# Patient Record
Sex: FEMALE | Race: WHITE | NOT HISPANIC OR LATINO | Employment: FULL TIME | URBAN - METROPOLITAN AREA
[De-identification: names, ages, dates, MRNs, and addresses within clinical notes are randomized per-mention and may not be internally consistent; named-entity substitution may affect disease eponyms.]

---

## 2017-05-22 ENCOUNTER — OFFICE VISIT (OUTPATIENT)
Dept: FAMILY MEDICINE | Facility: CLINIC | Age: 52
End: 2017-05-22
Payer: COMMERCIAL

## 2017-05-22 VITALS
SYSTOLIC BLOOD PRESSURE: 128 MMHG | WEIGHT: 246.5 LBS | HEIGHT: 66 IN | DIASTOLIC BLOOD PRESSURE: 80 MMHG | HEART RATE: 60 BPM | BODY MASS INDEX: 39.62 KG/M2

## 2017-05-22 DIAGNOSIS — R00.2 PALPITATIONS: ICD-10-CM

## 2017-05-22 DIAGNOSIS — Z00.00 ROUTINE ADULT HEALTH MAINTENANCE: Primary | ICD-10-CM

## 2017-05-22 DIAGNOSIS — F31.70 BIPOLAR DISORDER IN FULL REMISSION, MOST RECENT EPISODE UNSPECIFIED TYPE: ICD-10-CM

## 2017-05-22 PROCEDURE — 99999 PR PBB SHADOW E&M-EST. PATIENT-LVL III: CPT | Mod: PBBFAC,,, | Performed by: FAMILY MEDICINE

## 2017-05-22 PROCEDURE — 99396 PREV VISIT EST AGE 40-64: CPT | Mod: S$GLB,,, | Performed by: FAMILY MEDICINE

## 2017-05-22 RX ORDER — OMEPRAZOLE 20 MG/1
20 CAPSULE, DELAYED RELEASE ORAL DAILY
Qty: 30 CAPSULE | Refills: 11 | Status: SHIPPED | OUTPATIENT
Start: 2017-05-22 | End: 2018-08-25 | Stop reason: SDUPTHER

## 2017-05-22 RX ORDER — DIPHENHYDRAMINE HCL 50 MG
50 CAPSULE ORAL NIGHTLY PRN
COMMUNITY
Start: 2017-04-18 | End: 2019-05-02

## 2017-05-22 NOTE — PROGRESS NOTES
HPI  Cristina Tan is a 52 y.o. female with multiple medical diagnoses as listed in the medical history and problem list that presents for Hyperlipidemia (Dr. Garcia)  .      HPI  Here today for routine health maintenance.     PAST MEDICAL HISTORY:  Past Medical History:   Diagnosis Date    Bipolar 1 disorder     GERD (gastroesophageal reflux disease)     History of psychiatric hospitalization     Mental disorder        PAST SURGICAL HISTORY:  Past Surgical History:   Procedure Laterality Date    breast reduction       SECTION      gastric sleeve      HYSTERECTOMY      OOPHORECTOMY      TONSILLECTOMY      tummy tuck         SOCIAL HISTORY:  Social History     Social History    Marital status:      Spouse name: N/A    Number of children: N/A    Years of education: N/A     Occupational History    Not on file.     Social History Main Topics    Smoking status: Never Smoker    Smokeless tobacco: Never Used    Alcohol use No    Drug use: No    Sexual activity: Yes     Partners: Male     Other Topics Concern    Not on file     Social History Narrative    No narrative on file       FAMILY HISTORY:  Family History   Problem Relation Age of Onset    Cancer Father     Breast cancer Neg Hx     Ovarian cancer Neg Hx     Diabetes Neg Hx     Hypertension Neg Hx     Thyroid disease Neg Hx        ALLERGIES AND MEDICATIONS: updated and reviewed.  Review of patient's allergies indicates:   Allergen Reactions    Sulfa (sulfonamide antibiotics) Other (See Comments)     Unknown/childhood    Lamictal [lamotrigine] Rash     Current Outpatient Prescriptions   Medication Sig Dispense Refill    diphenhydrAMINE (BENADRYL) 50 MG capsule Take 50 mg by mouth nightly as needed.      lithium (ESKALITH) 300 MG capsule Take 600 mg by mouth every evening. 2 cap. In the evening.      omeprazole (PRILOSEC) 20 MG capsule Take 1 capsule (20 mg total) by mouth once daily. 30 capsule 11     No current  "facility-administered medications for this visit.        ROS  Review of Systems   Constitutional: Negative for fatigue, fever and unexpected weight change.   HENT: Negative for congestion, hearing loss, rhinorrhea and sore throat.    Eyes: Negative for visual disturbance.   Respiratory: Negative for cough, chest tightness, shortness of breath and wheezing.    Cardiovascular: Positive for palpitations (began 6 months ago.  no triggering events.  no treatment.  lasts several seconds and resolves spontaneously). Negative for chest pain and leg swelling.   Gastrointestinal: Negative for abdominal distention, abdominal pain, blood in stool, constipation, diarrhea, nausea and vomiting.   Genitourinary: Negative for difficulty urinating, dysuria, frequency, hematuria, menstrual problem, pelvic pain, urgency and vaginal bleeding.        Rare urgency   Musculoskeletal: Negative for back pain, joint swelling and neck pain.   Skin: Negative for rash.   Neurological: Negative for dizziness, tremors, weakness, light-headedness, numbness and headaches.   Psychiatric/Behavioral: Negative for confusion, dysphoric mood and sleep disturbance. The patient is not nervous/anxious.        Physical Exam  Vitals:    05/22/17 1627   BP: 128/80   Pulse: 60    Body mass index is 39.78 kg/m².  Weight: 111.8 kg (246 lb 7.6 oz)   Height: 5' 6" (167.6 cm)     Physical Exam   Constitutional: She is oriented to person, place, and time. She appears well-developed and well-nourished.   HENT:   Head: Normocephalic and atraumatic.   Right Ear: External ear normal.   Left Ear: External ear normal.   Nose: Nose normal.   Mouth/Throat: Oropharynx is clear and moist.   Eyes: Conjunctivae and EOM are normal. Pupils are equal, round, and reactive to light. No scleral icterus.   Neck: Normal range of motion. Neck supple. No JVD present. No thyromegaly present.   Cardiovascular: Normal rate, regular rhythm and normal heart sounds.  Exam reveals no gallop and " no friction rub.    No murmur heard.  Pulmonary/Chest: Effort normal and breath sounds normal. She has no wheezes. She has no rales.   Abdominal: Soft. Bowel sounds are normal. She exhibits no distension and no mass. There is no tenderness. There is no rebound and no guarding.   Musculoskeletal: Normal range of motion. She exhibits no edema.   Lymphadenopathy:     She has no cervical adenopathy.   Neurological: She is alert and oriented to person, place, and time. She has normal strength. No cranial nerve deficit or sensory deficit.   Skin: Skin is warm and dry. No rash noted.   Vitals reviewed.      Health Maintenance       Date Due Completion Date    Fecal Occult Blood Test (FOBT) 1965 ---    TETANUS VACCINE 04/05/1982 ---    Mammogram 10/28/2016 10/28/2015    Influenza Vaccine 08/01/2017 ---    Pap Smear 10/26/2018 10/26/2015    Lipid Panel 02/17/2021 2/17/2016          Assessment & Plan    Routine adult health maintenance  -     Mammo Digital Screening Bilat with CAD; Future; Expected date: 05/22/2017  -     Occult blood x 1, stool; Future; Expected date: 05/22/2017  - Health maintenance reviewed  - Diet and exercise education.    Palpitations  -     Holter monitor - 48 hour; Future    Bipolar disorder in full remission, most recent episode unspecified type  - Continue current therapy  - Continue Psychiatry    Other orders  -     omeprazole (PRILOSEC) 20 MG capsule; Take 1 capsule (20 mg total) by mouth once daily.  Dispense: 30 capsule; Refill: 11        No Follow-up on file.

## 2017-05-30 ENCOUNTER — HOSPITAL ENCOUNTER (OUTPATIENT)
Dept: RADIOLOGY | Facility: HOSPITAL | Age: 52
Discharge: HOME | End: 2017-05-30
Attending: FAMILY MEDICINE
Payer: COMMERCIAL

## 2017-05-30 DIAGNOSIS — Z00.00 ROUTINE ADULT HEALTH MAINTENANCE: ICD-10-CM

## 2017-05-30 DIAGNOSIS — Z12.31 VISIT FOR SCREENING MAMMOGRAM: ICD-10-CM

## 2017-05-30 PROCEDURE — 77067 SCR MAMMO BI INCL CAD: CPT | Mod: 26,,, | Performed by: RADIOLOGY

## 2017-05-30 PROCEDURE — 77067 SCR MAMMO BI INCL CAD: CPT | Mod: TC

## 2017-06-21 ENCOUNTER — LAB VISIT (OUTPATIENT)
Dept: LAB | Facility: HOSPITAL | Age: 52
End: 2017-06-21
Attending: FAMILY MEDICINE
Payer: COMMERCIAL

## 2017-06-21 DIAGNOSIS — Z00.00 ROUTINE ADULT HEALTH MAINTENANCE: ICD-10-CM

## 2017-06-21 PROCEDURE — 82272 OCCULT BLD FECES 1-3 TESTS: CPT

## 2017-06-22 LAB — OB PNL STL: NEGATIVE

## 2017-07-07 ENCOUNTER — TELEPHONE (OUTPATIENT)
Dept: FAMILY MEDICINE | Facility: CLINIC | Age: 52
End: 2017-07-07

## 2017-07-07 NOTE — TELEPHONE ENCOUNTER
----- Message from Ratna Daugherty sent at 7/7/2017  9:02 AM CDT -----  Contact: Rere Engle [phone]  Patient needs hospital follow up with the doctor.Thanks!

## 2017-07-10 ENCOUNTER — TELEPHONE (OUTPATIENT)
Dept: FAMILY MEDICINE | Facility: CLINIC | Age: 52
End: 2017-07-10

## 2017-07-10 NOTE — TELEPHONE ENCOUNTER
----- Message from Megan Reaves sent at 7/10/2017 10:49 AM CDT -----  Dr. Garcia's office is calling to let office know that patient is on her way.  said she was supposed to call about 15 minutes ago but that was still when the appointment was supposed to be completed. I do not have the patient's cell. When she gets there office will have to decide whether to reschedule.

## 2018-01-30 ENCOUNTER — TELEPHONE (OUTPATIENT)
Dept: FAMILY MEDICINE | Facility: CLINIC | Age: 53
End: 2018-01-30

## 2018-01-30 NOTE — TELEPHONE ENCOUNTER
----- Message from Tessy Davison sent at 1/29/2018  4:05 PM CST -----  Contact: self  Patient called regarding heart monitor was to be given at last appt, stating having issues with heart again. Please contact [phone] (home)

## 2018-02-02 ENCOUNTER — CLINICAL SUPPORT (OUTPATIENT)
Dept: CARDIOLOGY | Facility: CLINIC | Age: 53
End: 2018-02-02
Attending: FAMILY MEDICINE
Payer: COMMERCIAL

## 2018-02-02 DIAGNOSIS — R00.2 PALPITATIONS: ICD-10-CM

## 2018-02-02 PROCEDURE — 93224 XTRNL ECG REC UP TO 48 HRS: CPT | Mod: S$GLB,,, | Performed by: INTERNAL MEDICINE

## 2018-07-31 ENCOUNTER — TELEPHONE (OUTPATIENT)
Dept: FAMILY MEDICINE | Facility: CLINIC | Age: 53
End: 2018-07-31

## 2018-07-31 DIAGNOSIS — Z12.39 SCREENING FOR BREAST CANCER: Primary | ICD-10-CM

## 2018-07-31 NOTE — TELEPHONE ENCOUNTER
----- Message from Tabatha Coppola sent at 7/31/2018  7:49 AM CDT -----  Contact: self  Patient [phone] is calling to schedule her mammogram/last mammogram was 05 30 17 but an order is needed/please enter order/

## 2018-08-11 ENCOUNTER — HOSPITAL ENCOUNTER (OUTPATIENT)
Dept: RADIOLOGY | Facility: HOSPITAL | Age: 53
Discharge: HOME | End: 2018-08-11
Attending: FAMILY MEDICINE
Payer: COMMERCIAL

## 2018-08-11 DIAGNOSIS — Z12.39 SCREENING FOR BREAST CANCER: ICD-10-CM

## 2018-08-11 PROCEDURE — 77067 SCR MAMMO BI INCL CAD: CPT | Mod: 26,,, | Performed by: RADIOLOGY

## 2018-08-11 PROCEDURE — 77063 BREAST TOMOSYNTHESIS BI: CPT | Mod: TC,PO

## 2018-08-11 PROCEDURE — 77063 BREAST TOMOSYNTHESIS BI: CPT | Mod: 26,,, | Performed by: RADIOLOGY

## 2018-08-27 RX ORDER — OMEPRAZOLE 20 MG/1
CAPSULE, DELAYED RELEASE ORAL
Qty: 30 CAPSULE | Refills: 3 | Status: SHIPPED | OUTPATIENT
Start: 2018-08-27 | End: 2020-08-20 | Stop reason: SDUPTHER

## 2018-08-28 ENCOUNTER — PATIENT MESSAGE (OUTPATIENT)
Dept: FAMILY MEDICINE | Facility: CLINIC | Age: 53
End: 2018-08-28

## 2018-09-19 ENCOUNTER — PATIENT MESSAGE (OUTPATIENT)
Dept: FAMILY MEDICINE | Facility: CLINIC | Age: 53
End: 2018-09-19

## 2018-09-22 ENCOUNTER — PATIENT MESSAGE (OUTPATIENT)
Dept: FAMILY MEDICINE | Facility: CLINIC | Age: 53
End: 2018-09-22

## 2018-09-22 DIAGNOSIS — Z00.00 ROUTINE HEALTH MAINTENANCE: Primary | ICD-10-CM

## 2018-11-07 ENCOUNTER — TELEPHONE (OUTPATIENT)
Dept: GASTROENTEROLOGY | Facility: CLINIC | Age: 53
End: 2018-11-07

## 2018-11-07 NOTE — TELEPHONE ENCOUNTER
Spoke with pt. Attempted to schedule ordered colonoscopy. Pt undecided if she wants to have procedure. Pt will call back to schedule if she decides to schedule.

## 2019-04-12 ENCOUNTER — OFFICE VISIT (OUTPATIENT)
Dept: FAMILY MEDICINE | Facility: CLINIC | Age: 54
End: 2019-04-12
Payer: COMMERCIAL

## 2019-04-12 VITALS
HEART RATE: 68 BPM | DIASTOLIC BLOOD PRESSURE: 70 MMHG | BODY MASS INDEX: 42.13 KG/M2 | WEIGHT: 261 LBS | SYSTOLIC BLOOD PRESSURE: 114 MMHG | OXYGEN SATURATION: 94 %

## 2019-04-12 DIAGNOSIS — R07.9 CHEST PAIN, UNSPECIFIED TYPE: ICD-10-CM

## 2019-04-12 DIAGNOSIS — S62.91XD CLOSED FRACTURE OF RIGHT HAND WITH ROUTINE HEALING, SUBSEQUENT ENCOUNTER: ICD-10-CM

## 2019-04-12 DIAGNOSIS — F31.9 BIPOLAR AFFECTIVE DISORDER, REMISSION STATUS UNSPECIFIED: ICD-10-CM

## 2019-04-12 DIAGNOSIS — Z00.00 ROUTINE HEALTH MAINTENANCE: Primary | ICD-10-CM

## 2019-04-12 PROCEDURE — 99999 PR PBB SHADOW E&M-EST. PATIENT-LVL II: CPT | Mod: PBBFAC,,, | Performed by: FAMILY MEDICINE

## 2019-04-12 PROCEDURE — 99396 PR PREVENTIVE VISIT,EST,40-64: ICD-10-PCS | Mod: S$GLB,,, | Performed by: FAMILY MEDICINE

## 2019-04-12 PROCEDURE — 99396 PREV VISIT EST AGE 40-64: CPT | Mod: S$GLB,,, | Performed by: FAMILY MEDICINE

## 2019-04-12 PROCEDURE — 99999 PR PBB SHADOW E&M-EST. PATIENT-LVL II: ICD-10-PCS | Mod: PBBFAC,,, | Performed by: FAMILY MEDICINE

## 2019-04-12 NOTE — PROGRESS NOTES
JESSICA Tan is a 54 y.o. female with multiple medical diagnoses as listed in the medical history and problem list that presents for No chief complaint on file.  .      HPI  Here today for routine health maintenance.  Still seeing Psychiatry for recurrent bipolar disorder with manic episodes.  She recently injured her right hand with persistent pain and numbness in the right thumb.  Recent xrays by her report show loose bone fragments and a hand specialist was recommended.    PAST MEDICAL HISTORY:  Past Medical History:   Diagnosis Date    Bipolar 1 disorder     GERD (gastroesophageal reflux disease)     History of psychiatric hospitalization     Mental disorder        PAST SURGICAL HISTORY:  Past Surgical History:   Procedure Laterality Date    breast reduction       SECTION      gastric sleeve      HYSTERECTOMY      OOPHORECTOMY      TONSILLECTOMY      TOTAL REDUCTION MAMMOPLASTY Bilateral         tummy tuck         SOCIAL HISTORY:  Social History     Socioeconomic History    Marital status:      Spouse name: Not on file    Number of children: Not on file    Years of education: Not on file    Highest education level: Not on file   Occupational History    Not on file   Social Needs    Financial resource strain: Not on file    Food insecurity:     Worry: Not on file     Inability: Not on file    Transportation needs:     Medical: Not on file     Non-medical: Not on file   Tobacco Use    Smoking status: Never Smoker    Smokeless tobacco: Never Used   Substance and Sexual Activity    Alcohol use: Yes    Drug use: No    Sexual activity: Yes     Partners: Male   Lifestyle    Physical activity:     Days per week: Not on file     Minutes per session: Not on file    Stress: Not on file   Relationships    Social connections:     Talks on phone: Not on file     Gets together: Not on file     Attends Muslim service: Not on file     Active member of club or  organization: Not on file     Attends meetings of clubs or organizations: Not on file     Relationship status: Not on file   Other Topics Concern    Not on file   Social History Narrative    Not on file       FAMILY HISTORY:  Family History   Problem Relation Age of Onset    Cancer Father     Breast cancer Neg Hx     Ovarian cancer Neg Hx     Diabetes Neg Hx     Hypertension Neg Hx     Thyroid disease Neg Hx        ALLERGIES AND MEDICATIONS: updated and reviewed.  Review of patient's allergies indicates:   Allergen Reactions    Sulfa (sulfonamide antibiotics) Other (See Comments)     Unknown/childhood    Lamictal [lamotrigine] Rash     Current Outpatient Medications   Medication Sig Dispense Refill    diphenhydrAMINE (BENADRYL) 50 MG capsule Take 50 mg by mouth nightly as needed.      lithium (ESKALITH) 300 MG capsule Take 600 mg by mouth every evening. 2 cap. In the evening.      LORazepam (ATIVAN) 1 MG tablet Take 1 tablet (1 mg total) by mouth every 8 (eight) hours as needed for Anxiety. 15 tablet 0    omeprazole (PRILOSEC) 20 MG capsule TAKE 1 CAPSULE(20 MG) BY MOUTH EVERY DAY 30 capsule 3    risperidone (RISPERDAL) 1 MG tablet Take 1 tablet (1 mg total) by mouth once daily. 30 tablet 1     No current facility-administered medications for this visit.        ROS  Review of Systems   Constitutional: Negative for fatigue, fever and unexpected weight change.   HENT: Negative for congestion, hearing loss, rhinorrhea and sore throat.    Eyes: Negative for visual disturbance.   Respiratory: Negative for cough, chest tightness, shortness of breath and wheezing.    Cardiovascular: Positive for chest pain (one episode sharp and shooting last week). Negative for palpitations and leg swelling.   Gastrointestinal: Negative for abdominal distention, abdominal pain, blood in stool, constipation, diarrhea, nausea and vomiting.   Genitourinary: Negative for difficulty urinating, dysuria, frequency, hematuria,  menstrual problem, pelvic pain, urgency and vaginal bleeding.   Musculoskeletal: Positive for arthralgias. Negative for back pain, joint swelling and neck pain.   Skin: Negative for rash.   Neurological: Negative for dizziness, tremors, weakness, light-headedness, numbness and headaches.   Psychiatric/Behavioral: Negative for confusion, dysphoric mood and sleep disturbance. The patient is not nervous/anxious.        Physical Exam  There were no vitals filed for this visit. Body mass index is 42.13 kg/m².  Weight: 118.4 kg (261 lb 0.4 oz)         Physical Exam   Constitutional: She is oriented to person, place, and time. She appears well-developed and well-nourished.   HENT:   Head: Normocephalic and atraumatic.   Right Ear: External ear normal.   Left Ear: External ear normal.   Nose: Nose normal.   Mouth/Throat: Oropharynx is clear and moist.   Eyes: Pupils are equal, round, and reactive to light. Conjunctivae and EOM are normal. No scleral icterus.   Neck: Normal range of motion. Neck supple. No JVD present. No thyromegaly present.   Cardiovascular: Normal rate, regular rhythm and normal heart sounds. Exam reveals no gallop and no friction rub.   No murmur heard.  Pulmonary/Chest: Effort normal and breath sounds normal. She has no wheezes. She has no rales.   Abdominal: Soft. Bowel sounds are normal. She exhibits no distension and no mass. There is no tenderness. There is no rebound and no guarding.   Musculoskeletal: Normal range of motion. She exhibits no edema.   Lymphadenopathy:     She has no cervical adenopathy.   Neurological: She is alert and oriented to person, place, and time. She has normal strength. No cranial nerve deficit or sensory deficit.   Skin: Skin is warm and dry. No rash noted.   Vitals reviewed.      Health Maintenance       Date Due Completion Date    TETANUS VACCINE 04/05/1982 ---    Fecal Occult Blood Test (FOBT)/[employer] 06/21/2018 6/21/2017    Influenza Vaccine 08/01/2018 ---    Mammogram  08/11/2019 8/11/2018    Lipid Panel 02/17/2021 2/17/2016          Assessment & Plan    Routine health maintenance  -     Comprehensive metabolic panel; Future; Expected date: 04/12/2019  -     Lipid panel; Future; Expected date: 04/12/2019  -     Fecal Immunochemical Test (iFOBT); Future; Expected date: 04/12/2019  -     Case request GI: COLONOSCOPY  - Health maintenance reviewed  - Diet and exercise education.    Bipolar affective disorder, remission status unspecified  -     Lithium level; Future; Expected date: 04/12/2019  -     TSH; Future; Expected date: 04/12/2019  - Continue current therapy  - Continue Psychiatry    Closed fracture of right hand with routine healing, subsequent encounter  -     Ambulatory consult to Orthopedics    Chest pain, unspecified type  -     Echocardiogram stress test (Cupid Only); Future          Follow up in about 1 year (around 4/12/2020).

## 2019-04-15 ENCOUNTER — TELEPHONE (OUTPATIENT)
Dept: GASTROENTEROLOGY | Facility: CLINIC | Age: 54
End: 2019-04-15

## 2019-04-15 NOTE — TELEPHONE ENCOUNTER
Pt not interested in scheduling colonoscopy at this time. Needs more time to think about it. Will call back when she is ready to schedule

## 2019-04-24 ENCOUNTER — LAB VISIT (OUTPATIENT)
Dept: LAB | Facility: HOSPITAL | Age: 54
End: 2019-04-24
Attending: FAMILY MEDICINE
Payer: COMMERCIAL

## 2019-04-24 DIAGNOSIS — F31.9 BIPOLAR AFFECTIVE DISORDER, REMISSION STATUS UNSPECIFIED: ICD-10-CM

## 2019-04-24 DIAGNOSIS — Z00.00 ROUTINE HEALTH MAINTENANCE: ICD-10-CM

## 2019-04-24 LAB
ALBUMIN SERPL BCP-MCNC: 3.6 G/DL (ref 3.5–5.2)
ALP SERPL-CCNC: 52 U/L (ref 55–135)
ALT SERPL W/O P-5'-P-CCNC: 16 U/L (ref 10–44)
ANION GAP SERPL CALC-SCNC: 8 MMOL/L (ref 8–16)
AST SERPL-CCNC: 18 U/L (ref 10–40)
BILIRUB SERPL-MCNC: 1.2 MG/DL (ref 0.1–1)
BUN SERPL-MCNC: 11 MG/DL (ref 6–20)
CALCIUM SERPL-MCNC: 9.6 MG/DL (ref 8.7–10.5)
CHLORIDE SERPL-SCNC: 109 MMOL/L (ref 95–110)
CHOLEST SERPL-MCNC: 255 MG/DL (ref 120–199)
CHOLEST/HDLC SERPL: 4.7 {RATIO} (ref 2–5)
CO2 SERPL-SCNC: 26 MMOL/L (ref 23–29)
CREAT SERPL-MCNC: 0.8 MG/DL (ref 0.5–1.4)
EST. GFR  (AFRICAN AMERICAN): >60 ML/MIN/1.73 M^2
EST. GFR  (NON AFRICAN AMERICAN): >60 ML/MIN/1.73 M^2
GLUCOSE SERPL-MCNC: 109 MG/DL (ref 70–110)
HDLC SERPL-MCNC: 54 MG/DL (ref 40–75)
HDLC SERPL: 21.2 % (ref 20–50)
LDLC SERPL CALC-MCNC: 161.2 MG/DL (ref 63–159)
LITHIUM SERPL-SCNC: 0.7 MMOL/L (ref 0.6–1.2)
NONHDLC SERPL-MCNC: 201 MG/DL
POTASSIUM SERPL-SCNC: 4.2 MMOL/L (ref 3.5–5.1)
PROT SERPL-MCNC: 7.1 G/DL (ref 6–8.4)
SODIUM SERPL-SCNC: 143 MMOL/L (ref 136–145)
TRIGL SERPL-MCNC: 199 MG/DL (ref 30–150)
TSH SERPL DL<=0.005 MIU/L-ACNC: 3.15 UIU/ML (ref 0.4–4)

## 2019-04-24 PROCEDURE — 80061 LIPID PANEL: CPT

## 2019-04-24 PROCEDURE — 36415 COLL VENOUS BLD VENIPUNCTURE: CPT | Mod: PO

## 2019-04-24 PROCEDURE — 84443 ASSAY THYROID STIM HORMONE: CPT

## 2019-04-24 PROCEDURE — 80053 COMPREHEN METABOLIC PANEL: CPT

## 2019-04-24 PROCEDURE — 80178 ASSAY OF LITHIUM: CPT

## 2019-04-26 ENCOUNTER — CLINICAL SUPPORT (OUTPATIENT)
Dept: CARDIOLOGY | Facility: CLINIC | Age: 54
End: 2019-04-26
Attending: FAMILY MEDICINE
Payer: COMMERCIAL

## 2019-04-26 VITALS — HEIGHT: 66 IN | WEIGHT: 261 LBS | BODY MASS INDEX: 41.95 KG/M2

## 2019-04-26 DIAGNOSIS — R07.9 CHEST PAIN, UNSPECIFIED TYPE: ICD-10-CM

## 2019-04-26 PROCEDURE — 93351 ECHOCARDIOGRAM STRESS TEST (CUPID ONLY): ICD-10-PCS | Mod: S$GLB,,, | Performed by: INTERNAL MEDICINE

## 2019-04-26 PROCEDURE — 99999 PR PBB SHADOW E&M-EST. PATIENT-LVL I: ICD-10-PCS | Mod: PBBFAC,,,

## 2019-04-26 PROCEDURE — 99999 PR PBB SHADOW E&M-EST. PATIENT-LVL I: CPT | Mod: PBBFAC,,,

## 2019-04-26 PROCEDURE — 93351 STRESS TTE COMPLETE: CPT | Mod: S$GLB,,, | Performed by: INTERNAL MEDICINE

## 2019-04-29 DIAGNOSIS — R07.9 ACUTE CHEST PAIN: ICD-10-CM

## 2019-04-29 LAB
ASCENDING AORTA: 2.89 CM
BSA FOR ECHO PROCEDURE: 2.35 M2
CV ECHO LV RWT: 0.38 CM
CV STRESS BASE HR: 68 BPM
DIASTOLIC BLOOD PRESSURE: 74 MMHG
DOP CALC LVOT AREA: 4.79 CM2
DOP CALC LVOT DIAMETER: 2.47 CM
DOP CALC LVOT PEAK VEL: 0.9 M/S
DOP CALC LVOT STROKE VOLUME: 104.12 CM3
DOP CALCLVOT PEAK VEL VTI: 21.74 CM
E WAVE DECELERATION TIME: 175.96 MSEC
E/A RATIO: 1.03
E/E' RATIO: 9.06
ECHO LV POSTERIOR WALL: 0.98 CM (ref 0.6–1.1)
FRACTIONAL SHORTENING: 50 % (ref 28–44)
INTERVENTRICULAR SEPTUM: 0.93 CM (ref 0.6–1.1)
IVRT: 0.13 MSEC
LA MAJOR: 5.05 CM
LA MINOR: 4.55 CM
LA WIDTH: 4.05 CM
LEFT ATRIUM SIZE: 4.11 CM
LEFT ATRIUM VOLUME INDEX: 30.2 ML/M2
LEFT ATRIUM VOLUME: 67.73 CM3
LEFT INTERNAL DIMENSION IN SYSTOLE: 2.58 CM (ref 2.1–4)
LEFT VENTRICLE DIASTOLIC VOLUME INDEX: 56.94 ML/M2
LEFT VENTRICLE DIASTOLIC VOLUME: 127.52 ML
LEFT VENTRICLE MASS INDEX: 80.8 G/M2
LEFT VENTRICLE SYSTOLIC VOLUME INDEX: 10.8 ML/M2
LEFT VENTRICLE SYSTOLIC VOLUME: 24.09 ML
LEFT VENTRICULAR INTERNAL DIMENSION IN DIASTOLE: 5.17 CM (ref 3.5–6)
LEFT VENTRICULAR MASS: 180.9 G
LV LATERAL E/E' RATIO: 7.7
LV SEPTAL E/E' RATIO: 11
MV PEAK A VEL: 0.75 M/S
MV PEAK E VEL: 0.77 M/S
OHS CV CPX 1 MINUTE RECOVERY HEART RATE: 116 BPM
OHS CV CPX 85 PERCENT MAX PREDICTED HEART RATE MALE: 134
OHS CV CPX ESTIMATED METS: 11
OHS CV CPX MAX PREDICTED HEART RATE: 158
OHS CV CPX PATIENT IS FEMALE: 1
OHS CV CPX PATIENT IS MALE: 0
OHS CV CPX PEAK DIASTOLIC BLOOD PRESSURE: 80 MMHG
OHS CV CPX PEAK HEAR RATE: 162 BPM
OHS CV CPX PEAK RATE PRESSURE PRODUCT: NORMAL
OHS CV CPX PEAK SYSTOLIC BLOOD PRESSURE: 164 MMHG
OHS CV CPX PERCENT MAX PREDICTED HEART RATE ACHIEVED: 103
OHS CV CPX PERCENT TARGET HEART RATE ACHIEVED: 120.9
OHS CV CPX RATE PRESSURE PRODUCT PRESENTING: 7752
OHS CV CPX TARGET HEART RATE: 134
PISA TR MAX VEL: 2.32 M/S
PULM VEIN S/D RATIO: 1.07
PV PEAK D VEL: 0.55 M/S
PV PEAK S VEL: 0.59 M/S
RA MAJOR: 5.25 CM
RA WIDTH: 3.7 CM
SINUS: 2.68 CM
STJ: 2.91 CM
STRESS ECHO POST EXERCISE DUR MIN: 6 MIN
STRESS ECHO POST EXERCISE DUR SEC: 17
SYSTOLIC BLOOD PRESSURE: 114 MMHG
TDI LATERAL: 0.1
TDI SEPTAL: 0.07
TDI: 0.09
TR MAX PG: 21.53 MMHG

## 2019-05-02 ENCOUNTER — OFFICE VISIT (OUTPATIENT)
Dept: ORTHOPEDICS | Facility: CLINIC | Age: 54
End: 2019-05-02
Payer: COMMERCIAL

## 2019-05-02 VITALS
BODY MASS INDEX: 42.24 KG/M2 | HEIGHT: 66 IN | SYSTOLIC BLOOD PRESSURE: 125 MMHG | DIASTOLIC BLOOD PRESSURE: 86 MMHG | HEART RATE: 71 BPM | WEIGHT: 262.81 LBS

## 2019-05-02 DIAGNOSIS — G56.01 CARPAL TUNNEL SYNDROME OF RIGHT WRIST: Primary | ICD-10-CM

## 2019-05-02 DIAGNOSIS — S60.211A CONTUSION OF RIGHT WRIST, INITIAL ENCOUNTER: ICD-10-CM

## 2019-05-02 PROCEDURE — 99203 OFFICE O/P NEW LOW 30 MIN: CPT | Mod: S$GLB,,, | Performed by: ORTHOPAEDIC SURGERY

## 2019-05-02 PROCEDURE — 99999 PR PBB SHADOW E&M-EST. PATIENT-LVL III: ICD-10-PCS | Mod: PBBFAC,,, | Performed by: ORTHOPAEDIC SURGERY

## 2019-05-02 PROCEDURE — 99203 PR OFFICE/OUTPT VISIT, NEW, LEVL III, 30-44 MIN: ICD-10-PCS | Mod: S$GLB,,, | Performed by: ORTHOPAEDIC SURGERY

## 2019-05-02 PROCEDURE — 99999 PR PBB SHADOW E&M-EST. PATIENT-LVL III: CPT | Mod: PBBFAC,,, | Performed by: ORTHOPAEDIC SURGERY

## 2019-05-02 RX ORDER — MELOXICAM 15 MG/1
15 TABLET ORAL DAILY
Qty: 30 TABLET | Refills: 0 | Status: SHIPPED | OUTPATIENT
Start: 2019-05-02 | End: 2019-07-22

## 2019-05-02 NOTE — LETTER
May 4, 2019      Tin Palomares MD  [address] [facility] [address] [place] [address]           [facility]  [address] [facility] [address] [place] [address]  Phone: [phone]          Patient: Cristina Tan   MR Number: [id number]   YOB: 1965   Date of Visit: 5/2/2019       Dear Dr. Tin Palomares:    Thank you for referring Cristina Tan to me for evaluation. Attached you will find relevant portions of my assessment and plan of care.    If you have questions, please do not hesitate to call me. I look forward to following Cristina Tan along with you.    Sincerely,    Ld Carbone MD    Enclosure  CC:  No Recipients    If you would like to receive this communication electronically, please contact [email or web address] or [phone] to request more information on [employer] access.    For providers and/or their staff who would like to refer a patient to [facility], please contact us through our one-stop-shop provider referral line, [facility], at [phone].    If you feel you have received this communication in error or would no longer like to receive these types of communications, please e-mail [email or web address]

## 2019-05-02 NOTE — H&P
2019    Chief Complaint:  Chief Complaint   Patient presents with    Hand Injury     pt injured hand on 19; fell out of a van       HPI:  Cristina Tan is a 54 y.o. female, who presents to clinic today approximately 3-4 months ago she was noted to have a fall onto her right outstretched hand.  She had pain and some swelling about the wrist. She eventually obtained an x-ray.  She was told she may have a fracture in her wrist.  She is here today for follow-up.  She states her main complaint at this point is numbness and tingling in her hand and fingers.  She states that the thumb is the most affected.  She states that her wrist is no longer significantly tender.  She has no other complaints.    PMHX:  Past Medical History:   Diagnosis Date    Bipolar 1 disorder     GERD (gastroesophageal reflux disease)     History of psychiatric hospitalization     Mental disorder        PSHX:  Past Surgical History:   Procedure Laterality Date    breast reduction       SECTION      gastric sleeve      HYSTERECTOMY      OOPHORECTOMY      TONSILLECTOMY      TOTAL REDUCTION MAMMOPLASTY Bilateral         tummy tuck         FMHX:  Family History   Problem Relation Age of Onset    Cancer Father     Breast cancer Neg Hx     Ovarian cancer Neg Hx     Diabetes Neg Hx     Hypertension Neg Hx     Thyroid disease Neg Hx        SOCHX:  Social History     Tobacco Use    Smoking status: Never Smoker    Smokeless tobacco: Never Used   Substance Use Topics    Alcohol use: Yes       ALLERGIES:  Lamictal [lamotrigine]    CURRENT MEDICATIONS:  Current Outpatient Medications on File Prior to Visit   Medication Sig Dispense Refill    lithium (ESKALITH) 300 MG capsule Take 600 mg by mouth every evening. 2 cap. In the evening.      omeprazole (PRILOSEC) 20 MG capsule TAKE 1 CAPSULE(20 MG) BY MOUTH EVERY DAY 30 capsule 3    LORazepam (ATIVAN) 1 MG tablet Take 1 tablet (1 mg total) by mouth every 8 (eight)  "hours as needed for Anxiety. 15 tablet 0    risperidone (RISPERDAL) 1 MG tablet Take 1 tablet (1 mg total) by mouth once daily. 30 tablet 1    [DISCONTINUED] diphenhydrAMINE (BENADRYL) 50 MG capsule Take 50 mg by mouth nightly as needed.       No current facility-administered medications on file prior to visit.        REVIEW OF SYSTEMS:  General: No major recent weight loss or weight gain  HEENT: No major changes in vision or hearing, she has no chronic sore throat  Respiratory: No chronic shortness of breath or wheezing  Cardiac: No chest pains or palpitations  GI: No chronic nausea vomiting or diarrhea  : No pain on urination or difficulty with urinating  Derm: No chronic skin rashes  Neuro: Never had a seizure or stroke    GENERAL PHYSICAL EXAM:   /86   Pulse 71   Ht 5' 6" (1.676 m)   Wt 119.2 kg (262 lb 12.6 oz)   BMI 42.42 kg/m²    GEN: well developed, well nourished, no acute distress   HENT: Normocephalic, atraumatic   EYES: No discharge, conjunctiva normal   NECK: Supple, non-tender   PULM: No wheezing, no respiratory distress   CV: RRR   ABD: Soft, non-tender    ORTHO EXAM:   Examination of the right wrist and hand reveals that there is no edema.  There are no major skin changes.  Palpation produces no areas of tenderness.  There is no snuffbox tenderness.  There is no tenderness over the CMC joint or over the distal pole of the scaphoid.  Wrist range of motion is extension of 70° and flexion of 60°.  She has full pronation and supination.  She does report mild decreased sensation in the median distribution when compared to the radial and ulnar distribution.  She has a negative Tinel's but a mildly positive Durkan's test.  She does have 5/5 thenar muscular strength.    RADIOLOGY:   X-rays of the right wrist from an outside facility have been reviewed.  She is noted to have what appears to be an area of calcification between the radial styloid and the tip of the scaphoid.  This does not appear " to be a fracture fragment.  There are no significant changes in the alignment of any other bony structures and no obvious fractures are noted.    ASSESSMENT:   Right carpal tunnel syndrome, right wrist contusion    PLAN:  1.  I will start her on nighttime splinting as well as a course of Mobic    2.  She will follow up with me in 4 weeks.  If she has not had significant improvement may consider nerve conduction study and repeat x-rays

## 2019-05-03 ENCOUNTER — TELEPHONE (OUTPATIENT)
Dept: FAMILY MEDICINE | Facility: CLINIC | Age: 54
End: 2019-05-03

## 2019-05-03 DIAGNOSIS — R07.9 ACUTE CHEST PAIN: Primary | ICD-10-CM

## 2019-05-03 NOTE — TELEPHONE ENCOUNTER
----- Message from Tin Palomares MD sent at 4/29/2019  8:31 PM CDT -----  Needs a nuclear stress test, Orders entered

## 2019-05-07 ENCOUNTER — PATIENT MESSAGE (OUTPATIENT)
Dept: FAMILY MEDICINE | Facility: CLINIC | Age: 54
End: 2019-05-07

## 2019-07-22 ENCOUNTER — OFFICE VISIT (OUTPATIENT)
Dept: PSYCHIATRY | Facility: CLINIC | Age: 54
End: 2019-07-22
Payer: COMMERCIAL

## 2019-07-22 VITALS
HEIGHT: 66 IN | SYSTOLIC BLOOD PRESSURE: 119 MMHG | DIASTOLIC BLOOD PRESSURE: 68 MMHG | RESPIRATION RATE: 16 BRPM | WEIGHT: 264.88 LBS | BODY MASS INDEX: 42.57 KG/M2 | HEART RATE: 59 BPM

## 2019-07-22 DIAGNOSIS — F31.77 BIPOLAR 1 DISORDER, MIXED, PARTIAL REMISSION: ICD-10-CM

## 2019-07-22 PROCEDURE — 99999 PR PBB SHADOW E&M-EST. PATIENT-LVL III: CPT | Mod: PBBFAC,,, | Performed by: NURSE PRACTITIONER

## 2019-07-22 PROCEDURE — 90792 PR PSYCHIATRIC DIAGNOSTIC EVALUATION W/MEDICAL SERVICES: ICD-10-PCS | Mod: S$GLB,,, | Performed by: NURSE PRACTITIONER

## 2019-07-22 PROCEDURE — 99999 PR PBB SHADOW E&M-EST. PATIENT-LVL III: ICD-10-PCS | Mod: PBBFAC,,, | Performed by: NURSE PRACTITIONER

## 2019-07-22 PROCEDURE — 90792 PSYCH DIAG EVAL W/MED SRVCS: CPT | Mod: S$GLB,,, | Performed by: NURSE PRACTITIONER

## 2019-07-22 RX ORDER — ALPRAZOLAM 0.5 MG/1
TABLET ORAL
Refills: 3 | COMMUNITY
Start: 2019-06-25 | End: 2020-07-24 | Stop reason: SDUPTHER
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
## 2019-08-02 ENCOUNTER — LAB VISIT (OUTPATIENT)
Dept: LAB | Facility: HOSPITAL | Age: 54
End: 2019-08-02
Attending: FAMILY MEDICINE
Payer: COMMERCIAL

## 2019-08-02 DIAGNOSIS — Z00.00 ROUTINE HEALTH MAINTENANCE: ICD-10-CM

## 2019-08-02 PROCEDURE — 82274 ASSAY TEST FOR BLOOD FECAL: CPT

## 2019-08-07 LAB — HEMOCCULT STL QL IA: NEGATIVE

## 2019-08-22 ENCOUNTER — OFFICE VISIT (OUTPATIENT)
Dept: PSYCHIATRY | Facility: CLINIC | Age: 54
End: 2019-08-22
Payer: COMMERCIAL

## 2019-08-22 VITALS
SYSTOLIC BLOOD PRESSURE: 133 MMHG | WEIGHT: 264.56 LBS | HEART RATE: 70 BPM | HEIGHT: 66 IN | BODY MASS INDEX: 42.52 KG/M2 | RESPIRATION RATE: 16 BRPM | DIASTOLIC BLOOD PRESSURE: 80 MMHG

## 2019-08-22 DIAGNOSIS — F31.77 BIPOLAR 1 DISORDER, MIXED, PARTIAL REMISSION: Primary | ICD-10-CM

## 2019-08-22 PROCEDURE — 99999 PR PBB SHADOW E&M-EST. PATIENT-LVL III: ICD-10-PCS | Mod: PBBFAC,,, | Performed by: NURSE PRACTITIONER

## 2019-08-22 PROCEDURE — 99214 OFFICE O/P EST MOD 30 MIN: CPT | Mod: S$GLB,,, | Performed by: NURSE PRACTITIONER

## 2019-08-22 PROCEDURE — 90833 PR PSYCHOTHERAPY W/PATIENT W/E&M, 30 MIN (ADD ON): ICD-10-PCS | Mod: S$GLB,,, | Performed by: NURSE PRACTITIONER

## 2019-08-22 PROCEDURE — 90833 PSYTX W PT W E/M 30 MIN: CPT | Mod: S$GLB,,, | Performed by: NURSE PRACTITIONER

## 2019-08-22 PROCEDURE — 99999 PR PBB SHADOW E&M-EST. PATIENT-LVL III: CPT | Mod: PBBFAC,,, | Performed by: NURSE PRACTITIONER

## 2019-08-22 PROCEDURE — 99214 PR OFFICE/OUTPT VISIT, EST, LEVL IV, 30-39 MIN: ICD-10-PCS | Mod: S$GLB,,, | Performed by: NURSE PRACTITIONER

## 2019-08-22 NOTE — PROGRESS NOTES
"Outpatient Psychiatry Follow-Up Visit    Clinical Status of Patient: Outpatient (Ambulatory)  08/22/2019     Chief Complaint: Pt is a 54 year old female who presents today for a follow-up. Met with patient.       Interval History and Content of Current Session:  Interim Events/Subjective Report/Content of Current Session:  follow up appointment.    Pt is a 54 year old female with past psychiatric hx of Bipolar 1 disorder who presents for follow up treatment. She is currently taking Lithium 600mg BID and Xanax 0.5 mg QHS PRN for sleep (only uses sparingly). Pt reports good tolerability efficacy with current regimen. Reports good sleep, feeling rested upon waking. No real issues falling or staying asleep. Energy and concentration good. Appetite good. States that she has desire to increase her activity levels and eating habits. Is motivated due to her daughter's wedding approaching. Denies gamal/hypomania since last visit. Denies any worsening of depressive symptoms since last visit. Last Li level 0.7 around 4 months ago.      Past Psychiatric hx: Pt. is a 54 year old female  with a past psychiatric hx of depression, Bipolar 1 who established care with me 07/19 following the penitentiary of her previous psych, Dr. Pelaez. She came to me taking Lithium 600mg BID and Xanax 0.5 mg QHS PRN for sleep (only uses this sparingly) , which had been previously prescribed and managed by Dr. Pelaez. She reports that her symptoms have stabilized over the last 7-8 months, and has been without a manic episode since November of 2018. Previous med trials of Lamictal (rash), Risperdal, Wellbutrin, some SSRIs (destablized mood, triggered manic episodes). Pt stable in clinic upon initial eval - all meds were continued at same dosage.      Notes a long standing history of "mood swings" dating to childhood, but first formal psych encounter in 1987 following the birth of her child. While admitted to the hospital following birth, and after being " "discharged, pt speaks to experiencing A/V hallucinations and euphoric gamal following a two-week period of going without sleep. "I was real paranoid after I had the baby. I felt that if I went to sleep, I wouldn't be able to hear the baby cry. I ended up going a couple weeks where I didn't sleep at all, started hearing things and seeing things that weren't there. I felt like people were trying to contact me and tell me things through the computer or the TV. I was paranoid and was thinking the vampires were after us. I started thinking I was someone I wasn't." She eventually sought psychiatric care in an outpatient seeing, but was not started on any psychiatric medication until 2002 (?). She was prescribed SSRIs and Wellbutrin at this time, which triggered a manic episode, and were d/c'd. She was eventually started on Lithium around 2005, and has achieved good results. States she has felt more stable than she's ever felt before.      Today, reports manic episodes "once or twice a year" that are triggered by a lack of sleep. States that her manic episodes last for "weeks" and "I get hyper, my metabolism speeds up. I feel like I'm sped up. I always try to buy a car or a house. I had two houses at one time. It always ends with them putting me in the hospital." Does continue to experience delusional thinking and auditory hallucinations exclusively during manic episodes. Hx of several ED admissions in 03/19 r/t delusions, and "walking down the street talking to people who weren't there" Per - ED note. Reports inpatient hospitalization following manic episodes to [facility] x 1 week in November of 2018. Also reports another admission to [facility] in March of 2018 for 3 days following a manic episode. During these episodes, "I get very happy during those episodes. I start thinking I'm Colt and can communicate through the TV".      Denies any depressive symptoms. Reports sleeping 7-9 hours of sleep each night, restful. No " "issues falling or staying asleep. Denies anhedonia. Denies guilt/worthlessness. Energy good, concentration good. Appetite good. Denies psychomotor slowing, denies SI.          Past Medical hx:   Past Medical History:   Diagnosis Date    Bipolar 1 disorder     GERD (gastroesophageal reflux disease)     History of psychiatric hospitalization     Mental disorder         Interim hx:  Medication changes last visit:  none  Anxiety: denies  Depression: denies     Denies suicidal/homicidal ideations.  Denies hopelessness/worthlessness.    Denies auditory/visual hallucinations       Tobacco: never used  Alcohol: denies   Drug use: denies  Caffeine: 1-2 cups coffee daily      Review of Systems   · PSYCHIATRIC: Pertinent items are noted in the narrative.        CONSTITUTIONAL: weight stable        M/S: no pain today         ENT: no allergies noted today        ABD: no n/v/d     Past Medical, Family and Social History: The patient's past medical, family and social history have been reviewed and updated as appropriate within the electronic medical record. See encounter notes.     Medication: Lithium 600mg BID and Xanax 0.5 mg QHS PRN for sleep (     Compliance: yes      Side effects: tolerates     Risk Parameters:  Patient reports no suicidal ideation  Patient reports no homicidal ideation  Patient reports no self-injurious behavior  Patient reports no violent behavior     Exam (detailed: at least 9 elements; comprehensive: all 15 elements)   Constitutional  Vitals:  Most recent vital signs, dated less than 90 days prior to this appointment, were reviewed. /80 (BP Location: Left arm, Patient Position: Sitting)   Pulse 70   Resp 16   Ht 5' 6" (1.676 m)   Wt 120 kg (264 lb 8.8 oz)   BMI 42.70 kg/m²      General:  unremarkable, age appropriate, casual attire, good eye contact, good rapport       Musculoskeletal  Muscle Strength/Tone:  no flaccidity, no tremor    Gait & Station:  normal      Psychiatric                "        Speech:  normal tone, normal rate, rhythm, and volume   Mood & Affect:   Euthymic, congruent, appropriate         Thought Process:   Goal directed; Linear    Associations:   intact   Thought Content:   No SI/HI, delusions, or paranoia, no AV/VH   Insight & Judgement:   Good, adequate to circumstances   Orientation:   grossly intact; alert and oriented x 4    Memory:  intact for content of interview    Language:  grossly intact, can repeat    Attention Span  : Grossly intact for content of interview   Fund of Knowledge:   intact and appropriate to age and level of education        Assessment and Diagnosis   Status/Progress: Based on the examination today, the patient's problem(s) is/are under fair control.  New problems have not been presented today. Comorbidities are not currently complicating management of the primary condition.      Impression: Pt is a 54 year old female with past psychiatric hx of Bipolar 1 disorder who presents for follow up treatment. She is currently taking Lithium 600mg BID and Xanax 0.5 mg QHS PRN for sleep (only uses sparingly). Pt reports good tolerability efficacy with current regimen. Reports good sleep, feeling rested upon waking. No real issues falling or staying asleep. Energy and concentration good. Appetite good. States that she has desire to increase her activity levels and eating habits. Is motivated due to her daughter's wedding approaching. Denies gamal/hypomania since last visit. Denies any worsening of depressive symptoms since last visit. Last Li level 0.7 around 4 months ago       Diagnosis: Hx of Bipolar 1 disorer    Intervention/Counseling/Treatment Plan   · Medication Management:      1) Continue Lithium 600 mg BID.      2) Continue Xanax 0.5 mg QHS PRN for sleep. Discussed risk of decreased RT, sedation, addictive potential, and not to mix with alcohol.      3. Call to report any worsening of symptoms or problems with the medication. Pt instructed to go to ER with  thoughts of harming self, others     4. Patient given contact # for psychotherapists at [facility] and also instructed she may check with insurance for list of providers.      5. Labs: Li level    Psychotherapy:   · Target symptoms: gamal, depression  · Why chosen therapy is appropriate versus another modality: relevant to diagnosis, patient responds to this modality  · Outcome monitoring methods: self-report, observation, feedback from family   · Therapeutic intervention type: supportive psychotherapy  · Topics discussed/themes: building skills sets for symptom management, symptom recognition, nutrition, exercise  · The patient's response to the intervention is accepting. The patient's progress toward treatment goals is positive progress.  · Duration of intervention: 20 minutes     Return to clinic: 2 mo    -Spent 30min face to face with the pt; >50% time spent in counseling   -Supportive therapy and psychoeducation provided  -R/B/SE's of medications discussed with the pt who expresses understanding and chooses to take medications as prescribed.   -Pt instructed to call clinic, 911 or go to nearest emergency room if sxs worsen or pt is in   crisis. The pt expresses understanding.    Montana Aguilar, NP

## 2019-10-04 ENCOUNTER — TELEPHONE (OUTPATIENT)
Dept: PSYCHIATRY | Facility: CLINIC | Age: 54
End: 2019-10-04

## 2019-10-04 NOTE — TELEPHONE ENCOUNTER
"FYI    Pt called clinic to request sooner appt with Isaías Aguilar NP.  This nurse asked pt if she was okay, pt states "yes I just think I am getting manic and I'm having trouble sleeping."  Pt denies HI/SI.  Offered Monday appt but pt requested Tuesday appt. She is adviosed to report to ED if symptoms worsen prior to appt.  Pt verbalized understanding.   "

## 2019-10-07 ENCOUNTER — LAB VISIT (OUTPATIENT)
Dept: LAB | Facility: HOSPITAL | Age: 54
End: 2019-10-07
Attending: NURSE PRACTITIONER
Payer: COMMERCIAL

## 2019-10-07 DIAGNOSIS — Z79.899 HIGH RISK MEDICATION USE: ICD-10-CM

## 2019-10-07 DIAGNOSIS — Z79.899 HIGH RISK MEDICATION USE: Primary | ICD-10-CM

## 2019-10-07 LAB — LITHIUM SERPL-SCNC: 0.8 MMOL/L (ref 0.6–1.2)

## 2019-10-07 PROCEDURE — 80178 ASSAY OF LITHIUM: CPT

## 2019-10-07 PROCEDURE — 36415 COLL VENOUS BLD VENIPUNCTURE: CPT | Mod: PN

## 2019-10-08 ENCOUNTER — OFFICE VISIT (OUTPATIENT)
Dept: PSYCHIATRY | Facility: CLINIC | Age: 54
End: 2019-10-08
Payer: COMMERCIAL

## 2019-10-08 VITALS
SYSTOLIC BLOOD PRESSURE: 116 MMHG | BODY MASS INDEX: 41.47 KG/M2 | HEIGHT: 66 IN | HEART RATE: 69 BPM | DIASTOLIC BLOOD PRESSURE: 69 MMHG | WEIGHT: 258.06 LBS | RESPIRATION RATE: 16 BRPM

## 2019-10-08 DIAGNOSIS — F31.77 BIPOLAR 1 DISORDER, MIXED, PARTIAL REMISSION: Primary | ICD-10-CM

## 2019-10-08 PROCEDURE — 99214 PR OFFICE/OUTPT VISIT, EST, LEVL IV, 30-39 MIN: ICD-10-PCS | Mod: S$GLB,,, | Performed by: NURSE PRACTITIONER

## 2019-10-08 PROCEDURE — 90833 PSYTX W PT W E/M 30 MIN: CPT | Mod: S$GLB,,, | Performed by: NURSE PRACTITIONER

## 2019-10-08 PROCEDURE — 99214 OFFICE O/P EST MOD 30 MIN: CPT | Mod: S$GLB,,, | Performed by: NURSE PRACTITIONER

## 2019-10-08 PROCEDURE — 90833 PR PSYCHOTHERAPY W/PATIENT W/E&M, 30 MIN (ADD ON): ICD-10-PCS | Mod: S$GLB,,, | Performed by: NURSE PRACTITIONER

## 2019-10-08 PROCEDURE — 99999 PR PBB SHADOW E&M-EST. PATIENT-LVL III: ICD-10-PCS | Mod: PBBFAC,,, | Performed by: NURSE PRACTITIONER

## 2019-10-08 PROCEDURE — 99999 PR PBB SHADOW E&M-EST. PATIENT-LVL III: CPT | Mod: PBBFAC,,, | Performed by: NURSE PRACTITIONER

## 2019-10-08 RX ORDER — LITHIUM CARBONATE 600 MG/1
600 CAPSULE ORAL 2 TIMES DAILY
Qty: 60 CAPSULE | Refills: 2 | Status: SHIPPED | OUTPATIENT
Start: 2019-10-08 | End: 2019-12-05 | Stop reason: SDUPTHER

## 2019-10-08 RX ORDER — QUETIAPINE FUMARATE 50 MG/1
50 TABLET, FILM COATED ORAL NIGHTLY
Qty: 30 TABLET | Refills: 0 | Status: SHIPPED | OUTPATIENT
Start: 2019-10-08 | End: 2019-10-24 | Stop reason: SDUPTHER

## 2019-10-08 NOTE — PROGRESS NOTES
"Outpatient Psychiatry Follow-Up Visit    Clinical Status of Patient: Outpatient (Ambulatory)  10/08/2019     Chief Complaint: Pt is a 54 year old female who presents today for a follow-up. Met with patient.       Interval History and Content of Current Session:  Interim Events/Subjective Report/Content of Current Session:  follow up appointment.    Pt is a 54 year old female with past psychiatric hx of Bipolar 1 disorder who presents for follow up treatment. She is currently taking Lithium 600mg BID and Xanax 0.5 mg QHS PRN for sleep (only uses sparingly).     Since last visit, pt emailed staff requesting a sooner appointment, stating  "I just think I am getting manic and I'm having trouble sleeping." Li level ordered yesterday: 0.8. In clinic today, pt is stable, not currently manic or hypomanic. Historically, lack of sleep has destabilized mood and led to manic episodes. Pt reports a decrease in sleep quality over the past week or so and is concerned she is "getting manic". Pt reports she has been getting around a few hours each night. She states she has had an increase rate of speech. Denies any worsening of depressive or anxiety episodes. Will start Seroquel 50mg QHS for mood lability/sleep.    Has noticed some increase in anxiety, noting an upcoming beach trip. Feels this anxiety also often precipitates a manic episode. Noted some generalized and ruminative worrying, sometimes feeling restless/on edge.    Past Psychiatric hx: Pt. is a 54 year old female  with a past psychiatric hx of depression, Bipolar 1 who established care with me 07/19 following the penitentiary of her previous psych, Dr. Pelaez. She came to me taking Lithium 600mg BID and Xanax 0.5 mg QHS PRN for sleep (only uses this sparingly) , which had been previously prescribed and managed by Dr. Pelaez. She reports that her symptoms have stabilized over the last 7-8 months, and has been without a manic episode since November of 2018. Previous med " "trials of Lamictal (rash), Risperdal, Wellbutrin, some SSRIs (destablized mood, triggered manic episodes). Pt stable in clinic upon initial eval - all meds were continued at same dosage.      Notes a long standing history of "mood swings" dating to childhood, but first formal psych encounter in 1987 following the birth of her child. While admitted to the hospital following birth, and after being discharged, pt speaks to experiencing A/V hallucinations and euphoric gamal following a two-week period of going without sleep. "I was real paranoid after I had the baby. I felt that if I went to sleep, I wouldn't be able to hear the baby cry. I ended up going a couple weeks where I didn't sleep at all, started hearing things and seeing things that weren't there. I felt like people were trying to contact me and tell me things through the computer or the TV. I was paranoid and was thinking the vampires were after us. I started thinking I was someone I wasn't." She eventually sought psychiatric care in an outpatient seeing, but was not started on any psychiatric medication until 2002 (?). She was prescribed SSRIs and Wellbutrin at this time, which triggered a manic episode, and were d/c'd. She was eventually started on Lithium around 2005, and has achieved good results. States she has felt more stable than she's ever felt before.      Today, reports manic episodes "once or twice a year" that are triggered by a lack of sleep. States that her manic episodes last for "weeks" and "I get hyper, my metabolism speeds up. I feel like I'm sped up. I always try to buy a car or a house. I had two houses at one time. It always ends with them putting me in the hospital." Does continue to experience delusional thinking and auditory hallucinations exclusively during manic episodes. Hx of several ED admissions in 03/19 r/t delusions, and "walking down the street talking to people who weren't there" Per - ED note. Reports inpatient " "hospitalization following manic episodes to [place] x 1 week in November of 2018. Also reports another admission to [facility] in March of 2018 for 3 days following a manic episode. During these episodes, "I get very happy during those episodes. I start thinking I'm Colt and can communicate through the TV".      Denies any depressive symptoms. Reports sleeping 7-9 hours of sleep each night, restful. No issues falling or staying asleep. Denies anhedonia. Denies guilt/worthlessness. Energy good, concentration good. Appetite good. Denies psychomotor slowing, denies SI.          Past Medical hx:   Past Medical History:   Diagnosis Date    Bipolar 1 disorder     GERD (gastroesophageal reflux disease)     History of psychiatric hospitalization     Mental disorder         Interim hx:  Medication changes last visit:  none  Anxiety: denies  Depression: denies     Denies suicidal/homicidal ideations.  Denies hopelessness/worthlessness.    Denies auditory/visual hallucinations       Tobacco: never used  Alcohol: denies   Drug use: denies  Caffeine: 1-2 cups coffee daily      Review of Systems   · PSYCHIATRIC: Pertinent items are noted in the narrative.        CONSTITUTIONAL: weight stable        M/S: no pain today         ENT: no allergies noted today        ABD: no n/v/d     Past Medical, Family and Social History: The patient's past medical, family and social history have been reviewed and updated as appropriate within the electronic medical record. See encounter notes.     Medication: Lithium 600mg BID and Xanax 0.5 mg QHS PRN for sleep      Compliance: yes      Side effects: tolerates     Risk Parameters:  Patient reports no suicidal ideation  Patient reports no homicidal ideation  Patient reports no self-injurious behavior  Patient reports no violent behavior     Exam (detailed: at least 9 elements; comprehensive: all 15 elements)   Constitutional  Vitals:  Most recent vital signs, dated less than 90 days prior to " "this appointment, were reviewed. /69 (BP Location: Left arm, Patient Position: Sitting)   Pulse 69   Resp 16   Ht 5' 6" (1.676 m)   Wt 117 kg (258 lb 0.8 oz)   BMI 41.65 kg/m²      General:  unremarkable, age appropriate, casual attire, good eye contact, good rapport       Musculoskeletal  Muscle Strength/Tone:  no flaccidity, no tremor    Gait & Station:  normal      Psychiatric                       Speech:  normal tone, normal rate, rhythm, and volume   Mood & Affect:   Euthymic, congruent, appropriate         Thought Process:   Goal directed; Linear    Associations:   intact   Thought Content:   No SI/HI, delusions, or paranoia, no AV/VH   Insight & Judgement:   Good, adequate to circumstances   Orientation:   grossly intact; alert and oriented x 4    Memory:  intact for content of interview    Language:  grossly intact, can repeat    Attention Span  : Grossly intact for content of interview   Fund of Knowledge:   intact and appropriate to age and level of education        Assessment and Diagnosis   Status/Progress: Based on the examination today, the patient's problem(s) is/are under fair control.  New problems have not been presented today. Comorbidities are not currently complicating management of the primary condition.      Impression:   Pt is a 54 year old female with past psychiatric hx of Bipolar 1 disorder who presents for follow up treatment. She is currently taking Lithium 600mg BID and Xanax 0.5 mg QHS PRN for sleep (only uses sparingly). Since last visit, pt emailed staff requesting a sooner appointment, stating  "I just think I am getting manic and I'm having trouble sleeping." Li level ordered yesterday: 0.8.     In clinic today, pt is stable, not currently manic or hypomanic. Historically, lack of sleep has destabilized mood and led to manic episodes. Pt reports a decrease in sleep quality over the past week or so and is concerned she is "getting manic". Pt reports she has been getting " around a few hours each night. She states she has had an increase rate of speech. Denies any worsening of depressive or anxiety episodes. Will start Seroquel 50mg QHS for mood lability/sleep.    Diagnosis: Bipolar 1 disorder, mixed, partial remission    Intervention/Counseling/Treatment Plan   · Medication Management:      1) Continue Lithium 600 mg BID.     2. Start Seroquel 50mg QHS for mood lability/sleep. Typical ELFEGO's reviewed including weight gain, abnormal movements, EPS, TD, metabolic side effects.      3) Continue Xanax 0.5 mg QHS PRN for sleep. Discussed risk of decreased RT, sedation, addictive potential, and not to mix with alcohol.      4. Call to report any worsening of symptoms or problems with the medication. Pt instructed to go to ER with thoughts of harming self, others     5. Patient given contact # for psychotherapists at [facility] and also instructed she may check with insurance for list of providers.      6. Labs: Li level 0.8 10/9/19 - reviewed results with patient.     Psychotherapy:   · Target symptoms: gamal, depression  · Why chosen therapy is appropriate versus another modality: relevant to diagnosis, patient responds to this modality  · Outcome monitoring methods: self-report, observation, feedback from family   · Therapeutic intervention type: supportive psychotherapy  · Topics discussed/themes: building skills sets for symptom management, symptom recognition, nutrition, exercise  · The patient's response to the intervention is accepting. The patient's progress toward treatment goals is positive progress.  · Duration of intervention: 20 minutes     Return to clinic: 3 weeks    -Spent 30min face to face with the pt; >50% time spent in counseling   -Supportive therapy and psychoeducation provided  -R/B/SE's of medications discussed with the pt who expresses understanding and chooses to take medications as prescribed.   -Pt instructed to call clinic, 911 or go to nearest emergency  room if sxs worsen or pt is in   crisis. The pt expresses understanding.    Montana Aguilar, NP

## 2019-10-16 ENCOUNTER — PATIENT MESSAGE (OUTPATIENT)
Dept: PSYCHIATRY | Facility: CLINIC | Age: 54
End: 2019-10-16

## 2019-10-24 RX ORDER — QUETIAPINE FUMARATE 50 MG/1
TABLET, FILM COATED ORAL
Qty: 90 TABLET | Refills: 0 | Status: SHIPPED | OUTPATIENT
Start: 2019-10-24 | End: 2019-12-05 | Stop reason: SDUPTHER

## 2019-10-24 NOTE — TELEPHONE ENCOUNTER
Pt states that her seroquel was recently increased to 3 tabs per day and she is now out.  Needs refill for 50mg q am and 100mg q evening.  Please advise.

## 2019-10-29 ENCOUNTER — OFFICE VISIT (OUTPATIENT)
Dept: PSYCHIATRY | Facility: CLINIC | Age: 54
End: 2019-10-29
Payer: COMMERCIAL

## 2019-10-29 DIAGNOSIS — F31.77 BIPOLAR 1 DISORDER, MIXED, PARTIAL REMISSION: Primary | ICD-10-CM

## 2019-10-29 PROCEDURE — 99999 PR PBB SHADOW E&M-EST. PATIENT-LVL II: CPT | Mod: PBBFAC,,, | Performed by: SOCIAL WORKER

## 2019-10-29 PROCEDURE — 90791 PSYCH DIAGNOSTIC EVALUATION: CPT | Mod: S$GLB,,, | Performed by: SOCIAL WORKER

## 2019-10-29 PROCEDURE — 99999 PR PBB SHADOW E&M-EST. PATIENT-LVL II: ICD-10-PCS | Mod: PBBFAC,,, | Performed by: SOCIAL WORKER

## 2019-10-29 PROCEDURE — 90791 PR PSYCHIATRIC DIAGNOSTIC EVALUATION: ICD-10-PCS | Mod: S$GLB,,, | Performed by: SOCIAL WORKER
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
## 2019-11-08 ENCOUNTER — OFFICE VISIT (OUTPATIENT)
Dept: PSYCHIATRY | Facility: CLINIC | Age: 54
End: 2019-11-08
Payer: COMMERCIAL

## 2019-11-08 VITALS
SYSTOLIC BLOOD PRESSURE: 110 MMHG | WEIGHT: 263.44 LBS | HEIGHT: 66 IN | HEART RATE: 55 BPM | BODY MASS INDEX: 42.34 KG/M2 | DIASTOLIC BLOOD PRESSURE: 63 MMHG | RESPIRATION RATE: 16 BRPM

## 2019-11-08 DIAGNOSIS — F41.9 ANXIETY DISORDER, UNSPECIFIED TYPE: ICD-10-CM

## 2019-11-08 DIAGNOSIS — F31.9 BIPOLAR 1 DISORDER: Primary | ICD-10-CM

## 2019-11-08 PROBLEM — F31.11 BIPOLAR 1 DISORDER, MANIC, MILD: Status: ACTIVE | Noted: 2019-07-22

## 2019-11-08 PROCEDURE — 90833 PR PSYCHOTHERAPY W/PATIENT W/E&M, 30 MIN (ADD ON): ICD-10-PCS | Mod: S$GLB,,, | Performed by: NURSE PRACTITIONER

## 2019-11-08 PROCEDURE — 99999 PR PBB SHADOW E&M-EST. PATIENT-LVL III: CPT | Mod: PBBFAC,,, | Performed by: NURSE PRACTITIONER

## 2019-11-08 PROCEDURE — 99214 OFFICE O/P EST MOD 30 MIN: CPT | Mod: S$GLB,,, | Performed by: NURSE PRACTITIONER

## 2019-11-08 PROCEDURE — 99999 PR PBB SHADOW E&M-EST. PATIENT-LVL III: ICD-10-PCS | Mod: PBBFAC,,, | Performed by: NURSE PRACTITIONER

## 2019-11-08 PROCEDURE — 99214 PR OFFICE/OUTPT VISIT, EST, LEVL IV, 30-39 MIN: ICD-10-PCS | Mod: S$GLB,,, | Performed by: NURSE PRACTITIONER

## 2019-11-08 PROCEDURE — 90833 PSYTX W PT W E/M 30 MIN: CPT | Mod: S$GLB,,, | Performed by: NURSE PRACTITIONER

## 2019-11-08 NOTE — PROGRESS NOTES
"Outpatient Psychiatry Follow-Up Visit    Clinical Status of Patient: Outpatient (Ambulatory)  11/08/2019     Chief Complaint: Pt is a 54 year old female who presents today for a follow-up. Met with patient.       Interval History and Content of Current Session:  Interim Events/Subjective Report/Content of Current Session:  follow up appointment.    Pt is a 54 year old female with past psychiatric hx of Bipolar 1 disorder who presents for follow up treatment. She is currently taking Lithium 600mg BID, Xanax 0.5 mg QHS PRN for sleep (only uses sparingly), Seroquel 50mg qam and 100mg qhs. Since establishing care, pt has been stable without any true gamal or hypomania. However, pt's daughter who is an [facility] physician has been keeping me updated on pt's condition throughout the portal.     On 10/16, pt daughter reported "She seems a little bit better during the day, but is still pretty manic and only sleeping 2-3 hours at night, despite trying to up-titrate the medication.  She had to have a meeting with her supervisors at work yesterday, but they have fortunately been very understanding about her bipolar" Episodes do not meet criteria for true gamal/or hypomania, but we have been up-titrating Seroquel to address symptoms - pt has responded well but reports dry mouth.    3 weeks ago, through the portal, I advised pt to add dose of Seroquel 50mg Q AM. However, she states pt states this made her groggy so d/c this. Despite this, pt reports today "I've been feeling so much better. I'm actually sleeping now and definitely haven't been feeling manic at all. I feel way more calm now." However,  Notes overall less mood lability, less agitation. Denies manic/hypomanic episodes since last visit. She went to [place] on vacation between visit - enjoyed this. Pt doing well with no decompensations since last visit.  Does note continued generalized worry. Feels restless, on edge.     Past Psychiatric hx: Pt. is a 54 year old " "female  with a past psychiatric hx of Bipolar 1 disorder, depressed who established care with me 07/19 following the long term of her previous psych, Dr. Pelaez. She came to me taking Lithium 600mg BID and Xanax 0.5 mg QHS PRN for sleep (only uses this sparingly) , which had been previously prescribed and managed by Dr. Pelaez. She reports that her symptoms have stabilized over the last 7-8 months, and has been without a manic episode since November of 2018. Previous med trials of Lamictal (rash), Risperdal, Wellbutrin, some SSRIs (destablized mood, triggered manic episodes). Pt stable in clinic upon initial eval - all meds were continued at same dosage.      Notes a long standing history of "mood swings" dating to childhood, but first formal psych encounter in 1987 following the birth of her child. While admitted to the hospital following birth, and after being discharged, pt speaks to experiencing A/V hallucinations and euphoric gamal following a two-week period of going without sleep. "I was real paranoid after I had the baby. I felt that if I went to sleep, I wouldn't be able to hear the baby cry. I ended up going a couple weeks where I didn't sleep at all, started hearing things and seeing things that weren't there. I felt like people were trying to contact me and tell me things through the computer or the TV. I was paranoid and was thinking the vampires were after us. I started thinking I was someone I wasn't." She eventually sought psychiatric care in an outpatient seeing, but was not started on any psychiatric medication until 2002 (?). She was prescribed SSRIs and Wellbutrin at this time, which triggered a manic episode, and were d/c'd. She was eventually started on Lithium around 2005, and has achieved good results. States she has felt more stable than she's ever felt before.      Today, reports manic episodes "once or twice a year" that are triggered by a lack of sleep. States that her manic episodes " "last for "weeks" and "I get hyper, my metabolism speeds up. I feel like I'm sped up. I always try to buy a car or a house. I had two houses at one time. It always ends with them putting me in the hospital." Does continue to experience delusional thinking and auditory hallucinations exclusively during manic episodes. Hx of several ED admissions in 03/19 r/t delusions, and "walking down the street talking to people who weren't there" Per - ED note. Reports inpatient hospitalization following manic episodes to [facility] x 1 week in November of 2018. Also reports another admission to [facility] in March of 2018 for 3 days following a manic episode. During these episodes, "I get very happy during those episodes. I start thinking I'm [place] and can communicate through the TV".      Denies any depressive symptoms. Reports sleeping 7-9 hours of sleep each night, restful. No issues falling or staying asleep. Denies anhedonia. Denies guilt/worthlessness. Energy good, concentration good. Appetite good. Denies psychomotor slowing, denies SI.          Past Medical hx:   Past Medical History:   Diagnosis Date    Bipolar 1 disorder     GERD (gastroesophageal reflux disease)     History of psychiatric hospitalization     Mental disorder         Interim hx:  Medication changes last visit: Inc seroquel to 50mg PO QAM, 100mg QHS  Anxiety: inc'd  Depression: denies     Denies suicidal/homicidal ideations.  Denies hopelessness/worthlessness.    Denies auditory/visual hallucinations       Tobacco: never used  Alcohol: denies   Drug use: denies  Caffeine: 1-2 cups coffee daily      Review of Systems   · PSYCHIATRIC: Pertinent items are noted in the narrative.        CONSTITUTIONAL: weight stable        M/S: no pain today         ENT: no allergies noted today        ABD: no n/v/d     Past Medical, Family and Social History: The patient's past medical, family and social history have been reviewed and updated as appropriate within the " "electronic medical record. See encounter notes.     Medication: Lithium 600mg BID and Xanax 0.5 mg QHS PRN for sleep, Seroquel 100mg QHS     Compliance: yes      Side effects: dry mouth from Seroquel     Risk Parameters:  Patient reports no suicidal ideation  Patient reports no homicidal ideation  Patient reports no self-injurious behavior  Patient reports no violent behavior     Exam (detailed: at least 9 elements; comprehensive: all 15 elements)   Constitutional  Vitals:  Most recent vital signs, dated less than 90 days prior to this appointment, were reviewed. Resp 16   Ht 5' 6" (1.676 m)   Wt 119.5 kg (263 lb 7.2 oz)   BMI 42.52 kg/m²      General:  unremarkable, age appropriate, casual attire, good eye contact, good rapport       Musculoskeletal  Muscle Strength/Tone:  no flaccidity, no tremor    Gait & Station:  normal      Psychiatric                       Speech:  normal tone, normal rate, rhythm, and volume   Mood & Affect:   Euthymic, congruent, appropriate         Thought Process:   Goal directed; Linear    Associations:   intact   Thought Content:   No SI/HI, delusions, or paranoia, no AV/VH   Insight & Judgement:   Good, adequate to circumstances   Orientation:   grossly intact; alert and oriented x 4    Memory:  intact for content of interview    Language:  grossly intact, can repeat    Attention Span  : Grossly intact for content of interview   Fund of Knowledge:   intact and appropriate to age and level of education        Assessment and Diagnosis   Status/Progress: Based on the examination today, the patient's problem(s) is/are under fair control.  New problems have not been presented today. Comorbidities are not currently complicating management of the primary condition.      Impression: Pt is a 54 year old female with past psychiatric hx of Bipolar 1 disorder who presents for follow up treatment. She is currently taking Lithium 600mg BID, Xanax 0.5 mg QHS PRN for sleep (only uses sparingly), " "Seroquel 50mg qam and 100mg qhs. Since establishing care, pt has been stable without any true gamal or hypomania. However, pt's daughter who is an [facility] physician has been keeping me updated on pt's condition throughout the portal.     On 10/16, pt daughter reported "She seems a little bit better during the day, but is still pretty manic and only sleeping 2-3 hours at night, despite trying to up-titrate the medication.  She had to have a meeting with her supervisors at work yesterday, but they have fortunately been very understanding about her bipolar" Episodes do not meet criteria for true gamal/or hypomania, but we have been up-titrating Seroquel to address symptoms - pt has responded well but reports dry mouth.    3 weeks ago, through the portal, I advised pt to add dose of Seroquel 50mg Q AM. However, she states pt states this made her groggy so d/c this. Despite this, pt reports today "I've been feeling so much better. I'm actually sleeping now and definitely haven't been feeling manic at all. I feel way more calm now." However,  Notes overall less mood lability, less agitation. Denies manic/hypomanic episodes since last visit. She went to [place] on vacation between visit - enjoyed this. Pt doing well with no decompensations since last visit.       Diagnosis: Bipolar 1 disorder, partial remission, anxiety unspecified    Intervention/Counseling/Treatment Plan   · Medication Management:      1) Continue Lithium 600 mg BID.     2. Increase Seroquel to 50mg QAM, 100 mg QHS. Typical ELFEGO's reviewed including weight gain, abnormal movements, EPS, TD, metabolic side effects.      3) Continue Xanax 0.5 mg QHS PRN for sleep. Discussed risk of decreased RT, sedation, addictive potential, and not to mix with alcohol.      4. Call to report any worsening of symptoms or problems with the medication. Pt instructed to go to ER with thoughts of harming self, others     5. Patient given contact # for psychotherapists at " [facility] and also instructed she may check with insurance for list of providers.      6. Labs: Li level 0.8 10/9/19 - reviewed results with patient.     Psychotherapy:   · Target symptoms: gamal, depression  · Why chosen therapy is appropriate versus another modality: relevant to diagnosis, patient responds to this modality  · Outcome monitoring methods: self-report, observation, feedback from family   · Therapeutic intervention type: supportive psychotherapy  · Topics discussed/themes: building skills sets for symptom management, symptom recognition, nutrition, exercise  · The patient's response to the intervention is accepting. The patient's progress toward treatment goals is positive progress.  · Duration of intervention: 20 minutes     Return to clinic: 4 weeks    -Spent 30min face to face with the pt; >50% time spent in counseling   -Supportive therapy and psychoeducation provided  -R/B/SE's of medications discussed with the pt who expresses understanding and chooses to take medications as prescribed.   -Pt instructed to call clinic, 911 or go to nearest emergency room if sxs worsen or pt is in   crisis. The pt expresses understanding.    Montana Aguilar, NP

## 2019-11-11 ENCOUNTER — OFFICE VISIT (OUTPATIENT)
Dept: PSYCHIATRY | Facility: CLINIC | Age: 54
End: 2019-11-11
Payer: COMMERCIAL

## 2019-11-11 DIAGNOSIS — F41.9 ANXIETY DISORDER, UNSPECIFIED TYPE: Primary | ICD-10-CM

## 2019-11-11 DIAGNOSIS — F31.9 BIPOLAR 1 DISORDER: ICD-10-CM

## 2019-11-11 PROCEDURE — 99999 PR PBB SHADOW E&M-EST. PATIENT-LVL II: CPT | Mod: PBBFAC,,, | Performed by: SOCIAL WORKER

## 2019-11-11 PROCEDURE — 99999 PR PBB SHADOW E&M-EST. PATIENT-LVL II: ICD-10-PCS | Mod: PBBFAC,,, | Performed by: SOCIAL WORKER

## 2019-11-11 PROCEDURE — 90834 PSYTX W PT 45 MINUTES: CPT | Mod: S$GLB,,, | Performed by: SOCIAL WORKER

## 2019-11-11 PROCEDURE — 90834 PR PSYCHOTHERAPY W/PATIENT, 45 MIN: ICD-10-PCS | Mod: S$GLB,,, | Performed by: SOCIAL WORKER
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
## 2019-12-05 ENCOUNTER — OFFICE VISIT (OUTPATIENT)
Dept: PSYCHIATRY | Facility: CLINIC | Age: 54
End: 2019-12-05
Payer: COMMERCIAL

## 2019-12-05 VITALS
BODY MASS INDEX: 42.66 KG/M2 | HEIGHT: 66 IN | HEART RATE: 74 BPM | WEIGHT: 265.44 LBS | DIASTOLIC BLOOD PRESSURE: 75 MMHG | SYSTOLIC BLOOD PRESSURE: 118 MMHG

## 2019-12-05 DIAGNOSIS — F41.9 ANXIETY DISORDER, UNSPECIFIED TYPE: Primary | ICD-10-CM

## 2019-12-05 DIAGNOSIS — F31.60 BIPOLAR 1 DISORDER, MIXED: ICD-10-CM

## 2019-12-05 PROCEDURE — 99999 PR PBB SHADOW E&M-EST. PATIENT-LVL III: ICD-10-PCS | Mod: PBBFAC,,, | Performed by: NURSE PRACTITIONER

## 2019-12-05 PROCEDURE — 90833 PSYTX W PT W E/M 30 MIN: CPT | Mod: S$GLB,,, | Performed by: NURSE PRACTITIONER

## 2019-12-05 PROCEDURE — 99999 PR PBB SHADOW E&M-EST. PATIENT-LVL III: CPT | Mod: PBBFAC,,, | Performed by: NURSE PRACTITIONER

## 2019-12-05 PROCEDURE — 99214 OFFICE O/P EST MOD 30 MIN: CPT | Mod: S$GLB,,, | Performed by: NURSE PRACTITIONER

## 2019-12-05 PROCEDURE — 90833 PR PSYCHOTHERAPY W/PATIENT W/E&M, 30 MIN (ADD ON): ICD-10-PCS | Mod: S$GLB,,, | Performed by: NURSE PRACTITIONER

## 2019-12-05 PROCEDURE — 99214 PR OFFICE/OUTPT VISIT, EST, LEVL IV, 30-39 MIN: ICD-10-PCS | Mod: S$GLB,,, | Performed by: NURSE PRACTITIONER

## 2019-12-05 RX ORDER — QUETIAPINE FUMARATE 100 MG/1
100 TABLET, FILM COATED ORAL NIGHTLY
Qty: 30 TABLET | Refills: 1 | Status: SHIPPED | OUTPATIENT
Start: 2019-12-05 | End: 2020-02-10

## 2019-12-05 RX ORDER — LITHIUM CARBONATE 300 MG/1
600 CAPSULE ORAL 2 TIMES DAILY
Qty: 120 CAPSULE | Refills: 3 | Status: SHIPPED | OUTPATIENT
Start: 2019-12-05 | End: 2020-03-27 | Stop reason: SDUPTHER

## 2019-12-05 NOTE — PROGRESS NOTES
"Outpatient Psychiatry Follow-Up Visit    Clinical Status of Patient: Outpatient (Ambulatory)  12/05/2019     Chief Complaint: Pt is a 54 year old female who presents today for a follow-up. Met with patient.       Interval History and Content of Current Session:  Interim Events/Subjective Report/Content of Current Session:  follow up appointment.    Pt is a 54 year old female with past psychiatric hx of Bipolar 1 disorder, anxiety NOS who presents for follow up treatment. She is currently taking Lithium 300mg QID, Xanax 0.5 mg QHS PRN for sleep (only uses sparingly - "a few times a month), Seroquel 100mg qhs. We have been titrating Seroquel to optimal dose do address ongoing mood lability, although pt has been without a true manic or hypomanic episode. We attempted to add AM dose of Seroquel 50mg but this "brought me down too much so I decided not to do it") Today, pt reports good sleep and continued mood stability. Denies any racing thoughts, rapid speech. States she "can actually sit at my desk and get things done now." Pt does note some continued generalized worrying, but still feels "more calm". Chiki paranoia, A/V hallucinations. Does report a seemingly fixed delusion about "thinking I'm Colt when I get manic", but states "I know I'm not, just get that way when I feel manic".    AIMS: 0; no abnormal movements noted or reported    Past Psychiatric hx: Pt. is a 54 year old female  with a past psychiatric hx of Bipolar 1 disorder, depressed who established care with me 07/19 following the correction of her previous psych, Dr. Pelaez. She came to me taking Lithium 600mg BID and Xanax 0.5 mg QHS PRN for sleep (only uses this sparingly) , which had been previously prescribed and managed by Dr. Pelaez. She reports that her symptoms have stabilized over the last 7-8 months, and has been without a manic episode since November of 2018. Previous med trials of Lamictal (rash), Risperdal, Wellbutrin, some SSRIs (destablized " "mood, triggered manic episodes). Pt stable in clinic upon initial eval - all meds were continued at same dosage.      Notes a long standing history of "mood swings" dating to childhood, but first formal psych encounter in 1987 following the birth of her child. While admitted to the hospital following birth, and after being discharged, pt speaks to experiencing A/V hallucinations and euphoric gamal following a two-week period of going without sleep. "I was real paranoid after I had the baby. I felt that if I went to sleep, I wouldn't be able to hear the baby cry. I ended up going a couple weeks where I didn't sleep at all, started hearing things and seeing things that weren't there. I felt like people were trying to contact me and tell me things through the computer or the TV. I was paranoid and was thinking the vampires were after us. I started thinking I was someone I wasn't." She eventually sought psychiatric care in an outpatient seeing, but was not started on any psychiatric medication until 2002 (?). She was prescribed SSRIs and Wellbutrin at this time, which triggered a manic episode, and were d/c'd. She was eventually started on Lithium around 2005, and has achieved good results. States she has felt more stable than she's ever felt before.      Upon initial eval, pt reports manic episodes "once or twice a year" that are triggered by a lack of sleep. States that her manic episodes last for "weeks" and "I get hyper, my metabolism speeds up. I feel like I'm sped up. I always try to buy a car or a house. I had two houses at one time. It always ends with them putting me in the hospital." Does continue to experience delusional thinking and auditory hallucinations exclusively during manic episodes. Hx of several ED admissions in 03/19 r/t delusions, and "walking down the street talking to people who weren't there" Per - ED note. Reports inpatient hospitalization following manic episodes to [facility] x 1 week in " "November of 2018. Also reports another admission to [facility] in March of 2018 for 3 days following a manic episode. During these episodes, "I get very happy during those episodes. I start thinking I'm Colt and can communicate through the TV".      Denies any depressive symptoms. Reports sleeping 7-9 hours of sleep each night, restful. No issues falling or staying asleep. Denies anhedonia. Denies guilt/worthlessness. Energy good, concentration good. Appetite good. Denies psychomotor slowing, denies SI.      On 10/16, pt daughter reported "She seems a little bit better during the day, but is still pretty manic and only sleeping 2-3 hours at night, despite trying to up-titrate the medication.  She had to have a meeting with her supervisors at work yesterday, but they have fortunately been very understanding about her bipolar" Episodes do not meet criteria for true gamal/or hypomania, but we have been up-titrating Seroquel (started 10/19) to address symptoms - pt has responded well but reports dry mouth.I advised pt to add dose of Seroquel 50mg Q AM. However, she states pt states this made her groggy so d/c this. Despite this, pt reports at f/u "I've been feeling so much better. I'm actually sleeping now and definitely haven't been feeling manic at all. I feel way more calm now." However,  Notes overall less mood lability, less agitation. Denies manic/hypomanic episodes since last visit. She went to [place] on vacation between visit - enjoyed this. Pt doing well with no decompensations since last visit.  Does note continued generalized worry. Feels restless, on edge.     Past Medical hx:   Past Medical History:   Diagnosis Date    Bipolar 1 disorder     GERD (gastroesophageal reflux disease)     History of psychiatric hospitalization     Mental disorder         Interim hx:  Medication changes last visit: Inc seroquel to 50mg PO QAM, 100mg QHS  Anxiety: inc'd  Depression: denies     Denies suicidal/homicidal " "ideations.  Denies hopelessness/worthlessness.    Denies auditory/visual hallucinations       Tobacco: never used  Alcohol: denies   Drug use: denies  Caffeine: 1-2 cups coffee daily      Review of Systems   · PSYCHIATRIC: Pertinent items are noted in the narrative.        CONSTITUTIONAL: weight stable        M/S: no pain today         ENT: no allergies noted today        ABD: no n/v/d     Past Medical, Family and Social History: The patient's past medical, family and social history have been reviewed and updated as appropriate within the electronic medical record. See encounter notes.     Medication: Lithium 600mg BID and Xanax 0.5 mg QHS PRN for sleep, Seroquel 50mg Q AM 100mg QHS     Compliance: yes      Side effects: dry mouth from Seroquel     Risk Parameters:  Patient reports no suicidal ideation  Patient reports no homicidal ideation  Patient reports no self-injurious behavior  Patient reports no violent behavior     Exam (detailed: at least 9 elements; comprehensive: all 15 elements)   Constitutional  Vitals:  Most recent vital signs, dated less than 90 days prior to this appointment, were reviewed. /75   Pulse 74   Ht 5' 6" (1.676 m)   Wt 120.4 kg (265 lb 6.9 oz)   BMI 42.84 kg/m²      General:  unremarkable, age appropriate, casual attire, good eye contact, good rapport       Musculoskeletal  Muscle Strength/Tone:  no flaccidity, no tremor    Gait & Station:  normal      Psychiatric                       Speech:  normal tone, normal rate, rhythm, and volume   Mood & Affect:   Euthymic, congruent, appropriate         Thought Process:   Goal directed; Linear    Associations:   intact   Thought Content:   No SI/HI, + fixed delusions, or paranoia, no AV/VH   Insight & Judgement:   Good, adequate to circumstances   Orientation:   grossly intact; alert and oriented x 4    Memory:  intact for content of interview    Language:  grossly intact, can repeat    Attention Span  : Grossly intact for content of " "interview   Fund of Knowledge:   intact and appropriate to age and level of education        Assessment and Diagnosis   Status/Progress: Based on the examination today, the patient's problem(s) is/are under fair control.  New problems have not been presented today. Comorbidities are not currently complicating management of the primary condition.      Impression:Pt is a 54 year old female with past psychiatric hx of Bipolar 1 disorder, anxiety NOS who presents for follow up treatment. She is currently taking Lithium 300mg QID, Xanax 0.5 mg QHS PRN for sleep (only uses sparingly - "a few times a month), Seroquel 100mg qhs. We have been titrating Seroquel to optimal dose do address ongoing mood lability, although pt has been without a true manic or hypomanic episode. We attempted to add AM dose of Seroquel 50mg but this "brought me down too much so I decided not to do it") Today, pt reports good sleep and continued mood stability. Denies any racing thoughts, rapid speech. States she "can actually sit at my desk and get things done now." Pt does note some continued generalized worrying, but still feels "more calm". Chiki paranoia, A/V hallucinations. Does report a seemingly fixed delusion about "thinking I'm Colt when I get manic", but states "I know I'm not, just get that way when I feel manic".    AIMS: 0; no abnormal movements noted or reported      Diagnosis: Bipolar 1 disorder, partial remission, anxiety unspecified    Intervention/Counseling/Treatment Plan   · Medication Management:      1) Continue Lithium 300mg QID for mood.     2) Continue Seroquel 100 mg QHS. Typical ELFEGO's reviewed including weight gain, abnormal movements, EPS, TD, metabolic side effects.      3) Continue Xanax 0.5 mg QHS PRN for sleep. Discussed risk of decreased RT, sedation, addictive potential, and not to mix with alcohol.      4. Call to report any worsening of symptoms or problems with the medication. Pt instructed to go to ER with " thoughts of harming self, others     5. Patient given contact # for psychotherapists at [facility] and also instructed she may check with insurance for list of providers.      6. Labs: Li level 0.8 10/9/19 - reviewed results with patient.     Psychotherapy:   · Target symptoms: gamal, depression  · Why chosen therapy is appropriate versus another modality: relevant to diagnosis, patient responds to this modality  · Outcome monitoring methods: self-report, observation, feedback from family   · Therapeutic intervention type: supportive psychotherapy  · Topics discussed/themes: building skills sets for symptom management, symptom recognition, nutrition, exercise  · The patient's response to the intervention is accepting. The patient's progress toward treatment goals is positive progress.  · Duration of intervention: 20 minutes     Return to clinic: 2 mo    -Spent 30min face to face with the pt; >50% time spent in counseling   -Supportive therapy and psychoeducation provided  -R/B/SE's of medications discussed with the pt who expresses understanding and chooses to take medications as prescribed.   -Pt instructed to call clinic, 911 or go to nearest emergency room if sxs worsen or pt is in   crisis. The pt expresses understanding.    Montana Aguilar, NP

## 2019-12-24 ENCOUNTER — PATIENT MESSAGE (OUTPATIENT)
Dept: PSYCHIATRY | Facility: CLINIC | Age: 54
End: 2019-12-24

## 2020-02-06 ENCOUNTER — OFFICE VISIT (OUTPATIENT)
Dept: PSYCHIATRY | Facility: CLINIC | Age: 55
End: 2020-02-06
Payer: COMMERCIAL

## 2020-02-06 VITALS
HEART RATE: 67 BPM | SYSTOLIC BLOOD PRESSURE: 124 MMHG | WEIGHT: 263.44 LBS | DIASTOLIC BLOOD PRESSURE: 81 MMHG | RESPIRATION RATE: 16 BRPM | BODY MASS INDEX: 42.34 KG/M2 | HEIGHT: 66 IN

## 2020-02-06 DIAGNOSIS — F41.9 ANXIETY DISORDER, UNSPECIFIED TYPE: ICD-10-CM

## 2020-02-06 DIAGNOSIS — F31.60 BIPOLAR 1 DISORDER, MIXED: Primary | ICD-10-CM

## 2020-02-06 PROCEDURE — 99214 OFFICE O/P EST MOD 30 MIN: CPT | Mod: S$GLB,,, | Performed by: NURSE PRACTITIONER

## 2020-02-06 PROCEDURE — 99999 PR PBB SHADOW E&M-EST. PATIENT-LVL III: CPT | Mod: PBBFAC,,, | Performed by: NURSE PRACTITIONER

## 2020-02-06 PROCEDURE — 99999 PR PBB SHADOW E&M-EST. PATIENT-LVL III: ICD-10-PCS | Mod: PBBFAC,,, | Performed by: NURSE PRACTITIONER

## 2020-02-06 PROCEDURE — 90833 PSYTX W PT W E/M 30 MIN: CPT | Mod: S$GLB,,, | Performed by: NURSE PRACTITIONER

## 2020-02-06 PROCEDURE — 99214 PR OFFICE/OUTPT VISIT, EST, LEVL IV, 30-39 MIN: ICD-10-PCS | Mod: S$GLB,,, | Performed by: NURSE PRACTITIONER

## 2020-02-06 PROCEDURE — 90833 PR PSYCHOTHERAPY W/PATIENT W/E&M, 30 MIN (ADD ON): ICD-10-PCS | Mod: S$GLB,,, | Performed by: NURSE PRACTITIONER

## 2020-02-06 NOTE — PROGRESS NOTES
"Outpatient Psychiatry Follow-Up Visit    Clinical Status of Patient: Outpatient (Ambulatory)  02/06/2020     Chief Complaint: Pt is a 54 year old female who presents today for a follow-up. Met with patient.       Interval History and Content of Current Session:  Interim Events/Subjective Report/Content of Current Session:  follow up appointment.    Pt is a 54 year old female with past psychiatric hx of Bipolar 1 disorder, anxiety NOS who presents for follow up treatment. She is currently taking Lithium 300mg QID, Xanax 0.5 mg QHS PRN for sleep (only uses sparingly - "a few times a month), Seroquel 100mg qhs. We have been titrating Seroquel to optimal dose do address ongoing mood lability, and pt has been without a true manic or hypomanic episode since starting. Reports sleep has improved, is able to sleep through the night now. Dote continued anxiety, often feeling restless and tense. Has been stressed r/t daughter's wedding, flying to [place]. Energy ok, concentration fluctuates. Has been making an effort to start exercising, eating cleanly.     AIMS: 0; no abnormal movements noted or reported    Past Psychiatric hx: Pt. is a 54 year old female  with a past psychiatric hx of Bipolar 1 disorder, depressed who established care with me 07/19 following the halfway of her previous psych, Dr. Pelaez. She came to me taking Lithium 600mg BID and Xanax 0.5 mg QHS PRN for sleep (only uses this sparingly) , which had been previously prescribed and managed by Dr. Pelaez. She reports that her symptoms have stabilized over the last 7-8 months, and has been without a manic episode since November of 2018. Previous med trials of Lamictal (rash), Risperdal, Wellbutrin, some SSRIs (destablized mood, triggered manic episodes). Pt stable in clinic upon initial eval - all meds were continued at same dosage.      Notes a long standing history of "mood swings" dating to childhood, but first formal psych encounter in 1987 following the " "birth of her child. While admitted to the hospital following birth, and after being discharged, pt speaks to experiencing A/V hallucinations and euphoric gamal following a two-week period of going without sleep. "I was real paranoid after I had the baby. I felt that if I went to sleep, I wouldn't be able to hear the baby cry. I ended up going a couple weeks where I didn't sleep at all, started hearing things and seeing things that weren't there. I felt like people were trying to contact me and tell me things through the computer or the TV. I was paranoid and was thinking the vampires were after us. I started thinking I was someone I wasn't." She eventually sought psychiatric care in an outpatient seeing, but was not started on any psychiatric medication until 2002 (?). She was prescribed SSRIs and Wellbutrin at this time, which triggered a manic episode, and were d/c'd. She was eventually started on Lithium around 2005, and has achieved good results. States she has felt more stable than she's ever felt before.      Upon initial eval, pt reports manic episodes "once or twice a year" that are triggered by a lack of sleep. States that her manic episodes last for "weeks" and "I get hyper, my metabolism speeds up. I feel like I'm sped up. I always try to buy a car or a house. I had two houses at one time. It always ends with them putting me in the hospital." Does continue to experience delusional thinking and auditory hallucinations exclusively during manic episodes. Hx of several ED admissions in 03/19 r/t delusions, and "walking down the street talking to people who weren't there" Per - ED note. Reports inpatient hospitalization following manic episodes to [facility] x 1 week in November of 2018. Also reports another admission to [facility] in March of 2018 for 3 days following a manic episode. During these episodes, "I get very happy during those episodes. I start thinking I'm Colt and can communicate through the " "TV".      Denies any depressive symptoms. Reports sleeping 7-9 hours of sleep each night, restful. No issues falling or staying asleep. Denies anhedonia. Denies guilt/worthlessness. Energy good, concentration good. Appetite good. Denies psychomotor slowing, denies SI.      On 10/16, pt daughter reported "She seems a little bit better during the day, but is still pretty manic and only sleeping 2-3 hours at night, despite trying to up-titrate the medication.  She had to have a meeting with her supervisors at work yesterday, but they have fortunately been very understanding about her bipolar" Episodes do not meet criteria for true gamal/or hypomania, but we have been up-titrating Seroquel (started 10/19) to address symptoms - pt has responded well but reports dry mouth.I advised pt to add dose of Seroquel 50mg Q AM. However, she states pt states this made her groggy so d/c this. Despite this, pt reports at f/u "I've been feeling so much better. I'm actually sleeping now and definitely haven't been feeling manic at all. I feel way more calm now." However,  Notes overall less mood lability, less agitation. Denies manic/hypomanic episodes since last visit. She went to [place] on vacation between visit - enjoyed this. Pt doing well with no decompensations since last visit.  Does note continued generalized worry. Feels restless, on edge.     Past Medical hx:   Past Medical History:   Diagnosis Date    Bipolar 1 disorder     GERD (gastroesophageal reflux disease)     History of psychiatric hospitalization     Mental disorder         Interim hx:  Medication changes last visit: Inc seroquel to 50mg PO QAM, 100mg QHS  Anxiety: inc'd  Depression: denies     Denies suicidal/homicidal ideations.  Denies hopelessness/worthlessness.    Denies auditory/visual hallucinations       Tobacco: never used  Alcohol: denies   Drug use: denies  Caffeine: 1-2 cups coffee daily      Review of Systems   · PSYCHIATRIC: Pertinent items are " "noted in the narrative.        CONSTITUTIONAL: weight stable        M/S: no pain today         ENT: no allergies noted today        ABD: no n/v/d     Past Medical, Family and Social History: The patient's past medical, family and social history have been reviewed and updated as appropriate within the electronic medical record. See encounter notes.     Medication: Lithium 600mg BID and Xanax 0.5 mg QHS PRN for sleep, Seroquel 50mg Q AM 100mg QHS     Compliance: yes      Side effects: dry mouth from Seroquel     Risk Parameters:  Patient reports no suicidal ideation  Patient reports no homicidal ideation  Patient reports no self-injurious behavior  Patient reports no violent behavior     Exam (detailed: at least 9 elements; comprehensive: all 15 elements)   Constitutional  Vitals:  Most recent vital signs, dated less than 90 days prior to this appointment, were reviewed. /81 (BP Location: Left arm, Patient Position: Sitting)   Pulse 67   Resp 16   Ht 5' 6" (1.676 m)   Wt 119.5 kg (263 lb 7.2 oz)   BMI 42.52 kg/m²      General:  unremarkable, age appropriate, casual attire, good eye contact, good rapport       Musculoskeletal  Muscle Strength/Tone:  no flaccidity, no tremor    Gait & Station:  normal      Psychiatric                       Speech:  normal tone, normal rate, rhythm, and volume   Mood & Affect:   Euthymic, congruent, appropriate         Thought Process:   Goal directed; Linear    Associations:   intact   Thought Content:   No SI/HI, + fixed delusions, or paranoia, no AV/VH   Insight & Judgement:   Good, adequate to circumstances   Orientation:   grossly intact; alert and oriented x 4    Memory:  intact for content of interview    Language:  grossly intact, can repeat    Attention Span  : Grossly intact for content of interview   Fund of Knowledge:   intact and appropriate to age and level of education        Assessment and Diagnosis   Status/Progress: Based on the examination today, the " "patient's problem(s) is/are under fair control.  New problems have not been presented today. Comorbidities are not currently complicating management of the primary condition.      Impression:Pt is a 54 year old female with past psychiatric hx of Bipolar 1 disorder, anxiety NOS who presents for follow up treatment. She is currently taking Lithium 300mg QID, Xanax 0.5 mg QHS PRN for sleep (only uses sparingly - "a few times a month), Seroquel 100mg qhs. We have been titrating Seroquel to optimal dose do address ongoing mood lability, and pt has been without a true manic or hypomanic episode since starting. Reports sleep has improved, is able to sleep through the night now. Dote continued anxiety, often feeling restless and tense. Has been stressed r/t daughter's wedding, flying to [place]. Energy ok, concentration fluctuates. Has been making an effort to start exercising, eating cleanly.     AIMS: 0; no abnormal movements noted or reported      Diagnosis: Bipolar 1 disorder, partial remission, anxiety unspecified    Intervention/Counseling/Treatment Plan   · Medication Management:      1) Continue Lithium 300mg QID for mood.     2) Continue Seroquel 100 mg QHS. Typical ELFEGO's reviewed including weight gain, abnormal movements, EPS, TD, metabolic side effects.      3) Continue Xanax 0.5 mg QHS PRN for sleep. Discussed risk of decreased RT, sedation, addictive potential, and not to mix with alcohol.      4. Call to report any worsening of symptoms or problems with the medication. Pt instructed to go to ER with thoughts of harming self, others     5. Patient given contact # for psychotherapists at [facility] and also instructed she may check with insurance for list of providers.      6. Labs: Li level 0.8 10/9/19 - reviewed results with patient.     Psychotherapy:   · Target symptoms: gamal, depression  · Why chosen therapy is appropriate versus another modality: relevant to diagnosis, patient responds to this " modality  · Outcome monitoring methods: self-report, observation, feedback from family   · Therapeutic intervention type: supportive psychotherapy  · Topics discussed/themes: building skills sets for symptom management, symptom recognition, nutrition, exercise  · The patient's response to the intervention is accepting. The patient's progress toward treatment goals is positive progress.  · Duration of intervention: 20 minutes     Return to clinic: 3 mo    -Spent 30min face to face with the pt; >50% time spent in counseling   -Supportive therapy and psychoeducation provided  -R/B/SE's of medications discussed with the pt who expresses understanding and chooses to take medications as prescribed.   -Pt instructed to call clinic, 911 or go to nearest emergency room if sxs worsen or pt is in   crisis. The pt expresses understanding.    [place]ana Aguilar, NP

## 2020-02-10 RX ORDER — QUETIAPINE FUMARATE 100 MG/1
TABLET, FILM COATED ORAL
Qty: 30 TABLET | Refills: 1 | Status: SHIPPED | OUTPATIENT
Start: 2020-02-10 | End: 2020-03-27 | Stop reason: SDUPTHER

## 2020-03-27 RX ORDER — QUETIAPINE FUMARATE 100 MG/1
TABLET, FILM COATED ORAL
Qty: 30 TABLET | Refills: 1 | Status: SHIPPED | OUTPATIENT
Start: 2020-03-27 | End: 2020-03-30

## 2020-03-27 RX ORDER — LITHIUM CARBONATE 300 MG/1
CAPSULE ORAL
Qty: 120 CAPSULE | Refills: 3 | Status: SHIPPED | OUTPATIENT
Start: 2020-03-27 | End: 2020-03-30

## 2020-03-27 NOTE — TELEPHONE ENCOUNTER
Patient requesting 3 or 4 months supply of prescription lithium and Seroquel. States lost job and insurance will no longer be active by the end of this month.  Hayley

## 2020-03-30 RX ORDER — QUETIAPINE FUMARATE 100 MG/1
100 TABLET, FILM COATED ORAL NIGHTLY
Qty: 90 TABLET | Refills: 0 | Status: SHIPPED | OUTPATIENT
Start: 2020-03-30 | End: 2020-05-01 | Stop reason: SDUPTHER

## 2020-03-30 RX ORDER — LITHIUM CARBONATE 300 MG/1
600 CAPSULE ORAL 2 TIMES DAILY
Qty: 360 CAPSULE | Refills: 0 | Status: SHIPPED | OUTPATIENT
Start: 2020-03-30 | End: 2020-07-24 | Stop reason: SDUPTHER

## 2020-05-04 RX ORDER — QUETIAPINE FUMARATE 100 MG/1
100 TABLET, FILM COATED ORAL NIGHTLY
Qty: 90 TABLET | Refills: 0 | Status: SHIPPED | OUTPATIENT
Start: 2020-05-04 | End: 2020-05-15

## 2020-05-15 ENCOUNTER — OFFICE VISIT (OUTPATIENT)
Dept: PSYCHIATRY | Facility: CLINIC | Age: 55
End: 2020-05-15
Payer: MEDICAID

## 2020-05-15 DIAGNOSIS — F31.60 BIPOLAR 1 DISORDER, MIXED: Primary | ICD-10-CM

## 2020-05-15 DIAGNOSIS — F41.9 ANXIETY DISORDER, UNSPECIFIED TYPE: ICD-10-CM

## 2020-05-15 PROCEDURE — 99214 OFFICE O/P EST MOD 30 MIN: CPT | Mod: 95,AF,HB, | Performed by: NURSE PRACTITIONER

## 2020-05-15 PROCEDURE — 90833 PR PSYCHOTHERAPY W/PATIENT W/E&M, 30 MIN (ADD ON): ICD-10-PCS | Mod: 95,AF,HB, | Performed by: NURSE PRACTITIONER

## 2020-05-15 PROCEDURE — 99214 PR OFFICE/OUTPT VISIT, EST, LEVL IV, 30-39 MIN: ICD-10-PCS | Mod: 95,AF,HB, | Performed by: NURSE PRACTITIONER

## 2020-05-15 PROCEDURE — 90833 PSYTX W PT W E/M 30 MIN: CPT | Mod: 95,AF,HB, | Performed by: NURSE PRACTITIONER

## 2020-05-15 RX ORDER — QUETIAPINE FUMARATE 50 MG/1
TABLET, FILM COATED ORAL
Qty: 90 TABLET | Refills: 1 | Status: SHIPPED | OUTPATIENT
Start: 2020-05-15 | End: 2020-07-24

## 2020-05-15 NOTE — PROGRESS NOTES
"Outpatient Psychiatry Follow-Up Visit    Clinical Status of Patient: Outpatient (Ambulatory)  05/15/2020     The patient location is:  [address] [place] [id number] [phone] ([phone]  The patient location [place] is: [facility]  The patient phone number is: [phone]  Visit type: Virtual visit with synchronous audio and video  Each patient to whom he or she provides medical services by telemedicine is:  (1) informed of the relationship between the physician and patient and the respective role of any other health care provider with respect to management of the patient; and (2) notified that he or she may decline to receive medical services by telemedicine and may withdraw from such care at any time.    Chief Complaint: Pt is a 54 year old female who presents today for a follow-up. Met with patient.       Interval History and Content of Current Session:  Interim Events/Subjective Report/Content of Current Session:  follow up appointment.    Pt is a 54 year old female with past psychiatric hx of Bipolar 1 disorder, anxiety NOS who presents for follow up treatment. She is currently taking Lithium 300mg QID, Xanax 0.5 mg QHS PRN for sleep (only uses sparingly - "a few times a month), Seroquel 100mg qhs. We have been titrating Seroquel to optimal dose do address ongoing mood lability, and pt has been without a true manic or hypomanic episode since starting. Today pt reports increased anxiety due to recently losing her job.     "I've been getting into bed at night, and sometimes it feels like someone else is getting in bed with me. It got to the point where I couldn't sleep in my bed because I was kind of scared. I thought it was my cat but my cat wasn't in the room. Sometime it even touches me, like it's brushing on either my foot or back. Like someone is in here trying to wake me up, but no one is in there. I usually just go and sleep on the sofa." Denies A/V hallucinations. Has issues falling and staying " "asleep, and wakes several times throughout the night. Leads to some next-day fatigue. Denies any major decompensations of mood, and denies any manic or hypomanic episodes between sessions. "I feel pretty relaxed, actually".      AIMS: 0; no abnormal movements noted or reported    Past Psychiatric hx: Pt. is a 54 year old female  with a past psychiatric hx of Bipolar 1 disorder, depressed who established care with me 07/19 following the correction of her previous psych, Dr. Pelaez. She came to me taking Lithium 600mg BID and Xanax 0.5 mg QHS PRN for sleep (only uses this sparingly) , which had been previously prescribed and managed by Dr. Pelaez. She reports that her symptoms have stabilized over the last 7-8 months, and has been without a manic episode since November of 2018. Previous med trials of Lamictal (rash), Risperdal, Wellbutrin, some SSRIs (destablized mood, triggered manic episodes). Pt stable in clinic upon initial eval - all meds were continued at same dosage.      Notes a long standing history of "mood swings" dating to childhood, but first formal psych encounter in 1987 following the birth of her child. While admitted to the hospital following birth, and after being discharged, pt speaks to experiencing A/V hallucinations and euphoric gamal following a two-week period of going without sleep. "I was real paranoid after I had the baby. I felt that if I went to sleep, I wouldn't be able to hear the baby cry. I ended up going a couple weeks where I didn't sleep at all, started hearing things and seeing things that weren't there. I felt like people were trying to contact me and tell me things through the computer or the TV. I was paranoid and was thinking the vampires were after us. I started thinking I was someone I wasn't." She eventually sought psychiatric care in an outpatient seeing, but was not started on any psychiatric medication until 2002 (?). She was prescribed SSRIs and Wellbutrin at this time, " "which triggered a manic episode, and were d/c'd. She was eventually started on Lithium around 2005, and has achieved good results. States she has felt more stable than she's ever felt before.      Upon initial eval, pt reports manic episodes "once or twice a year" that are triggered by a lack of sleep. States that her manic episodes last for "weeks" and "I get hyper, my metabolism speeds up. I feel like I'm sped up. I always try to buy a car or a house. I had two houses at one time. It always ends with them putting me in the hospital." Does continue to experience delusional thinking and auditory hallucinations exclusively during manic episodes. Hx of several ED admissions in 03/19 r/t delusions, and "walking down the street talking to people who weren't there" Per - ED note. Reports inpatient hospitalization following manic episodes to [facility] x 1 week in November of 2018. Also reports another admission to [facility] in March of 2018 for 3 days following a manic episode. During these episodes, "I get very happy during those episodes. I start thinking I'm Colt and can communicate through the TV".      Denies any depressive symptoms. Reports sleeping 7-9 hours of sleep each night, restful. No issues falling or staying asleep. Denies anhedonia. Denies guilt/worthlessness. Energy good, concentration good. Appetite good. Denies psychomotor slowing, denies SI.      On 10/16, pt daughter reported "She seems a little bit better during the day, but is still pretty manic and only sleeping 2-3 hours at night, despite trying to up-titrate the medication.  She had to have a meeting with her supervisors at work yesterday, but they have fortunately been very understanding about her bipolar" Episodes do not meet criteria for true gamal/or hypomania, but we have been up-titrating Seroquel (started 10/19) to address symptoms - pt has responded well but reports dry mouth.I advised pt to add dose of Seroquel 50mg Q AM. However, she " "states pt states this made her groggy so d/c this. Despite this, pt reports at f/u "I've been feeling so much better. I'm actually sleeping now and definitely haven't been feeling manic at all. I feel way more calm now." However,  Notes overall less mood lability, less agitation. Denies manic/hypomanic episodes since last visit. She went to [place] on vacation between visit - enjoyed this. Pt doing well with no decompensations since last visit.  Does note continued generalized worry. Feels restless, on edge.     Past Medical hx:   Past Medical History:   Diagnosis Date    Bipolar 1 disorder     GERD (gastroesophageal reflux disease)     History of psychiatric hospitalization     Mental disorder         Interim hx:  Medication changes last visit: Inc seroquel to 50mg PO QAM, 100mg QHS  Anxiety: inc'd  Depression: denies     Denies suicidal/homicidal ideations.  Denies hopelessness/worthlessness.    Denies auditory/visual hallucinations       Tobacco: never used  Alcohol: denies   Drug use: denies  Caffeine: 1-2 cups coffee daily      Review of Systems   · PSYCHIATRIC: Pertinent items are noted in the narrative.        CONSTITUTIONAL: weight stable        M/S: no pain today         ENT: no allergies noted today        ABD: no n/v/d     Past Medical, Family and Social History: The patient's past medical, family and social history have been reviewed and updated as appropriate within the electronic medical record. See encounter notes.     Medication: Lithium 600mg BID and Xanax 0.5 mg QHS PRN for sleep, Seroquel 50mg Q AM 100mg QHS     Compliance: yes      Side effects: dry mouth from Seroquel     Risk Parameters:  Patient reports no suicidal ideation  Patient reports no homicidal ideation  Patient reports no self-injurious behavior  Patient reports no violent behavior     Exam (detailed: at least 9 elements; comprehensive: all 15 elements)   Constitutional  Vitals:  Most recent vital signs, dated less than 90 " "days prior to this appointment, were reviewed. There were no vitals taken for this visit.     General:  unremarkable, age appropriate, casual attire, good eye contact, good rapport       Musculoskeletal  Muscle Strength/Tone:  no flaccidity, no tremor    Gait & Station:  normal      Psychiatric                       Speech:  normal tone, normal rate, rhythm, and volume   Mood & Affect:   Euthymic, congruent, appropriate         Thought Process:   Goal directed; Linear    Associations:   intact   Thought Content:   No SI/HI, + fixed delusions, or paranoia, no AV/VH   Insight & Judgement:   Good, adequate to circumstances   Orientation:   grossly intact; alert and oriented x 4    Memory:  intact for content of interview    Language:  grossly intact, can repeat    Attention Span  : Grossly intact for content of interview   Fund of Knowledge:   intact and appropriate to age and level of education        Assessment and Diagnosis   Status/Progress: Based on the examination today, the patient's problem(s) is/are under fair control.  New problems have not been presented today. Comorbidities are not currently complicating management of the primary condition.      Impression:Pt is a 54 year old female with past psychiatric hx of Bipolar 1 disorder, anxiety NOS who presents for follow up treatment. She is currently taking Lithium 300mg QID, Xanax 0.5 mg QHS PRN for sleep (only uses sparingly - "a few times a month), Seroquel 100mg qhs. We have been titrating Seroquel to optimal dose do address ongoing mood lability, and pt has been without a true manic or hypomanic episode since starting. Reports sleep has improved, is able to sleep through the night now. Dote continued anxiety, often feeling restless and tense. Has been stressed r/t daughter's wedding, flying to [place]. Energy ok, concentration fluctuates. Has been making an effort to start exercising, eating cleanly.     AIMS: 0; no abnormal movements noted or " reported      Diagnosis: Bipolar 1 disorder, partial remission, anxiety unspecified    Intervention/Counseling/Treatment Plan   · Medication Management:      1) Continue Lithium 300mg QID for mood.     2) Increase Seroquel to 50mg Q AM, 100mg QHS. Typical ELFEGO's reviewed including weight gain, abnormal movements, EPS, TD, metabolic side effects.      3) Continue Xanax 0.5 mg QHS PRN for sleep. Discussed risk of decreased RT, sedation, addictive potential, and not to mix with alcohol.      4. Call to report any worsening of symptoms or problems with the medication. Pt instructed to go to ER with thoughts of harming self, others     5. Patient given contact # for psychotherapists at [facility] and also instructed she may check with insurance for list of providers.      6. Labs: Li level 0.8 10/9/19 - reviewed results with patient.     Psychotherapy:   · Target symptoms: gamal, depression  · Why chosen therapy is appropriate versus another modality: relevant to diagnosis, patient responds to this modality  · Outcome monitoring methods: self-report, observation, feedback from family   · Therapeutic intervention type: supportive psychotherapy  · Topics discussed/themes: building skills sets for symptom management, symptom recognition, nutrition, exercise  · The patient's response to the intervention is accepting. The patient's progress toward treatment goals is positive progress.  · Duration of intervention: 20 minutes     Return to clinic: 1 month - virtual     -Spent 30min face to face with the pt; >50% time spent in counseling   -Supportive therapy and psychoeducation provided  -R/B/SE's of medications discussed with the pt who expresses understanding and chooses to take medications as prescribed.   -Pt instructed to call clinic, 911 or go to nearest emergency room if sxs worsen or pt is in   crisis. The pt expresses understanding.    Montana Aguilar, NP

## 2020-05-19 ENCOUNTER — PATIENT MESSAGE (OUTPATIENT)
Dept: PSYCHIATRY | Facility: CLINIC | Age: 55
End: 2020-05-19

## 2020-06-22 ENCOUNTER — OFFICE VISIT (OUTPATIENT)
Dept: PSYCHIATRY | Facility: CLINIC | Age: 55
End: 2020-06-22
Payer: MEDICAID

## 2020-06-22 DIAGNOSIS — F41.9 ANXIETY DISORDER, UNSPECIFIED TYPE: Primary | ICD-10-CM

## 2020-06-22 DIAGNOSIS — F31.60 BIPOLAR 1 DISORDER, MIXED: ICD-10-CM

## 2020-06-22 DIAGNOSIS — Z79.899 HIGH RISK MEDICATION USE: ICD-10-CM

## 2020-06-22 PROCEDURE — 99214 OFFICE O/P EST MOD 30 MIN: CPT | Mod: 95,SA,HB, | Performed by: NURSE PRACTITIONER

## 2020-06-22 PROCEDURE — 90833 PR PSYCHOTHERAPY W/PATIENT W/E&M, 30 MIN (ADD ON): ICD-10-PCS | Mod: 95,SA,HB, | Performed by: NURSE PRACTITIONER

## 2020-06-22 PROCEDURE — 90833 PSYTX W PT W E/M 30 MIN: CPT | Mod: 95,SA,HB, | Performed by: NURSE PRACTITIONER

## 2020-06-22 PROCEDURE — 99214 PR OFFICE/OUTPT VISIT, EST, LEVL IV, 30-39 MIN: ICD-10-PCS | Mod: 95,SA,HB, | Performed by: NURSE PRACTITIONER

## 2020-06-22 NOTE — PROGRESS NOTES
"Outpatient Psychiatry Follow-Up Visit    Clinical Status of Patient: Outpatient (Ambulatory)  06/22/2020    Chief Complaint: Pt is a 54 year old female who presents today for a follow-up. Met with patient.       Interval History and Content of Current Session:  Interim Events/Subjective Report/Content of Current Session:  follow up appointment.    Pt is a 54 year old female with past psychiatric hx of Bipolar 1 disorder, anxiety NOS who presents for follow up treatment. She is currently taking Lithium 300mg QID, Xanax 0.5 mg QHS PRN for sleep (only uses sparingly - "a few times a month), Seroquel 50mg Q AM, 100mg qhs. We have been titrating Seroquel to optimal dose do address ongoing mood lability, and pt has been without a true manic or hypomanic episode since starting.     During her 5/20 f/u visit pt reports "I've been getting into bed at night, and sometimes it feels like someone else is getting in bed with me. It got to the point where I couldn't sleep in my bed because I was kind of scared. I thought it was my cat but my cat wasn't in the room. Sometime it even touches me, like it's brushing on either my foot or back. Like someone is in here trying to wake me up, but no one is in there. I usually just go and sleep on the sofa." Denies A/V hallucinations.     Seroquel was increased following this visit, Today, pt reports "I don't have those feelings in my bed anymore. I'm not afraid to get in bed anymore. Thanks goodness. I've been sleeping better." Does reports "feeling extremely tired when I take it in the morning." and requests to move to PM dosing. Tolerates well otherwise. Denies any gamal or hypomania between sessions.    AIMS: 0; no abnormal movements noted or reported    Past Psychiatric hx: Pt. is a 54 year old female  with a past psychiatric hx of Bipolar 1 disorder, depressed who established care with me 07/19 following the USP of her previous psych, Dr. Pelaez. She came to me taking [place] " "600mg BID and Xanax 0.5 mg QHS PRN for sleep (only uses this sparingly) , which had been previously prescribed and managed by Dr. Pelaez. She reports that her symptoms have stabilized over the last 7-8 months, and has been without a manic episode since November of 2018. Previous med trials of Lamictal (rash), Risperdal, Wellbutrin, some SSRIs (destablized mood, triggered manic episodes). Pt stable in clinic upon initial eval - all meds were continued at same dosage.      Notes a long standing history of "mood swings" dating to childhood, but first formal psych encounter in 1987 following the birth of her child. While admitted to the hospital following birth, and after being discharged, pt speaks to experiencing A/V hallucinations and euphoric gamal following a two-week period of going without sleep. "I was real paranoid after I had the baby. I felt that if I went to sleep, I wouldn't be able to hear the baby cry. I ended up going a couple weeks where I didn't sleep at all, started hearing things and seeing things that weren't there. I felt like people were trying to contact me and tell me things through the computer or the TV. I was paranoid and was thinking the vampires were after us. I started thinking I was someone I wasn't." She eventually sought psychiatric care in an outpatient seeing, but was not started on any psychiatric medication until 2002 (?). She was prescribed SSRIs and Wellbutrin at this time, which triggered a manic episode, and were d/c'd. She was eventually started on Lithium around 2005, and has achieved good results. States she has felt more stable than she's ever felt before.      Upon initial eval, pt reports manic episodes "once or twice a year" that are triggered by a lack of sleep. States that her manic episodes last for "weeks" and "I get hyper, my metabolism speeds up. I feel like I'm sped up. I always try to buy a car or a house. I had two houses at one time. It always ends with them " "putting me in the hospital." Does continue to experience delusional thinking and auditory hallucinations exclusively during manic episodes. Hx of several ED admissions in 03/19 r/t delusions, and "walking down the street talking to people who weren't there" Per - ED note. Reports inpatient hospitalization following manic episodes to [facility] x 1 week in November of 2018. Also reports another admission to [facility] in March of 2018 for 3 days following a manic episode. During these episodes, "I get very happy during those episodes. I start thinking I'm [place]t and can communicate through the TV".      Denies any depressive symptoms. Reports sleeping 7-9 hours of sleep each night, restful. No issues falling or staying asleep. Denies anhedonia. Denies guilt/worthlessness. Energy good, concentration good. Appetite good. Denies psychomotor slowing, denies SI.      On 10/16, pt daughter reported "She seems a little bit better during the day, but is still pretty manic and only sleeping 2-3 hours at night, despite trying to up-titrate the medication.  She had to have a meeting with her supervisors at work yesterday, but they have fortunately been very understanding about her bipolar" Episodes do not meet criteria for true gamal/or hypomania, but we have been up-titrating Seroquel (started 10/19) to address symptoms - pt has responded well but reports dry mouth.I advised pt to add dose of Seroquel 50mg Q AM. However, she states pt states this made her groggy so d/c this. Despite this, pt reports at f/u "I've been feeling so much better. I'm actually sleeping now and definitely haven't been feeling manic at all. I feel way more calm now." However,  Notes overall less mood lability, less agitation. Denies manic/hypomanic episodes since last visit. She went to [place] on vacation between visit - enjoyed this. Pt doing well with no decompensations since last visit.  Does note continued generalized worry. Feels restless, on " anmol.     Past Medical hx:   Past Medical History:   Diagnosis Date    Bipolar 1 disorder     GERD (gastroesophageal reflux disease)     History of psychiatric hospitalization     Mental disorder         Interim hx:  Medication changes last visit: Inc seroquel to 50mg PO QAM, 100mg QHS  Anxiety: inc'd  Depression: denies     Denies suicidal/homicidal ideations.  Denies hopelessness/worthlessness.    Denies auditory/visual hallucinations       Tobacco: never used  Alcohol: denies   Drug use: denies  Caffeine: 1-2 cups coffee daily      Review of Systems   · PSYCHIATRIC: Pertinent items are noted in the narrative.        CONSTITUTIONAL: weight stable        M/S: no pain today         ENT: no allergies noted today        ABD: no n/v/d     Past Medical, Family and Social History: The patient's past medical, family and social history have been reviewed and updated as appropriate within the electronic medical record. See encounter notes.     Medication: Lithium 600mg BID and Xanax 0.5 mg QHS PRN for sleep, Seroquel 50mg Q AM 100mg QHS     Compliance: yes      Side effects: dry mouth from Seroquel     Risk Parameters:  Patient reports no suicidal ideation  Patient reports no homicidal ideation  Patient reports no self-injurious behavior  Patient reports no violent behavior     Exam (detailed: at least 9 elements; comprehensive: all 15 elements)   Constitutional  Vitals:  Most recent vital signs, dated less than 90 days prior to this appointment, were reviewed. There were no vitals taken for this visit.     General:  unremarkable, age appropriate, casual attire, good eye contact, good rapport       Musculoskeletal  Muscle Strength/Tone:  no flaccidity, no tremor    Gait & Station:  normal      Psychiatric                       Speech:  normal tone, normal rate, rhythm, and volume   Mood & Affect:   Euthymic, congruent, appropriate         Thought Process:   Goal directed; Linear    Associations:   intact   Thought  "Content:   No SI/HI, + fixed delusions, or paranoia, no AV/VH   Insight & Judgement:   Good, adequate to circumstances   Orientation:   grossly intact; alert and oriented x 4    Memory:  intact for content of interview    Language:  grossly intact, can repeat    Attention Span  : Grossly intact for content of interview   Fund of Knowledge:   intact and appropriate to age and level of education        Assessment and Diagnosis   Status/Progress: Based on the examination today, the patient's problem(s) is/are under fair control.  New problems have not been presented today. Comorbidities are not currently complicating management of the primary condition.      Impression:Pt is a 54 year old female with past psychiatric hx of Bipolar 1 disorder, anxiety NOS who presents for follow up treatment. She is currently taking Lithium 300mg QID, Xanax 0.5 mg QHS PRN for sleep (only uses sparingly - "a few times a month), Seroquel 50mg Q AM, 100mg qhs. We have been titrating Seroquel to optimal dose do address ongoing mood lability, and pt has been without a true manic or hypomanic episode since starting.     During her 5/20 f/u visit pt reports "I've been getting into bed at night, and sometimes it feels like someone else is getting in bed with me. It got to the point where I couldn't sleep in my bed because I was kind of scared. I thought it was my cat but my cat wasn't in the room. Sometime it even touches me, like it's brushing on either my foot or back. Like someone is in here trying to wake me up, but no one is in there. I usually just go and sleep on the sofa." Denies A/V hallucinations.     Seroquel was increased following this visit, Today, pt reports "I don't have those feelings in my bed anymore. I'm not afraid to get in bed anymore. Thanks goodness. I've been sleeping better." Does reports "feeling extremely tired when I take it in the morning." and requests to move to PM dosing. Tolerates well otherwise.    AIMS: 0; no " abnormal movements noted or reported      Diagnosis: Bipolar 1 disorder, partial remission, anxiety unspecified    Intervention/Counseling/Treatment Plan   · Medication Management:      1) Continue Lithium 300mg QID for mood.     2) Continue Seroquel 150mg QHS. Typical ELFEGO's reviewed including weight gain, abnormal movements, EPS, TD, metabolic side effects.      3) Continue Xanax 0.5 mg QHS PRN for sleep. Discussed risk of decreased RT, sedation, addictive potential, and not to mix with alcohol.      4. Call to report any worsening of symptoms or problems with the medication. Pt instructed to go to ER with thoughts of harming self, others     5. Patient given contact # for psychotherapists at [facility] and also instructed she may check with insurance for list of providers.      6. Labs: Li, TSH, CBC, CMP     Psychotherapy:   · Target symptoms: gamal, depression  · Why chosen therapy is appropriate versus another modality: relevant to diagnosis, patient responds to this modality  · Outcome monitoring methods: self-report, observation, feedback from family   · Therapeutic intervention type: supportive psychotherapy  · Topics discussed/themes: building skills sets for symptom management, symptom recognition, nutrition, exercise  · The patient's response to the intervention is accepting. The patient's progress toward treatment goals is positive progress.  · Duration of intervention: 20 minutes     Return to clinic: 1 month - virtual     -Spent 30min face to face with the pt; >50% time spent in counseling   -Supportive therapy and psychoeducation provided  -R/B/SE's of medications discussed with the pt who expresses understanding and chooses to take medications as prescribed.   -Pt instructed to call clinic, 911 or go to nearest emergency room if sxs worsen or pt is in   crisis. The pt expresses understanding.    [place] Aguilar, NP

## 2020-07-07 ENCOUNTER — TELEPHONE (OUTPATIENT)
Dept: FAMILY MEDICINE | Facility: CLINIC | Age: 55
End: 2020-07-07

## 2020-07-07 NOTE — TELEPHONE ENCOUNTER
----- Message from Monicaashutosh Westondivina sent at 7/6/2020  4:04 PM CDT -----  Contact: self  Type:  Sooner Apoointment Request    Caller is requesting a sooner appointment.  Caller declined first available appointment listed below.  Caller will not accept being placed on the waitlist and is requesting a message be sent to doctor.    Name of Caller:  patient   When is the first available appointment?  Has medcaid now  Symptoms:  annual checkup  Best Call Back Number:  [phone] (home)   Additional Information:  She rec'd a reply from Khurram Vasquez stating that we take medicaid so call patient back to get her scheduled.  Thanks

## 2020-07-09 LAB
ALBUMIN SERPL-MCNC: 4.1 G/DL (ref 3.6–5.1)
ALBUMIN/GLOB SERPL: 1.6 (CALC) (ref 1–2.5)
ALP SERPL-CCNC: 45 U/L (ref 37–153)
ALT SERPL-CCNC: 26 U/L (ref 6–29)
AST SERPL-CCNC: 20 U/L (ref 10–35)
BASOPHILS # BLD AUTO: 34 CELLS/UL (ref 0–200)
BASOPHILS NFR BLD AUTO: 0.4 %
BILIRUB SERPL-MCNC: 1.3 MG/DL (ref 0.2–1.2)
BUN SERPL-MCNC: 11 MG/DL (ref 7–25)
BUN/CREAT SERPL: ABNORMAL (CALC) (ref 6–22)
CALCIUM SERPL-MCNC: 9.8 MG/DL (ref 8.6–10.4)
CHLORIDE SERPL-SCNC: 106 MMOL/L (ref 98–110)
CO2 SERPL-SCNC: 28 MMOL/L (ref 20–32)
CREAT SERPL-MCNC: 0.82 MG/DL (ref 0.5–1.05)
EOSINOPHIL # BLD AUTO: 302 CELLS/UL (ref 15–500)
EOSINOPHIL NFR BLD AUTO: 3.6 %
ERYTHROCYTE [DISTWIDTH] IN BLOOD BY AUTOMATED COUNT: 13.1 % (ref 11–15)
GFRSERPLBLD MDRD-ARVRAT: 80 ML/MIN/1.73M2
GLOBULIN SER CALC-MCNC: 2.6 G/DL (CALC) (ref 1.9–3.7)
GLUCOSE SERPL-MCNC: 111 MG/DL (ref 65–99)
HCT VFR BLD AUTO: 40.7 % (ref 35–45)
HGB BLD-MCNC: 13.4 G/DL (ref 11.7–15.5)
LITHIUM SERPL-SCNC: 0.6 MMOL/L (ref 0.6–1.2)
LYMPHOCYTES # BLD AUTO: 2999 CELLS/UL (ref 850–3900)
LYMPHOCYTES NFR BLD AUTO: 35.7 %
MCH RBC QN AUTO: 30.6 PG (ref 27–33)
MCHC RBC AUTO-ENTMCNC: 32.9 G/DL (ref 32–36)
MCV RBC AUTO: 92.9 FL (ref 80–100)
MONOCYTES # BLD AUTO: 512 CELLS/UL (ref 200–950)
MONOCYTES NFR BLD AUTO: 6.1 %
NEUTROPHILS # BLD AUTO: 4553 CELLS/UL (ref 1500–7800)
NEUTROPHILS NFR BLD AUTO: 54.2 %
PLATELET # BLD AUTO: 385 THOUSAND/UL (ref 140–400)
PMV BLD REES-ECKER: 10.4 FL (ref 7.5–12.5)
POTASSIUM SERPL-SCNC: 4.5 MMOL/L (ref 3.5–5.3)
PROT SERPL-MCNC: 6.7 G/DL (ref 6.1–8.1)
RBC # BLD AUTO: 4.38 MILLION/UL (ref 3.8–5.1)
SODIUM SERPL-SCNC: 141 MMOL/L (ref 135–146)
TSH SERPL-ACNC: 4.74 MIU/L (ref 0.4–4.5)
WBC # BLD AUTO: 8.4 THOUSAND/UL (ref 3.8–10.8)

## 2020-07-24 ENCOUNTER — OFFICE VISIT (OUTPATIENT)
Dept: PSYCHIATRY | Facility: CLINIC | Age: 55
End: 2020-07-24
Payer: MEDICAID

## 2020-07-24 DIAGNOSIS — F41.9 ANXIETY DISORDER, UNSPECIFIED TYPE: ICD-10-CM

## 2020-07-24 DIAGNOSIS — F31.60 BIPOLAR 1 DISORDER, MIXED: Primary | ICD-10-CM

## 2020-07-24 PROCEDURE — 90833 PR PSYCHOTHERAPY W/PATIENT W/E&M, 30 MIN (ADD ON): ICD-10-PCS | Mod: 95,SA,HB, | Performed by: NURSE PRACTITIONER

## 2020-07-24 PROCEDURE — 90833 PSYTX W PT W E/M 30 MIN: CPT | Mod: 95,SA,HB, | Performed by: NURSE PRACTITIONER

## 2020-07-24 PROCEDURE — 99214 OFFICE O/P EST MOD 30 MIN: CPT | Mod: 95,SA,HB, | Performed by: NURSE PRACTITIONER

## 2020-07-24 PROCEDURE — 99214 PR OFFICE/OUTPT VISIT, EST, LEVL IV, 30-39 MIN: ICD-10-PCS | Mod: 95,SA,HB, | Performed by: NURSE PRACTITIONER

## 2020-07-24 RX ORDER — LITHIUM CARBONATE 300 MG/1
300 CAPSULE ORAL 3 TIMES DAILY
Qty: 90 CAPSULE | Refills: 2 | Status: SHIPPED | OUTPATIENT
Start: 2020-07-24 | End: 2020-11-23 | Stop reason: SDUPTHER

## 2020-07-24 RX ORDER — QUETIAPINE FUMARATE 100 MG/1
200 TABLET, FILM COATED ORAL NIGHTLY
Qty: 60 TABLET | Refills: 3 | Status: SHIPPED | OUTPATIENT
Start: 2020-07-24 | End: 2021-01-04 | Stop reason: SDUPTHER

## 2020-07-24 RX ORDER — ALPRAZOLAM 0.5 MG/1
0.5 TABLET ORAL DAILY PRN
Qty: 30 TABLET | Refills: 0 | Status: SHIPPED | OUTPATIENT
Start: 2020-07-24 | End: 2020-11-23 | Stop reason: SDUPTHER

## 2020-07-24 NOTE — PROGRESS NOTES
"Outpatient Psychiatry Follow-Up Visit    Clinical Status of Patient: Outpatient (Ambulatory)  07/24/2020    Chief Complaint: Pt is a 54 year old female who presents today for a follow-up. Met with patient.       Interval History and Content of Current Session:  Interim Events/Subjective Report/Content of Current Session:  follow up appointment.    Pt is a 54 year old female with past psychiatric hx of Bipolar 1 disorder, anxiety NOS who presents for follow up treatment. She is currently taking Lithium 300mg QID, Xanax 0.5 mg QHS PRN for sleep (only uses sparingly - "a few times a month), Seroquel 50mg Q AM, 100mg qhs. We have been titrating Seroquel to optimal dose do address ongoing mood lability, and pt has been without a true manic or hypomanic episode since starting.     Between sessions, pt's TSH returned higher than normal. I have referred her to PCP for evaluation. She reports hair loss and sluggishness throughout the day. She is stable, and we have discussed decreasing her Lithium to 300mg TID and instructed her to monitor closely for mood destabilization.      AIMS: 0; no abnormal movements noted or reported    Past Psychiatric hx: Pt. is a 54 year old female  with a past psychiatric hx of Bipolar 1 disorder, depressed who established care with me 07/19 following the care home of her previous psych, Dr. Pelaez. She came to me taking Lithium 600mg BID and Xanax 0.5 mg QHS PRN for sleep (only uses this sparingly) , which had been previously prescribed and managed by Dr. Pelaez. She reports that her symptoms have stabilized over the last 7-8 months, and has been without a manic episode since November of 2018. Previous med trials of Lamictal (rash), Risperdal, Wellbutrin, some SSRIs (destablized mood, triggered manic episodes). Pt stable in clinic upon initial eval - all meds were continued at same dosage.      Notes a long standing history of "mood swings" dating to childhood, but first formal psych encounter " "in 1987 following the birth of her child. While admitted to the hospital following birth, and after being discharged, pt speaks to experiencing A/V hallucinations and euphoric gamal following a two-week period of going without sleep. "I was real paranoid after I had the baby. I felt that if I went to sleep, I wouldn't be able to hear the baby cry. I ended up going a couple weeks where I didn't sleep at all, started hearing things and seeing things that weren't there. I felt like people were trying to contact me and tell me things through the computer or the TV. I was paranoid and was thinking the vampires were after us. I started thinking I was someone I wasn't." She eventually sought psychiatric care in an outpatient seeing, but was not started on any psychiatric medication until 2002 (?). She was prescribed SSRIs and Wellbutrin at this time, which triggered a manic episode, and were d/c'd. She was eventually started on Lithium around 2005, and has achieved good results. States she has felt more stable than she's ever felt before.      Upon initial eval, pt reports manic episodes "once or twice a year" that are triggered by a lack of sleep. States that her manic episodes last for "weeks" and "I get hyper, my metabolism speeds up. I feel like I'm sped up. I always try to buy a car or a house. I had two houses at one time. It always ends with them putting me in the hospital." Does continue to experience delusional thinking and auditory hallucinations exclusively during manic episodes. Hx of several ED admissions in 03/19 r/t delusions, and "walking down the street talking to people who weren't there" Per - ED note. Reports inpatient hospitalization following manic episodes to [facility] x 1 week in November of 2018. Also reports another admission to [facility] in March of 2018 for 3 days following a manic episode. During these episodes, "I get very happy during those episodes. I start thinking I'm [place]t and can " "communicate through the TV".      Denies any depressive symptoms. Reports sleeping 7-9 hours of sleep each night, restful. No issues falling or staying asleep. Denies anhedonia. Denies guilt/worthlessness. Energy good, concentration good. Appetite good. Denies psychomotor slowing, denies SI.      On 10/16, pt daughter reported "She seems a little bit better during the day, but is still pretty manic and only sleeping 2-3 hours at night, despite trying to up-titrate the medication.  She had to have a meeting with her supervisors at work yesterday, but they have fortunately been very understanding about her bipolar" Episodes do not meet criteria for true gamal/or hypomania, but we have been up-titrating Seroquel (started 10/19) to address symptoms - pt has responded well but reports dry mouth.I advised pt to add dose of Seroquel 50mg Q AM. However, she states pt states this made her groggy so d/c this. Despite this, pt reports at f/u "I've been feeling so much better. I'm actually sleeping now and definitely haven't been feeling manic at all. I feel way more calm now." However,  Notes overall less mood lability, less agitation. Denies manic/hypomanic episodes since last visit. She went to [place] on vacation between visit - enjoyed this. Pt doing well with no decompensations since last visit.  Does note continued generalized worry. Feels restless, on edge.       During her 5/20 f/u visit pt reports "I've been getting into bed at night, and sometimes it feels like someone else is getting in bed with me. It got to the point where I couldn't sleep in my bed because I was kind of scared. I thought it was my cat but my cat wasn't in the room. Sometime it even touches me, like it's brushing on either my foot or back. Like someone is in here trying to wake me up, but no one is in there. I usually just go and sleep on the sofa." Denies A/V hallucinations.     Seroquel was increased following this visit, Today, pt reports " ""I don't have those feelings in my bed anymore. I'm not afraid to get in bed anymore. Thanks goodness. I've been sleeping better." Does reports "feeling extremely tired when I take it in the morning." and requests to move to PM dosing. Tolerates well otherwise. Denies any gamal or hypomania between sessions.      Past Medical hx:   Past Medical History:   Diagnosis Date    Bipolar 1 disorder     GERD (gastroesophageal reflux disease)     History of psychiatric hospitalization     Mental disorder         Interim hx:  Medication changes last visit: Inc seroquel to 50mg PO QAM, 100mg QHS  Anxiety: inc'd  Depression: denies     Denies suicidal/homicidal ideations.  Denies hopelessness/worthlessness.    Denies auditory/visual hallucinations       Tobacco: never used  Alcohol: denies   Drug use: denies  Caffeine: 1-2 cups coffee daily      Review of Systems   · PSYCHIATRIC: Pertinent items are noted in the narrative.        CONSTITUTIONAL: weight stable        M/S: no pain today         ENT: no allergies noted today        ABD: no n/v/d     Past Medical, Family and Social History: The patient's past medical, family and social history have been reviewed and updated as appropriate within the electronic medical record. See encounter notes.     Medication: Lithium 600mg BID and Xanax 0.5 mg QHS PRN for sleep, Seroquel 50mg Q AM 100mg QHS     Compliance: yes      Side effects: dry mouth from Seroquel     Risk Parameters:  Patient reports no suicidal ideation  Patient reports no homicidal ideation  Patient reports no self-injurious behavior  Patient reports no violent behavior     Exam (detailed: at least 9 elements; comprehensive: all 15 elements)   Constitutional  Vitals:  Most recent vital signs, dated less than 90 days prior to this appointment, were reviewed. There were no vitals taken for this visit.     General:  unremarkable, age appropriate, casual attire, good eye contact, good rapport     " "  Musculoskeletal  Muscle Strength/Tone:  no flaccidity, no tremor    Gait & Station:  normal      Psychiatric                       Speech:  normal tone, normal rate, rhythm, and volume   Mood & Affect:   Euthymic, congruent, appropriate         Thought Process:   Goal directed; Linear    Associations:   intact   Thought Content:   No SI/HI, + fixed delusions, or paranoia, no AV/VH   Insight & Judgement:   Good, adequate to circumstances   Orientation:   grossly intact; alert and oriented x 4    Memory:  intact for content of interview    Language:  grossly intact, can repeat    Attention Span  : Grossly intact for content of interview   Fund of Knowledge:   intact and appropriate to age and level of education        Assessment and Diagnosis   Status/Progress: Based on the examination today, the patient's problem(s) is/are under fair control.  New problems have not been presented today. Comorbidities are not currently complicating management of the primary condition.      Impression:Pt is a 54 year old female with past psychiatric hx of Bipolar 1 disorder, anxiety NOS who presents for follow up treatment. She is currently taking Lithium 300mg QID, Xanax 0.5 mg QHS PRN for sleep (only uses sparingly - "a few times a month), Seroquel 50mg Q AM, 100mg qhs. We have been titrating Seroquel to optimal dose do address ongoing mood lability, and pt has been without a true manic or hypomanic episode since starting.     Between sessions, pt's TSH returned higher than normal. I have referred her to PCP for evaluation. She reports hair loss and sluggishness throughout the day. She is stable, and we have discussed decreasing her Lithium to 300mg TID and instructed her to monitor closely for mood destabilization.      AIMS: 0; no abnormal movements noted or reported          AIMS: 0; no abnormal movements noted or reported      Diagnosis: Bipolar 1 disorder, partial remission, anxiety " unspecified    Intervention/Counseling/Treatment Plan   · Medication Management:      1) Decrease Lithium to 300mg TID due to increased TSH. Pt instructed to monitor closely for mood destabilization.     2) Increase Seroquel to 200mg QHS. Typical ELFEGO's reviewed including weight gain, abnormal movements, EPS, TD, metabolic side effects.      3) Continue Xanax 0.5 mg QHS PRN for sleep. Discussed risk of decreased RT, sedation, addictive potential, and not to mix with alcohol.      4. Call to report any worsening of symptoms or problems with the medication. Pt instructed to go to ER with thoughts of harming self, others     5. Patient given contact # for psychotherapists at [facility] and also instructed she may check with insurance for list of providers.      6. Labs: no new orders    Psychotherapy:   · Target symptoms: gamal, depression  · Why chosen therapy is appropriate versus another modality: relevant to diagnosis, patient responds to this modality  · Outcome monitoring methods: self-report, observation, feedback from family   · Therapeutic intervention type: supportive psychotherapy  · Topics discussed/themes: building skills sets for symptom management, symptom recognition, nutrition, exercise  · The patient's response to the intervention is accepting. The patient's progress toward treatment goals is positive progress.  · Duration of intervention: 20 minutes     Return to clinic: 3 weeks - office visit    -Spent 30min face to face with the pt; >50% time spent in counseling   -Supportive therapy and psychoeducation provided  -R/B/SE's of medications discussed with the pt who expresses understanding and chooses to take medications as prescribed.   -Pt instructed to call clinic, 911 or go to nearest emergency room if sxs worsen or pt is in   crisis. The pt expresses understanding.    Montana Aguilar, NP

## 2020-07-29 ENCOUNTER — TELEPHONE (OUTPATIENT)
Dept: PSYCHIATRY | Facility: CLINIC | Age: 55
End: 2020-07-29

## 2020-08-05 ENCOUNTER — DOCUMENTATION ONLY (OUTPATIENT)
Dept: PSYCHIATRY | Facility: CLINIC | Age: 55
End: 2020-08-05

## 2020-08-05 NOTE — PROGRESS NOTES
Liv CUELLAR from [facility] for Lithium Carbonate 300mg capsule eff 07/31/2020-07/31/2021.  Faxed approval letter to pharmacy.

## 2020-08-13 ENCOUNTER — OFFICE VISIT (OUTPATIENT)
Dept: PSYCHIATRY | Facility: CLINIC | Age: 55
End: 2020-08-13
Payer: MEDICAID

## 2020-08-13 DIAGNOSIS — F31.60 BIPOLAR 1 DISORDER, MIXED: Primary | ICD-10-CM

## 2020-08-13 DIAGNOSIS — F41.9 ANXIETY DISORDER, UNSPECIFIED TYPE: ICD-10-CM

## 2020-08-13 PROCEDURE — 99214 PR OFFICE/OUTPT VISIT, EST, LEVL IV, 30-39 MIN: ICD-10-PCS | Mod: 95,SA,HB, | Performed by: NURSE PRACTITIONER

## 2020-08-13 PROCEDURE — 99214 OFFICE O/P EST MOD 30 MIN: CPT | Mod: 95,SA,HB, | Performed by: NURSE PRACTITIONER

## 2020-08-13 PROCEDURE — 90833 PSYTX W PT W E/M 30 MIN: CPT | Mod: 95,SA,HB, | Performed by: NURSE PRACTITIONER

## 2020-08-13 PROCEDURE — 90833 PR PSYCHOTHERAPY W/PATIENT W/E&M, 30 MIN (ADD ON): ICD-10-PCS | Mod: 95,SA,HB, | Performed by: NURSE PRACTITIONER

## 2020-08-13 NOTE — PROGRESS NOTES
"Outpatient Psychiatry Follow-Up Visit    Clinical Status of Patient: Outpatient (Ambulatory)  08/13/2020    Chief Complaint: Pt is a 55 year old female who presents today for a follow-up. Met with patient.       Interval History and Content of Current Session:  Interim Events/Subjective Report/Content of Current Session:  follow up appointment.    Pt is a 54 year old female with past psychiatric hx of Bipolar 1 disorder, anxiety NOS who presents for follow up treatment. She is currently taking Lithium 300mg TID, Xanax 0.5 mg QHS PRN for sleep (only uses sparingly - "a few times a month), Seroquel 200mg QHS. Meds tolerated well and provide good symptomatic relief. Sleeping well - no issues falling or staying asleep. We did decrease her Lithium from 300mg QID to 300mg TID due to increased TSH during her last visit. She has tolerated this decrease well, and pt is stable. She has shown no apparent decompensations in mood. She does have a visit with Dr. Palomares in the next 3-4 days.       Past Psychiatric hx: Pt. is a 54 year old female  with a past psychiatric hx of Bipolar 1 disorder, depressed who established care with me 07/19 following the intermediate of her previous psych, Dr. Pelaez. She came to me taking Lithium 600mg BID and Xanax 0.5 mg QHS PRN for sleep (only uses this sparingly) , which had been previously prescribed and managed by Dr. Pelaez. She reports that her symptoms have stabilized over the last 7-8 months, and has been without a manic episode since November of 2018. Previous med trials of Lamictal (rash), Risperdal, Wellbutrin, some SSRIs (destablized mood, triggered manic episodes). Pt stable in clinic upon initial eval - all meds were continued at same dosage.      Notes a long standing history of "mood swings" dating to childhood, but first formal psych encounter in 1987 following the birth of her child. While admitted to the hospital following birth, and after being discharged, pt speaks to " "experiencing A/V hallucinations and euphoric gamal following a two-week period of going without sleep. "I was real paranoid after I had the baby. I felt that if I went to sleep, I wouldn't be able to hear the baby cry. I ended up going a couple weeks where I didn't sleep at all, started hearing things and seeing things that weren't there. I felt like people were trying to contact me and tell me things through the computer or the TV. I was paranoid and was thinking the vampires were after us. I started thinking I was someone I wasn't." She eventually sought psychiatric care in an outpatient seeing, but was not started on any psychiatric medication until 2002 (?). She was prescribed SSRIs and Wellbutrin at this time, which triggered a manic episode, and were d/c'd. She was eventually started on Lithium around 2005, and has achieved good results. States she has felt more stable than she's ever felt before.      Upon initial eval, pt reports manic episodes "once or twice a year" that are triggered by a lack of sleep. States that her manic episodes last for "weeks" and "I get hyper, my metabolism speeds up. I feel like I'm sped up. I always try to buy a car or a house. I had two houses at one time. It always ends with them putting me in the hospital." Does continue to experience delusional thinking and auditory hallucinations exclusively during manic episodes. Hx of several ED admissions in 03/19 r/t delusions, and "walking down the street talking to people who weren't there" Per - ED note. Reports inpatient hospitalization following manic episodes to [facility] x 1 week in November of 2018. Also reports another admission to [facility] in March of 2018 for 3 days following a manic episode. During these episodes, "I get very happy during those episodes. I start thinking I'm [place]t and can communicate through the TV".      Denies any depressive symptoms. Reports sleeping 7-9 hours of sleep each night, restful. No issues " "falling or staying asleep. Denies anhedonia. Denies guilt/worthlessness. Energy good, concentration good. Appetite good. Denies psychomotor slowing, denies SI.      On 10/16, pt daughter reported "She seems a little bit better during the day, but is still pretty manic and only sleeping 2-3 hours at night, despite trying to up-titrate the medication.  She had to have a meeting with her supervisors at work yesterday, but they have fortunately been very understanding about her bipolar" Episodes do not meet criteria for true gamal/or hypomania, but we have been up-titrating Seroquel (started 10/19) to address symptoms - pt has responded well but reports dry mouth.I advised pt to add dose of Seroquel 50mg Q AM. However, she states pt states this made her groggy so d/c this. Despite this, pt reports at f/u "I've been feeling so much better. I'm actually sleeping now and definitely haven't been feeling manic at all. I feel way more calm now." However,  Notes overall less mood lability, less agitation. Denies manic/hypomanic episodes since last visit. She went to [place] on vacation between visit - enjoyed this. Pt doing well with no decompensations since last visit.  Does note continued generalized worry. Feels restless, on edge.       During her 5/20 f/u visit pt reports "I've been getting into bed at night, and sometimes it feels like someone else is getting in bed with me. It got to the point where I couldn't sleep in my bed because I was kind of scared. I thought it was my cat but my cat wasn't in the room. Sometime it even touches me, like it's brushing on either my foot or back. Like someone is in here trying to wake me up, but no one is in there. I usually just go and sleep on the sofa." Denies A/V hallucinations.     Seroquel was increased following this visit, Today, pt reports "I don't have those feelings in my bed anymore. I'm not afraid to get in bed anymore. Thanks goodness. I've been sleeping better." " "Does reports "feeling extremely tired when I take it in the morning." and requests to move to PM dosing. Tolerates well otherwise. Denies any gamal or hypomania between sessions.      Past Medical hx:   Past Medical History:   Diagnosis Date    Bipolar 1 disorder     GERD (gastroesophageal reflux disease)     History of psychiatric hospitalization     Mental disorder         Interim hx:  Medication changes last visit: Inc seroquel to 50mg PO QAM, 100mg QHS  Anxiety: inc'd  Depression: denies     Denies suicidal/homicidal ideations.  Denies hopelessness/worthlessness.    Denies auditory/visual hallucinations       Tobacco: never used  Alcohol: denies   Drug use: denies  Caffeine: 1-2 cups coffee daily      Review of Systems   · PSYCHIATRIC: Pertinent items are noted in the narrative.        CONSTITUTIONAL: weight stable        M/S: no pain today         ENT: no allergies noted today        ABD: no n/v/d     Past Medical, Family and Social History: The patient's past medical, family and social history have been reviewed and updated as appropriate within the electronic medical record. See encounter notes.     Medication: Lithium 300mg TID, and Xanax 0.5 mg QHS PRN for sleep, Seroquel 50mg Q AM 100mg QHS     Compliance: yes      Side effects: dry mouth from Seroquel     Risk Parameters:  Patient reports no suicidal ideation  Patient reports no homicidal ideation  Patient reports no self-injurious behavior  Patient reports no violent behavior     Exam (detailed: at least 9 elements; comprehensive: all 15 elements)   Constitutional  Vitals:  Most recent vital signs, dated less than 90 days prior to this appointment, were reviewed. There were no vitals taken for this visit.     General:  unremarkable, age appropriate, casual attire, good eye contact, good rapport       Musculoskeletal  Muscle Strength/Tone:  no flaccidity, no tremor    Gait & Station:  normal      Psychiatric                       Speech:  normal tone, " "normal rate, rhythm, and volume   Mood & Affect:   Euthymic, congruent, appropriate         Thought Process:   Goal directed; Linear    Associations:   intact   Thought Content:   No SI/HI, + fixed delusions, or paranoia, no AV/VH   Insight & Judgement:   Good, adequate to circumstances   Orientation:   grossly intact; alert and oriented x 4    Memory:  intact for content of interview    Language:  grossly intact, can repeat    Attention Span  : Grossly intact for content of interview   Fund of Knowledge:   intact and appropriate to age and level of education        Assessment and Diagnosis   Status/Progress: Based on the examination today, the patient's problem(s) is/are under fair control.  New problems have not been presented today. Comorbidities are not currently complicating management of the primary condition.      Impression: Pt is a 54 year old female with past psychiatric hx of Bipolar 1 disorder, anxiety NOS who presents for follow up treatment. She is currently taking Lithium 300mg TID, Xanax 0.5 mg QHS PRN for sleep (only uses sparingly - "a few times a month), Seroquel 200mg QHS. Meds tolerated well and provide good symptomatic relief. Sleeping well - no issues falling or staying asleep. We did decrease her Lithium from 300mg QID to 300mg TID due to increased TSH during her last visit. She has tolerated this decrease well, and pt is stable. She has shown no apparent decompensations in mood. She does have a visit with Dr. Palomares in the next 3-4 days.       Diagnosis: Bipolar 1 disorder, partial remission, anxiety unspecified    Intervention/Counseling/Treatment Plan   · Medication Management:      1) Continue Lithium 300mg TID due to increased TSH. Pt instructed to monitor closely for mood destabilization.     2) Continue Seroquel to 200mg QHS. Typical ELFEGO's reviewed including weight gain, abnormal movements, EPS, TD, metabolic side effects.      3) Continue Xanax 0.5 mg QHS PRN for sleep. Discussed " risk of decreased RT, sedation, addictive potential, and not to mix with alcohol.      4. Call to report any worsening of symptoms or problems with the medication. Pt instructed to go to ER with thoughts of harming self, others     5. Patient given contact # for psychotherapists at [facility] and also instructed she may check with insurance for list of providers.      6. Labs: no new orders    Psychotherapy:   · Target symptoms: gamal, depression  · Why chosen therapy is appropriate versus another modality: relevant to diagnosis, patient responds to this modality  · Outcome monitoring methods: self-report, observation, feedback from family   · Therapeutic intervention type: supportive psychotherapy  · Topics discussed/themes: building skills sets for symptom management, symptom recognition, nutrition, exercise  · The patient's response to the intervention is accepting. The patient's progress toward treatment goals is positive progress.  · Duration of intervention: 20 minutes     Return to clinic: 4 weeks    -Spent 30min face to face with the pt; >50% time spent in counseling   -Supportive therapy and psychoeducation provided  -R/B/SE's of medications discussed with the pt who expresses understanding and chooses to take medications as prescribed.   -Pt instructed to call clinic, 911 or go to nearest emergency room if sxs worsen or pt is in   crisis. The pt expresses understanding.    [place] Aguilar, NP

## 2020-08-20 ENCOUNTER — OFFICE VISIT (OUTPATIENT)
Dept: FAMILY MEDICINE | Facility: CLINIC | Age: 55
End: 2020-08-20
Payer: MEDICAID

## 2020-08-20 VITALS
WEIGHT: 258.19 LBS | OXYGEN SATURATION: 98 % | DIASTOLIC BLOOD PRESSURE: 74 MMHG | BODY MASS INDEX: 41.49 KG/M2 | HEART RATE: 80 BPM | TEMPERATURE: 98 F | SYSTOLIC BLOOD PRESSURE: 122 MMHG | HEIGHT: 66 IN

## 2020-08-20 DIAGNOSIS — E78.5 HYPERLIPIDEMIA, UNSPECIFIED HYPERLIPIDEMIA TYPE: ICD-10-CM

## 2020-08-20 DIAGNOSIS — M79.672 LEFT FOOT PAIN: ICD-10-CM

## 2020-08-20 DIAGNOSIS — F31.9 BIPOLAR AFFECTIVE DISORDER, REMISSION STATUS UNSPECIFIED: Primary | ICD-10-CM

## 2020-08-20 DIAGNOSIS — K21.9 GASTROESOPHAGEAL REFLUX DISEASE, ESOPHAGITIS PRESENCE NOT SPECIFIED: ICD-10-CM

## 2020-08-20 DIAGNOSIS — E03.9 HYPOTHYROIDISM, UNSPECIFIED TYPE: ICD-10-CM

## 2020-08-20 DIAGNOSIS — Z00.00 ROUTINE HEALTH MAINTENANCE: ICD-10-CM

## 2020-08-20 DIAGNOSIS — Z12.11 SCREENING FOR COLON CANCER: ICD-10-CM

## 2020-08-20 DIAGNOSIS — Z12.31 SCREENING MAMMOGRAM, ENCOUNTER FOR: ICD-10-CM

## 2020-08-20 PROCEDURE — 99396 PR PREVENTIVE VISIT,EST,40-64: ICD-10-PCS | Mod: S$PBB,,, | Performed by: INTERNAL MEDICINE

## 2020-08-20 PROCEDURE — 99396 PREV VISIT EST AGE 40-64: CPT | Mod: S$PBB,,, | Performed by: INTERNAL MEDICINE

## 2020-08-20 PROCEDURE — 99999 PR PBB SHADOW E&M-EST. PATIENT-LVL IV: CPT | Mod: PBBFAC,,, | Performed by: INTERNAL MEDICINE

## 2020-08-20 PROCEDURE — 99999 PR PBB SHADOW E&M-EST. PATIENT-LVL IV: ICD-10-PCS | Mod: PBBFAC,,, | Performed by: INTERNAL MEDICINE

## 2020-08-20 PROCEDURE — 99214 OFFICE O/P EST MOD 30 MIN: CPT | Mod: PBBFAC,PO | Performed by: INTERNAL MEDICINE

## 2020-08-20 RX ORDER — LEVOTHYROXINE SODIUM 50 UG/1
50 TABLET ORAL
Qty: 30 TABLET | Refills: 11 | Status: SHIPPED | OUTPATIENT
Start: 2020-08-20 | End: 2021-08-09

## 2020-08-20 RX ORDER — OMEPRAZOLE 20 MG/1
20 CAPSULE, DELAYED RELEASE ORAL DAILY
Qty: 30 CAPSULE | Refills: 11 | Status: SHIPPED | OUTPATIENT
Start: 2020-08-20 | End: 2021-09-08

## 2020-08-20 NOTE — PROGRESS NOTES
Subjective:       Patient ID: Cristina Tan is a 55 y.o. female.    Chief Complaint: Establish Care    Pt here for wellness. Due mmg. Due colonoscopy. Due for labs.  Recently had some blood work showing elevated tsh of 4.8. complaining of weight gain, constipation, hair loss, dry skin.      Review of Systems   Constitutional: Negative for fatigue, fever and unexpected weight change.   HENT: Negative for nasal congestion, postnasal drip and sore throat.    Eyes: Negative for visual disturbance.   Respiratory: Negative for cough, chest tightness, shortness of breath and wheezing.    Cardiovascular: Negative for chest pain, palpitations and leg swelling.   Gastrointestinal: Negative for abdominal pain, blood in stool, constipation, diarrhea, nausea and vomiting.   Endocrine: Negative for cold intolerance and heat intolerance.   Genitourinary: Negative for difficulty urinating, dysuria and frequency.   Musculoskeletal: Negative for back pain, joint swelling and myalgias.   Integumentary:  Negative for rash.   Allergic/Immunologic: Negative for environmental allergies.   Neurological: Negative for dizziness, seizures, numbness and headaches.   Hematological: Does not bruise/bleed easily.   Psychiatric/Behavioral: Negative for agitation and sleep disturbance.         Objective:      Physical Exam  Constitutional:       Appearance: She is well-developed.   HENT:      Head: Normocephalic and atraumatic.   Eyes:      Conjunctiva/sclera: Conjunctivae normal.      Pupils: Pupils are equal, round, and reactive to light.   Neck:      Musculoskeletal: Normal range of motion and neck supple.      Thyroid: No thyromegaly.   Cardiovascular:      Rate and Rhythm: Normal rate and regular rhythm.      Heart sounds: Normal heart sounds. No murmur. No friction rub. No gallop.    Pulmonary:      Effort: Pulmonary effort is normal. No respiratory distress.      Breath sounds: Normal breath sounds. No wheezing or rales.   Abdominal:       General: Bowel sounds are normal. There is no distension.      Palpations: Abdomen is soft.      Tenderness: There is no abdominal tenderness.   Musculoskeletal: Normal range of motion.   Lymphadenopathy:      Cervical: No cervical adenopathy.   Skin:     General: Skin is warm and dry.   Neurological:      Mental Status: She is alert and oriented to person, place, and time.      Cranial Nerves: No cranial nerve deficit.   Psychiatric:         Behavior: Behavior normal.         Assessment:       1. Bipolar affective disorder, remission status unspecified    2. Routine health maintenance    3. Gastroesophageal reflux disease, esophagitis presence not specified    4. Hyperlipidemia, unspecified hyperlipidemia type    5. Hypothyroidism, unspecified type    6. Left foot pain    7. Screening mammogram, encounter for    8. Screening for colon cancer        Plan:       Hypothyroidism-- start levothyroxine 50. rechcek 6 weeks  Ordered labs, colonoscopy, and mmg

## 2020-08-27 ENCOUNTER — PATIENT OUTREACH (OUTPATIENT)
Dept: ADMINISTRATIVE | Facility: OTHER | Age: 55
End: 2020-08-27

## 2020-08-27 NOTE — PROGRESS NOTES
Health Maintenance Due   Topic Date Due    TETANUS VACCINE  04/05/1982    Colonoscopy  04/05/1982    Shingles Vaccine (1 of 2) 04/05/2014    Mammogram  08/11/2019     Updates were requested from care everywhere.  Chart was reviewed for overdue Proactive [facility] Encounters ([facility]) topics (CRS, Breast Cancer Screening, Eye exam)  Health Maintenance has been updated.  [facility] immunization registry triggered.  Immunizations were reconciled.  Mammogram scheduled to be completed 10/1/2020.  Patient has open case request to [facility] on file.

## 2020-09-01 ENCOUNTER — HOSPITAL ENCOUNTER (OUTPATIENT)
Dept: RADIOLOGY | Facility: HOSPITAL | Age: 55
Discharge: HOME | End: 2020-09-01
Attending: PODIATRIST
Payer: MEDICAID

## 2020-09-01 ENCOUNTER — OFFICE VISIT (OUTPATIENT)
Dept: PODIATRY | Facility: CLINIC | Age: 55
End: 2020-09-01
Payer: MEDICAID

## 2020-09-01 VITALS — BODY MASS INDEX: 41.46 KG/M2 | HEIGHT: 66 IN | WEIGHT: 258 LBS

## 2020-09-01 DIAGNOSIS — R60.0 LOCALIZED EDEMA: ICD-10-CM

## 2020-09-01 DIAGNOSIS — M79.672 LEFT FOOT PAIN: ICD-10-CM

## 2020-09-01 DIAGNOSIS — G57.92 PERIPHERAL NEURITIS OF LEFT FOOT: Primary | ICD-10-CM

## 2020-09-01 PROCEDURE — 99999 PR PBB SHADOW E&M-EST. PATIENT-LVL IV: CPT | Mod: PBBFAC,,, | Performed by: PODIATRIST

## 2020-09-01 PROCEDURE — 73630 X-RAY EXAM OF FOOT: CPT | Mod: 26,LT,, | Performed by: RADIOLOGY

## 2020-09-01 PROCEDURE — 99999 PR PBB SHADOW E&M-EST. PATIENT-LVL IV: ICD-10-PCS | Mod: PBBFAC,,, | Performed by: PODIATRIST

## 2020-09-01 PROCEDURE — 99203 PR OFFICE/OUTPT VISIT, NEW, LEVL III, 30-44 MIN: ICD-10-PCS | Mod: S$PBB,,, | Performed by: PODIATRIST

## 2020-09-01 PROCEDURE — 73630 XR FOOT COMPLETE 3 VIEW LEFT: ICD-10-PCS | Mod: 26,LT,, | Performed by: RADIOLOGY

## 2020-09-01 PROCEDURE — 99214 OFFICE O/P EST MOD 30 MIN: CPT | Mod: PBBFAC,25,PN | Performed by: PODIATRIST

## 2020-09-01 PROCEDURE — 73630 X-RAY EXAM OF FOOT: CPT | Mod: TC,PO,LT

## 2020-09-01 PROCEDURE — 99203 OFFICE O/P NEW LOW 30 MIN: CPT | Mod: S$PBB,,, | Performed by: PODIATRIST

## 2020-09-01 NOTE — PATIENT INSTRUCTIONS
- Wear supportive shoes such as sneakers with arch supports.    - Look for Superfeet, SofSole, or Powerstep arch supports and sneakers such as New Balance, Hoka, and Gaona.    - Wear over-the-counter compression socks at all times when ambulating.  Do not wear them while resting for prolonged periods of time such as when sleeping.  Look for Jobst brand compression socks 15-20 mmHg.  Prefer Sport style but travel and regular are okay.    - Rest/reduce ambulation to necessary activities of daily living.    - Ice foot for no more than 20 minutes/per hour.    - Elevate foot as much as possible throughout the day.    - Apply lidocaine cream or Voltaren gel to foot as needed for pain relief.    - Take Aleve as directed.    - Notify clinic if pain worsens or fails to improve.

## 2020-09-10 ENCOUNTER — OFFICE VISIT (OUTPATIENT)
Dept: PSYCHIATRY | Facility: CLINIC | Age: 55
End: 2020-09-10
Payer: MEDICAID

## 2020-09-10 DIAGNOSIS — F31.77 BIPOLAR 1 DISORDER, MIXED, PARTIAL REMISSION: Primary | ICD-10-CM

## 2020-09-10 DIAGNOSIS — F41.9 ANXIETY DISORDER, UNSPECIFIED TYPE: ICD-10-CM

## 2020-09-10 PROCEDURE — 90833 PR PSYCHOTHERAPY W/PATIENT W/E&M, 30 MIN (ADD ON): ICD-10-PCS | Mod: 95,SA,HB, | Performed by: NURSE PRACTITIONER

## 2020-09-10 PROCEDURE — 99214 OFFICE O/P EST MOD 30 MIN: CPT | Mod: 95,SA,HB, | Performed by: NURSE PRACTITIONER

## 2020-09-10 PROCEDURE — 90833 PSYTX W PT W E/M 30 MIN: CPT | Mod: 95,SA,HB, | Performed by: NURSE PRACTITIONER

## 2020-09-10 PROCEDURE — 99214 PR OFFICE/OUTPT VISIT, EST, LEVL IV, 30-39 MIN: ICD-10-PCS | Mod: 95,SA,HB, | Performed by: NURSE PRACTITIONER

## 2020-09-10 NOTE — PROGRESS NOTES
"Outpatient Psychiatry Follow-Up Visit    Clinical Status of Patient: Outpatient (Ambulatory)  09/10/2020    Chief Complaint: Pt is a 55 year old female who presents today for a follow-up. Met with patient.       Interval History and Content of Current Session:  Interim Events/Subjective Report/Content of Current Session:  follow up appointment.    Pt is a 54 year old female with past psychiatric hx of Bipolar 1 disorder, anxiety NOS who presents for follow up treatment. She is currently taking Lithium 300mg TID, Xanax 0.5 mg QHS PRN for sleep (only uses sparingly - "a few times a month), Seroquel 200mg QHS. Meds tolerated well and provide good symptomatic relief overall. We have been gradually titrating down on her lithium due to recent elevation of TSH. Since her last visit, she was started on levo by her PCP and is starting to feel better. Sleeping well - no decompensations of mood between sessions.      Past Psychiatric hx: Pt. is a 54 year old female  with a past psychiatric hx of Bipolar 1 disorder, depressed who established care with me 07/19 following the half-way of her previous psych, Dr. Pelaez. She came to me taking Lithium 600mg BID and Xanax 0.5 mg QHS PRN for sleep (only uses this sparingly) , which had been previously prescribed and managed by Dr. Pelaez. She reports that her symptoms have stabilized over the last 7-8 months, and has been without a manic episode since November of 2018. Previous med trials of Lamictal (rash), Risperdal, Wellbutrin, some SSRIs (destablized mood, triggered manic episodes). Pt stable in clinic upon initial eval - all meds were continued at same dosage.      Notes a long standing history of "mood swings" dating to childhood, but first formal psych encounter in 1987 following the birth of her child. While admitted to the hospital following birth, and after being discharged, pt speaks to experiencing A/V hallucinations and euphoric gamal following a two-week period of " "going without sleep. "I was real paranoid after I had the baby. I felt that if I went to sleep, I wouldn't be able to hear the baby cry. I ended up going a couple weeks where I didn't sleep at all, started hearing things and seeing things that weren't there. I felt like people were trying to contact me and tell me things through the computer or the TV. I was paranoid and was thinking the vampires were after us. I started thinking I was someone I wasn't." She eventually sought psychiatric care in an outpatient seeing, but was not started on any psychiatric medication until 2002 (?). She was prescribed SSRIs and Wellbutrin at this time, which triggered a manic episode, and were d/c'd. She was eventually started on Lithium around 2005, and has achieved good results. States she has felt more stable than she's ever felt before.      Upon initial eval, pt reports manic episodes "once or twice a year" that are triggered by a lack of sleep. States that her manic episodes last for "weeks" and "I get hyper, my metabolism speeds up. I feel like I'm sped up. I always try to buy a car or a house. I had two houses at one time. It always ends with them putting me in the hospital." Does continue to experience delusional thinking and auditory hallucinations exclusively during manic episodes. Hx of several ED admissions in 03/19 r/t delusions, and "walking down the street talking to people who weren't there" Per - ED note. Reports inpatient hospitalization following manic episodes to [facility] x 1 week in November of 2018. Also reports another admission to [facility] in March of 2018 for 3 days following a manic episode. During these episodes, "I get very happy during those episodes. I start thinking I'm Colt and can communicate through the TV".      Denies any depressive symptoms. Reports sleeping 7-9 hours of sleep each night, restful. No issues falling or staying asleep. Denies anhedonia. Denies guilt/worthlessness. Energy good, " "concentration good. Appetite good. Denies psychomotor slowing, denies SI.      On 10/16, pt daughter reported "She seems a little bit better during the day, but is still pretty manic and only sleeping 2-3 hours at night, despite trying to up-titrate the medication.  She had to have a meeting with her supervisors at work yesterday, but they have fortunately been very understanding about her bipolar" Episodes do not meet criteria for true gamal/or hypomania, but we have been up-titrating Seroquel (started 10/19) to address symptoms - pt has responded well but reports dry mouth.I advised pt to add dose of Seroquel 50mg Q AM. However, she states pt states this made her groggy so d/c this. Despite this, pt reports at f/u "I've been feeling so much better. I'm actually sleeping now and definitely haven't been feeling manic at all. I feel way more calm now." However,  Notes overall less mood lability, less agitation. Denies manic/hypomanic episodes since last visit. She went to [place] on vacation between visit - enjoyed this. Pt doing well with no decompensations since last visit.  Does note continued generalized worry. Feels restless, on edge.       During her 5/20 f/u visit pt reports "I've been getting into bed at night, and sometimes it feels like someone else is getting in bed with me. It got to the point where I couldn't sleep in my bed because I was kind of scared. I thought it was my cat but my cat wasn't in the room. Sometime it even touches me, like it's brushing on either my foot or back. Like someone is in here trying to wake me up, but no one is in there. I usually just go and sleep on the sofa." Denies A/V hallucinations.     Seroquel was increased following this visit, Today, pt reports "I don't have those feelings in my bed anymore. I'm not afraid to get in bed anymore. Thanks goodness. I've been sleeping better." Does reports "feeling extremely tired when I take it in the morning." and requests to " move to PM dosing. Tolerates well otherwise. Denies any gamal or hypomania between sessions.      Past Medical hx:   Past Medical History:   Diagnosis Date    Bipolar 1 disorder     GERD (gastroesophageal reflux disease)     History of psychiatric hospitalization     Mental disorder         Interim hx:  Medication changes last visit: Inc seroquel to 50mg PO QAM, 100mg QHS  Anxiety: inc'd  Depression: denies     Denies suicidal/homicidal ideations.  Denies hopelessness/worthlessness.    Denies auditory/visual hallucinations       Tobacco: never used  Alcohol: denies   Drug use: denies  Caffeine: 1-2 cups coffee daily      Review of Systems   · PSYCHIATRIC: Pertinent items are noted in the narrative.        CONSTITUTIONAL: weight stable        M/S: no pain today         ENT: no allergies noted today        ABD: no n/v/d     Past Medical, Family and Social History: The patient's past medical, family and social history have been reviewed and updated as appropriate within the electronic medical record. See encounter notes.     Medication: Lithium 300mg TID, and Xanax 0.5 mg QHS PRN for sleep, Seroquel 50mg Q AM 100mg QHS     Compliance: yes      Side effects: dry mouth from Seroquel     Risk Parameters:  Patient reports no suicidal ideation  Patient reports no homicidal ideation  Patient reports no self-injurious behavior  Patient reports no violent behavior     Exam (detailed: at least 9 elements; comprehensive: all 15 elements)   Constitutional  Vitals:  Most recent vital signs, dated less than 90 days prior to this appointment, were reviewed. There were no vitals taken for this visit.     General:  unremarkable, age appropriate, casual attire, good eye contact, good rapport       Musculoskeletal  Muscle Strength/Tone:  no flaccidity, no tremor    Gait & Station:  normal      Psychiatric                       Speech:  normal tone, normal rate, rhythm, and volume   Mood & Affect:   Euthymic, congruent, appropriate  "        Thought Process:   Goal directed; Linear    Associations:   intact   Thought Content:   No SI/HI, + fixed delusions, or paranoia, no AV/VH   Insight & Judgement:   Good, adequate to circumstances   Orientation:   grossly intact; alert and oriented x 4    Memory:  intact for content of interview    Language:  grossly intact, can repeat    Attention Span  : Grossly intact for content of interview   Fund of Knowledge:   intact and appropriate to age and level of education        Assessment and Diagnosis   Status/Progress: Based on the examination today, the patient's problem(s) is/are under fair control.  New problems have not been presented today. Comorbidities are not currently complicating management of the primary condition.      Impression: Pt is a 54 year old female with past psychiatric hx of Bipolar 1 disorder, anxiety NOS who presents for follow up treatment. She is currently taking Lithium 300mg TID, Xanax 0.5 mg QHS PRN for sleep (only uses sparingly - "a few times a month), Seroquel 200mg QHS. Meds tolerated well and provide good symptomatic relief overall. We have been gradually titrating down on her lithium due to recent elevation of TSH. Since her last visit, she was started on levo by her PCP and is starting to feel better. Sleeping well - no decompensations of mood between sessions.      Diagnosis: Bipolar 1 disorder, partial remission, anxiety unspecified    Intervention/Counseling/Treatment Plan   · Medication Management:      1) Continue Lithium 300mg TID for mood. Pt instructed to monitor closely for mood destabilization as we have been gradually tapering due to inc'd TSH.    2) Continue Seroquel to 200mg QHS. Typical ELFEGO's reviewed including weight gain, abnormal movements, EPS, TD, metabolic side effects.      3) Continue Xanax 0.5 mg QHS PRN for sleep. Discussed risk of decreased RT, sedation, addictive potential, and not to mix with alcohol.      4. Call to report any worsening of " symptoms or problems with the medication. Pt instructed to go to ER with thoughts of harming self, others     5. Patient given contact # for psychotherapists at [facility] and also instructed she may check with insurance for list of providers.      6. Labs: no new orders    Psychotherapy:   · Target symptoms: gamal, depression  · Why chosen therapy is appropriate versus another modality: relevant to diagnosis, patient responds to this modality  · Outcome monitoring methods: self-report, observation, feedback from family   · Therapeutic intervention type: supportive psychotherapy  · Topics discussed/themes: building skills sets for symptom management, symptom recognition, nutrition, exercise  · The patient's response to the intervention is accepting. The patient's progress toward treatment goals is positive progress.  · Duration of intervention: 20 minutes     Return to clinic: 2 months - please call schedule    -Spent 30min face to face with the pt; >50% time spent in counseling   -Supportive therapy and psychoeducation provided  -R/B/SE's of medications discussed with the pt who expresses understanding and chooses to take medications as prescribed.   -Pt instructed to call clinic, 911 or go to nearest emergency room if sxs worsen or pt is in   crisis. The pt expresses understanding.    Montana Aguilar, NP

## 2020-09-14 NOTE — PROGRESS NOTES
Subjective:      Patient ID: Cristina Tan is a 55 y.o. female.    Chief Complaint: Foot Pain (L foot pain// lateral// sharp, burning, stinging  and swelling x2mths)      HPI:  Cristina Tan is a 55 y.o. female who presents to clinic with a chief complaint of     PCP:   Tin Palomares MD  Date last seen:     Review of Systems   Constitutional: Negative for appetite change, fever, chills, fatigue and unexpected weight change.   Respiratory: Negative for cough, wheezing, and shortness of breath.   Cardiovascular: Negative for chest pain, claudication, cyanosis, and leg swelling.  Endocrine:  Negative for intolerance to cold, intolerance to heat, polydipsia, polyphagia, and polyuria.    Gastrointestinal: Negative for nausea, vomiting, diarrhea, and constipation.   Musculoskeletal: Negative for back pain, arthritis, joint pain, joint swelling, myalgias, and stiffness.   Skin: Negative for nail bed changes, discoloration, rash, itching, poor wound healing, suspicious lesion, and unusual hair distribution.   Neurological: Negative for loss of balance, sensory change, paresthesias, and numbness.   Hematological: Negative for adenopathy, bleeding, and bruising easily.   Psychiatric/Behavioral: The patient is not nervous/anxious.  Negative for altered mental status.    No results found for: HGBA1C    Past Medical History:   Diagnosis Date    Bipolar 1 disorder     GERD (gastroesophageal reflux disease)     History of psychiatric hospitalization     Mental disorder      Past Surgical History:   Procedure Laterality Date    breast reduction       SECTION      gastric sleeve      HYSTERECTOMY      OOPHORECTOMY      TONSILLECTOMY      TOTAL REDUCTION MAMMOPLASTY Bilateral         tummy tuck       Family History   Problem Relation Age of Onset    Cancer Father     Breast cancer Neg Hx     Ovarian cancer Neg Hx     Diabetes Neg Hx     Hypertension Neg Hx     Thyroid disease Neg Hx   "    Social History     Socioeconomic History    Marital status:      Spouse name: Not on file    Number of children: Not on file    Years of education: Not on file    Highest education level: Not on file   Occupational History    Not on file   Social Needs    Financial resource strain: Not hard at all    Food insecurity     Worry: Never true     Inability: Never true    Transportation needs     Medical: No     Non-medical: No   Tobacco Use    Smoking status: Never Smoker    Smokeless tobacco: Never Used   Substance and Sexual Activity    Alcohol use: Yes     Frequency: Monthly or less     Drinks per session: 1 or 2     Binge frequency: Never    Drug use: No    Sexual activity: Yes     Partners: Male   Lifestyle    Physical activity     Days per week: 6 days     Minutes per session: 40 min    Stress: Only a little   Relationships    Social connections     Talks on phone: More than three times a week     Gets together: Twice a week     Attends Rastafarian service: Not on file     Active member of club or organization: No     Attends meetings of clubs or organizations: Never     Relationship status:    Other Topics Concern    Not on file   Social History Narrative    Not on file           Objective:        Ht 5' 6" (1.676 m)   Wt 117 kg (258 lb)   BMI 41.64 kg/m²     Physical Exam       Nursing note and vitals reviewed.          Assessment:       Encounter Diagnoses   Name Primary?    Peripheral neuritis of left foot Yes    Left foot pain     Localized edema          Plan:       Cristina was seen today for foot pain.    Diagnoses and all orders for this visit:    Peripheral neuritis of left foot    Left foot pain  -     Ambulatory referral/consult to Podiatry  -     X-Ray Foot Complete Left; Future    Localized edema      I counseled the patient on her conditions, their implications and medical management.    - Ordered and reviewed xrays with patient.    - Educated patient on proper " foot care and supportive/accommodative shoe gear.    Patient was given the following recommendations and instructions:  Patient Instructions   - Wear supportive shoes such as sneakers with arch supports.    - Look for Superfeet, SofSole, or Powerstep arch supports and sneakers such as New Balance, Hoka, and Gaona.    - Wear over-the-counter compression socks at all times when ambulating.  Do not wear them while resting for prolonged periods of time such as when sleeping.  Look for Jobst brand compression socks 15-20 mmHg.  Prefer Sport style but travel and regular are okay.    - Rest/reduce ambulation to necessary activities of daily living.    - Ice foot for no more than 20 minutes/per hour.    - Elevate foot as much as possible throughout the day.    - Apply lidocaine cream or Voltaren gel to foot as needed for pain relief.    - Take Aleve as directed.    - Notify clinic if pain worsens or fails to improve.                    Tamera Davidson DPM        Dictation was performed using M*Modal Fluency.  Transcription errors may be present.

## 2020-09-15 ENCOUNTER — TELEPHONE (OUTPATIENT)
Dept: PODIATRY | Facility: CLINIC | Age: 55
End: 2020-09-15

## 2020-09-15 NOTE — TELEPHONE ENCOUNTER
Spoke with pt. Pt states she is doing better and does not need video visit today. She will call if symptoms get worse.

## 2020-09-18 ENCOUNTER — TELEPHONE (OUTPATIENT)
Dept: GASTROENTEROLOGY | Facility: CLINIC | Age: 55
End: 2020-09-18

## 2020-09-18 NOTE — TELEPHONE ENCOUNTER
Pt stated that she would hold off on getting the colonoscopy due to her having to be tested for covid prior to the procedure. Informed pt that she could call back to reschedule whenever she was ready.

## 2020-10-01 ENCOUNTER — HOSPITAL ENCOUNTER (OUTPATIENT)
Dept: RADIOLOGY | Facility: HOSPITAL | Age: 55
Discharge: HOME | End: 2020-10-01
Attending: INTERNAL MEDICINE
Payer: MEDICAID

## 2020-10-01 DIAGNOSIS — Z12.31 SCREENING MAMMOGRAM, ENCOUNTER FOR: ICD-10-CM

## 2020-10-01 PROCEDURE — 77063 BREAST TOMOSYNTHESIS BI: CPT | Mod: 26,,, | Performed by: RADIOLOGY

## 2020-10-01 PROCEDURE — 77067 MAMMO DIGITAL SCREENING BILAT WITH TOMO: ICD-10-PCS | Mod: 26,,, | Performed by: RADIOLOGY

## 2020-10-01 PROCEDURE — 77063 MAMMO DIGITAL SCREENING BILAT WITH TOMO: ICD-10-PCS | Mod: 26,,, | Performed by: RADIOLOGY

## 2020-10-01 PROCEDURE — 77067 SCR MAMMO BI INCL CAD: CPT | Mod: 26,,, | Performed by: RADIOLOGY

## 2020-10-01 PROCEDURE — 77067 SCR MAMMO BI INCL CAD: CPT | Mod: TC,PO

## 2020-10-21 ENCOUNTER — PATIENT MESSAGE (OUTPATIENT)
Dept: PSYCHIATRY | Facility: CLINIC | Age: 55
End: 2020-10-21

## 2020-11-19 ENCOUNTER — OFFICE VISIT (OUTPATIENT)
Dept: PSYCHIATRY | Facility: CLINIC | Age: 55
End: 2020-11-19
Payer: MEDICAID

## 2020-11-19 DIAGNOSIS — F41.9 ANXIETY DISORDER, UNSPECIFIED TYPE: ICD-10-CM

## 2020-11-19 DIAGNOSIS — F31.77 BIPOLAR 1 DISORDER, MIXED, PARTIAL REMISSION: Primary | ICD-10-CM

## 2020-11-19 PROCEDURE — 90833 PSYTX W PT W E/M 30 MIN: CPT | Mod: 95,SA,HB, | Performed by: NURSE PRACTITIONER

## 2020-11-19 PROCEDURE — 99214 OFFICE O/P EST MOD 30 MIN: CPT | Mod: 95,SA,HB, | Performed by: NURSE PRACTITIONER

## 2020-11-19 PROCEDURE — 90833 PR PSYCHOTHERAPY W/PATIENT W/E&M, 30 MIN (ADD ON): ICD-10-PCS | Mod: 95,SA,HB, | Performed by: NURSE PRACTITIONER

## 2020-11-19 PROCEDURE — 99214 PR OFFICE/OUTPT VISIT, EST, LEVL IV, 30-39 MIN: ICD-10-PCS | Mod: 95,SA,HB, | Performed by: NURSE PRACTITIONER

## 2020-11-19 NOTE — PROGRESS NOTES
"  Outpatient Psychiatry Follow-Up Visit    Clinical Status of Patient: Outpatient (Virtual)  11/19/2020     The patient location is:  [address] [place] [id number] [phone] (M)  The patient location [place] is: [facility]  The patient phone number is:   Visit type: Virtual visit with synchronous audio and video  Each patient to whom he or she provides medical services by telemedicine is:  (1) informed of the relationship between the physician and patient and the respective role of any other health care provider with respect to management of the patient; and (2) notified that he or she may decline to receive medical services by telemedicine and may withdraw from such care at any time.      Chief Complaint: Pt is a 55 year old female who presents today for a follow-up. Met with patient.       Interval History and Content of Current Session:  Interim Events/Subjective Report/Content of Current Session:  follow up appointment.    Pt is a 54 year old female with past psychiatric hx of Bipolar 1 disorder, anxiety NOS who presents for follow up treatment. She is currently taking Lithium 300mg TID, Xanax 0.5 mg QHS PRN for sleep (only uses sparingly - "a few times a month), Seroquel 200mg QHS. Meds tolerated well and provide good symptomatic relief overall. We have been gradually titrating down on her lithium due to recent elevation of TSH. She was started on levo by her PCP and is starting to feel better.     Since her last visit, pt notes good control of symptoms. She denies any gamal or hypomania between sessions and Is stable. She recently started new position with [employer]. Also notes that she has begun exercising and eating healthier - 10 pounds weight loss in a few months. Doing well.      Past Psychiatric hx: Pt. is a 54 year old female  with a past psychiatric hx of Bipolar 1 disorder, depressed who established care with me 07/19 following the [facility] of her previous psych, Dr. Pelaez. She came to " "me taking Lithium 600mg BID and Xanax 0.5 mg QHS PRN for sleep (only uses this sparingly) , which had been previously prescribed and managed by Dr. Pelaez. She reports that her symptoms have stabilized over the last 7-8 months, and has been without a manic episode since November of 2018. Previous med trials of Lamictal (rash), Risperdal, Wellbutrin, some SSRIs (destablized mood, triggered manic episodes). Pt stable in clinic upon initial eval - all meds were continued at same dosage.      Notes a long standing history of "mood swings" dating to childhood, but first formal psych encounter in 1987 following the birth of her child. While admitted to the hospital following birth, and after being discharged, pt speaks to experiencing A/V hallucinations and euphoric gamal following a two-week period of going without sleep. "I was real paranoid after I had the baby. I felt that if I went to sleep, I wouldn't be able to hear the baby cry. I ended up going a couple weeks where I didn't sleep at all, started hearing things and seeing things that weren't there. I felt like people were trying to contact me and tell me things through the computer or the TV. I was paranoid and was thinking the vampires were after us. I started thinking I was someone I wasn't." She eventually sought psychiatric care in an outpatient seeing, but was not started on any psychiatric medication until 2002 (?). She was prescribed SSRIs and Wellbutrin at this time, which triggered a manic episode, and were d/c'd. She was eventually started on Lithium around 2005, and has achieved good results. States she has felt more stable than she's ever felt before.      Upon initial eval, pt reports manic episodes "once or twice a year" that are triggered by a lack of sleep. States that her manic episodes last for "weeks" and "I get hyper, my metabolism speeds up. I feel like I'm sped up. I always try to buy a car or a house. I had two houses at one time. It always " "ends with them putting me in the hospital." Does continue to experience delusional thinking and auditory hallucinations exclusively during manic episodes. Hx of several ED admissions in 03/19 r/t delusions, and "walking down the street talking to people who weren't there" Per - ED note. Reports inpatient hospitalization following manic episodes to [facility] x 1 week in November of 2018. Also reports another admission to [facility] in March of 2018 for 3 days following a manic episode. During these episodes, "I get very happy during those episodes. I start thinking I'm Colt and can communicate through the TV".      Denies any depressive symptoms. Reports sleeping 7-9 hours of sleep each night, restful. No issues falling or staying asleep. Denies anhedonia. Denies guilt/worthlessness. Energy good, concentration good. Appetite good. Denies psychomotor slowing, denies SI.      On 10/16, pt daughter reported "She seems a little bit better during the day, but is still pretty manic and only sleeping 2-3 hours at night, despite trying to up-titrate the medication.  She had to have a meeting with her supervisors at work yesterday, but they have fortunately been very understanding about her bipolar" Episodes do not meet criteria for true gamal/or hypomania, but we have been up-titrating Seroquel (started 10/19) to address symptoms - pt has responded well but reports dry mouth.I advised pt to add dose of Seroquel 50mg Q AM. However, she states pt states this made her groggy so d/c this. Despite this, pt reports at f/u "I've been feeling so much better. I'm actually sleeping now and definitely haven't been feeling manic at all. I feel way more calm now." However,  Notes overall less mood lability, less agitation. Denies manic/hypomanic episodes since last visit. She went to [place] on vacation between visit - enjoyed this. Pt doing well with no decompensations since last visit.  Does note continued generalized worry. " "Feels restless, on edge.       During her 5/20 f/u visit pt reports "I've been getting into bed at night, and sometimes it feels like someone else is getting in bed with me. It got to the point where I couldn't sleep in my bed because I was kind of scared. I thought it was my cat but my cat wasn't in the room. Sometime it even touches me, like it's brushing on either my foot or back. Like someone is in here trying to wake me up, but no one is in there. I usually just go and sleep on the sofa." Denies A/V hallucinations.     Seroquel was increased following this visit, Today, pt reports "I don't have those feelings in my bed anymore. I'm not afraid to get in bed anymore. Thanks goodness. I've been sleeping better." Does reports "feeling extremely tired when I take it in the morning." and requests to move to PM dosing. Tolerates well otherwise. Denies any gamal or hypomania between sessions.      Past Medical hx:   Past Medical History:   Diagnosis Date    Bipolar 1 disorder     GERD (gastroesophageal reflux disease)     History of psychiatric hospitalization     Mental disorder         Interim hx:  Medication changes last visit: Inc seroquel to 50mg PO QAM, 100mg QHS  Anxiety: inc'd  Depression: denies     Denies suicidal/homicidal ideations.  Denies hopelessness/worthlessness.    Denies auditory/visual hallucinations       Tobacco: never used  Alcohol: denies   Drug use: denies  Caffeine: 1-2 cups coffee daily      Review of Systems   · PSYCHIATRIC: Pertinent items are noted in the narrative.        CONSTITUTIONAL: weight stable        M/S: no pain today         ENT: no allergies noted today        ABD: no n/v/d     Past Medical, Family and Social History: The patient's past medical, family and social history have been reviewed and updated as appropriate within the electronic medical record. See encounter notes.     Medication: Lithium 300mg TID, and Xanax 0.5 mg QHS PRN for sleep, Sseroquel 200mg QHS   " "  Compliance: yes      Side effects: dry mouth from Seroquel     Risk Parameters:  Patient reports no suicidal ideation  Patient reports no homicidal ideation  Patient reports no self-injurious behavior  Patient reports no violent behavior     Exam (detailed: at least 9 elements; comprehensive: all 15 elements)   Constitutional  Vitals:  Most recent vital signs, dated less than 90 days prior to this appointment, were reviewed. There were no vitals taken for this visit.     General:  unremarkable, age appropriate, casual attire, good eye contact, good rapport       Musculoskeletal  Muscle Strength/Tone:  no flaccidity, no tremor    Gait & Station:  normal      Psychiatric                       Speech:  normal tone, normal rate, rhythm, and volume   Mood & Affect:   Euthymic, congruent, appropriate         Thought Process:   Goal directed; Linear    Associations:   intact   Thought Content:   No SI/HI, + fixed delusions, or paranoia, no AV/VH   Insight & Judgement:   Good, adequate to circumstances   Orientation:   grossly intact; alert and oriented x 4    Memory:  intact for content of interview    Language:  grossly intact, can repeat    Attention Span  : Grossly intact for content of interview   Fund of Knowledge:   intact and appropriate to age and level of education        Assessment and Diagnosis   Status/Progress: Based on the examination today, the patient's problem(s) is/are under fair control.  New problems have not been presented today. Comorbidities are not currently complicating management of the primary condition.      Impression: Pt is a 54 year old female with past psychiatric hx of Bipolar 1 disorder, anxiety NOS who presents for follow up treatment. She is currently taking Lithium 300mg TID, Xanax 0.5 mg QHS PRN for sleep (only uses sparingly - "a few times a month), Seroquel 200mg QHS. Meds tolerated well and provide good symptomatic relief overall. We have been gradually titrating down on her " lithium due to recent elevation of TSH. She was started on levo by her PCP and is starting to feel better.     Since her last visit, pt notes good control of symptoms. She denies any gamal or hypomania between sessions and Is stable. She recently started new position with [employer]. Also notes that she has begun exercising and eating healthier - 10 pounds weight loss in a few months. Doing well    Diagnosis: Bipolar 1 disorder, partial remission, anxiety unspecified    Intervention/Counseling/Treatment Plan   · Medication Management:      1) Continue Lithium 300mg TID for mood. Pt instructed to monitor closely for mood destabilization as we have been gradually tapering due to inc'd TSH.    2) Continue Seroquel to 200mg QHS. Typical ELFEGO's reviewed including weight gain, abnormal movements, EPS, TD, metabolic side effects.      3) Continue Xanax 0.5 mg QHS PRN for sleep. Discussed risk of decreased RT, sedation, addictive potential, and not to mix with alcohol.      4. Call to report any worsening of symptoms or problems with the medication. Pt instructed to go to ER with thoughts of harming self, others     5. Patient given contact # for psychotherapists at [facility] and also instructed she may check with insurance for list of providers.      6. Labs: no new orders    Psychotherapy:   · Target symptoms: gamal, depression  · Why chosen therapy is appropriate versus another modality: relevant to diagnosis, patient responds to this modality  · Outcome monitoring methods: self-report, observation, feedback from family   · Therapeutic intervention type: supportive psychotherapy  · Topics discussed/themes: building skills sets for symptom management, symptom recognition, nutrition, exercise  · The patient's response to the intervention is accepting. The patient's progress toward treatment goals is positive progress.  · Duration of intervention: 20 minutes     Return to clinic: 2 months - please call schedule    -Spent  30min face to face with the pt; >50% time spent in counseling   -Supportive therapy and psychoeducation provided  -R/B/SE's of medications discussed with the pt who expresses understanding and chooses to take medications as prescribed.   -Pt instructed to call clinic, 911 or go to nearest emergency room if sxs worsen or pt is in   crisis. The pt expresses understanding.    Montana Aguilar, NP

## 2020-11-29 ENCOUNTER — PATIENT MESSAGE (OUTPATIENT)
Dept: FAMILY MEDICINE | Facility: CLINIC | Age: 55
End: 2020-11-29

## 2020-11-30 ENCOUNTER — PATIENT MESSAGE (OUTPATIENT)
Dept: FAMILY MEDICINE | Facility: CLINIC | Age: 55
End: 2020-11-30

## 2020-11-30 DIAGNOSIS — I49.9 CARDIAC ARRHYTHMIA, UNSPECIFIED CARDIAC ARRHYTHMIA TYPE: Primary | ICD-10-CM

## 2020-11-30 NOTE — TELEPHONE ENCOUNTER
Requesting external referral to cardiology (insurance required) due to cardiac event with ER visit and need for follow up.

## 2020-12-01 ENCOUNTER — OFFICE VISIT (OUTPATIENT)
Dept: FAMILY MEDICINE | Facility: CLINIC | Age: 55
End: 2020-12-01
Payer: MEDICAID

## 2020-12-01 VITALS
OXYGEN SATURATION: 98 % | DIASTOLIC BLOOD PRESSURE: 78 MMHG | WEIGHT: 258.38 LBS | BODY MASS INDEX: 41.52 KG/M2 | TEMPERATURE: 98 F | HEART RATE: 56 BPM | HEIGHT: 66 IN | SYSTOLIC BLOOD PRESSURE: 110 MMHG

## 2020-12-01 DIAGNOSIS — R00.2 PALPITATIONS: Primary | ICD-10-CM

## 2020-12-01 PROCEDURE — 99999 PR PBB SHADOW E&M-EST. PATIENT-LVL IV: CPT | Mod: PBBFAC,,, | Performed by: NURSE PRACTITIONER

## 2020-12-01 PROCEDURE — 99999 PR PBB SHADOW E&M-EST. PATIENT-LVL IV: ICD-10-PCS | Mod: PBBFAC,,, | Performed by: NURSE PRACTITIONER

## 2020-12-01 PROCEDURE — 99214 PR OFFICE/OUTPT VISIT, EST, LEVL IV, 30-39 MIN: ICD-10-PCS | Mod: S$PBB,,, | Performed by: NURSE PRACTITIONER

## 2020-12-01 PROCEDURE — 99214 OFFICE O/P EST MOD 30 MIN: CPT | Mod: PBBFAC,PO | Performed by: NURSE PRACTITIONER

## 2020-12-01 PROCEDURE — 99214 OFFICE O/P EST MOD 30 MIN: CPT | Mod: S$PBB,,, | Performed by: NURSE PRACTITIONER

## 2020-12-02 NOTE — PATIENT INSTRUCTIONS
"  Heart Palpitations    Palpitations are the feeling that your heart is beating hard, fast, or irregular. Some describe it as "pounding" or "skipped beats." Palpitations may occur in someone with heart disease, but can also occur in a healthy person.  Heart-related causes:  · Arrhythmia (a change from the heart's normal rhythm)  · Heart valve disease  · Disease of the heart muscle  · Coronary artery disease  · High blood pressure  Non-heart-related causes:  · Certain medicines (such as asthma inhalers and decongestants)  · Some herbal supplements, energy drinks and pills, and weight loss pills  · Illegal stimulant drugs (such as cocaine, crank, methamphetamine, PCP, bath salts, ecstasy)  · Caffeine, alcohol, and tobacco  · Medical conditions such as thyroid disease, anemia, anxiety, and panic disorder  Sometimes the cause can't be found.  Home care  Follow these home care tips:  · Avoid excess caffeine, alcohol, tobacco, and any stimulant drugs.  · Tell your doctor about any prescription or over-the-counter or herbal medicines you take.  Follow-up care  · Follow up with your doctor, or as advised.  Call 911  This is the fastest and safest way to get to the emergency department. The paramedics can also begin treatment on the way to the hospital, if needed.  Don't wait until your symptoms are severe to call 911. These are reasons to call 911:  · Chest pain  · Shortness of breath  · Feeling lightheaded, faint, or dizzy  · Fainting or loss of consciousness  · Very irregular heartbeat  · Rapid heartbeat that makes you uncomfortable  · Slower than usual heart rate associated with symptoms  · Slower than usual heart rate  · Chest pain with weakness, dizziness, heavy sweating, nausea, or vomiting  · Extreme drowsiness or confusion  · Weakness of an arm or leg, or on 1 side of the face  · Difficulty with speech or vision  When to seek medical advice  Get prompt medical attention if you have palpitations and any of the " following:  · Weakness  · Dizziness  · Lightheadedness  · Fainting  Date Last Reviewed: 4/27/2016  © 3253-1522 [employer]. [address] [place] [address], [place] [address]. All rights reserved. This information is not intended as a substitute for professional medical care. Always follow your healthcare professional's instructions.

## 2020-12-02 NOTE — PROGRESS NOTES
"Subjective:       Patient ID: Cristina Tan is a 55 y.o. female.    Chief Complaint: Follow-up (ER)    Patient who is new to me presents for palpitation and ER follow up. Per ER note, "  Patient reports she had intermittent palpitations Friday and Saturday essentially today should palpitations throughout the day that would last approximately 5 minutes resolved for several minutes and then returned.  She reports her palpitations including elevated heart rate not skipped beats.  She denies any chest pain but had mild shortness of breath when feeling the palpitations and reports once yesterday she became sweaty.  She reports last palpitations while she was in the waiting room they will not cause on EKG and she has had no palpitations while she was on a monitor.  She denies any other symptoms. No alcohol use caffeine use drug use.  No other medications other than her prescribed home medications she also is concerned about her thyroid she has a tender area in the left side the neck.  This became tender yesterday.1 prior episode of palpitations 2 yrs ago- saw PCP had holter monitor placed which showed occasional PVCs and PACs   She also had a echocardiogram at that time." She states when she gets the palpitations its like a "hallow feeling in her chest". She states she was instructed to follow up with her PCP. She states she was working for [employer] and did not feel it at work. But noticed them when at rest. She denies any chest pain, blurred vision, or weakness.     Palpitations   This is a new problem. The current episode started in the past 7 days. The problem occurs daily. Nothing aggravates the symptoms. Associated symptoms include diaphoresis (was having when her thyroid level was low), an irregular heartbeat and shortness of breath (when palpitations occur). Pertinent negatives include no chest pain, coughing, dizziness, fever, nausea, numbness, syncope, vomiting or weakness. Risk factors include post menopause (she " denies any stimulants, smoking, ETOH, and DM). There is no history of hyperthyroidism or a valve disorder.     Review of Systems   Constitutional: Positive for diaphoresis (was having when her thyroid level was low). Negative for chills, fatigue and fever.   HENT: Negative for congestion, sinus pressure, sinus pain, sneezing and sore throat.    Respiratory: Positive for shortness of breath (when palpitations occur). Negative for cough, chest tightness and wheezing.    Cardiovascular: Positive for palpitations. Negative for chest pain, leg swelling and syncope.   Gastrointestinal: Negative for abdominal distention, abdominal pain, constipation, diarrhea, nausea and vomiting.   Genitourinary: Negative for decreased urine volume, difficulty urinating, dysuria, frequency and urgency.   Musculoskeletal: Negative for arthralgias, gait problem, joint swelling and myalgias.   Skin: Negative for rash and wound.   Neurological: Negative for dizziness, weakness, light-headedness, numbness and headaches.       Objective:      Physical Exam  Vitals signs and nursing note reviewed.   Constitutional:       Appearance: She is well-developed.   HENT:      Head: Normocephalic and atraumatic.      Right Ear: External ear normal.      Left Ear: External ear normal.      Nose: Nose normal.   Eyes:      Pupils: Pupils are equal, round, and reactive to light.   Neck:      Musculoskeletal: Normal range of motion.   Cardiovascular:      Rate and Rhythm: Normal rate and regular rhythm.      Heart sounds: Normal heart sounds.   Pulmonary:      Effort: Pulmonary effort is normal.      Breath sounds: Normal breath sounds.   Abdominal:      General: Bowel sounds are normal.      Palpations: Abdomen is soft.   Musculoskeletal: Normal range of motion.   Skin:     General: Skin is warm and dry.   Neurological:      Mental Status: She is alert and oriented to person, place, and time.         Assessment:       1. Palpitations        Plan:        Cristina was seen today for follow-up.    Diagnoses and all orders for this visit:    Palpitations  -     Holter monitor - 48 hour; Future  -     Ambulatory referral/consult to Cardiology; Future      Discussed worsening signs/symptoms and when to return to clinic or go to ED.   Patient expresses understanding and agrees with treatment plan.

## 2020-12-03 ENCOUNTER — CLINICAL SUPPORT (OUTPATIENT)
Dept: CARDIOLOGY | Facility: CLINIC | Age: 55
End: 2020-12-03
Attending: NURSE PRACTITIONER
Payer: MEDICAID

## 2020-12-03 DIAGNOSIS — R00.2 PALPITATIONS: ICD-10-CM

## 2020-12-03 PROCEDURE — 93226 XTRNL ECG REC<48 HR SCAN A/R: CPT | Mod: PBBFAC,PO | Performed by: INTERNAL MEDICINE

## 2020-12-03 PROCEDURE — 93227 XTRNL ECG REC<48 HR R&I: CPT | Mod: S$PBB,,, | Performed by: INTERNAL MEDICINE

## 2020-12-03 PROCEDURE — 93227 HOLTER MONITOR - 48 HOUR (CUPID ONLY): ICD-10-PCS | Mod: S$PBB,,, | Performed by: INTERNAL MEDICINE

## 2020-12-09 LAB
OHS CV EVENT MONITOR DAY: 0
OHS CV HOLTER LENGTH DECIMAL HOURS: 48
OHS CV HOLTER LENGTH HOURS: 48
OHS CV HOLTER LENGTH MINUTES: 0

## 2020-12-10 ENCOUNTER — TELEPHONE (OUTPATIENT)
Dept: FAMILY MEDICINE | Facility: CLINIC | Age: 55
End: 2020-12-10

## 2020-12-10 NOTE — TELEPHONE ENCOUNTER
----- Message from Iman Nicholson NP sent at 12/10/2020  1:20 PM CST -----  Please call the patient and schedule her for cardiology. She needs to follow up with them to review the Holter monitor. Thanks.

## 2020-12-18 ENCOUNTER — PATIENT MESSAGE (OUTPATIENT)
Dept: FAMILY MEDICINE | Facility: CLINIC | Age: 55
End: 2020-12-18

## 2021-01-05 RX ORDER — QUETIAPINE FUMARATE 100 MG/1
200 TABLET, FILM COATED ORAL NIGHTLY
Qty: 60 TABLET | Refills: 3 | Status: ON HOLD | OUTPATIENT
Start: 2021-01-05 | End: 2021-04-27 | Stop reason: SDUPTHER

## 2021-01-19 ENCOUNTER — TELEPHONE (OUTPATIENT)
Dept: FAMILY MEDICINE | Facility: CLINIC | Age: 56
End: 2021-01-19

## 2021-01-21 ENCOUNTER — TELEPHONE (OUTPATIENT)
Dept: FAMILY MEDICINE | Facility: CLINIC | Age: 56
End: 2021-01-21

## 2021-01-22 ENCOUNTER — OFFICE VISIT (OUTPATIENT)
Dept: PSYCHIATRY | Facility: CLINIC | Age: 56
End: 2021-01-22
Payer: MEDICAID

## 2021-01-22 DIAGNOSIS — F41.9 ANXIETY DISORDER, UNSPECIFIED TYPE: ICD-10-CM

## 2021-01-22 DIAGNOSIS — Z79.899 HIGH RISK MEDICATION USE: ICD-10-CM

## 2021-01-22 DIAGNOSIS — F31.77 BIPOLAR 1 DISORDER, MIXED, PARTIAL REMISSION: Primary | ICD-10-CM

## 2021-01-22 PROCEDURE — 99214 PR OFFICE/OUTPT VISIT, EST, LEVL IV, 30-39 MIN: ICD-10-PCS | Mod: 95,SA,HB, | Performed by: NURSE PRACTITIONER

## 2021-01-22 PROCEDURE — 90833 PSYTX W PT W E/M 30 MIN: CPT | Mod: 95,SA,HB, | Performed by: NURSE PRACTITIONER

## 2021-01-22 PROCEDURE — 99214 OFFICE O/P EST MOD 30 MIN: CPT | Mod: 95,SA,HB, | Performed by: NURSE PRACTITIONER

## 2021-01-22 PROCEDURE — 90833 PR PSYCHOTHERAPY W/PATIENT W/E&M, 30 MIN (ADD ON): ICD-10-PCS | Mod: 95,SA,HB, | Performed by: NURSE PRACTITIONER

## 2021-01-25 ENCOUNTER — OFFICE VISIT (OUTPATIENT)
Dept: FAMILY MEDICINE | Facility: CLINIC | Age: 56
End: 2021-01-25
Payer: MEDICAID

## 2021-01-25 VITALS
OXYGEN SATURATION: 96 % | DIASTOLIC BLOOD PRESSURE: 72 MMHG | TEMPERATURE: 99 F | HEART RATE: 61 BPM | WEIGHT: 250.69 LBS | SYSTOLIC BLOOD PRESSURE: 116 MMHG | BODY MASS INDEX: 40.29 KG/M2 | HEIGHT: 66 IN

## 2021-01-25 DIAGNOSIS — G56.02 CARPAL TUNNEL SYNDROME, LEFT: Primary | ICD-10-CM

## 2021-01-25 DIAGNOSIS — Z12.11 ENCOUNTER FOR SCREENING FOR MALIGNANT NEOPLASM OF COLON: ICD-10-CM

## 2021-01-25 PROCEDURE — 99214 OFFICE O/P EST MOD 30 MIN: CPT | Mod: S$PBB,,, | Performed by: NURSE PRACTITIONER

## 2021-01-25 PROCEDURE — 99214 OFFICE O/P EST MOD 30 MIN: CPT | Mod: PBBFAC,PO | Performed by: NURSE PRACTITIONER

## 2021-01-25 PROCEDURE — 99999 PR PBB SHADOW E&M-EST. PATIENT-LVL IV: ICD-10-PCS | Mod: PBBFAC,,, | Performed by: NURSE PRACTITIONER

## 2021-01-25 PROCEDURE — 99999 PR PBB SHADOW E&M-EST. PATIENT-LVL IV: CPT | Mod: PBBFAC,,, | Performed by: NURSE PRACTITIONER

## 2021-01-25 PROCEDURE — 99214 PR OFFICE/OUTPT VISIT, EST, LEVL IV, 30-39 MIN: ICD-10-PCS | Mod: S$PBB,,, | Performed by: NURSE PRACTITIONER

## 2021-01-25 RX ORDER — MELOXICAM 15 MG/1
15 TABLET ORAL DAILY
Qty: 20 TABLET | Refills: 0 | Status: ON HOLD | OUTPATIENT
Start: 2021-01-25 | End: 2021-04-27 | Stop reason: HOSPADM

## 2021-01-27 ENCOUNTER — LAB VISIT (OUTPATIENT)
Dept: LAB | Facility: HOSPITAL | Age: 56
End: 2021-01-27
Attending: NURSE PRACTITIONER
Payer: MEDICAID

## 2021-01-27 DIAGNOSIS — Z12.11 ENCOUNTER FOR SCREENING FOR MALIGNANT NEOPLASM OF COLON: ICD-10-CM

## 2021-01-27 PROCEDURE — 82274 ASSAY TEST FOR BLOOD FECAL: CPT

## 2021-01-28 LAB — HEMOCCULT STL QL IA: NEGATIVE

## 2021-02-04 ENCOUNTER — LAB VISIT (OUTPATIENT)
Dept: LAB | Facility: HOSPITAL | Age: 56
End: 2021-02-04
Attending: NURSE PRACTITIONER
Payer: MEDICAID

## 2021-02-04 DIAGNOSIS — Z79.899 HIGH RISK MEDICATION USE: ICD-10-CM

## 2021-02-04 PROCEDURE — 36415 COLL VENOUS BLD VENIPUNCTURE: CPT | Mod: PO

## 2021-02-04 PROCEDURE — 80178 ASSAY OF LITHIUM: CPT

## 2021-02-05 LAB — LITHIUM SERPL-SCNC: 0.8 MMOL/L (ref 0.6–1.2)

## 2021-02-08 ENCOUNTER — PATIENT OUTREACH (OUTPATIENT)
Dept: ADMINISTRATIVE | Facility: OTHER | Age: 56
End: 2021-02-08

## 2021-02-09 DIAGNOSIS — G56.01 CARPAL TUNNEL SYNDROME OF RIGHT WRIST: Primary | ICD-10-CM

## 2021-02-10 ENCOUNTER — HOSPITAL ENCOUNTER (OUTPATIENT)
Dept: RADIOLOGY | Facility: HOSPITAL | Age: 56
Discharge: HOME | End: 2021-02-10
Attending: ORTHOPAEDIC SURGERY
Payer: MEDICAID

## 2021-02-10 ENCOUNTER — OFFICE VISIT (OUTPATIENT)
Dept: ORTHOPEDICS | Facility: CLINIC | Age: 56
End: 2021-02-10
Payer: MEDICAID

## 2021-02-10 DIAGNOSIS — M25.532 BILATERAL WRIST PAIN: ICD-10-CM

## 2021-02-10 DIAGNOSIS — M25.531 BILATERAL WRIST PAIN: ICD-10-CM

## 2021-02-10 DIAGNOSIS — G56.01 CARPAL TUNNEL SYNDROME OF RIGHT WRIST: ICD-10-CM

## 2021-02-10 DIAGNOSIS — G56.02 LEFT CARPAL TUNNEL SYNDROME: Primary | ICD-10-CM

## 2021-02-10 PROCEDURE — 73110 X-RAY EXAM OF WRIST: CPT | Mod: 26,50,, | Performed by: RADIOLOGY

## 2021-02-10 PROCEDURE — 99212 OFFICE O/P EST SF 10 MIN: CPT | Mod: PBBFAC,25,PN | Performed by: ORTHOPAEDIC SURGERY

## 2021-02-10 PROCEDURE — 73110 XR WRIST COMPLETE 3 VIEWS BILATERAL: ICD-10-PCS | Mod: 26,50,, | Performed by: RADIOLOGY

## 2021-02-10 PROCEDURE — 99999 PR PBB SHADOW E&M-EST. PATIENT-LVL II: CPT | Mod: PBBFAC,,, | Performed by: ORTHOPAEDIC SURGERY

## 2021-02-10 PROCEDURE — 99213 PR OFFICE/OUTPT VISIT, EST, LEVL III, 20-29 MIN: ICD-10-PCS | Mod: S$PBB,,, | Performed by: ORTHOPAEDIC SURGERY

## 2021-02-10 PROCEDURE — 73110 X-RAY EXAM OF WRIST: CPT | Mod: TC,50,PO

## 2021-02-10 PROCEDURE — 99999 PR PBB SHADOW E&M-EST. PATIENT-LVL II: ICD-10-PCS | Mod: PBBFAC,,, | Performed by: ORTHOPAEDIC SURGERY

## 2021-02-10 PROCEDURE — 99213 OFFICE O/P EST LOW 20 MIN: CPT | Mod: S$PBB,,, | Performed by: ORTHOPAEDIC SURGERY

## 2021-02-25 ENCOUNTER — OFFICE VISIT (OUTPATIENT)
Dept: PHYSICAL MEDICINE AND REHAB | Facility: CLINIC | Age: 56
End: 2021-02-25
Payer: MEDICAID

## 2021-02-25 DIAGNOSIS — G56.03 CARPAL TUNNEL SYNDROME ON BOTH SIDES: ICD-10-CM

## 2021-02-25 PROCEDURE — 95910 PR NERVE CONDUCTION STUDY; 7-8 STUDIES: ICD-10-PCS | Mod: 26,S$PBB,, | Performed by: PHYSICAL MEDICINE & REHABILITATION

## 2021-02-25 PROCEDURE — 95886 MUSC TEST DONE W/N TEST COMP: CPT | Mod: PBBFAC,PN,50 | Performed by: PHYSICAL MEDICINE & REHABILITATION

## 2021-02-25 PROCEDURE — 95886 PR EMG COMPLETE, W/ NERVE CONDUCTION STUDIES, 5+ MUSCLES: ICD-10-PCS | Mod: 26,S$PBB,, | Performed by: PHYSICAL MEDICINE & REHABILITATION

## 2021-02-25 PROCEDURE — 99999 PR PBB SHADOW E&M-EST. PATIENT-LVL II: CPT | Mod: PBBFAC,,, | Performed by: PHYSICAL MEDICINE & REHABILITATION

## 2021-02-25 PROCEDURE — 99499 UNLISTED E&M SERVICE: CPT | Mod: S$PBB,,, | Performed by: PHYSICAL MEDICINE & REHABILITATION

## 2021-02-25 PROCEDURE — 99499 NO LOS: ICD-10-PCS | Mod: S$PBB,,, | Performed by: PHYSICAL MEDICINE & REHABILITATION

## 2021-02-25 PROCEDURE — 95910 NRV CNDJ TEST 7-8 STUDIES: CPT | Mod: PBBFAC,PN | Performed by: PHYSICAL MEDICINE & REHABILITATION

## 2021-02-25 PROCEDURE — 95886 MUSC TEST DONE W/N TEST COMP: CPT | Mod: 26,S$PBB,, | Performed by: PHYSICAL MEDICINE & REHABILITATION

## 2021-02-25 PROCEDURE — 95910 NRV CNDJ TEST 7-8 STUDIES: CPT | Mod: 26,S$PBB,, | Performed by: PHYSICAL MEDICINE & REHABILITATION

## 2021-02-25 PROCEDURE — 99999 PR PBB SHADOW E&M-EST. PATIENT-LVL II: ICD-10-PCS | Mod: PBBFAC,,, | Performed by: PHYSICAL MEDICINE & REHABILITATION

## 2021-02-25 PROCEDURE — 99212 OFFICE O/P EST SF 10 MIN: CPT | Mod: PBBFAC,PN | Performed by: PHYSICAL MEDICINE & REHABILITATION

## 2021-03-05 ENCOUNTER — IMMUNIZATION (OUTPATIENT)
Dept: FAMILY MEDICINE | Facility: CLINIC | Age: 56
End: 2021-03-05
Payer: MEDICAID

## 2021-03-05 DIAGNOSIS — Z23 NEED FOR VACCINATION: Primary | ICD-10-CM

## 2021-03-05 PROCEDURE — 91300 COVID-19, MRNA, LNP-S, PF, 30 MCG/0.3 ML DOSE VACCINE: CPT | Mod: PBBFAC,PO

## 2021-03-22 ENCOUNTER — OFFICE VISIT (OUTPATIENT)
Dept: PSYCHIATRY | Facility: CLINIC | Age: 56
End: 2021-03-22
Payer: MEDICAID

## 2021-03-22 DIAGNOSIS — F31.77 BIPOLAR 1 DISORDER, MIXED, PARTIAL REMISSION: Primary | ICD-10-CM

## 2021-03-22 DIAGNOSIS — F41.9 ANXIETY DISORDER, UNSPECIFIED TYPE: ICD-10-CM

## 2021-03-22 PROCEDURE — 99214 PR OFFICE/OUTPT VISIT, EST, LEVL IV, 30-39 MIN: ICD-10-PCS | Mod: 95,SA,HB, | Performed by: NURSE PRACTITIONER

## 2021-03-22 PROCEDURE — 90833 PSYTX W PT W E/M 30 MIN: CPT | Mod: 95,SA,HB, | Performed by: NURSE PRACTITIONER

## 2021-03-22 PROCEDURE — 90833 PR PSYCHOTHERAPY W/PATIENT W/E&M, 30 MIN (ADD ON): ICD-10-PCS | Mod: 95,SA,HB, | Performed by: NURSE PRACTITIONER

## 2021-03-22 PROCEDURE — 99214 OFFICE O/P EST MOD 30 MIN: CPT | Mod: 95,SA,HB, | Performed by: NURSE PRACTITIONER

## 2021-03-22 RX ORDER — ALPRAZOLAM 0.5 MG/1
0.5 TABLET ORAL DAILY PRN
Qty: 30 TABLET | Refills: 0 | Status: SHIPPED | OUTPATIENT
Start: 2021-03-22 | End: 2021-04-26

## 2021-03-26 ENCOUNTER — IMMUNIZATION (OUTPATIENT)
Dept: FAMILY MEDICINE | Facility: CLINIC | Age: 56
End: 2021-03-26
Payer: MEDICAID

## 2021-03-26 DIAGNOSIS — Z23 NEED FOR VACCINATION: Primary | ICD-10-CM

## 2021-03-26 PROCEDURE — 91300 COVID-19, MRNA, LNP-S, PF, 30 MCG/0.3 ML DOSE VACCINE: CPT | Mod: ,,, | Performed by: FAMILY MEDICINE

## 2021-03-26 PROCEDURE — 91300 COVID-19, MRNA, LNP-S, PF, 30 MCG/0.3 ML DOSE VACCINE: ICD-10-PCS | Mod: ,,, | Performed by: FAMILY MEDICINE

## 2021-03-26 PROCEDURE — 0002A COVID-19, MRNA, LNP-S, PF, 30 MCG/0.3 ML DOSE VACCINE: CPT | Mod: CV19,,, | Performed by: FAMILY MEDICINE

## 2021-03-26 PROCEDURE — 0002A COVID-19, MRNA, LNP-S, PF, 30 MCG/0.3 ML DOSE VACCINE: ICD-10-PCS | Mod: CV19,,, | Performed by: FAMILY MEDICINE

## 2021-04-09 ENCOUNTER — PATIENT MESSAGE (OUTPATIENT)
Dept: ORTHOPEDICS | Facility: CLINIC | Age: 56
End: 2021-04-09

## 2021-04-12 ENCOUNTER — TELEPHONE (OUTPATIENT)
Dept: ORTHOPEDICS | Facility: CLINIC | Age: 56
End: 2021-04-12

## 2021-04-14 ENCOUNTER — OFFICE VISIT (OUTPATIENT)
Dept: ORTHOPEDICS | Facility: CLINIC | Age: 56
End: 2021-04-14
Payer: MEDICAID

## 2021-04-14 VITALS
HEIGHT: 66 IN | HEART RATE: 60 BPM | DIASTOLIC BLOOD PRESSURE: 81 MMHG | BODY MASS INDEX: 40.18 KG/M2 | WEIGHT: 250 LBS | SYSTOLIC BLOOD PRESSURE: 125 MMHG

## 2021-04-14 DIAGNOSIS — Z01.818 PRE-OP TESTING: Primary | ICD-10-CM

## 2021-04-14 DIAGNOSIS — G56.02 CARPAL TUNNEL SYNDROME ON LEFT: Primary | ICD-10-CM

## 2021-04-14 PROCEDURE — 99215 OFFICE O/P EST HI 40 MIN: CPT | Mod: PBBFAC,PN | Performed by: ORTHOPAEDIC SURGERY

## 2021-04-14 PROCEDURE — 99999 PR PBB SHADOW E&M-EST. PATIENT-LVL V: ICD-10-PCS | Mod: PBBFAC,,, | Performed by: ORTHOPAEDIC SURGERY

## 2021-04-14 PROCEDURE — 99213 OFFICE O/P EST LOW 20 MIN: CPT | Mod: S$PBB,,, | Performed by: ORTHOPAEDIC SURGERY

## 2021-04-14 PROCEDURE — 99213 PR OFFICE/OUTPT VISIT, EST, LEVL III, 20-29 MIN: ICD-10-PCS | Mod: S$PBB,,, | Performed by: ORTHOPAEDIC SURGERY

## 2021-04-14 PROCEDURE — 99999 PR PBB SHADOW E&M-EST. PATIENT-LVL V: CPT | Mod: PBBFAC,,, | Performed by: ORTHOPAEDIC SURGERY

## 2021-04-15 PROBLEM — G56.02 CARPAL TUNNEL SYNDROME ON LEFT: Status: ACTIVE | Noted: 2021-04-15

## 2021-04-15 RX ORDER — LIDOCAINE HYDROCHLORIDE 10 MG/ML
1 INJECTION, SOLUTION EPIDURAL; INFILTRATION; INTRACAUDAL; PERINEURAL ONCE
Status: CANCELLED | OUTPATIENT
Start: 2021-04-15 | End: 2021-04-15

## 2021-04-23 ENCOUNTER — TELEPHONE (OUTPATIENT)
Dept: ORTHOPEDICS | Facility: CLINIC | Age: 56
End: 2021-04-23

## 2021-04-24 ENCOUNTER — LAB VISIT (OUTPATIENT)
Dept: FAMILY MEDICINE | Facility: CLINIC | Age: 56
End: 2021-04-24
Payer: MEDICAID

## 2021-04-24 DIAGNOSIS — Z01.818 PRE-OP TESTING: ICD-10-CM

## 2021-04-24 PROCEDURE — U0003 INFECTIOUS AGENT DETECTION BY NUCLEIC ACID (DNA OR RNA); SEVERE ACUTE RESPIRATORY SYNDROME CORONAVIRUS 2 (SARS-COV-2) (CORONAVIRUS DISEASE [COVID-19]), AMPLIFIED PROBE TECHNIQUE, MAKING USE OF HIGH THROUGHPUT TECHNOLOGIES AS DESCRIBED BY CMS-2020-01-R: HCPCS | Performed by: ORTHOPAEDIC SURGERY

## 2021-04-24 PROCEDURE — U0005 INFEC AGEN DETEC AMPLI PROBE: HCPCS | Performed by: ORTHOPAEDIC SURGERY

## 2021-04-26 ENCOUNTER — PATIENT MESSAGE (OUTPATIENT)
Dept: PSYCHIATRY | Facility: CLINIC | Age: 56
End: 2021-04-26

## 2021-04-26 ENCOUNTER — TELEPHONE (OUTPATIENT)
Dept: ORTHOPEDICS | Facility: CLINIC | Age: 56
End: 2021-04-26

## 2021-04-26 LAB — SARS-COV-2 RNA RESP QL NAA+PROBE: NOT DETECTED

## 2021-04-27 ENCOUNTER — HOSPITAL ENCOUNTER (OUTPATIENT)
Facility: HOSPITAL | Age: 56
Discharge: HOME | End: 2021-04-27
Attending: ORTHOPAEDIC SURGERY | Admitting: ORTHOPAEDIC SURGERY
Payer: MEDICAID

## 2021-04-27 DIAGNOSIS — G56.02 CARPAL TUNNEL SYNDROME ON LEFT: ICD-10-CM

## 2021-04-27 PROCEDURE — 25000003 PHARM REV CODE 250: Mod: PO | Performed by: ORTHOPAEDIC SURGERY

## 2021-04-27 PROCEDURE — 36000706: Mod: PO | Performed by: ORTHOPAEDIC SURGERY

## 2021-04-27 PROCEDURE — 71000015 HC POSTOP RECOV 1ST HR: Mod: PO | Performed by: ORTHOPAEDIC SURGERY

## 2021-04-27 PROCEDURE — 36000707: Mod: PO | Performed by: ORTHOPAEDIC SURGERY

## 2021-04-27 PROCEDURE — 63600175 PHARM REV CODE 636 W HCPCS: Mod: PO | Performed by: ORTHOPAEDIC SURGERY

## 2021-04-27 PROCEDURE — 64721 CARPAL TUNNEL SURGERY: CPT | Mod: LT,,, | Performed by: ORTHOPAEDIC SURGERY

## 2021-04-27 PROCEDURE — 64721 PR REVISE MEDIAN N/CARPAL TUNNEL SURG: ICD-10-PCS | Mod: LT,,, | Performed by: ORTHOPAEDIC SURGERY

## 2021-04-27 RX ORDER — ALPRAZOLAM 0.5 MG/1
0.5 TABLET ORAL DAILY PRN
Qty: 30 TABLET | Refills: 0 | Status: SHIPPED | OUTPATIENT
Start: 2021-04-27 | End: 2021-11-18 | Stop reason: SDUPTHER

## 2021-04-27 RX ORDER — QUETIAPINE FUMARATE 100 MG/1
200 TABLET, FILM COATED ORAL NIGHTLY
Qty: 60 TABLET | Refills: 3 | Status: SHIPPED | OUTPATIENT
Start: 2021-04-27 | End: 2021-08-16 | Stop reason: SDUPTHER

## 2021-04-27 RX ORDER — LIDOCAINE HYDROCHLORIDE 10 MG/ML
1 INJECTION, SOLUTION EPIDURAL; INFILTRATION; INTRACAUDAL; PERINEURAL ONCE
Status: COMPLETED | OUTPATIENT
Start: 2021-04-27 | End: 2021-04-27

## 2021-04-27 RX ORDER — IBUPROFEN 800 MG/1
800 TABLET ORAL EVERY 6 HOURS PRN
Qty: 12 TABLET | Refills: 0 | Status: SHIPPED | OUTPATIENT
Start: 2021-04-27 | End: 2021-04-30 | Stop reason: SDUPTHER

## 2021-04-27 RX ORDER — CEFAZOLIN SODIUM 2 G/50ML
2 SOLUTION INTRAVENOUS
Status: COMPLETED | OUTPATIENT
Start: 2021-04-27 | End: 2021-04-27

## 2021-04-27 RX ORDER — SODIUM CHLORIDE, SODIUM LACTATE, POTASSIUM CHLORIDE, CALCIUM CHLORIDE 600; 310; 30; 20 MG/100ML; MG/100ML; MG/100ML; MG/100ML
INJECTION, SOLUTION INTRAVENOUS CONTINUOUS
Status: DISCONTINUED | OUTPATIENT
Start: 2021-04-27 | End: 2021-04-27 | Stop reason: HOSPADM

## 2021-04-27 RX ADMIN — LIDOCAINE HYDROCHLORIDE 10 MG: 10 INJECTION, SOLUTION EPIDURAL; INFILTRATION; INTRACAUDAL; PERINEURAL at 08:04

## 2021-04-27 RX ADMIN — SODIUM CHLORIDE, SODIUM LACTATE, POTASSIUM CHLORIDE, AND CALCIUM CHLORIDE: .6; .31; .03; .02 INJECTION, SOLUTION INTRAVENOUS at 08:04

## 2021-04-28 VITALS
TEMPERATURE: 98 F | RESPIRATION RATE: 17 BRPM | SYSTOLIC BLOOD PRESSURE: 103 MMHG | DIASTOLIC BLOOD PRESSURE: 64 MMHG | BODY MASS INDEX: 38.57 KG/M2 | HEART RATE: 51 BPM | OXYGEN SATURATION: 97 % | WEIGHT: 240 LBS | HEIGHT: 66 IN

## 2021-04-28 RX ORDER — LITHIUM CARBONATE 300 MG/1
300 CAPSULE ORAL 3 TIMES DAILY
Qty: 90 CAPSULE | Refills: 3 | Status: SHIPPED | OUTPATIENT
Start: 2021-04-28 | End: 2021-08-16 | Stop reason: SDUPTHER

## 2021-04-30 DIAGNOSIS — G56.02 LEFT CARPAL TUNNEL SYNDROME: Primary | ICD-10-CM

## 2021-05-03 ENCOUNTER — TELEPHONE (OUTPATIENT)
Dept: ORTHOPEDICS | Facility: CLINIC | Age: 56
End: 2021-05-03

## 2021-05-03 RX ORDER — IBUPROFEN 800 MG/1
800 TABLET ORAL EVERY 6 HOURS PRN
Qty: 12 TABLET | Refills: 0 | Status: SHIPPED | OUTPATIENT
Start: 2021-05-03 | End: 2022-02-24

## 2021-05-10 ENCOUNTER — OFFICE VISIT (OUTPATIENT)
Dept: PSYCHIATRY | Facility: CLINIC | Age: 56
End: 2021-05-10
Payer: MEDICAID

## 2021-05-10 DIAGNOSIS — F41.9 ANXIETY DISORDER, UNSPECIFIED TYPE: ICD-10-CM

## 2021-05-10 DIAGNOSIS — F31.77 BIPOLAR 1 DISORDER, MIXED, PARTIAL REMISSION: Primary | ICD-10-CM

## 2021-05-10 PROCEDURE — 90833 PR PSYCHOTHERAPY W/PATIENT W/E&M, 30 MIN (ADD ON): ICD-10-PCS | Mod: 95,SA,HB, | Performed by: NURSE PRACTITIONER

## 2021-05-10 PROCEDURE — 90833 PSYTX W PT W E/M 30 MIN: CPT | Mod: 95,SA,HB, | Performed by: NURSE PRACTITIONER

## 2021-05-10 PROCEDURE — 99214 PR OFFICE/OUTPT VISIT, EST, LEVL IV, 30-39 MIN: ICD-10-PCS | Mod: 95,SA,HB, | Performed by: NURSE PRACTITIONER

## 2021-05-10 PROCEDURE — 99214 OFFICE O/P EST MOD 30 MIN: CPT | Mod: 95,SA,HB, | Performed by: NURSE PRACTITIONER

## 2021-05-12 ENCOUNTER — OFFICE VISIT (OUTPATIENT)
Dept: ORTHOPEDICS | Facility: CLINIC | Age: 56
End: 2021-05-12
Payer: MEDICAID

## 2021-05-12 VITALS — SYSTOLIC BLOOD PRESSURE: 120 MMHG | HEART RATE: 63 BPM | DIASTOLIC BLOOD PRESSURE: 76 MMHG

## 2021-05-12 DIAGNOSIS — Z98.890 STATUS POST CARPAL TUNNEL RELEASE: Primary | ICD-10-CM

## 2021-05-12 PROCEDURE — 99999 PR PBB SHADOW E&M-EST. PATIENT-LVL III: ICD-10-PCS | Mod: PBBFAC,,, | Performed by: ORTHOPAEDIC SURGERY

## 2021-05-12 PROCEDURE — 99024 PR POST-OP FOLLOW-UP VISIT: ICD-10-PCS | Mod: ,,, | Performed by: ORTHOPAEDIC SURGERY

## 2021-05-12 PROCEDURE — 99213 OFFICE O/P EST LOW 20 MIN: CPT | Mod: PBBFAC,PN | Performed by: ORTHOPAEDIC SURGERY

## 2021-05-12 PROCEDURE — 99024 POSTOP FOLLOW-UP VISIT: CPT | Mod: ,,, | Performed by: ORTHOPAEDIC SURGERY

## 2021-05-12 PROCEDURE — 99999 PR PBB SHADOW E&M-EST. PATIENT-LVL III: CPT | Mod: PBBFAC,,, | Performed by: ORTHOPAEDIC SURGERY

## 2021-05-31 ENCOUNTER — OFFICE VISIT (OUTPATIENT)
Dept: ORTHOPEDICS | Facility: CLINIC | Age: 56
End: 2021-05-31
Payer: MEDICAID

## 2021-05-31 VITALS — BODY MASS INDEX: 38.58 KG/M2 | HEIGHT: 66 IN | WEIGHT: 240.06 LBS

## 2021-05-31 DIAGNOSIS — Z98.890 STATUS POST CARPAL TUNNEL RELEASE: Primary | ICD-10-CM

## 2021-05-31 PROCEDURE — 99213 OFFICE O/P EST LOW 20 MIN: CPT | Mod: PBBFAC,PN | Performed by: ORTHOPAEDIC SURGERY

## 2021-05-31 PROCEDURE — 99999 PR PBB SHADOW E&M-EST. PATIENT-LVL III: ICD-10-PCS | Mod: PBBFAC,,, | Performed by: ORTHOPAEDIC SURGERY

## 2021-05-31 PROCEDURE — 99999 PR PBB SHADOW E&M-EST. PATIENT-LVL III: CPT | Mod: PBBFAC,,, | Performed by: ORTHOPAEDIC SURGERY

## 2021-05-31 PROCEDURE — 99024 POSTOP FOLLOW-UP VISIT: CPT | Mod: ,,, | Performed by: ORTHOPAEDIC SURGERY

## 2021-05-31 PROCEDURE — 99024 PR POST-OP FOLLOW-UP VISIT: ICD-10-PCS | Mod: ,,, | Performed by: ORTHOPAEDIC SURGERY

## 2021-06-03 ENCOUNTER — DOCUMENTATION ONLY (OUTPATIENT)
Dept: FAMILY MEDICINE | Facility: CLINIC | Age: 56
End: 2021-06-03

## 2021-06-15 ENCOUNTER — OFFICE VISIT (OUTPATIENT)
Dept: PSYCHIATRY | Facility: CLINIC | Age: 56
End: 2021-06-15
Payer: MEDICAID

## 2021-06-15 DIAGNOSIS — F41.9 ANXIETY DISORDER, UNSPECIFIED TYPE: ICD-10-CM

## 2021-06-15 DIAGNOSIS — F31.77 BIPOLAR 1 DISORDER, MIXED, PARTIAL REMISSION: Primary | ICD-10-CM

## 2021-06-15 PROCEDURE — 90833 PSYTX W PT W E/M 30 MIN: CPT | Mod: 95,SA,HB, | Performed by: NURSE PRACTITIONER

## 2021-06-15 PROCEDURE — 90833 PR PSYCHOTHERAPY W/PATIENT W/E&M, 30 MIN (ADD ON): ICD-10-PCS | Mod: 95,SA,HB, | Performed by: NURSE PRACTITIONER

## 2021-06-15 PROCEDURE — 99214 PR OFFICE/OUTPT VISIT, EST, LEVL IV, 30-39 MIN: ICD-10-PCS | Mod: 95,SA,HB, | Performed by: NURSE PRACTITIONER

## 2021-06-15 PROCEDURE — 99214 OFFICE O/P EST MOD 30 MIN: CPT | Mod: 95,SA,HB, | Performed by: NURSE PRACTITIONER

## 2021-06-16 ENCOUNTER — TELEPHONE (OUTPATIENT)
Dept: FAMILY MEDICINE | Facility: CLINIC | Age: 56
End: 2021-06-16

## 2021-06-21 ENCOUNTER — HOSPITAL ENCOUNTER (OUTPATIENT)
Dept: RADIOLOGY | Facility: HOSPITAL | Age: 56
Discharge: HOME | End: 2021-06-21
Attending: NURSE PRACTITIONER
Payer: MEDICAID

## 2021-06-21 ENCOUNTER — OFFICE VISIT (OUTPATIENT)
Dept: FAMILY MEDICINE | Facility: CLINIC | Age: 56
End: 2021-06-21
Payer: MEDICAID

## 2021-06-21 VITALS
OXYGEN SATURATION: 96 % | HEIGHT: 66 IN | DIASTOLIC BLOOD PRESSURE: 76 MMHG | HEART RATE: 63 BPM | WEIGHT: 246.5 LBS | BODY MASS INDEX: 39.62 KG/M2 | SYSTOLIC BLOOD PRESSURE: 126 MMHG

## 2021-06-21 DIAGNOSIS — R22.41 FOOT MASS, RIGHT: Primary | ICD-10-CM

## 2021-06-21 DIAGNOSIS — R22.41 FOOT MASS, RIGHT: ICD-10-CM

## 2021-06-21 PROCEDURE — 99999 PR PBB SHADOW E&M-EST. PATIENT-LVL IV: ICD-10-PCS | Mod: PBBFAC,,, | Performed by: NURSE PRACTITIONER

## 2021-06-21 PROCEDURE — 99214 OFFICE O/P EST MOD 30 MIN: CPT | Mod: PBBFAC,PO,25 | Performed by: NURSE PRACTITIONER

## 2021-06-21 PROCEDURE — 73630 X-RAY EXAM OF FOOT: CPT | Mod: TC,FY,PO,RT

## 2021-06-21 PROCEDURE — 73630 XR FOOT COMPLETE 3 VIEW RIGHT: ICD-10-PCS | Mod: 26,RT,, | Performed by: RADIOLOGY

## 2021-06-21 PROCEDURE — 99999 PR PBB SHADOW E&M-EST. PATIENT-LVL IV: CPT | Mod: PBBFAC,,, | Performed by: NURSE PRACTITIONER

## 2021-06-21 PROCEDURE — 99214 PR OFFICE/OUTPT VISIT, EST, LEVL IV, 30-39 MIN: ICD-10-PCS | Mod: S$PBB,,, | Performed by: NURSE PRACTITIONER

## 2021-06-21 PROCEDURE — 99214 OFFICE O/P EST MOD 30 MIN: CPT | Mod: S$PBB,,, | Performed by: NURSE PRACTITIONER

## 2021-06-21 PROCEDURE — 73630 X-RAY EXAM OF FOOT: CPT | Mod: 26,RT,, | Performed by: RADIOLOGY

## 2021-07-13 ENCOUNTER — TELEPHONE (OUTPATIENT)
Dept: FAMILY MEDICINE | Facility: CLINIC | Age: 56
End: 2021-07-13

## 2021-08-02 ENCOUNTER — TELEPHONE (OUTPATIENT)
Dept: PODIATRY | Facility: CLINIC | Age: 56
End: 2021-08-02

## 2021-08-05 ENCOUNTER — TELEPHONE (OUTPATIENT)
Dept: PODIATRY | Facility: CLINIC | Age: 56
End: 2021-08-05

## 2021-08-16 ENCOUNTER — OFFICE VISIT (OUTPATIENT)
Dept: PSYCHIATRY | Facility: CLINIC | Age: 56
End: 2021-08-16
Payer: MEDICAID

## 2021-08-16 DIAGNOSIS — F31.77 BIPOLAR 1 DISORDER, MIXED, PARTIAL REMISSION: ICD-10-CM

## 2021-08-16 DIAGNOSIS — F41.9 ANXIETY DISORDER, UNSPECIFIED TYPE: Primary | ICD-10-CM

## 2021-08-16 PROCEDURE — 90833 PSYTX W PT W E/M 30 MIN: CPT | Mod: 95,SA,HB, | Performed by: NURSE PRACTITIONER

## 2021-08-16 PROCEDURE — 99214 OFFICE O/P EST MOD 30 MIN: CPT | Mod: 95,SA,HB, | Performed by: NURSE PRACTITIONER

## 2021-08-16 PROCEDURE — 99214 PR OFFICE/OUTPT VISIT, EST, LEVL IV, 30-39 MIN: ICD-10-PCS | Mod: 95,SA,HB, | Performed by: NURSE PRACTITIONER

## 2021-08-16 PROCEDURE — 90833 PR PSYCHOTHERAPY W/PATIENT W/E&M, 30 MIN (ADD ON): ICD-10-PCS | Mod: 95,SA,HB, | Performed by: NURSE PRACTITIONER

## 2021-08-16 RX ORDER — QUETIAPINE FUMARATE 100 MG/1
200 TABLET, FILM COATED ORAL NIGHTLY
Qty: 60 TABLET | Refills: 3 | Status: SHIPPED | OUTPATIENT
Start: 2021-08-16 | End: 2021-11-18 | Stop reason: SDUPTHER

## 2021-08-16 RX ORDER — LITHIUM CARBONATE 300 MG/1
300 CAPSULE ORAL 2 TIMES DAILY
Qty: 60 CAPSULE | Refills: 3 | Status: SHIPPED | OUTPATIENT
Start: 2021-08-16 | End: 2021-11-18 | Stop reason: SDUPTHER

## 2021-09-08 ENCOUNTER — OFFICE VISIT (OUTPATIENT)
Dept: PODIATRY | Facility: CLINIC | Age: 56
End: 2021-09-08
Payer: MEDICAID

## 2021-09-08 VITALS — HEIGHT: 66 IN | WEIGHT: 246.5 LBS | BODY MASS INDEX: 39.62 KG/M2

## 2021-09-08 DIAGNOSIS — R22.41 SUBCUTANEOUS MASS OF RIGHT FOOT: Primary | ICD-10-CM

## 2021-09-08 DIAGNOSIS — M67.471 GANGLION CYST OF RIGHT FOOT: ICD-10-CM

## 2021-09-08 PROCEDURE — 99999 PR PBB SHADOW E&M-EST. PATIENT-LVL III: CPT | Mod: PBBFAC,,, | Performed by: PODIATRIST

## 2021-09-08 PROCEDURE — 99213 OFFICE O/P EST LOW 20 MIN: CPT | Mod: PBBFAC,PN | Performed by: PODIATRIST

## 2021-09-08 PROCEDURE — 99213 PR OFFICE/OUTPT VISIT, EST, LEVL III, 20-29 MIN: ICD-10-PCS | Mod: S$PBB,,, | Performed by: PODIATRIST

## 2021-09-08 PROCEDURE — 99999 PR PBB SHADOW E&M-EST. PATIENT-LVL III: ICD-10-PCS | Mod: PBBFAC,,, | Performed by: PODIATRIST

## 2021-09-08 PROCEDURE — 99213 OFFICE O/P EST LOW 20 MIN: CPT | Mod: S$PBB,,, | Performed by: PODIATRIST

## 2021-09-27 ENCOUNTER — PATIENT OUTREACH (OUTPATIENT)
Dept: ADMINISTRATIVE | Facility: OTHER | Age: 56
End: 2021-09-27

## 2021-09-30 ENCOUNTER — LAB VISIT (OUTPATIENT)
Dept: LAB | Facility: HOSPITAL | Age: 56
End: 2021-09-30
Attending: PODIATRIST
Payer: MEDICAID

## 2021-09-30 ENCOUNTER — OFFICE VISIT (OUTPATIENT)
Dept: PODIATRY | Facility: CLINIC | Age: 56
End: 2021-09-30
Payer: MEDICAID

## 2021-09-30 DIAGNOSIS — R22.41 SUBCUTANEOUS MASS OF RIGHT FOOT: Primary | ICD-10-CM

## 2021-09-30 DIAGNOSIS — M67.471 GANGLION CYST OF RIGHT FOOT: ICD-10-CM

## 2021-09-30 DIAGNOSIS — R22.41 SUBCUTANEOUS MASS OF RIGHT FOOT: ICD-10-CM

## 2021-09-30 DIAGNOSIS — R22.41 LOCALIZED SWELLING, MASS, OR LUMP OF RIGHT LOWER EXTREMITY: ICD-10-CM

## 2021-09-30 LAB
ALBUMIN SERPL BCP-MCNC: 3.9 G/DL (ref 3.5–5.2)
ANION GAP SERPL CALC-SCNC: 8 MMOL/L (ref 8–16)
BUN SERPL-MCNC: 18 MG/DL (ref 6–20)
CALCIUM SERPL-MCNC: 10.2 MG/DL (ref 8.7–10.5)
CHLORIDE SERPL-SCNC: 105 MMOL/L (ref 95–110)
CO2 SERPL-SCNC: 26 MMOL/L (ref 23–29)
CREAT SERPL-MCNC: 0.8 MG/DL (ref 0.5–1.4)
EST. GFR  (AFRICAN AMERICAN): >60 ML/MIN/1.73 M^2
EST. GFR  (NON AFRICAN AMERICAN): >60 ML/MIN/1.73 M^2
GLUCOSE SERPL-MCNC: 102 MG/DL (ref 70–110)
PHOSPHATE SERPL-MCNC: 4.1 MG/DL (ref 2.7–4.5)
POTASSIUM SERPL-SCNC: 4.1 MMOL/L (ref 3.5–5.1)
SODIUM SERPL-SCNC: 139 MMOL/L (ref 136–145)

## 2021-09-30 PROCEDURE — 99999 PR PBB SHADOW E&M-EST. PATIENT-LVL III: ICD-10-PCS | Mod: PBBFAC,,, | Performed by: PODIATRIST

## 2021-09-30 PROCEDURE — 80069 RENAL FUNCTION PANEL: CPT | Performed by: PODIATRIST

## 2021-09-30 PROCEDURE — 36415 COLL VENOUS BLD VENIPUNCTURE: CPT | Mod: PO | Performed by: PODIATRIST

## 2021-09-30 PROCEDURE — 99212 PR OFFICE/OUTPT VISIT, EST, LEVL II, 10-19 MIN: ICD-10-PCS | Mod: S$PBB,,, | Performed by: PODIATRIST

## 2021-09-30 PROCEDURE — 99213 OFFICE O/P EST LOW 20 MIN: CPT | Mod: PBBFAC,PN | Performed by: PODIATRIST

## 2021-09-30 PROCEDURE — 99999 PR PBB SHADOW E&M-EST. PATIENT-LVL III: CPT | Mod: PBBFAC,,, | Performed by: PODIATRIST

## 2021-09-30 PROCEDURE — 99212 OFFICE O/P EST SF 10 MIN: CPT | Mod: S$PBB,,, | Performed by: PODIATRIST

## 2021-10-01 DIAGNOSIS — Z12.31 VISIT FOR SCREENING MAMMOGRAM: Primary | ICD-10-CM

## 2021-10-04 ENCOUNTER — OFFICE VISIT (OUTPATIENT)
Dept: OBSTETRICS AND GYNECOLOGY | Facility: CLINIC | Age: 56
End: 2021-10-04
Payer: MEDICAID

## 2021-10-04 ENCOUNTER — HOSPITAL ENCOUNTER (OUTPATIENT)
Dept: RADIOLOGY | Facility: HOSPITAL | Age: 56
Discharge: HOME | End: 2021-10-04
Attending: SPECIALIST
Payer: MEDICAID

## 2021-10-04 VITALS
WEIGHT: 250.88 LBS | SYSTOLIC BLOOD PRESSURE: 122 MMHG | DIASTOLIC BLOOD PRESSURE: 74 MMHG | RESPIRATION RATE: 16 BRPM | HEIGHT: 66 IN | BODY MASS INDEX: 40.32 KG/M2

## 2021-10-04 DIAGNOSIS — Z12.31 VISIT FOR SCREENING MAMMOGRAM: ICD-10-CM

## 2021-10-04 DIAGNOSIS — Z01.419 ENCOUNTER FOR GYNECOLOGICAL EXAMINATION WITHOUT ABNORMAL FINDING: Primary | ICD-10-CM

## 2021-10-04 PROCEDURE — 88175 CYTOPATH C/V AUTO FLUID REDO: CPT | Performed by: SPECIALIST

## 2021-10-04 PROCEDURE — 99213 OFFICE O/P EST LOW 20 MIN: CPT | Mod: PBBFAC,PN | Performed by: SPECIALIST

## 2021-10-04 PROCEDURE — 77067 MAMMO DIGITAL SCREENING BILAT WITH TOMO: ICD-10-PCS | Mod: 26,,, | Performed by: RADIOLOGY

## 2021-10-04 PROCEDURE — 99999 PR PBB SHADOW E&M-EST. PATIENT-LVL III: CPT | Mod: PBBFAC,,, | Performed by: SPECIALIST

## 2021-10-04 PROCEDURE — 99386 PREV VISIT NEW AGE 40-64: CPT | Mod: S$PBB,,, | Performed by: SPECIALIST

## 2021-10-04 PROCEDURE — 77067 SCR MAMMO BI INCL CAD: CPT | Mod: TC,PN

## 2021-10-04 PROCEDURE — 99386 PR PREVENTIVE VISIT,NEW,40-64: ICD-10-PCS | Mod: S$PBB,,, | Performed by: SPECIALIST

## 2021-10-04 PROCEDURE — 77063 BREAST TOMOSYNTHESIS BI: CPT | Mod: 26,,, | Performed by: RADIOLOGY

## 2021-10-04 PROCEDURE — 77063 MAMMO DIGITAL SCREENING BILAT WITH TOMO: ICD-10-PCS | Mod: 26,,, | Performed by: RADIOLOGY

## 2021-10-04 PROCEDURE — 99999 PR PBB SHADOW E&M-EST. PATIENT-LVL III: ICD-10-PCS | Mod: PBBFAC,,, | Performed by: SPECIALIST

## 2021-10-04 PROCEDURE — 77067 SCR MAMMO BI INCL CAD: CPT | Mod: 26,,, | Performed by: RADIOLOGY

## 2021-10-19 LAB
CYTOLOGIST CVX/VAG CYTO: NORMAL
CYTOLOGY CVX/VAG DOC CYTO: NORMAL
CYTOLOGY CVX/VAG DOC THIN PREP: NORMAL
CYTOLOGY THINPREP PAP COMMENT: NORMAL
CYTOLOGY THINPREP PAP COMMENT: NORMAL
PAP NOTE: NORMAL
PATHOLOGIST CVX/VAG CYTO: NORMAL
STAT OF ADQ CVX/VAG CYTO-IMP: NORMAL

## 2021-10-27 ENCOUNTER — HOSPITAL ENCOUNTER (OUTPATIENT)
Dept: RADIOLOGY | Facility: HOSPITAL | Age: 56
Discharge: HOME | End: 2021-10-27
Attending: PODIATRIST
Payer: MEDICAID

## 2021-10-27 DIAGNOSIS — R22.41 SUBCUTANEOUS MASS OF RIGHT FOOT: ICD-10-CM

## 2021-10-27 DIAGNOSIS — M67.471 GANGLION CYST OF RIGHT FOOT: ICD-10-CM

## 2021-10-27 DIAGNOSIS — R22.41 LOCALIZED SWELLING, MASS, OR LUMP OF RIGHT LOWER EXTREMITY: ICD-10-CM

## 2021-10-27 PROCEDURE — 73720 MRI LWR EXTREMITY W/O&W/DYE: CPT | Mod: TC,PO,RT

## 2021-10-27 PROCEDURE — 25500020 PHARM REV CODE 255: Mod: PO | Performed by: PODIATRIST

## 2021-10-27 PROCEDURE — A9585 GADOBUTROL INJECTION: HCPCS | Mod: PO | Performed by: PODIATRIST

## 2021-10-27 PROCEDURE — 73720 MRI LWR EXTREMITY W/O&W/DYE: CPT | Mod: 26,RT,, | Performed by: RADIOLOGY

## 2021-10-27 PROCEDURE — 73720 MRI FOOT (FOREFOOT) RIGHT W W/O CONTRAST: ICD-10-PCS | Mod: 26,RT,, | Performed by: RADIOLOGY

## 2021-10-27 RX ORDER — GADOBUTROL 604.72 MG/ML
10 INJECTION INTRAVENOUS
Status: COMPLETED | OUTPATIENT
Start: 2021-10-27 | End: 2021-10-27

## 2021-10-27 RX ADMIN — GADOBUTROL 10 ML: 604.72 INJECTION INTRAVENOUS at 03:10

## 2021-11-11 ENCOUNTER — OFFICE VISIT (OUTPATIENT)
Dept: PODIATRY | Facility: CLINIC | Age: 56
End: 2021-11-11
Payer: MEDICAID

## 2021-11-11 DIAGNOSIS — R22.41 SUBCUTANEOUS MASS OF RIGHT FOOT: Primary | ICD-10-CM

## 2021-11-11 PROCEDURE — 99999 PR PBB SHADOW E&M-EST. PATIENT-LVL III: ICD-10-PCS | Mod: PBBFAC,,, | Performed by: PODIATRIST

## 2021-11-11 PROCEDURE — 99212 OFFICE O/P EST SF 10 MIN: CPT | Mod: S$PBB,,, | Performed by: PODIATRIST

## 2021-11-11 PROCEDURE — 99212 PR OFFICE/OUTPT VISIT, EST, LEVL II, 10-19 MIN: ICD-10-PCS | Mod: S$PBB,,, | Performed by: PODIATRIST

## 2021-11-11 PROCEDURE — 99213 OFFICE O/P EST LOW 20 MIN: CPT | Mod: PBBFAC,PN | Performed by: PODIATRIST

## 2021-11-11 PROCEDURE — 99999 PR PBB SHADOW E&M-EST. PATIENT-LVL III: CPT | Mod: PBBFAC,,, | Performed by: PODIATRIST

## 2021-11-18 ENCOUNTER — OFFICE VISIT (OUTPATIENT)
Dept: PSYCHIATRY | Facility: CLINIC | Age: 56
End: 2021-11-18
Payer: MEDICAID

## 2021-11-18 DIAGNOSIS — F31.77 BIPOLAR 1 DISORDER, MIXED, PARTIAL REMISSION: Primary | ICD-10-CM

## 2021-11-18 DIAGNOSIS — F41.9 ANXIETY DISORDER, UNSPECIFIED TYPE: ICD-10-CM

## 2021-11-18 PROCEDURE — 99214 PR OFFICE/OUTPT VISIT, EST, LEVL IV, 30-39 MIN: ICD-10-PCS | Mod: SA,HB,95, | Performed by: NURSE PRACTITIONER

## 2021-11-18 PROCEDURE — 99214 OFFICE O/P EST MOD 30 MIN: CPT | Mod: SA,HB,95, | Performed by: NURSE PRACTITIONER

## 2021-11-18 RX ORDER — ALPRAZOLAM 0.5 MG/1
0.5 TABLET ORAL DAILY PRN
Qty: 30 TABLET | Refills: 0 | Status: SHIPPED | OUTPATIENT
Start: 2021-11-18 | End: 2022-02-24 | Stop reason: SDUPTHER

## 2021-11-18 RX ORDER — LITHIUM CARBONATE 300 MG/1
300 CAPSULE ORAL 2 TIMES DAILY
Qty: 60 CAPSULE | Refills: 3 | Status: SHIPPED | OUTPATIENT
Start: 2021-11-18 | End: 2022-02-24 | Stop reason: SDUPTHER

## 2021-11-18 RX ORDER — QUETIAPINE FUMARATE 100 MG/1
200 TABLET, FILM COATED ORAL NIGHTLY
Qty: 60 TABLET | Refills: 3 | Status: SHIPPED | OUTPATIENT
Start: 2021-11-18 | End: 2022-02-24 | Stop reason: SDUPTHER

## 2021-12-07 ENCOUNTER — PATIENT MESSAGE (OUTPATIENT)
Dept: ADMINISTRATIVE | Facility: OTHER | Age: 56
End: 2021-12-07
Payer: MEDICAID

## 2021-12-09 ENCOUNTER — LAB VISIT (OUTPATIENT)
Dept: LAB | Facility: HOSPITAL | Age: 56
End: 2021-12-09
Attending: INTERNAL MEDICINE
Payer: MEDICAID

## 2021-12-09 DIAGNOSIS — E78.49 OTHER HYPERLIPIDEMIA: ICD-10-CM

## 2021-12-09 LAB
ALBUMIN SERPL BCP-MCNC: 3.6 G/DL (ref 3.5–5.2)
ALP SERPL-CCNC: 60 U/L (ref 55–135)
ALT SERPL W/O P-5'-P-CCNC: 17 U/L (ref 10–44)
ANION GAP SERPL CALC-SCNC: 12 MMOL/L (ref 8–16)
AST SERPL-CCNC: 21 U/L (ref 10–40)
BILIRUB SERPL-MCNC: 1.6 MG/DL (ref 0.1–1)
BUN SERPL-MCNC: 13 MG/DL (ref 6–20)
CALCIUM SERPL-MCNC: 9.3 MG/DL (ref 8.7–10.5)
CHLORIDE SERPL-SCNC: 109 MMOL/L (ref 95–110)
CHOLEST SERPL-MCNC: 258 MG/DL (ref 120–199)
CHOLEST/HDLC SERPL: 4.4 {RATIO} (ref 2–5)
CO2 SERPL-SCNC: 21 MMOL/L (ref 23–29)
CREAT SERPL-MCNC: 0.8 MG/DL (ref 0.5–1.4)
EST. GFR  (AFRICAN AMERICAN): >60 ML/MIN/1.73 M^2
EST. GFR  (NON AFRICAN AMERICAN): >60 ML/MIN/1.73 M^2
GLUCOSE SERPL-MCNC: 106 MG/DL (ref 70–110)
HDLC SERPL-MCNC: 58 MG/DL (ref 40–75)
HDLC SERPL: 22.5 % (ref 20–50)
LDLC SERPL CALC-MCNC: 165.8 MG/DL (ref 63–159)
NONHDLC SERPL-MCNC: 200 MG/DL
POTASSIUM SERPL-SCNC: 3.8 MMOL/L (ref 3.5–5.1)
PROT SERPL-MCNC: 7 G/DL (ref 6–8.4)
SODIUM SERPL-SCNC: 142 MMOL/L (ref 136–145)
TRIGL SERPL-MCNC: 171 MG/DL (ref 30–150)
TSH SERPL DL<=0.005 MIU/L-ACNC: 1.8 UIU/ML (ref 0.4–4)

## 2021-12-09 PROCEDURE — 84443 ASSAY THYROID STIM HORMONE: CPT | Performed by: INTERNAL MEDICINE

## 2021-12-09 PROCEDURE — 80061 LIPID PANEL: CPT | Performed by: INTERNAL MEDICINE

## 2021-12-09 PROCEDURE — 36415 COLL VENOUS BLD VENIPUNCTURE: CPT | Mod: PO | Performed by: INTERNAL MEDICINE

## 2021-12-09 PROCEDURE — 80053 COMPREHEN METABOLIC PANEL: CPT | Performed by: INTERNAL MEDICINE

## 2022-01-14 ENCOUNTER — TELEPHONE (OUTPATIENT)
Dept: FAMILY MEDICINE | Facility: CLINIC | Age: 57
End: 2022-01-14
Payer: MEDICAID

## 2022-01-14 NOTE — TELEPHONE ENCOUNTER
----- Message from Shahnaz Oneal sent at 1/14/2022  4:06 PM CST -----  Regarding: pt would like to schedule nurse visit to see if she could get a shot for weight control  Type: Needs Medical Advice  Who Called: pt  Symptoms (please be specific):    How long has patient had these symptoms:    Pharmacy name and phone #:    Best Call Back Number:[phone] (home)     Additional Information pt would like to schedule nurse visit to see if she could get a shot for weight control

## 2022-01-26 ENCOUNTER — PATIENT MESSAGE (OUTPATIENT)
Dept: FAMILY MEDICINE | Facility: CLINIC | Age: 57
End: 2022-01-26

## 2022-01-26 ENCOUNTER — OFFICE VISIT (OUTPATIENT)
Dept: FAMILY MEDICINE | Facility: CLINIC | Age: 57
End: 2022-01-26
Payer: MEDICAID

## 2022-01-26 VITALS
BODY MASS INDEX: 40.18 KG/M2 | HEIGHT: 66 IN | DIASTOLIC BLOOD PRESSURE: 86 MMHG | TEMPERATURE: 98 F | OXYGEN SATURATION: 97 % | WEIGHT: 250 LBS | SYSTOLIC BLOOD PRESSURE: 120 MMHG | HEART RATE: 68 BPM

## 2022-01-26 DIAGNOSIS — F31.77 BIPOLAR 1 DISORDER, MIXED, PARTIAL REMISSION: ICD-10-CM

## 2022-01-26 DIAGNOSIS — E78.5 HYPERLIPIDEMIA, UNSPECIFIED HYPERLIPIDEMIA TYPE: Primary | ICD-10-CM

## 2022-01-26 PROCEDURE — 3079F DIAST BP 80-89 MM HG: CPT | Mod: CPTII,,, | Performed by: INTERNAL MEDICINE

## 2022-01-26 PROCEDURE — 3008F BODY MASS INDEX DOCD: CPT | Mod: CPTII,,, | Performed by: INTERNAL MEDICINE

## 2022-01-26 PROCEDURE — 3008F PR BODY MASS INDEX (BMI) DOCUMENTED: ICD-10-PCS | Mod: CPTII,,, | Performed by: INTERNAL MEDICINE

## 2022-01-26 PROCEDURE — 1159F PR MEDICATION LIST DOCUMENTED IN MEDICAL RECORD: ICD-10-PCS | Mod: CPTII,,, | Performed by: INTERNAL MEDICINE

## 2022-01-26 PROCEDURE — 3079F PR MOST RECENT DIASTOLIC BLOOD PRESSURE 80-89 MM HG: ICD-10-PCS | Mod: CPTII,,, | Performed by: INTERNAL MEDICINE

## 2022-01-26 PROCEDURE — 99214 PR OFFICE/OUTPT VISIT, EST, LEVL IV, 30-39 MIN: ICD-10-PCS | Mod: S$PBB,,, | Performed by: INTERNAL MEDICINE

## 2022-01-26 PROCEDURE — 99999 PR PBB SHADOW E&M-EST. PATIENT-LVL III: ICD-10-PCS | Mod: PBBFAC,,, | Performed by: INTERNAL MEDICINE

## 2022-01-26 PROCEDURE — 99999 PR PBB SHADOW E&M-EST. PATIENT-LVL III: CPT | Mod: PBBFAC,,, | Performed by: INTERNAL MEDICINE

## 2022-01-26 PROCEDURE — 3074F SYST BP LT 130 MM HG: CPT | Mod: CPTII,,, | Performed by: INTERNAL MEDICINE

## 2022-01-26 PROCEDURE — 99213 OFFICE O/P EST LOW 20 MIN: CPT | Mod: PBBFAC,PO | Performed by: INTERNAL MEDICINE

## 2022-01-26 PROCEDURE — 3074F PR MOST RECENT SYSTOLIC BLOOD PRESSURE < 130 MM HG: ICD-10-PCS | Mod: CPTII,,, | Performed by: INTERNAL MEDICINE

## 2022-01-26 PROCEDURE — 1159F MED LIST DOCD IN RCRD: CPT | Mod: CPTII,,, | Performed by: INTERNAL MEDICINE

## 2022-01-26 PROCEDURE — 99214 OFFICE O/P EST MOD 30 MIN: CPT | Mod: S$PBB,,, | Performed by: INTERNAL MEDICINE

## 2022-01-26 RX ORDER — ATORVASTATIN CALCIUM 20 MG/1
20 TABLET, FILM COATED ORAL DAILY
Qty: 90 TABLET | Refills: 3 | Status: SHIPPED | OUTPATIENT
Start: 2022-01-26 | End: 2022-06-02

## 2022-01-26 NOTE — PROGRESS NOTES
Subjective:       Patient ID: Cristina Tan is a 56 y.o. female.    Chief Complaint: Weight loss injection     Pt here for obesity. Wants to try wegovy    Hyperlipidemia- not on medication  Hypothyroidism- stable on synthroid  Bipolar-  Stable on lithium.    Review of Systems   Constitutional: Negative for fatigue.   Cardiovascular: Negative for chest pain.   Neurological: Negative for weakness.         Objective:      Physical Exam  Constitutional:       Appearance: Normal appearance.   HENT:      Head: Normocephalic.   Cardiovascular:      Rate and Rhythm: Normal rate and regular rhythm.   Pulmonary:      Effort: Pulmonary effort is normal.      Breath sounds: Normal breath sounds.   Musculoskeletal:         General: Normal range of motion.      Cervical back: Normal range of motion.   Neurological:      General: No focal deficit present.      Mental Status: She is alert.   Psychiatric:         Mood and Affect: Mood normal.         Behavior: Behavior normal.         Assessment:       Problem List Items Addressed This Visit        Psychiatric    Bipolar 1 disorder, mixed, partial remission       Cardiac/Vascular    Hyperlipidemia - Primary          Plan:       Hyperlipidemia- try lipitor, sent to pharm  Obesity- sent in wegovy  Bipolar- stable

## 2022-01-28 ENCOUNTER — PATIENT MESSAGE (OUTPATIENT)
Dept: FAMILY MEDICINE | Facility: CLINIC | Age: 57
End: 2022-01-28
Payer: MEDICAID

## 2022-01-29 NOTE — TELEPHONE ENCOUNTER
Care Due:                  Date            Visit Type   Department     Provider  --------------------------------------------------------------------------------                                             [facility]  Last Visit: 01-      None         MEDICINE       Milli Leonard  Next Visit: None Scheduled  None         None Found                                                            Last  Test          Frequency    Reason                     Performed    Due Date  --------------------------------------------------------------------------------    HBA1C.......  6 months...  semaglutide,.............  10-   03-    Powered by [employer]. Reference number: [id number].   1/29/2022 10:18:12 AM CST

## 2022-01-31 ENCOUNTER — DOCUMENTATION ONLY (OUTPATIENT)
Dept: FAMILY MEDICINE | Facility: CLINIC | Age: 57
End: 2022-01-31
Payer: MEDICAID

## 2022-01-31 NOTE — PROGRESS NOTES
PA initiated and submitted in CMM for Wegovy (semaglutide) 0.25 mg/0.5ml pen-injector  Case ID: [id number]  Harley: [id number]  [employer]

## 2022-02-11 ENCOUNTER — PATIENT MESSAGE (OUTPATIENT)
Dept: FAMILY MEDICINE | Facility: CLINIC | Age: 57
End: 2022-02-11
Payer: MEDICAID

## 2022-02-11 DIAGNOSIS — R73.09 BLOOD SUGAR INCREASED: Primary | ICD-10-CM

## 2022-02-14 ENCOUNTER — PATIENT MESSAGE (OUTPATIENT)
Dept: FAMILY MEDICINE | Facility: CLINIC | Age: 57
End: 2022-02-14
Payer: MEDICAID

## 2022-02-14 RX ORDER — SEMAGLUTIDE 1.34 MG/ML
0.25 INJECTION, SOLUTION SUBCUTANEOUS
Qty: 1 PEN | Refills: 5 | Status: SHIPPED | OUTPATIENT
Start: 2022-02-14 | End: 2022-06-02

## 2022-02-17 ENCOUNTER — PATIENT MESSAGE (OUTPATIENT)
Dept: FAMILY MEDICINE | Facility: CLINIC | Age: 57
End: 2022-02-17
Payer: MEDICAID

## 2022-02-17 DIAGNOSIS — Z12.11 ENCOUNTER FOR SCREENING COLONOSCOPY: Primary | ICD-10-CM

## 2022-02-21 ENCOUNTER — TELEPHONE (OUTPATIENT)
Dept: GASTROENTEROLOGY | Facility: CLINIC | Age: 57
End: 2022-02-21
Payer: MEDICAID

## 2022-02-21 NOTE — TELEPHONE ENCOUNTER
Pt scheduled for scope. Instructions mailed to home and sent via [employer]. Pt verbalized understanding of preop COVID test requirement.

## 2022-02-23 ENCOUNTER — TELEPHONE (OUTPATIENT)
Dept: PSYCHIATRY | Facility: CLINIC | Age: 57
End: 2022-02-23
Payer: MEDICAID

## 2022-02-24 ENCOUNTER — PATIENT MESSAGE (OUTPATIENT)
Dept: PSYCHIATRY | Facility: CLINIC | Age: 57
End: 2022-02-24
Payer: MEDICAID

## 2022-02-24 ENCOUNTER — OFFICE VISIT (OUTPATIENT)
Dept: PSYCHIATRY | Facility: CLINIC | Age: 57
End: 2022-02-24
Payer: MEDICAID

## 2022-02-24 VITALS
WEIGHT: 252.19 LBS | DIASTOLIC BLOOD PRESSURE: 76 MMHG | SYSTOLIC BLOOD PRESSURE: 126 MMHG | HEART RATE: 56 BPM | RESPIRATION RATE: 20 BRPM | BODY MASS INDEX: 40.71 KG/M2

## 2022-02-24 DIAGNOSIS — F31.77 BIPOLAR 1 DISORDER, MIXED, PARTIAL REMISSION: ICD-10-CM

## 2022-02-24 DIAGNOSIS — F41.9 ANXIETY DISORDER, UNSPECIFIED TYPE: ICD-10-CM

## 2022-02-24 DIAGNOSIS — Z79.899 HIGH RISK MEDICATION USE: Primary | ICD-10-CM

## 2022-02-24 PROCEDURE — 3008F BODY MASS INDEX DOCD: CPT | Mod: SA,HB,CPTII, | Performed by: NURSE PRACTITIONER

## 2022-02-24 PROCEDURE — 99213 OFFICE O/P EST LOW 20 MIN: CPT | Mod: PBBFAC,PO | Performed by: NURSE PRACTITIONER

## 2022-02-24 PROCEDURE — 99999 PR PBB SHADOW E&M-EST. PATIENT-LVL III: ICD-10-PCS | Mod: PBBFAC,SA,HB, | Performed by: NURSE PRACTITIONER

## 2022-02-24 PROCEDURE — 90833 PSYTX W PT W E/M 30 MIN: CPT | Mod: SA,HB,, | Performed by: NURSE PRACTITIONER

## 2022-02-24 PROCEDURE — 1159F PR MEDICATION LIST DOCUMENTED IN MEDICAL RECORD: ICD-10-PCS | Mod: SA,HB,CPTII, | Performed by: NURSE PRACTITIONER

## 2022-02-24 PROCEDURE — 3074F SYST BP LT 130 MM HG: CPT | Mod: SA,HB,CPTII, | Performed by: NURSE PRACTITIONER

## 2022-02-24 PROCEDURE — 1159F MED LIST DOCD IN RCRD: CPT | Mod: SA,HB,CPTII, | Performed by: NURSE PRACTITIONER

## 2022-02-24 PROCEDURE — 99214 OFFICE O/P EST MOD 30 MIN: CPT | Mod: S$PBB,SA,HB, | Performed by: NURSE PRACTITIONER

## 2022-02-24 PROCEDURE — 90833 PR PSYCHOTHERAPY W/PATIENT W/E&M, 30 MIN (ADD ON): ICD-10-PCS | Mod: SA,HB,, | Performed by: NURSE PRACTITIONER

## 2022-02-24 PROCEDURE — 3008F PR BODY MASS INDEX (BMI) DOCUMENTED: ICD-10-PCS | Mod: SA,HB,CPTII, | Performed by: NURSE PRACTITIONER

## 2022-02-24 PROCEDURE — 3078F DIAST BP <80 MM HG: CPT | Mod: SA,HB,CPTII, | Performed by: NURSE PRACTITIONER

## 2022-02-24 PROCEDURE — 3074F PR MOST RECENT SYSTOLIC BLOOD PRESSURE < 130 MM HG: ICD-10-PCS | Mod: SA,HB,CPTII, | Performed by: NURSE PRACTITIONER

## 2022-02-24 PROCEDURE — 3078F PR MOST RECENT DIASTOLIC BLOOD PRESSURE < 80 MM HG: ICD-10-PCS | Mod: SA,HB,CPTII, | Performed by: NURSE PRACTITIONER

## 2022-02-24 PROCEDURE — 1160F PR REVIEW ALL MEDS BY PRESCRIBER/CLIN PHARMACIST DOCUMENTED: ICD-10-PCS | Mod: SA,HB,CPTII, | Performed by: NURSE PRACTITIONER

## 2022-02-24 PROCEDURE — 1160F RVW MEDS BY RX/DR IN RCRD: CPT | Mod: SA,HB,CPTII, | Performed by: NURSE PRACTITIONER

## 2022-02-24 PROCEDURE — 99999 PR PBB SHADOW E&M-EST. PATIENT-LVL III: CPT | Mod: PBBFAC,SA,HB, | Performed by: NURSE PRACTITIONER

## 2022-02-24 PROCEDURE — 99214 PR OFFICE/OUTPT VISIT, EST, LEVL IV, 30-39 MIN: ICD-10-PCS | Mod: S$PBB,SA,HB, | Performed by: NURSE PRACTITIONER

## 2022-02-24 RX ORDER — ALPRAZOLAM 0.5 MG/1
0.5 TABLET ORAL DAILY PRN
Qty: 30 TABLET | Refills: 0 | Status: SHIPPED | OUTPATIENT
Start: 2022-02-24 | End: 2022-08-25 | Stop reason: SDUPTHER

## 2022-02-24 RX ORDER — QUETIAPINE FUMARATE 100 MG/1
200 TABLET, FILM COATED ORAL NIGHTLY
Qty: 60 TABLET | Refills: 3 | Status: SHIPPED | OUTPATIENT
Start: 2022-02-24 | End: 2022-06-02 | Stop reason: SDUPTHER

## 2022-02-24 RX ORDER — LITHIUM CARBONATE 300 MG/1
300 CAPSULE ORAL 2 TIMES DAILY
Qty: 60 CAPSULE | Refills: 3 | Status: SHIPPED | OUTPATIENT
Start: 2022-02-24 | End: 2022-10-25

## 2022-02-24 NOTE — PROGRESS NOTES
"  Outpatient Psychiatry Follow-Up Visit    Clinical Status of Patient: Outpatient (Virtual)  02/24/2022     Chief Complaint: Pt is a 56 year old female who presents today for a follow-up. Met with patient.       Interval History and Content of Current Session:  Interim Events/Subjective Report/Content of Current Session:  follow up appointment.    Pt is a 56 year old female with past psychiatric hx of Bipolar 1 disorder, anxiety NOS who presents for follow up treatment. She is currently taking Lithium 300mg BID, Xanax 0.5 mg QHS PRN for sleep (only uses sparingly - "a few times a month), Seroquel 200mg QHS. Meds tolerated well and provide good symptomatic relief overall.    Between visits, pt notes that is doing very well overall. She denies any major decompensations of mood - denies any gamal or hypomania. She is stable and functioning well.   Anxiety well managed - does note some continued generalized worrying. Stable and functioning well.    Past Psychiatric hx: Pt. is a 55 year old female  with a past psychiatric hx of Bipolar 1 disorder, depressed who established care with me 07/19 following the group home of her previous psych, Dr. Pelaez. She came to me taking Lithium 600mg BID and Xanax 0.5 mg QHS PRN for sleep (only uses this sparingly) , which had been previously prescribed and managed by Dr. Pelaez. She reports that her symptoms have stabilized over the last 7-8 months, and has been without a manic episode since November of 2018. Previous med trials of Lamictal (rash), Risperdal, Wellbutrin, some SSRIs (destablized mood, triggered manic episodes). Pt stable in clinic upon initial eval - all meds were continued at same dosage.      Notes a long standing history of "mood swings" dating to childhood, but first formal psych encounter in 1987 following the birth of her child. While admitted to the hospital following birth, and after being discharged, pt speaks to experiencing A/V hallucinations and euphoric " "gamal following a two-week period of going without sleep. "I was real paranoid after I had the baby. I felt that if I went to sleep, I wouldn't be able to hear the baby cry. I ended up going a couple weeks where I didn't sleep at all, started hearing things and seeing things that weren't there. I felt like people were trying to contact me and tell me things through the computer or the TV. I was paranoid and was thinking the vampires were after us. I started thinking I was someone I wasn't." She eventually sought psychiatric care in an outpatient seeing, but was not started on any psychiatric medication until 2002 (?). She was prescribed SSRIs and Wellbutrin at this time, which triggered a manic episode, and were d/c'd. She was eventually started on Lithium around 2005, and has achieved good results. States she has felt more stable than she's ever felt before.      Upon initial eval, pt reports manic episodes "once or twice a year" that are triggered by a lack of sleep. States that her manic episodes last for "weeks" and "I get hyper, my metabolism speeds up. I feel like I'm sped up. I always try to buy a car or a house. I had two houses at one time. It always ends with them putting me in the hospital." Does continue to experience delusional thinking and auditory hallucinations exclusively during manic episodes. Hx of several ED admissions in 03/19 r/t delusions, and "walking down the street talking to people who weren't there" Per - ED note. Reports inpatient hospitalization following manic episodes to [facility] x 1 week in November of 2018. Also reports another admission to [facility] in March of 2018 for 3 days following a manic episode. During these episodes, "I get very happy during those episodes. I start thinking I'm Colt and can communicate through the TV".      Denies any depressive symptoms. Reports sleeping 7-9 hours of sleep each night, restful. No issues falling or staying asleep. Denies anhedonia. " "Denies guilt/worthlessness. Energy good, concentration good. Appetite good. Denies psychomotor slowing, denies SI.      On 10/16, pt daughter reported "She seems a little bit better during the day, but is still pretty manic and only sleeping 2-3 hours at night, despite trying to up-titrate the medication.  She had to have a meeting with her supervisors at work yesterday, but they have fortunately been very understanding about her bipolar" Episodes do not meet criteria for true gamal/or hypomania, but we have been up-titrating Seroquel (started 10/19) to address symptoms - pt has responded well but reports dry mouth.I advised pt to add dose of Seroquel 50mg Q AM. However, she states pt states this made her groggy so d/c this. Despite this, pt reports at f/u "I've been feeling so much better. I'm actually sleeping now and definitely haven't been feeling manic at all. I feel way more calm now." However,  Notes overall less mood lability, less agitation. Denies manic/hypomanic episodes since last visit. She went to [place] on vacation between visit - enjoyed this. Pt doing well with no decompensations since last visit.  Does note continued generalized worry. Feels restless, on edge.       During her 5/20 f/u visit pt reports "I've been getting into bed at night, and sometimes it feels like someone else is getting in bed with me. It got to the point where I couldn't sleep in my bed because I was kind of scared. I thought it was my cat but my cat wasn't in the room. Sometime it even touches me, like it's brushing on either my foot or back. Like someone is in here trying to wake me up, but no one is in there. I usually just go and sleep on the sofa." Denies A/V hallucinations.     Seroquel was increased following this visit, Today, pt reports "I don't have those feelings in my bed anymore. I'm not afraid to get in bed anymore. Thanks goodness. I've been sleeping better." Does reports "feeling extremely tired when I " "take it in the morning." and requests to move to PM dosing. Tolerates well otherwise. Denies any gamal or hypomania between sessions.      Past Medical hx:   Past Medical History:   Diagnosis Date    Bipolar 1 disorder     Cancer     skin cancer to right arm    GERD (gastroesophageal reflux disease)     History of psychiatric hospitalization     Mental disorder     Thyroid disease         Interim hx:  Medication changes last visit: Inc seroquel to 50mg PO QAM, 100mg QHS  Anxiety: inc'd  Depression: denies     Denies suicidal/homicidal ideations.  Denies hopelessness/worthlessness.    Denies auditory/visual hallucinations       Tobacco: never used  Alcohol: denies   Drug use: denies  Caffeine: 1-2 cups coffee daily      Review of Systems   · PSYCHIATRIC: Pertinent items are noted in the narrative.        CONSTITUTIONAL: weight stable        M/S: no pain today         ENT: no allergies noted today        ABD: no n/v/d     Past Medical, Family and Social History: The patient's past medical, family and social history have been reviewed and updated as appropriate within the electronic medical record. See encounter notes.     Medication: Lithium 300mg TID, and Xanax 0.5 mg QHS PRN for sleep, Sseroquel 200mg QHS     Compliance: yes      Side effects: dry mouth from Seroquel     Risk Parameters:  Patient reports no suicidal ideation  Patient reports no homicidal ideation  Patient reports no self-injurious behavior  Patient reports no violent behavior     Exam (detailed: at least 9 elements; comprehensive: all 15 elements)   Constitutional  Vitals:  Most recent vital signs, dated less than 90 days prior to this appointment, were reviewed. /76   Pulse (!) 56   Resp 20   Wt 114.4 kg (252 lb 3.3 oz)   BMI 40.71 kg/m²      General:  unremarkable, age appropriate, casual attire, good eye contact, good rapport       Musculoskeletal  Muscle Strength/Tone:  no flaccidity, no tremor    Gait & Station:  normal    " "  Psychiatric                       Speech:  normal tone, normal rate, rhythm, and volume   Mood & Affect:   Euthymic, congruent, appropriate         Thought Process:   Goal directed; Linear    Associations:   intact   Thought Content:   No SI/HI, + fixed delusions, or paranoia, no AV/VH   Insight & Judgement:   Good, adequate to circumstances   Orientation:   grossly intact; alert and oriented x 4    Memory:  intact for content of interview    Language:  grossly intact, can repeat    Attention Span  : Grossly intact for content of interview   Fund of Knowledge:   intact and appropriate to age and level of education        Assessment and Diagnosis   Status/Progress: Based on the examination today, the patient's problem(s) is/are under fair control.  New problems have not been presented today. Comorbidities are not currently complicating management of the primary condition.      Impression:     Pt is a 56 year old female with past psychiatric hx of Bipolar 1 disorder, anxiety NOS who presents for follow up treatment. She is currently taking Lithium 300mg BID, Xanax 0.5 mg QHS PRN for sleep (only uses sparingly - "a few times a month), Seroquel 200mg QHS. Meds tolerated well and provide good symptomatic relief overall.    Between visits, pt notes that is doing very well overall. She denies any major decompensations of mood - denies any gamal or hypomania. She is stable and functioning well.   Anxiety well managed - does note some continued generalized worrying. Stable and functioning well.      Diagnosis: Bipolar 1 disorder, partial remission, anxiety unspecified    Intervention/Counseling/Treatment Plan   · Medication Management:      1) Continue Lithium 300mg BID for mood. Pt instructed to monitor closely for mood destabilization as we have been gradually tapering due to inc'd TSH.    2) Continue Seroquel to 200mg QHS. Typical ELFEGO's reviewed including weight gain, abnormal movements, EPS, TD, metabolic side effects. "      3) Continue Xanax 0.5 mg QHS PRN for sleep (uses a few times weekly). Discussed risk of decreased RT, sedation, addictive potential, and not to mix with alcohol.      4. Call to report any worsening of symptoms or problems with the medication. Pt instructed to go to ER with thoughts of harming self, others     5. Patient given contact # for psychotherapists at [facility] and also instructed she may check with insurance for list of providers.      6. Labs: no new orders       Return to clinic: 3 months - self sched    Psychotherapy:   · Target symptoms: inattention/distractibility, anxiety    · Why chosen therapy is appropriate versus another modality: relevant to diagnosis, patient responds to this modality  · Outcome monitoring methods: self-report, observation, feedback from family   · Therapeutic intervention type: supportive psychotherapy  · Topics discussed/themes: building skills sets for symptom management, symptom recognition, nutrition, exercise  · The patient's response to the intervention is accepting. The patient's progress toward treatment goals is positive progress.  · Duration of intervention: 20 minutes      -Spent 30min face to face with the pt; >50% time spent in counseling   -Supportive therapy and psychoeducation provided  -R/B/SE's of medications discussed with the pt who expresses understanding and chooses to take medications as prescribed.   -Pt instructed to call clinic, 911 or go to nearest emergency room if sxs worsen or pt is in   crisis. The pt expresses understanding.    Montana Aguilar, NP

## 2022-03-08 ENCOUNTER — LAB VISIT (OUTPATIENT)
Dept: LAB | Facility: HOSPITAL | Age: 57
End: 2022-03-08
Payer: MEDICAID

## 2022-03-08 DIAGNOSIS — Z79.899 HIGH RISK MEDICATION USE: ICD-10-CM

## 2022-03-08 LAB — LITHIUM SERPL-SCNC: 0.3 MMOL/L (ref 0.6–1.2)

## 2022-03-08 PROCEDURE — 36415 COLL VENOUS BLD VENIPUNCTURE: CPT | Performed by: NURSE PRACTITIONER

## 2022-03-08 PROCEDURE — 80178 ASSAY OF LITHIUM: CPT | Performed by: NURSE PRACTITIONER

## 2022-03-09 ENCOUNTER — PATIENT MESSAGE (OUTPATIENT)
Dept: PSYCHIATRY | Facility: CLINIC | Age: 57
End: 2022-03-09
Payer: MEDICAID

## 2022-03-11 ENCOUNTER — PATIENT MESSAGE (OUTPATIENT)
Dept: FAMILY MEDICINE | Facility: CLINIC | Age: 57
End: 2022-03-11
Payer: MEDICAID

## 2022-03-28 ENCOUNTER — TELEPHONE (OUTPATIENT)
Dept: GASTROENTEROLOGY | Facility: CLINIC | Age: 57
End: 2022-03-28
Payer: MEDICAID

## 2022-03-28 ENCOUNTER — TELEPHONE (OUTPATIENT)
Dept: SURGERY | Facility: CLINIC | Age: 57
End: 2022-03-28
Payer: MEDICAID

## 2022-03-28 NOTE — TELEPHONE ENCOUNTER
----- Message from Montserrat  sent at 3/28/2022 10:54 AM CDT -----  Regarding: appt access  Type: Needs Medical Advice    Who Called:      Best Call Back Number:     Additional Information: Requesting a call back from Nurse, regarding pt has moved and want to switch location to [facility] [place] for her colon test ,please advise and call back

## 2022-03-28 NOTE — TELEPHONE ENCOUNTER
----- Message from Juan Yeager MA sent at 3/28/2022 11:43 AM CDT -----  Regarding: FW: appt access    ----- Message -----  From: Montserrat Knight  Sent: 3/28/2022  10:56 AM CDT  To: [facility]FF[facility] Staff  Subject: appt access                                      Type: Needs Medical Advice    Who Called:      Best Call Back Number:     Additional Information: Requesting a call back from Nurse, regarding pt has moved and want to switch location to [facility] [place] for her colon test ,please advise and call back

## 2022-03-31 ENCOUNTER — DOCUMENTATION ONLY (OUTPATIENT)
Dept: FAMILY MEDICINE | Facility: CLINIC | Age: 57
End: 2022-03-31
Payer: MEDICAID

## 2022-03-31 NOTE — PROGRESS NOTES
PA initiated in CMM for Ozempic (0.25 or 0.5 MG/DOSE) 2MG/1.5ML pen-injectors    Key: [id number]    Decision: denied

## 2022-04-01 DIAGNOSIS — Z12.11 SPECIAL SCREENING FOR MALIGNANT NEOPLASMS, COLON: Primary | ICD-10-CM

## 2022-04-01 RX ORDER — POLYETHYLENE GLYCOL 3350, SODIUM SULFATE ANHYDROUS, SODIUM BICARBONATE, SODIUM CHLORIDE, POTASSIUM CHLORIDE 236; 22.74; 6.74; 5.86; 2.97 G/4L; G/4L; G/4L; G/4L; G/4L
4 POWDER, FOR SOLUTION ORAL ONCE
Qty: 4000 ML | Refills: 0 | Status: SHIPPED | OUTPATIENT
Start: 2022-04-01 | End: 2022-04-01

## 2022-04-13 ENCOUNTER — ANESTHESIA (OUTPATIENT)
Dept: ENDOSCOPY | Facility: HOSPITAL | Age: 57
End: 2022-04-13
Payer: MEDICAID

## 2022-04-13 ENCOUNTER — HOSPITAL ENCOUNTER (OUTPATIENT)
Facility: HOSPITAL | Age: 57
Discharge: HOME | End: 2022-04-13
Attending: STUDENT IN AN ORGANIZED HEALTH CARE EDUCATION/TRAINING PROGRAM | Admitting: STUDENT IN AN ORGANIZED HEALTH CARE EDUCATION/TRAINING PROGRAM
Payer: MEDICAID

## 2022-04-13 ENCOUNTER — ANESTHESIA EVENT (OUTPATIENT)
Dept: ENDOSCOPY | Facility: HOSPITAL | Age: 57
End: 2022-04-13
Payer: MEDICAID

## 2022-04-13 VITALS
DIASTOLIC BLOOD PRESSURE: 64 MMHG | HEIGHT: 66 IN | OXYGEN SATURATION: 97 % | RESPIRATION RATE: 16 BRPM | BODY MASS INDEX: 40.18 KG/M2 | HEART RATE: 76 BPM | TEMPERATURE: 98 F | WEIGHT: 250 LBS | SYSTOLIC BLOOD PRESSURE: 148 MMHG

## 2022-04-13 DIAGNOSIS — Z12.11 SCREENING FOR MALIGNANT NEOPLASM OF COLON: Primary | ICD-10-CM

## 2022-04-13 PROCEDURE — 45380 COLONOSCOPY AND BIOPSY: CPT | Mod: 59 | Performed by: STUDENT IN AN ORGANIZED HEALTH CARE EDUCATION/TRAINING PROGRAM

## 2022-04-13 PROCEDURE — 37000008 HC ANESTHESIA 1ST 15 MINUTES: Performed by: STUDENT IN AN ORGANIZED HEALTH CARE EDUCATION/TRAINING PROGRAM

## 2022-04-13 PROCEDURE — 27201089 HC SNARE, DISP (ANY): Performed by: STUDENT IN AN ORGANIZED HEALTH CARE EDUCATION/TRAINING PROGRAM

## 2022-04-13 PROCEDURE — 45380 COLONOSCOPY AND BIOPSY: CPT | Mod: 59,,, | Performed by: STUDENT IN AN ORGANIZED HEALTH CARE EDUCATION/TRAINING PROGRAM

## 2022-04-13 PROCEDURE — 45385 PR COLONOSCOPY,REMV LESN,SNARE: ICD-10-PCS | Mod: ,,, | Performed by: STUDENT IN AN ORGANIZED HEALTH CARE EDUCATION/TRAINING PROGRAM

## 2022-04-13 PROCEDURE — 00811 ANES LWR INTST NDSC NOS: CPT | Performed by: STUDENT IN AN ORGANIZED HEALTH CARE EDUCATION/TRAINING PROGRAM

## 2022-04-13 PROCEDURE — 88305 TISSUE EXAM BY PATHOLOGIST: ICD-10-PCS | Mod: 26,,, | Performed by: PATHOLOGY

## 2022-04-13 PROCEDURE — 45385 COLONOSCOPY W/LESION REMOVAL: CPT | Mod: PT | Performed by: STUDENT IN AN ORGANIZED HEALTH CARE EDUCATION/TRAINING PROGRAM

## 2022-04-13 PROCEDURE — 25000003 PHARM REV CODE 250: Performed by: STUDENT IN AN ORGANIZED HEALTH CARE EDUCATION/TRAINING PROGRAM

## 2022-04-13 PROCEDURE — 45385 COLONOSCOPY W/LESION REMOVAL: CPT | Mod: ,,, | Performed by: STUDENT IN AN ORGANIZED HEALTH CARE EDUCATION/TRAINING PROGRAM

## 2022-04-13 PROCEDURE — 45380 PR COLONOSCOPY,BIOPSY: ICD-10-PCS | Mod: 59,,, | Performed by: STUDENT IN AN ORGANIZED HEALTH CARE EDUCATION/TRAINING PROGRAM

## 2022-04-13 PROCEDURE — E9220 PRA ENDO ANESTHESIA: HCPCS | Mod: ,,, | Performed by: NURSE ANESTHETIST, CERTIFIED REGISTERED

## 2022-04-13 PROCEDURE — 63600175 PHARM REV CODE 636 W HCPCS: Performed by: NURSE ANESTHETIST, CERTIFIED REGISTERED

## 2022-04-13 PROCEDURE — 37000009 HC ANESTHESIA EA ADD 15 MINS: Performed by: STUDENT IN AN ORGANIZED HEALTH CARE EDUCATION/TRAINING PROGRAM

## 2022-04-13 PROCEDURE — 25000003 PHARM REV CODE 250: Performed by: NURSE ANESTHETIST, CERTIFIED REGISTERED

## 2022-04-13 PROCEDURE — E9220 PRA ENDO ANESTHESIA: ICD-10-PCS | Mod: ,,, | Performed by: NURSE ANESTHETIST, CERTIFIED REGISTERED

## 2022-04-13 PROCEDURE — 88305 TISSUE EXAM BY PATHOLOGIST: CPT | Performed by: PATHOLOGY

## 2022-04-13 PROCEDURE — 88305 TISSUE EXAM BY PATHOLOGIST: CPT | Mod: 26,,, | Performed by: PATHOLOGY

## 2022-04-13 RX ORDER — PROPOFOL 10 MG/ML
VIAL (ML) INTRAVENOUS
Status: DISCONTINUED | OUTPATIENT
Start: 2022-04-13 | End: 2022-04-13

## 2022-04-13 RX ORDER — LIDOCAINE HCL/PF 100 MG/5ML
SYRINGE (ML) INTRAVENOUS
Status: DISCONTINUED | OUTPATIENT
Start: 2022-04-13 | End: 2022-04-13

## 2022-04-13 RX ORDER — SODIUM CHLORIDE 9 MG/ML
INJECTION, SOLUTION INTRAVENOUS CONTINUOUS
Status: DISCONTINUED | OUTPATIENT
Start: 2022-04-13 | End: 2022-04-13 | Stop reason: HOSPADM

## 2022-04-13 RX ORDER — SODIUM CHLORIDE 9 MG/ML
INJECTION, SOLUTION INTRAVENOUS CONTINUOUS PRN
Status: DISCONTINUED | OUTPATIENT
Start: 2022-04-13 | End: 2022-04-13

## 2022-04-13 RX ORDER — PROPOFOL 10 MG/ML
VIAL (ML) INTRAVENOUS CONTINUOUS PRN
Status: DISCONTINUED | OUTPATIENT
Start: 2022-04-13 | End: 2022-04-13

## 2022-04-13 RX ADMIN — SODIUM CHLORIDE: 0.9 INJECTION, SOLUTION INTRAVENOUS at 10:04

## 2022-04-13 RX ADMIN — PROPOFOL 70 MG: 10 INJECTION, EMULSION INTRAVENOUS at 11:04

## 2022-04-13 RX ADMIN — PROPOFOL 50 MG: 10 INJECTION, EMULSION INTRAVENOUS at 11:04

## 2022-04-13 RX ADMIN — Medication 100 MG: at 11:04

## 2022-04-13 RX ADMIN — PROPOFOL 150 MCG/KG/MIN: 10 INJECTION, EMULSION INTRAVENOUS at 11:04

## 2022-04-13 NOTE — H&P
Endoscopy H&P    Procedure : Colonoscopy      asymptomatic screening exam and family history of colon polyps. Sister with polyps     No FHx of colon or rectal cancers       Past Medical History:   Diagnosis Date    Bipolar 1 disorder     Cancer     skin cancer to right arm    GERD (gastroesophageal reflux disease)     History of psychiatric hospitalization     Mental disorder     Thyroid disease              Review of Systems -ROS:  GENERAL: No fever, chills, fatigability or weight loss.  CHEST: Denies STEVENSON, cyanosis, wheezing, cough and sputum production.  CARDIOVASCULAR: Denies chest pain, PND, orthopnea or reduced exercise tolerance.   Musculoskeletal ROS: negative for - gait disturbance or joint pain  Neurological ROS: negative for - confusion or memory loss        Physical Exam:  General: well developed, well nourished, no distress  Head: normocephalic  Neck: supple, symmetrical, trachea midline  Lungs:  clear to auscultation bilaterally and normal respiratory effort  Heart: regular rate and rhythm, S1, S2 normal, no murmur, rub or gallop and regular rate and rhythm  Abdomen: soft, non-tender non-distented; bowel sounds normal; no masses,  no organomegaly  Extremities: no cyanosis or edema, or clubbing       Moderate Sedation (choice): Mallampati Score 1    ASA : II    IMP: asymptomatic screening exam and family history of colon polyps    Plan: Colonoscopy with Moderate sedation.  I have explained the procedure including indications, alternatives, expected outcomes and potential complications. The patient appears to understand and gives informed consent. The patient is medically ready for surgery.

## 2022-04-13 NOTE — ANESTHESIA POSTPROCEDURE EVALUATION
Anesthesia Post Evaluation    Patient: Cristina Tan    Procedure(s) Performed: Procedure(s) (LRB):  COLONOSCOPY (N/A)    Final Anesthesia Type: general      Patient location during evaluation: GI PACU  Patient participation: Yes- Able to Participate  Level of consciousness: awake and alert  Post-procedure vital signs: reviewed and stable  Pain management: adequate  Airway patency: patent    PONV status at discharge: No PONV  Anesthetic complications: no      Cardiovascular status: hemodynamically stable  Respiratory status: unassisted and spontaneous ventilation  Hydration status: euvolemic  Follow-up not needed.          Vitals Value Taken Time   /64 04/13/22 1232   Temp 36.4 °C (97.5 °F) 04/13/22 1202   Pulse 76 04/13/22 1232   Resp 16 04/13/22 1232   SpO2 97 % 04/13/22 1232         Event Time   Out of Recovery 12:33:40         Pain/Jimbo Score: Jimbo Score: 10 (4/13/2022 12:02 PM)

## 2022-04-13 NOTE — OR NURSING
Dr Mccabe informed patient is ready to be discharged. Dr Mccabe states he will speak to patient before she is discharged.

## 2022-04-13 NOTE — PROVATION PATIENT INSTRUCTIONS
Discharge Summary/Instructions after an Endoscopic Procedure  Patient Name: Cristina Tan  Patient MRN: [id number]  Patient YOB: 1965 Wednesday, April 13, 2022  Rodrick Mccabe MD  Dear patient,  As a result of recent federal legislation (The Federal Cures Act), you may   receive lab or pathology results from your procedure in your [facility]   account before your physician is able to contact you. Your physician or   their representative will relay the results to you with their   recommendations at their soonest availability.  Thank you,  RESTRICTIONS:  During your procedure today, you received medications for sedation.  These   medications may affect your judgment, balance and coordination.  Therefore,   for 24 hours, you have the following restrictions:   - DO NOT drive a car, operate machinery, make legal/financial decisions,   sign important papers or drink alcohol.    ACTIVITY:  Today: no heavy lifting, straining or running due to procedural   sedation/anesthesia.  The following day: return to full activity including work.  DIET:  Eat and drink normally unless instructed otherwise.     TREATMENT FOR COMMON SIDE EFFECTS:  - Mild abdominal pain, nausea, belching, bloating or excessive gas:  rest,   eat lightly and use a heating pad.  - Sore Throat: treat with throat lozenges and/or gargle with warm salt   water.  - Because air was used during the procedure, expelling large amounts of air   from your rectum or belching is normal.  - If a bowel prep was taken, you may not have a bowel movement for 1-3 days.    This is normal.  SYMPTOMS TO WATCH FOR AND REPORT TO YOUR PHYSICIAN:  1. Abdominal pain or bloating, other than gas cramps.  2. Chest pain.  3. Back pain.  4. Signs of infection such as: chills or fever occurring within 24 hours   after the procedure.  5. Rectal bleeding, which would show as bright red, maroon, or black stools.   (A tablespoon of blood from the rectum is not serious, especially  if   hemorrhoids are present.)  6. Vomiting.  7. Weakness or dizziness.  GO DIRECTLY TO THE NEAREST EMERGENCY ROOM IF YOU HAVE ANY OF THE FOLLOWING:      Difficulty breathing              Chills and/or fever over 101 F   Persistent vomiting and/or vomiting blood   Severe abdominal pain   Severe chest pain   Black, tarry stools   Bleeding- more than one tablespoon   Any other symptom or condition that you feel may need urgent attention  Your doctor recommends these additional instructions:  If any biopsies were taken, your doctors clinic will contact you in 1 to 2   weeks with any results.  - Discharge patient to home.   - Resume previous diet.   - Continue present medications.   - Await pathology results.   - Repeat colonoscopy in 3 years for surveillance based on pathology results.     - Return to referring physician as previously scheduled.  For questions, problems or results please call your physician - Rodrick Mccabe MD at Work:  [phone].  [facility] EMERGENCY ROOM PHONE NUMBER: [phone]  IF A COMPLICATION OR EMERGENCY SITUATION ARISES AND YOU ARE UNABLE TO REACH   YOUR PHYSICIAN - GO DIRECTLY TO THE EMERGENCY ROOM.  MD Rodrick Cannon MD  4/13/2022 11:59:03 AM  This report has been verified and signed electronically.  Dear patient,  As a result of recent federal legislation (The Federal Cures Act), you may   receive lab or pathology results from your procedure in your [facility]   account before your physician is able to contact you. Your physician or   their representative will relay the results to you with their   recommendations at their soonest availability.  Thank you,  PROVATION

## 2022-04-13 NOTE — ANESTHESIA PREPROCEDURE EVALUATION
2022  Cristina Tan is a 57 y.o., female.  Past Medical History:   Diagnosis Date    Bipolar 1 disorder     Cancer     skin cancer to right arm    GERD (gastroesophageal reflux disease)     History of psychiatric hospitalization     Mental disorder     Thyroid disease      Past Surgical History:   Procedure Laterality Date    breast reduction      CARPAL TUNNEL RELEASE Left 2021    Procedure: RELEASE, CARPAL TUNNEL;  Surgeon: Ld Carbone MD;  Location: [facility];  Service: Orthopedics;  Laterality: Left;  Procedure:  Left carpal tunnel release    Position:  Supine    Anesthesia:  Local    Equipment:  Carpal tunnel set     SECTION      CHOLECYSTECTOMY      gastric sleeve      HYSTERECTOMY      OOPHORECTOMY      TONSILLECTOMY      TOTAL REDUCTION MAMMOPLASTY Bilateral         tummy tuck           Pre-op Assessment    I have reviewed the Patient Summary Reports.     I have reviewed the Nursing Notes.    I have reviewed the Medications.     Review of Systems  Anesthesia Hx:  No problems with previous Anesthesia  Neg history of prior surgery. Denies Family Hx of Anesthesia complications.   Denies Personal Hx of Anesthesia complications.   Hematology/Oncology:  Hematology Normal   Oncology Normal     EENT/Dental:EENT/Dental Normal   Cardiovascular:  Cardiovascular Normal     Pulmonary:  Pulmonary Normal    Renal/:  Renal/ Normal     Hepatic/GI:   GERD    Musculoskeletal:  Musculoskeletal Normal    Neurological:   Neuromuscular Disease,    Endocrine:   Hypothyroidism    Dermatological:  Skin Normal    Psych:   Psychiatric History          Physical Exam  General: Well nourished    Airway:  Mallampati: II / II  Mouth Opening: Normal  TM Distance: Normal  Tongue: Normal  Neck ROM: Normal ROM    Dental:  Intact    Chest/Lungs:  Clear to auscultation, Normal Respiratory  Rate    Heart:  Rate: Normal  Rhythm: Regular Rhythm        Anesthesia Plan  Type of Anesthesia, risks & benefits discussed:    Anesthesia Type: Gen Natural Airway  Intra-op Monitoring Plan: Standard ASA Monitors  Induction:  IV  Informed Consent: Informed consent signed with the Patient and all parties understand the risks and agree with anesthesia plan.  All questions answered.   ASA Score: 2  Day of Surgery Review of History & Physical: H&P Update referred to the surgeon/provider.    Ready For Surgery From Anesthesia Perspective.     .

## 2022-04-13 NOTE — DISCHARGE SUMMARY
[facility] 4th [facility]  Discharge Note  Short Stay    Procedure(s) (LRB):  COLONOSCOPY (N/A)    OUTCOME: Patient tolerated treatment/procedure well without complication and is now ready for discharge.    DISPOSITION: Home or Self Care    FINAL DIAGNOSIS:  Screening for malignant neoplasm of colon    FOLLOWUP: In clinic    DISCHARGE INSTRUCTIONS:    Discharge Procedure Orders   Notify your health care provider if you experience any of the following:  temperature >100.4     Notify your health care provider if you experience any of the following:  persistent nausea and vomiting or diarrhea     Notify your health care provider if you experience any of the following:  severe uncontrolled pain     Activity as tolerated         Clinical Reference Documents Added to Patient Instructions       Document    COLONOSCOPY DISCHARGE INSTRUCTIONS (ENGLISH)        Genaro Mina MD   Pager: [phone]  General Surgery PGY-V  [facility]

## 2022-04-13 NOTE — TRANSFER OF CARE
"Anesthesia Transfer of Care Note    Patient: Cristina Tan    Procedure(s) Performed: Procedure(s) (LRB):  COLONOSCOPY (N/A)    Patient location: PACU    Anesthesia Type: general    Transport from OR: Transported from OR on room air with adequate spontaneous ventilation    Post pain: adequate analgesia    Post assessment: no apparent anesthetic complications    Post vital signs: stable    Level of consciousness: awake    Nausea/Vomiting: no nausea/vomiting    Complications: none    Transfer of care protocol was followed      Last vitals:   Visit Vitals  /65 (BP Location: Left arm, Patient Position: Lying)   Pulse 60   Temp 36.4 °C (97.5 °F) (Tympanic)   Resp 16   Ht 5' 6" (1.676 m)   Wt 113.4 kg (250 lb)   SpO2 (!) 94%   Breastfeeding No   BMI 40.35 kg/m²     "

## 2022-04-20 LAB
FINAL PATHOLOGIC DIAGNOSIS: NORMAL
GROSS: NORMAL
Lab: NORMAL

## 2022-04-29 ENCOUNTER — PATIENT MESSAGE (OUTPATIENT)
Dept: ORTHOPEDICS | Facility: CLINIC | Age: 57
End: 2022-04-29
Payer: MEDICAID

## 2022-05-02 ENCOUNTER — OFFICE VISIT (OUTPATIENT)
Dept: ORTHOPEDICS | Facility: CLINIC | Age: 57
End: 2022-05-02
Payer: MEDICAID

## 2022-05-02 VITALS — HEIGHT: 66 IN | WEIGHT: 250 LBS | BODY MASS INDEX: 40.18 KG/M2

## 2022-05-02 DIAGNOSIS — G56.01 CARPAL TUNNEL SYNDROME ON RIGHT: Primary | ICD-10-CM

## 2022-05-02 PROCEDURE — 1159F MED LIST DOCD IN RCRD: CPT | Mod: CPTII,,, | Performed by: ORTHOPAEDIC SURGERY

## 2022-05-02 PROCEDURE — 3008F BODY MASS INDEX DOCD: CPT | Mod: CPTII,,, | Performed by: ORTHOPAEDIC SURGERY

## 2022-05-02 PROCEDURE — 99213 OFFICE O/P EST LOW 20 MIN: CPT | Mod: S$PBB,,, | Performed by: ORTHOPAEDIC SURGERY

## 2022-05-02 PROCEDURE — 99213 OFFICE O/P EST LOW 20 MIN: CPT | Mod: PBBFAC,PN | Performed by: ORTHOPAEDIC SURGERY

## 2022-05-02 PROCEDURE — 99999 PR PBB SHADOW E&M-EST. PATIENT-LVL III: ICD-10-PCS | Mod: PBBFAC,,, | Performed by: ORTHOPAEDIC SURGERY

## 2022-05-02 PROCEDURE — 3008F PR BODY MASS INDEX (BMI) DOCUMENTED: ICD-10-PCS | Mod: CPTII,,, | Performed by: ORTHOPAEDIC SURGERY

## 2022-05-02 PROCEDURE — 99213 PR OFFICE/OUTPT VISIT, EST, LEVL III, 20-29 MIN: ICD-10-PCS | Mod: S$PBB,,, | Performed by: ORTHOPAEDIC SURGERY

## 2022-05-02 PROCEDURE — 1159F PR MEDICATION LIST DOCUMENTED IN MEDICAL RECORD: ICD-10-PCS | Mod: CPTII,,, | Performed by: ORTHOPAEDIC SURGERY

## 2022-05-02 PROCEDURE — 99999 PR PBB SHADOW E&M-EST. PATIENT-LVL III: CPT | Mod: PBBFAC,,, | Performed by: ORTHOPAEDIC SURGERY

## 2022-05-02 RX ORDER — TRIAMCINOLONE ACETONIDE 40 MG/ML
40 INJECTION, SUSPENSION INTRA-ARTICULAR; INTRAMUSCULAR
Status: DISCONTINUED | OUTPATIENT
Start: 2022-05-02 | End: 2022-05-02 | Stop reason: HOSPADM

## 2022-05-02 RX ADMIN — TRIAMCINOLONE ACETONIDE 40 MG: 40 INJECTION, SUSPENSION INTRA-ARTICULAR; INTRAMUSCULAR at 02:05

## 2022-05-02 NOTE — PROGRESS NOTES
Ms Tan returns to clinic today.  She has a history of bilateral carpal tunnel syndrome.  She underwent a left carpal tunnel release approximately a year ago and did really well.  She is continuing to complain of right carpal tunnel symptoms.    Physical exam:  Examination the right hand and wrist reveals that there is no edema.  There are no skin changes.  Palpation produces no tenderness.  She has a mildly positive Tinel's and a mildly positive Durkan's test.  She has 2+ radial pulse.  She does report mild paresthesias in the median distribution with intact radial ulnar sensation    EMG and nerve conduction:  Nerve conduction study has been reviewed.  It was consistent with severe left and moderate right carpal tunnel syndrome    Assessment:  Right carpal tunnel syndrome    Plan:    1. After informed consent was obtained injection was placed to the right carpal tunnel.  The patient tolerated that well.    2. She will begin wearing a nighttime carpal tunnel splint    3. She will follow up with me in 3 months for repeat evaluation

## 2022-05-02 NOTE — PROCEDURES
Carpal Tunnel    Date/Time: 5/2/2022 2:20 PM  Performed by: Ld Carbone MD  Authorized by: Ld Carbone MD     Consent Done?:  Yes (Verbal)  Indications:  Pain  Site marked: the procedure site was marked    Timeout: prior to procedure the correct patient, procedure, and site was verified    Prep: patient was prepped and draped in usual sterile fashion      Local anesthesia used?: Yes    Local anesthetic:  Lidocaine 1% without epinephrine  Anesthetic total (ml):  0.5    Location:  Wrist (Right carpal tunnel)  Needle size:  25 G  Medications:  40 mg triamcinolone acetonide 40 mg/mL (20 mg injected)  Patient tolerance:  Patient tolerated the procedure well with no immediate complications

## 2022-05-09 ENCOUNTER — PATIENT MESSAGE (OUTPATIENT)
Dept: SMOKING CESSATION | Facility: CLINIC | Age: 57
End: 2022-05-09
Payer: MEDICAID

## 2022-05-23 ENCOUNTER — PATIENT MESSAGE (OUTPATIENT)
Dept: PSYCHIATRY | Facility: CLINIC | Age: 57
End: 2022-05-23
Payer: MEDICAID

## 2022-05-23 ENCOUNTER — TELEPHONE (OUTPATIENT)
Dept: FAMILY MEDICINE | Facility: CLINIC | Age: 57
End: 2022-05-23
Payer: MEDICAID

## 2022-05-23 NOTE — TELEPHONE ENCOUNTER
----- Message from Nicole Saldana sent at 5/23/2022  7:00 AM CDT -----  Contact: [phone]  Type:  Sooner Apoointment Request    Caller is requesting a sooner appointment.  Caller declined first available appointment listed below.  Caller will not accept being placed on the waitlist and is requesting a message be sent to doctor.  Name of Caller:Portal Message  When is the first available appointment?n/a  Symptoms:My left back side below bra strap is killing me. [place] if it's my kidney or if I pulled a muscle.  It is very painful. Pt would like to be seen this week if possible  Would the patient rather a call back or a response via [facility]? [facility]  Best Call Back Number:[phone]

## 2022-06-01 ENCOUNTER — PATIENT MESSAGE (OUTPATIENT)
Dept: PSYCHIATRY | Facility: CLINIC | Age: 57
End: 2022-06-01
Payer: MEDICAID

## 2022-06-02 ENCOUNTER — LAB VISIT (OUTPATIENT)
Dept: LAB | Facility: HOSPITAL | Age: 57
End: 2022-06-02
Attending: PHYSICIAN ASSISTANT
Payer: MEDICAID

## 2022-06-02 ENCOUNTER — OFFICE VISIT (OUTPATIENT)
Dept: FAMILY MEDICINE | Facility: CLINIC | Age: 57
End: 2022-06-02
Payer: MEDICAID

## 2022-06-02 ENCOUNTER — OFFICE VISIT (OUTPATIENT)
Dept: PSYCHIATRY | Facility: CLINIC | Age: 57
End: 2022-06-02
Payer: MEDICAID

## 2022-06-02 VITALS
SYSTOLIC BLOOD PRESSURE: 121 MMHG | HEIGHT: 66 IN | BODY MASS INDEX: 41.85 KG/M2 | HEART RATE: 63 BPM | WEIGHT: 260.38 LBS | DIASTOLIC BLOOD PRESSURE: 77 MMHG

## 2022-06-02 VITALS
HEIGHT: 66 IN | WEIGHT: 260.13 LBS | DIASTOLIC BLOOD PRESSURE: 80 MMHG | HEART RATE: 67 BPM | BODY MASS INDEX: 41.8 KG/M2 | SYSTOLIC BLOOD PRESSURE: 122 MMHG | OXYGEN SATURATION: 97 %

## 2022-06-02 DIAGNOSIS — F31.77 BIPOLAR 1 DISORDER, MIXED, PARTIAL REMISSION: Primary | ICD-10-CM

## 2022-06-02 DIAGNOSIS — F41.9 ANXIETY DISORDER, UNSPECIFIED TYPE: ICD-10-CM

## 2022-06-02 DIAGNOSIS — Z23 NEED FOR TETANUS BOOSTER: Primary | ICD-10-CM

## 2022-06-02 DIAGNOSIS — F31.77 BIPOLAR 1 DISORDER, MIXED, PARTIAL REMISSION: ICD-10-CM

## 2022-06-02 DIAGNOSIS — E78.5 HYPERLIPIDEMIA, UNSPECIFIED HYPERLIPIDEMIA TYPE: ICD-10-CM

## 2022-06-02 LAB
ALBUMIN SERPL BCP-MCNC: 3.7 G/DL (ref 3.5–5.2)
ALP SERPL-CCNC: 75 U/L (ref 55–135)
ALT SERPL W/O P-5'-P-CCNC: 22 U/L (ref 10–44)
ANION GAP SERPL CALC-SCNC: 9 MMOL/L (ref 8–16)
AST SERPL-CCNC: 20 U/L (ref 10–40)
BASOPHILS # BLD AUTO: 0.04 K/UL (ref 0–0.2)
BASOPHILS NFR BLD: 0.4 % (ref 0–1.9)
BILIRUB SERPL-MCNC: 1.4 MG/DL (ref 0.1–1)
BUN SERPL-MCNC: 11 MG/DL (ref 6–20)
CALCIUM SERPL-MCNC: 9.2 MG/DL (ref 8.7–10.5)
CHLORIDE SERPL-SCNC: 106 MMOL/L (ref 95–110)
CHOLEST SERPL-MCNC: 170 MG/DL (ref 120–199)
CHOLEST/HDLC SERPL: 2.9 {RATIO} (ref 2–5)
CO2 SERPL-SCNC: 23 MMOL/L (ref 23–29)
CREAT SERPL-MCNC: 0.8 MG/DL (ref 0.5–1.4)
DIFFERENTIAL METHOD: NORMAL
EOSINOPHIL # BLD AUTO: 0.2 K/UL (ref 0–0.5)
EOSINOPHIL NFR BLD: 2 % (ref 0–8)
ERYTHROCYTE [DISTWIDTH] IN BLOOD BY AUTOMATED COUNT: 12.9 % (ref 11.5–14.5)
EST. GFR  (AFRICAN AMERICAN): >60 ML/MIN/1.73 M^2
EST. GFR  (NON AFRICAN AMERICAN): >60 ML/MIN/1.73 M^2
ESTIMATED AVG GLUCOSE: 105 MG/DL (ref 68–131)
GLUCOSE SERPL-MCNC: 93 MG/DL (ref 70–110)
HBA1C MFR BLD: 5.3 % (ref 4–5.6)
HCT VFR BLD AUTO: 39 % (ref 37–48.5)
HDLC SERPL-MCNC: 58 MG/DL (ref 40–75)
HDLC SERPL: 34.1 % (ref 20–50)
HGB BLD-MCNC: 12.8 G/DL (ref 12–16)
IMM GRANULOCYTES # BLD AUTO: 0.02 K/UL (ref 0–0.04)
IMM GRANULOCYTES NFR BLD AUTO: 0.2 % (ref 0–0.5)
LDLC SERPL CALC-MCNC: 75 MG/DL (ref 63–159)
LYMPHOCYTES # BLD AUTO: 3.1 K/UL (ref 1–4.8)
LYMPHOCYTES NFR BLD: 34.7 % (ref 18–48)
MCH RBC QN AUTO: 29 PG (ref 27–31)
MCHC RBC AUTO-ENTMCNC: 32.8 G/DL (ref 32–36)
MCV RBC AUTO: 88 FL (ref 82–98)
MONOCYTES # BLD AUTO: 0.6 K/UL (ref 0.3–1)
MONOCYTES NFR BLD: 6.7 % (ref 4–15)
NEUTROPHILS # BLD AUTO: 5.1 K/UL (ref 1.8–7.7)
NEUTROPHILS NFR BLD: 56 % (ref 38–73)
NONHDLC SERPL-MCNC: 112 MG/DL
NRBC BLD-RTO: 0 /100 WBC
PLATELET # BLD AUTO: 413 K/UL (ref 150–450)
PMV BLD AUTO: 10.2 FL (ref 9.2–12.9)
POTASSIUM SERPL-SCNC: 4.2 MMOL/L (ref 3.5–5.1)
PROT SERPL-MCNC: 7.2 G/DL (ref 6–8.4)
RBC # BLD AUTO: 4.41 M/UL (ref 4–5.4)
SODIUM SERPL-SCNC: 138 MMOL/L (ref 136–145)
TRIGL SERPL-MCNC: 185 MG/DL (ref 30–150)
TSH SERPL DL<=0.005 MIU/L-ACNC: 0.88 UIU/ML (ref 0.4–4)
WBC # BLD AUTO: 9.05 K/UL (ref 3.9–12.7)

## 2022-06-02 PROCEDURE — 1160F RVW MEDS BY RX/DR IN RCRD: CPT | Mod: SA,HB,CPTII, | Performed by: NURSE PRACTITIONER

## 2022-06-02 PROCEDURE — 99999 PR PBB SHADOW E&M-EST. PATIENT-LVL III: CPT | Mod: PBBFAC,SA,HB, | Performed by: NURSE PRACTITIONER

## 2022-06-02 PROCEDURE — 80061 LIPID PANEL: CPT | Performed by: PHYSICIAN ASSISTANT

## 2022-06-02 PROCEDURE — 3079F DIAST BP 80-89 MM HG: CPT | Mod: CPTII,,, | Performed by: PHYSICIAN ASSISTANT

## 2022-06-02 PROCEDURE — 3074F PR MOST RECENT SYSTOLIC BLOOD PRESSURE < 130 MM HG: ICD-10-PCS | Mod: SA,HB,CPTII, | Performed by: NURSE PRACTITIONER

## 2022-06-02 PROCEDURE — 80053 COMPREHEN METABOLIC PANEL: CPT | Performed by: PHYSICIAN ASSISTANT

## 2022-06-02 PROCEDURE — 1159F PR MEDICATION LIST DOCUMENTED IN MEDICAL RECORD: ICD-10-PCS | Mod: SA,HB,CPTII, | Performed by: NURSE PRACTITIONER

## 2022-06-02 PROCEDURE — 1160F RVW MEDS BY RX/DR IN RCRD: CPT | Mod: CPTII,,, | Performed by: PHYSICIAN ASSISTANT

## 2022-06-02 PROCEDURE — 90715 TDAP VACCINE 7 YRS/> IM: CPT | Mod: PBBFAC,PO

## 2022-06-02 PROCEDURE — 83036 HEMOGLOBIN GLYCOSYLATED A1C: CPT | Performed by: PHYSICIAN ASSISTANT

## 2022-06-02 PROCEDURE — 99999 PR PBB SHADOW E&M-EST. PATIENT-LVL III: ICD-10-PCS | Mod: PBBFAC,,, | Performed by: PHYSICIAN ASSISTANT

## 2022-06-02 PROCEDURE — 1159F MED LIST DOCD IN RCRD: CPT | Mod: SA,HB,CPTII, | Performed by: NURSE PRACTITIONER

## 2022-06-02 PROCEDURE — 85025 COMPLETE CBC W/AUTO DIFF WBC: CPT | Performed by: PHYSICIAN ASSISTANT

## 2022-06-02 PROCEDURE — 99213 OFFICE O/P EST LOW 20 MIN: CPT | Mod: PBBFAC,PO | Performed by: NURSE PRACTITIONER

## 2022-06-02 PROCEDURE — 1160F PR REVIEW ALL MEDS BY PRESCRIBER/CLIN PHARMACIST DOCUMENTED: ICD-10-PCS | Mod: SA,HB,CPTII, | Performed by: NURSE PRACTITIONER

## 2022-06-02 PROCEDURE — 99999 PR PBB SHADOW E&M-EST. PATIENT-LVL III: CPT | Mod: PBBFAC,,, | Performed by: PHYSICIAN ASSISTANT

## 2022-06-02 PROCEDURE — 3079F PR MOST RECENT DIASTOLIC BLOOD PRESSURE 80-89 MM HG: ICD-10-PCS | Mod: CPTII,,, | Performed by: PHYSICIAN ASSISTANT

## 2022-06-02 PROCEDURE — 99214 OFFICE O/P EST MOD 30 MIN: CPT | Mod: S$PBB,SA,HB, | Performed by: NURSE PRACTITIONER

## 2022-06-02 PROCEDURE — 3074F SYST BP LT 130 MM HG: CPT | Mod: SA,HB,CPTII, | Performed by: NURSE PRACTITIONER

## 2022-06-02 PROCEDURE — 3078F DIAST BP <80 MM HG: CPT | Mod: SA,HB,CPTII, | Performed by: NURSE PRACTITIONER

## 2022-06-02 PROCEDURE — 3008F BODY MASS INDEX DOCD: CPT | Mod: SA,HB,CPTII, | Performed by: NURSE PRACTITIONER

## 2022-06-02 PROCEDURE — 1159F PR MEDICATION LIST DOCUMENTED IN MEDICAL RECORD: ICD-10-PCS | Mod: CPTII,,, | Performed by: PHYSICIAN ASSISTANT

## 2022-06-02 PROCEDURE — 84443 ASSAY THYROID STIM HORMONE: CPT | Performed by: PHYSICIAN ASSISTANT

## 2022-06-02 PROCEDURE — 90833 PSYTX W PT W E/M 30 MIN: CPT | Mod: SA,HB,, | Performed by: NURSE PRACTITIONER

## 2022-06-02 PROCEDURE — 1159F MED LIST DOCD IN RCRD: CPT | Mod: CPTII,,, | Performed by: PHYSICIAN ASSISTANT

## 2022-06-02 PROCEDURE — 3008F BODY MASS INDEX DOCD: CPT | Mod: CPTII,,, | Performed by: PHYSICIAN ASSISTANT

## 2022-06-02 PROCEDURE — 99214 OFFICE O/P EST MOD 30 MIN: CPT | Mod: S$PBB,,, | Performed by: PHYSICIAN ASSISTANT

## 2022-06-02 PROCEDURE — 36415 COLL VENOUS BLD VENIPUNCTURE: CPT | Mod: PO | Performed by: PHYSICIAN ASSISTANT

## 2022-06-02 PROCEDURE — 99213 OFFICE O/P EST LOW 20 MIN: CPT | Mod: PBBFAC,27,PO | Performed by: PHYSICIAN ASSISTANT

## 2022-06-02 PROCEDURE — 3078F PR MOST RECENT DIASTOLIC BLOOD PRESSURE < 80 MM HG: ICD-10-PCS | Mod: SA,HB,CPTII, | Performed by: NURSE PRACTITIONER

## 2022-06-02 PROCEDURE — 3008F PR BODY MASS INDEX (BMI) DOCUMENTED: ICD-10-PCS | Mod: CPTII,,, | Performed by: PHYSICIAN ASSISTANT

## 2022-06-02 PROCEDURE — 99999 PR PBB SHADOW E&M-EST. PATIENT-LVL III: ICD-10-PCS | Mod: PBBFAC,SA,HB, | Performed by: NURSE PRACTITIONER

## 2022-06-02 PROCEDURE — 99214 PR OFFICE/OUTPT VISIT, EST, LEVL IV, 30-39 MIN: ICD-10-PCS | Mod: S$PBB,SA,HB, | Performed by: NURSE PRACTITIONER

## 2022-06-02 PROCEDURE — 99214 PR OFFICE/OUTPT VISIT, EST, LEVL IV, 30-39 MIN: ICD-10-PCS | Mod: S$PBB,,, | Performed by: PHYSICIAN ASSISTANT

## 2022-06-02 PROCEDURE — 3074F SYST BP LT 130 MM HG: CPT | Mod: CPTII,,, | Performed by: PHYSICIAN ASSISTANT

## 2022-06-02 PROCEDURE — 90833 PR PSYCHOTHERAPY W/PATIENT W/E&M, 30 MIN (ADD ON): ICD-10-PCS | Mod: SA,HB,, | Performed by: NURSE PRACTITIONER

## 2022-06-02 PROCEDURE — 3074F PR MOST RECENT SYSTOLIC BLOOD PRESSURE < 130 MM HG: ICD-10-PCS | Mod: CPTII,,, | Performed by: PHYSICIAN ASSISTANT

## 2022-06-02 PROCEDURE — 3008F PR BODY MASS INDEX (BMI) DOCUMENTED: ICD-10-PCS | Mod: SA,HB,CPTII, | Performed by: NURSE PRACTITIONER

## 2022-06-02 PROCEDURE — 1160F PR REVIEW ALL MEDS BY PRESCRIBER/CLIN PHARMACIST DOCUMENTED: ICD-10-PCS | Mod: CPTII,,, | Performed by: PHYSICIAN ASSISTANT

## 2022-06-02 RX ORDER — QUETIAPINE FUMARATE 50 MG/1
TABLET, FILM COATED ORAL
Qty: 90 TABLET | Refills: 0 | Status: SHIPPED | OUTPATIENT
Start: 2022-06-02 | End: 2022-07-12 | Stop reason: SDUPTHER

## 2022-06-02 NOTE — PROGRESS NOTES
"  Outpatient Psychiatry Follow-Up Visit    Clinical Status of Patient: Outpatient (Ambulatory)  06/02/2022     Chief Complaint: Pt is a 56 year old female who presents today for a follow-up. Met with patient.       Interval History and Content of Current Session:  Interim Events/Subjective Report/Content of Current Session:  follow up appointment.    Pt is a 56 year old female with past psychiatric hx of Bipolar 1 disorder, anxiety NOS who presents for follow up treatment. She is currently taking Lithium 300mg BID, Xanax 0.5 mg QHS PRN for sleep (uses 2-3 x weekly), Seroquel 200mg QHS. Meds tolerated well and provide good symptomatic relief overall.    Pt notes that, since her last session, she moved to the [facility] to be closer to her family. She has enjoyed this move, and it has had a positive impact on her mood. "Being close to my grandbaby makes me want to stay healthy." Pt reports a stable mood and denies any gamal or hypomania between visits. She does report that Seroquel has been making her very sluggish throughout the day.     Pt does note continued issues irritability and states "I just don't have patience. I can sometimes lose my temper when I'm driving."    Pt is stable and functioning well.       Most recent Li: 0.3: 3/22 - pt stable    Past Psychiatric hx: Pt. is a 55 year old female  with a past psychiatric hx of Bipolar 1 disorder, depressed who established care with me 07/19 following the FDC of her previous psych, Dr. Pelaez. She came to me taking Lithium 600mg BID and Xanax 0.5 mg QHS PRN for sleep (only uses this sparingly) , which had been previously prescribed and managed by Dr. Pelaez. She reports that her symptoms have stabilized over the last 7-8 months, and has been without a manic episode since November of 2018. Previous med trials of Lamictal (rash), Risperdal, Wellbutrin, some SSRIs (destablized mood, triggered manic episodes). Pt stable in clinic upon initial eval - all meds " "were continued at same dosage.      Notes a long standing history of "mood swings" dating to childhood, but first formal psych encounter in 1987 following the birth of her child. While admitted to the hospital following birth, and after being discharged, pt speaks to experiencing A/V hallucinations and euphoric gamal following a two-week period of going without sleep. "I was real paranoid after I had the baby. I felt that if I went to sleep, I wouldn't be able to hear the baby cry. I ended up going a couple weeks where I didn't sleep at all, started hearing things and seeing things that weren't there. I felt like people were trying to contact me and tell me things through the computer or the TV. I was paranoid and was thinking the vampires were after us. I started thinking I was someone I wasn't." She eventually sought psychiatric care in an outpatient seeing, but was not started on any psychiatric medication until 2002 (?). She was prescribed SSRIs and Wellbutrin at this time, which triggered a manic episode, and were d/c'd. She was eventually started on Lithium around 2005, and has achieved good results. States she has felt more stable than she's ever felt before.      Upon initial eval, pt reports manic episodes "once or twice a year" that are triggered by a lack of sleep. States that her manic episodes last for "weeks" and "I get hyper, my metabolism speeds up. I feel like I'm sped up. I always try to buy a car or a house. I had two houses at one time. It always ends with them putting me in the hospital." Does continue to experience delusional thinking and auditory hallucinations exclusively during manic episodes. Hx of several ED admissions in 03/19 r/t delusions, and "walking down the street talking to people who weren't there" Per - ED note. Reports inpatient hospitalization following manic episodes to [facility] x 1 week in November of 2018. Also reports another admission to [facility] in March of 2018 for 3 " "days following a manic episode. During these episodes, "I get very happy during those episodes. I start thinking I'm [place] and can communicate through the TV".      Denies any depressive symptoms. Reports sleeping 7-9 hours of sleep each night, restful. No issues falling or staying asleep. Denies anhedonia. Denies guilt/worthlessness. Energy good, concentration good. Appetite good. Denies psychomotor slowing, denies SI.      On 10/16, pt daughter reported "She seems a little bit better during the day, but is still pretty manic and only sleeping 2-3 hours at night, despite trying to up-titrate the medication.  She had to have a meeting with her supervisors at work yesterday, but they have fortunately been very understanding about her bipolar" Episodes do not meet criteria for true gamal/or hypomania, but we have been up-titrating Seroquel (started 10/19) to address symptoms - pt has responded well but reports dry mouth.I advised pt to add dose of Seroquel 50mg Q AM. However, she states pt states this made her groggy so d/c this. Despite this, pt reports at f/u "I've been feeling so much better. I'm actually sleeping now and definitely haven't been feeling manic at all. I feel way more calm now." However,  Notes overall less mood lability, less agitation. Denies manic/hypomanic episodes since last visit. She went to [place] on vacation between visit - enjoyed this. Pt doing well with no decompensations since last visit.  Does note continued generalized worry. Feels restless, on edge.       During her 5/20 f/u visit pt reports "I've been getting into bed at night, and sometimes it feels like someone else is getting in bed with me. It got to the point where I couldn't sleep in my bed because I was kind of scared. I thought it was my cat but my cat wasn't in the room. Sometime it even touches me, like it's brushing on either my foot or back. Like someone is in here trying to wake me up, but no one is in there. I " "usually just go and sleep on the sofa." Denies A/V hallucinations.     Seroquel was increased following this visit, Today, pt reports "I don't have those feelings in my bed anymore. I'm not afraid to get in bed anymore. Thanks goodness. I've been sleeping better." Does reports "feeling extremely tired when I take it in the morning." and requests to move to PM dosing. Tolerates well otherwise. Denies any gamal or hypomania between sessions.      Past Medical hx:   Past Medical History:   Diagnosis Date    Bipolar 1 disorder     Cancer     skin cancer to right arm    GERD (gastroesophageal reflux disease)     History of psychiatric hospitalization     Mental disorder     Thyroid disease         Interim hx:  Medication changes last visit: Inc seroquel to 50mg PO QAM, 100mg QHS  Anxiety: inc'd  Depression: denies     Denies suicidal/homicidal ideations.  Denies hopelessness/worthlessness.    Denies auditory/visual hallucinations       Tobacco: never used  Alcohol: denies   Drug use: denies  Caffeine: 1-2 cups coffee daily      Review of Systems   · PSYCHIATRIC: Pertinent items are noted in the narrative.        CONSTITUTIONAL: weight stable        M/S: no pain today         ENT: no allergies noted today        ABD: no n/v/d     Past Medical, Family and Social History: The patient's past medical, family and social history have been reviewed and updated as appropriate within the electronic medical record. See encounter notes.     Medication: Lithium 300mg TID, and Xanax 0.5 mg QHS PRN for sleep, Sseroquel 200mg QHS     Compliance: yes      Side effects: dry mouth from Seroquel     Risk Parameters:  Patient reports no suicidal ideation  Patient reports no homicidal ideation  Patient reports no self-injurious behavior  Patient reports no violent behavior     Exam (detailed: at least 9 elements; comprehensive: all 15 elements)   Constitutional  Vitals:  Most recent vital signs, dated less than 90 days prior to this " "appointment, were reviewed. There were no vitals taken for this visit.     General:  unremarkable, age appropriate, casual attire, good eye contact, good rapport       Musculoskeletal  Muscle Strength/Tone:  no flaccidity, no tremor    Gait & Station:  normal      Psychiatric                       Speech:  normal tone, normal rate, rhythm, and volume   Mood & Affect:   Euthymic, congruent, appropriate         Thought Process:   Goal directed; Linear    Associations:   intact   Thought Content:   No SI/HI, + fixed delusions, or paranoia, no AV/VH   Insight & Judgement:   Good, adequate to circumstances   Orientation:   grossly intact; alert and oriented x 4    Memory:  intact for content of interview    Language:  grossly intact, can repeat    Attention Span  : Grossly intact for content of interview   Fund of Knowledge:   intact and appropriate to age and level of education        Assessment and Diagnosis   Status/Progress: Based on the examination today, the patient's problem(s) is/are under fair control.  New problems have not been presented today. Comorbidities are not currently complicating management of the primary condition.      Impression:   Pt is a 56 year old female with past psychiatric hx of Bipolar 1 disorder, anxiety NOS who presents for follow up treatment. She is currently taking Lithium 300mg BID, Xanax 0.5 mg QHS PRN for sleep (uses 2-3 x weekly), Seroquel 200mg QHS. Meds tolerated well and provide good symptomatic relief overall.    Pt notes that, since her last session, she moved to the [facility] to be closer to her family. She has enjoyed this move, and it has had a positive impact on her mood. "Being close to my grandbaby makes me want to stay healthy." Pt reports a stable mood and denies any gamal or hypomania between visits. She does report that Seroquel has been making her very sluggish throughout the day.     Pt does note continued issues irritability and states "I just don't have " "patience. I can sometimes lose my temper when I'm driving."    Pt is stable and functioning well.       Most recent Li: 0.3: 3/22 - pt stable    Diagnosis: Bipolar 1 disorder, partial remission, anxiety unspecified    Intervention/Counseling/Treatment Plan   · Medication Management:      1) Continue Lithium 300mg BID for mood. Pt instructed to monitor closely for mood destabilization as we have been gradually tapering due to inc'd TSH.    2) Continue Seroquel to 200mg QHS. Typical ELFEGO's reviewed including weight gain, abnormal movements, EPS, TD, metabolic side effects.      3) Continue Xanax 0.5 mg QHS PRN for sleep (uses a few times weekly). Discussed risk of decreased RT, sedation, addictive potential, and not to mix with alcohol.      4. Call to report any worsening of symptoms or problems with the medication. Pt instructed to go to ER with thoughts of harming self, others     5. Patient given contact # for psychotherapists at [facility] and also instructed she may check with insurance for list of providers.      6. Labs: no new orders       Return to clinic: 3 months - self sched    Psychotherapy:   · Target symptoms: inattention/distractibility, anxiety    · Why chosen therapy is appropriate versus another modality: relevant to diagnosis, patient responds to this modality  · Outcome monitoring methods: self-report, observation, feedback from family   · Therapeutic intervention type: supportive psychotherapy  · Topics discussed/themes: building skills sets for symptom management, symptom recognition, nutrition, exercise  · The patient's response to the intervention is accepting. The patient's progress toward treatment goals is positive progress.  · Duration of intervention: 20 minutes      -Spent 30min face to face with the pt; >50% time spent in counseling   -Supportive therapy and psychoeducation provided  -R/B/SE's of medications discussed with the pt who expresses understanding and chooses to take " medications as prescribed.   -Pt instructed to call clinic, 911 or go to nearest emergency room if sxs worsen or pt is in   crisis. The pt expresses understanding.    Montana Aguilar, NP

## 2022-06-02 NOTE — PROGRESS NOTES
Subjective:       Patient ID: Cristina Tan is a 57 y.o. female.    Chief Complaint: Flank Pain (2 days and stopped )    HPI     Pt is new to me, PCP Dr. Leonard.     Pt is a 57 year old female with carpal tunnel syndrome, BP1, anxiety, HLD, GERD. She presents today complaining of left sided flank pain  X 2 days, but it has now resolved. States the pain was sharp and intermittent, worse with movement. No dysuria, hematuria, urinary frequency. No obvious injury. Pt would also like her routine labs done today.       Review of Systems   Constitutional: Negative for activity change, appetite change, chills, fatigue, fever and unexpected weight change.   HENT: Negative for nasal congestion, ear pain, hearing loss, rhinorrhea and sore throat.    Eyes: Negative for visual disturbance.   Respiratory: Negative for cough, chest tightness, shortness of breath and wheezing.    Cardiovascular: Negative for chest pain and palpitations.   Gastrointestinal: Negative for abdominal pain, change in bowel habit, constipation, diarrhea, nausea, vomiting, reflux and change in bowel habit.   Genitourinary: Negative for difficulty urinating, dysuria, frequency, hematuria and urgency.   Musculoskeletal: Negative for arthralgias and myalgias.   Integumentary:  Negative for rash.   Neurological: Negative for dizziness, vertigo, seizures, syncope, weakness, light-headedness and headaches.   Psychiatric/Behavioral: Negative for dysphoric mood and sleep disturbance. The patient is not nervous/anxious.          Objective:      Physical Exam  Vitals and nursing note reviewed.   Constitutional:       General: She is not in acute distress.     Appearance: Normal appearance. She is not ill-appearing, toxic-appearing or diaphoretic.   HENT:      Head: Normocephalic and atraumatic.      Right Ear: External ear normal.      Left Ear: External ear normal.   Eyes:      General: No scleral icterus.        Right eye: No discharge.         Left eye: No  discharge.      Extraocular Movements: Extraocular movements intact.      Conjunctiva/sclera: Conjunctivae normal.      Pupils: Pupils are equal, round, and reactive to light.   Cardiovascular:      Rate and Rhythm: Normal rate and regular rhythm.      Pulses: Normal pulses.      Heart sounds: Normal heart sounds. No murmur heard.    No friction rub. No gallop.   Pulmonary:      Effort: Pulmonary effort is normal. No respiratory distress.      Breath sounds: Normal breath sounds. No stridor. No wheezing, rhonchi or rales.   Abdominal:      General: Abdomen is flat. Bowel sounds are normal. There is no distension.      Palpations: Abdomen is soft. There is no mass.      Tenderness: There is no abdominal tenderness. There is no right CVA tenderness, left CVA tenderness, guarding or rebound.   Musculoskeletal:         General: No deformity or signs of injury.      Cervical back: Normal range of motion and neck supple.      Thoracic back: Normal.      Lumbar back: Normal.      Right lower leg: No edema.      Left lower leg: No edema.   Lymphadenopathy:      Cervical: No cervical adenopathy.   Skin:     General: Skin is warm.      Capillary Refill: Capillary refill takes less than 2 seconds.      Coloration: Skin is not jaundiced or pale.      Findings: No lesion or rash.   Neurological:      General: No focal deficit present.      Mental Status: She is alert and oriented to person, place, and time. Mental status is at baseline.   Psychiatric:         Mood and Affect: Mood normal.         Behavior: Behavior normal.         Thought Content: Thought content normal.         Judgment: Judgment normal.         Assessment:       Problem List Items Addressed This Visit        Psychiatric    Bipolar 1 disorder, mixed, partial remission    Relevant Orders    TSH       Cardiac/Vascular    Hyperlipidemia    Relevant Orders    CBC Auto Differential    Comprehensive Metabolic Panel    Lipid Panel    Hemoglobin A1C      Other Visit  Diagnoses     Need for tetanus booster    -  Primary    Relevant Orders    (In Office Administered) Tdap Vaccine (Completed)          Plan:         1. Need for tetanus booster  - (In Office Administered) Tdap Vaccine    2. Hyperlipidemia, unspecified hyperlipidemia type  - CBC Auto Differential; Future  - Comprehensive Metabolic Panel; Future  - Lipid Panel; Future  - Hemoglobin A1C; Future    3. Bipolar 1 disorder, mixed, partial remission  - TSH; Future  -lithium monitored by psych    RTC/Er precautions given. F/U with me prn

## 2022-06-02 NOTE — PATIENT INSTRUCTIONS
A few reminders from today:    Tetanus shot today  Labs today    Follow up with me if needed.   Please go to ER/urgent care if after hours or symptoms persist/worsen.     Do not hesitate to get in touch with me should you have any further questions.     Thank you for trusting me with your care!  I wish you health and happiness.    -Jacqueline Fernandez PA-C

## 2022-06-03 ENCOUNTER — PATIENT MESSAGE (OUTPATIENT)
Dept: FAMILY MEDICINE | Facility: CLINIC | Age: 57
End: 2022-06-03
Payer: MEDICAID

## 2022-06-03 ENCOUNTER — PATIENT MESSAGE (OUTPATIENT)
Dept: PSYCHIATRY | Facility: CLINIC | Age: 57
End: 2022-06-03
Payer: MEDICAID

## 2022-07-11 ENCOUNTER — PATIENT MESSAGE (OUTPATIENT)
Dept: PSYCHIATRY | Facility: CLINIC | Age: 57
End: 2022-07-11
Payer: MEDICAID

## 2022-07-12 ENCOUNTER — OFFICE VISIT (OUTPATIENT)
Dept: PSYCHIATRY | Facility: CLINIC | Age: 57
End: 2022-07-12
Payer: MEDICAID

## 2022-07-12 DIAGNOSIS — F31.77 BIPOLAR 1 DISORDER, MIXED, PARTIAL REMISSION: Primary | ICD-10-CM

## 2022-07-12 DIAGNOSIS — F41.9 ANXIETY DISORDER, UNSPECIFIED TYPE: ICD-10-CM

## 2022-07-12 PROCEDURE — 1159F MED LIST DOCD IN RCRD: CPT | Mod: SA,HB,CPTII,95 | Performed by: NURSE PRACTITIONER

## 2022-07-12 PROCEDURE — 3044F PR MOST RECENT HEMOGLOBIN A1C LEVEL <7.0%: ICD-10-PCS | Mod: SA,HB,CPTII,95 | Performed by: NURSE PRACTITIONER

## 2022-07-12 PROCEDURE — 3044F HG A1C LEVEL LT 7.0%: CPT | Mod: SA,HB,CPTII,95 | Performed by: NURSE PRACTITIONER

## 2022-07-12 PROCEDURE — 90833 PR PSYCHOTHERAPY W/PATIENT W/E&M, 30 MIN (ADD ON): ICD-10-PCS | Mod: SA,HB,95, | Performed by: NURSE PRACTITIONER

## 2022-07-12 PROCEDURE — 1160F RVW MEDS BY RX/DR IN RCRD: CPT | Mod: SA,HB,CPTII,95 | Performed by: NURSE PRACTITIONER

## 2022-07-12 PROCEDURE — 99214 PR OFFICE/OUTPT VISIT, EST, LEVL IV, 30-39 MIN: ICD-10-PCS | Mod: SA,HB,95, | Performed by: NURSE PRACTITIONER

## 2022-07-12 PROCEDURE — 1159F PR MEDICATION LIST DOCUMENTED IN MEDICAL RECORD: ICD-10-PCS | Mod: SA,HB,CPTII,95 | Performed by: NURSE PRACTITIONER

## 2022-07-12 PROCEDURE — 99214 OFFICE O/P EST MOD 30 MIN: CPT | Mod: SA,HB,95, | Performed by: NURSE PRACTITIONER

## 2022-07-12 PROCEDURE — 90833 PSYTX W PT W E/M 30 MIN: CPT | Mod: SA,HB,95, | Performed by: NURSE PRACTITIONER

## 2022-07-12 PROCEDURE — 1160F PR REVIEW ALL MEDS BY PRESCRIBER/CLIN PHARMACIST DOCUMENTED: ICD-10-PCS | Mod: SA,HB,CPTII,95 | Performed by: NURSE PRACTITIONER

## 2022-07-12 RX ORDER — QUETIAPINE FUMARATE 50 MG/1
TABLET, FILM COATED ORAL
Qty: 90 TABLET | Refills: 0 | Status: SHIPPED | OUTPATIENT
Start: 2022-07-12 | End: 2022-10-06

## 2022-07-12 NOTE — PROGRESS NOTES
Outpatient Psychiatry Follow-Up Visit    Clinical Status of Patient: Outpatient (Virtual)  07/12/2022     [address] [place] [address]  [phone]   Visit type: Virtual visit with synchronous audio and video  Each patient to whom he or she provides medical services by telemedicine is:  (1) informed of the relationship between the physician and patient and the respective role of any other health care provider with respect to management of the patient; and (2) notified that he or she may decline to receive medical services by telemedicine and may withdraw from such care at any time.      Chief Complaint: Pt is a 56 year old female who presents today for a follow-up. Met with patient.       Interval History and Content of Current Session:  Interim Events/Subjective Report/Content of Current Session:  follow up appointment.    Pt is a 57 year old female with past psychiatric hx of Bipolar 1 disorder, anxiety NOS who presents for follow up treatment. She is currently taking Lithium 300mg BID, Xanax 0.5 mg QHS PRN for sleep (uses 2-3 x weekly), Seroquel 150mg QHS (Dec'd from 200mg QHS last session due to excessive drowsiness). Meds tolerated well and provide good symptomatic relief overall.    Between sessions, pt reports that she is doing well overall. She reports a stable mood and denies any episdoes of gamal or hypomania between visits. Anxiety has been manageable. Notes that she has been less groggy on lower dose of Seroquel and sleep has actually improved. She does note that she struggles with irritability and anger management. Pt stable and high functioning.    Most recent Li: 0.3: 3/22 - pt stable    Past Psychiatric hx: Pt. is a 55 year old female  with a past psychiatric hx of Bipolar 1 disorder, depressed who established care with me 07/19 following the custodial of her previous psych, Dr. Pelaez. She came to me taking Lithium 600mg BID and Xanax 0.5 mg QHS PRN for sleep (only uses this sparingly)  ", which had been previously prescribed and managed by Dr. Pelaez. She reports that her symptoms have stabilized over the last 7-8 months, and has been without a manic episode since November of 2018. Previous med trials of Lamictal (rash), Risperdal, Wellbutrin, some SSRIs (destablized mood, triggered manic episodes). Pt stable in clinic upon initial eval - all meds were continued at same dosage.      Notes a long standing history of "mood swings" dating to childhood, but first formal psych encounter in 1987 following the birth of her child. While admitted to the hospital following birth, and after being discharged, pt speaks to experiencing A/V hallucinations and euphoric gamal following a two-week period of going without sleep. "I was real paranoid after I had the baby. I felt that if I went to sleep, I wouldn't be able to hear the baby cry. I ended up going a couple weeks where I didn't sleep at all, started hearing things and seeing things that weren't there. I felt like people were trying to contact me and tell me things through the computer or the TV. I was paranoid and was thinking the vampires were after us. I started thinking I was someone I wasn't." She eventually sought psychiatric care in an outpatient seeing, but was not started on any psychiatric medication until 2002 (?). She was prescribed SSRIs and Wellbutrin at this time, which triggered a manic episode, and were d/c'd. She was eventually started on Lithium around 2005, and has achieved good results. States she has felt more stable than she's ever felt before.      Upon initial eval, pt reports manic episodes "once or twice a year" that are triggered by a lack of sleep. States that her manic episodes last for "weeks" and "I get hyper, my metabolism speeds up. I feel like I'm sped up. I always try to buy a car or a house. I had two houses at one time. It always ends with them putting me in the hospital." Does continue to experience delusional " "thinking and auditory hallucinations exclusively during manic episodes. Hx of several ED admissions in 03/19 r/t delusions, and "walking down the street talking to people who weren't there" Per - ED note. Reports inpatient hospitalization following manic episodes to [facility] x 1 week in November of 2018. Also reports another admission to [facility] in March of 2018 for 3 days following a manic episode. During these episodes, "I get very happy during those episodes. I start thinking I'm [place] and can communicate through the TV".      Denies any depressive symptoms. Reports sleeping 7-9 hours of sleep each night, restful. No issues falling or staying asleep. Denies anhedonia. Denies guilt/worthlessness. Energy good, concentration good. Appetite good. Denies psychomotor slowing, denies SI.      On 10/16, pt daughter reported "She seems a little bit better during the day, but is still pretty manic and only sleeping 2-3 hours at night, despite trying to up-titrate the medication.  She had to have a meeting with her supervisors at work yesterday, but they have fortunately been very understanding about her bipolar" Episodes do not meet criteria for true gamal/or hypomania, but we have been up-titrating Seroquel (started 10/19) to address symptoms - pt has responded well but reports dry mouth.I advised pt to add dose of Seroquel 50mg Q AM. However, she states pt states this made her groggy so d/c this. Despite this, pt reports at f/u "I've been feeling so much better. I'm actually sleeping now and definitely haven't been feeling manic at all. I feel way more calm now." However,  Notes overall less mood lability, less agitation. Denies manic/hypomanic episodes since last visit. She went to [place] on vacation between visit - enjoyed this. Pt doing well with no decompensations since last visit.  Does note continued generalized worry. Feels restless, on edge.       During her 5/20 f/u visit pt reports "I've been getting " "into bed at night, and sometimes it feels like someone else is getting in bed with me. It got to the point where I couldn't sleep in my bed because I was kind of scared. I thought it was my cat but my cat wasn't in the room. Sometime it even touches me, like it's brushing on either my foot or back. Like someone is in here trying to wake me up, but no one is in there. I usually just go and sleep on the sofa." Denies A/V hallucinations.     Seroquel was increased following this visit, Today, pt reports "I don't have those feelings in my bed anymore. I'm not afraid to get in bed anymore. Thanks goodness. I've been sleeping better." Does reports "feeling extremely tired when I take it in the morning." and requests to move to PM dosing. Tolerates well otherwise. Denies any gamal or hypomania between sessions.      Past Medical hx:   Past Medical History:   Diagnosis Date    Bipolar 1 disorder     Cancer     skin cancer to right arm    GERD (gastroesophageal reflux disease)     History of psychiatric hospitalization     Mental disorder     Thyroid disease         Interim hx:  Medication changes last visit: Inc seroquel to 50mg PO QAM, 100mg QHS  Anxiety: inc'd  Depression: denies     Denies suicidal/homicidal ideations.  Denies hopelessness/worthlessness.    Denies auditory/visual hallucinations       Tobacco: never used  Alcohol: denies   Drug use: denies  Caffeine: 1-2 cups coffee daily      Review of Systems   · PSYCHIATRIC: Pertinent items are noted in the narrative.        CONSTITUTIONAL: weight stable        M/S: no pain today         ENT: no allergies noted today        ABD: no n/v/d     Past Medical, Family and Social History: The patient's past medical, family and social history have been reviewed and updated as appropriate within the electronic medical record. See encounter notes.     Medication: Lithium 300mg TID, and Xanax 0.5 mg QHS PRN for sleep, Sseroquel 200mg QHS     Compliance: yes      Side " effects: dry mouth from Seroquel     Risk Parameters:  Patient reports no suicidal ideation  Patient reports no homicidal ideation  Patient reports no self-injurious behavior  Patient reports no violent behavior     Exam (detailed: at least 9 elements; comprehensive: all 15 elements)   Constitutional  Vitals:  Most recent vital signs, dated less than 90 days prior to this appointment, were reviewed. There were no vitals taken for this visit.     General:  unremarkable, age appropriate, casual attire, good eye contact, good rapport       Musculoskeletal  Muscle Strength/Tone:  no flaccidity, no tremor    Gait & Station:  normal      Psychiatric                       Speech:  normal tone, normal rate, rhythm, and volume   Mood & Affect:   Euthymic, congruent, appropriate         Thought Process:   Goal directed; Linear    Associations:   intact   Thought Content:   No SI/HI, + fixed delusions, or paranoia, no AV/VH   Insight & Judgement:   Good, adequate to circumstances   Orientation:   grossly intact; alert and oriented x 4    Memory:  intact for content of interview    Language:  grossly intact, can repeat    Attention Span  : Grossly intact for content of interview   Fund of Knowledge:   intact and appropriate to age and level of education        Assessment and Diagnosis   Status/Progress: Based on the examination today, the patient's problem(s) is/are under fair control.  New problems have not been presented today. Comorbidities are not currently complicating management of the primary condition.      Impression:     Pt is a 57 year old female with past psychiatric hx of Bipolar 1 disorder, anxiety NOS who presents for follow up treatment. She is currently taking Lithium 300mg BID, Xanax 0.5 mg QHS PRN for sleep (uses 2-3 x weekly), Seroquel 150mg QHS (Dec'd from 200mg QHS last session due to excessive drowsiness). Meds tolerated well and provide good symptomatic relief overall.    Between sessions, pt reports  that she is doing well overall. She reports a stable mood and denies any episdoes of gamal or hypomania between visits. Anxiety has been manageable. Notes that she has been less groggy on lower dose of Seroquel and sleep has actually improved. Pt stable and high functioning.    Most recent Li: 0.3: 3/22 - pt stable    Diagnosis: Bipolar 1 disorder, partial remission, anxiety unspecified    Intervention/Counseling/Treatment Plan   · Medication Management:      1) Continue Lithium 300mg BID for mood. Pt instructed to monitor closely for mood destabilization as we have been gradually tapering due to inc'd TSH.    2) Continue Seroquel 150mg QHS. Typical ELFEGO's reviewed including weight gain, abnormal movements, EPS, TD, metabolic side effects.      3) Continue Xanax 0.5 mg QHS PRN for sleep (uses a few times weekly). Discussed risk of decreased RT, sedation, addictive potential, and not to mix with alcohol.      4. Call to report any worsening of symptoms or problems with the medication. Pt instructed to go to ER with thoughts of harming self, others     5. Patient given contact # for psychotherapists at [facility] and also instructed she may check with insurance for list of providers.      6. Labs: no new orders       Return to clinic: 3 months - self sched    Psychotherapy:   · Target symptoms: inattention/distractibility, anxiety, anger management   · Why chosen therapy is appropriate versus another modality: relevant to diagnosis, patient responds to this modality  · Outcome monitoring methods: self-report, observation, feedback from family   · Therapeutic intervention type: supportive psychotherapy  · Topics discussed/themes: building skills sets for symptom management, symptom recognition, nutrition, exercise  · The patient's response to the intervention is accepting. The patient's progress toward treatment goals is positive progress.  · Duration of intervention: 20 minutes      -Spent 30min face to face  with the pt; >50% time spent in counseling   -Supportive therapy and psychoeducation provided  -R/B/SE's of medications discussed with the pt who expresses understanding and chooses to take medications as prescribed.   -Pt instructed to call clinic, 911 or go to nearest emergency room if sxs worsen or pt is in   crisis. The pt expresses understanding.    Montana Aguilar, NP

## 2022-07-18 ENCOUNTER — PATIENT MESSAGE (OUTPATIENT)
Dept: PSYCHIATRY | Facility: CLINIC | Age: 57
End: 2022-07-18
Payer: MEDICAID

## 2022-07-21 ENCOUNTER — OFFICE VISIT (OUTPATIENT)
Dept: PSYCHIATRY | Facility: CLINIC | Age: 57
End: 2022-07-21
Payer: MEDICAID

## 2022-07-21 DIAGNOSIS — F31.77 BIPOLAR 1 DISORDER, MIXED, PARTIAL REMISSION: Primary | ICD-10-CM

## 2022-07-21 DIAGNOSIS — F41.9 ANXIETY DISORDER, UNSPECIFIED TYPE: ICD-10-CM

## 2022-07-21 PROCEDURE — 1160F RVW MEDS BY RX/DR IN RCRD: CPT | Mod: SA,HB,CPTII,95 | Performed by: NURSE PRACTITIONER

## 2022-07-21 PROCEDURE — 99214 OFFICE O/P EST MOD 30 MIN: CPT | Mod: SA,HB,95, | Performed by: NURSE PRACTITIONER

## 2022-07-21 PROCEDURE — 1160F PR REVIEW ALL MEDS BY PRESCRIBER/CLIN PHARMACIST DOCUMENTED: ICD-10-PCS | Mod: SA,HB,CPTII,95 | Performed by: NURSE PRACTITIONER

## 2022-07-21 PROCEDURE — 1159F PR MEDICATION LIST DOCUMENTED IN MEDICAL RECORD: ICD-10-PCS | Mod: SA,HB,CPTII,95 | Performed by: NURSE PRACTITIONER

## 2022-07-21 PROCEDURE — 3044F HG A1C LEVEL LT 7.0%: CPT | Mod: SA,HB,CPTII,95 | Performed by: NURSE PRACTITIONER

## 2022-07-21 PROCEDURE — 1159F MED LIST DOCD IN RCRD: CPT | Mod: SA,HB,CPTII,95 | Performed by: NURSE PRACTITIONER

## 2022-07-21 PROCEDURE — 3044F PR MOST RECENT HEMOGLOBIN A1C LEVEL <7.0%: ICD-10-PCS | Mod: SA,HB,CPTII,95 | Performed by: NURSE PRACTITIONER

## 2022-07-21 PROCEDURE — 99214 PR OFFICE/OUTPT VISIT, EST, LEVL IV, 30-39 MIN: ICD-10-PCS | Mod: SA,HB,95, | Performed by: NURSE PRACTITIONER

## 2022-07-21 RX ORDER — HYDROXYZINE HYDROCHLORIDE 25 MG/1
25 TABLET, FILM COATED ORAL NIGHTLY PRN
Qty: 30 TABLET | Refills: 0 | Status: SHIPPED | OUTPATIENT
Start: 2022-07-21 | End: 2022-08-18

## 2022-07-21 NOTE — PROGRESS NOTES
Outpatient Psychiatry Follow-Up Visit    Clinical Status of Patient: Outpatient (Virtual)  07/21/2022     [address] [place] [address]  [phone]   Visit type: Virtual visit with synchronous audio and video  Each patient to whom he or she provides medical services by telemedicine is:  (1) informed of the relationship between the physician and patient and the respective role of any other health care provider with respect to management of the patient; and (2) notified that he or she may decline to receive medical services by telemedicine and may withdraw from such care at any time.      Chief Complaint: Pt is a 56 year old female who presents today for a follow-up. Met with patient.       Interval History and Content of Current Session:  Interim Events/Subjective Report/Content of Current Session:  follow up appointment.    Pt is a 57 year old female with past psychiatric hx of Bipolar 1 disorder, anxiety NOS who presents for follow up treatment. She is currently taking Lithium 300mg BID, Xanax 0.5 mg QHS PRN for sleep (uses 2-3 x weekly), Seroquel 150mg QHS. Meds tolerated well and provide good symptomatic relief overall. We recently dec'd seroquel from 200mg qhs to 150mg qhs to combat daytime fatigue. She has responded well and notes improving energy levels throughout the day.     Since last visit, pt notes that she has been waking up several times nightly and is not sleeping as soundly. Pt is concerned because poor sleep has historically preceded manic episodes. However, pt mood has been stable and she denies any gamal between sessions. She reports minimal anxiety and good stress management. Denies any excessive pr ruminative worrying. Pt high functioning.       Most recent Li: 0.3: 3/22 - pt stable    Past Psychiatric hx: Pt. is a 55 year old female  with a past psychiatric hx of Bipolar 1 disorder, depressed who established care with me 07/19 following the penitentiary of her previous psych,   "Latanya. She came to me taking Lithium 600mg BID and Xanax 0.5 mg QHS PRN for sleep (only uses this sparingly) , which had been previously prescribed and managed by Dr. Pelaez. She reports that her symptoms have stabilized over the last 7-8 months, and has been without a manic episode since November of 2018. Previous med trials of Lamictal (rash), Risperdal, Wellbutrin, some SSRIs (destablized mood, triggered manic episodes). Pt stable in clinic upon initial eval - all meds were continued at same dosage.      Notes a long standing history of "mood swings" dating to childhood, but first formal psych encounter in 1987 following the birth of her child. While admitted to the hospital following birth, and after being discharged, pt speaks to experiencing A/V hallucinations and euphoric gamal following a two-week period of going without sleep. "I was real paranoid after I had the baby. I felt that if I went to sleep, I wouldn't be able to hear the baby cry. I ended up going a couple weeks where I didn't sleep at all, started hearing things and seeing things that weren't there. I felt like people were trying to contact me and tell me things through the computer or the TV. I was paranoid and was thinking the vampires were after us. I started thinking I was someone I wasn't." She eventually sought psychiatric care in an outpatient seeing, but was not started on any psychiatric medication until 2002 (?). She was prescribed SSRIs and Wellbutrin at this time, which triggered a manic episode, and were d/c'd. She was eventually started on Lithium around 2005, and has achieved good results. States she has felt more stable than she's ever felt before.      Upon initial eval, pt reports manic episodes "once or twice a year" that are triggered by a lack of sleep. States that her manic episodes last for "weeks" and "I get hyper, my metabolism speeds up. I feel like I'm sped up. I always try to buy a car or a house. I had two houses at " "one time. It always ends with them putting me in the hospital." Does continue to experience delusional thinking and auditory hallucinations exclusively during manic episodes. Hx of several ED admissions in 03/19 r/t delusions, and "walking down the street talking to people who weren't there" Per - ED note. Reports inpatient hospitalization following manic episodes to [facility] x 1 week in November of 2018. Also reports another admission to [facility] in March of 2018 for 3 days following a manic episode. During these episodes, "I get very happy during those episodes. I start thinking I'm [place] and can communicate through the TV".      Denies any depressive symptoms. Reports sleeping 7-9 hours of sleep each night, restful. No issues falling or staying asleep. Denies anhedonia. Denies guilt/worthlessness. Energy good, concentration good. Appetite good. Denies psychomotor slowing, denies SI.      On 10/16, pt daughter reported "She seems a little bit better during the day, but is still pretty manic and only sleeping 2-3 hours at night, despite trying to up-titrate the medication.  She had to have a meeting with her supervisors at work yesterday, but they have fortunately been very understanding about her bipolar" Episodes do not meet criteria for true gamal/or hypomania, but we have been up-titrating Seroquel (started 10/19) to address symptoms - pt has responded well but reports dry mouth.I advised pt to add dose of Seroquel 50mg Q AM. However, she states pt states this made her groggy so d/c this. Despite this, pt reports at f/u "I've been feeling so much better. I'm actually sleeping now and definitely haven't been feeling manic at all. I feel way more calm now." However,  Notes overall less mood lability, less agitation. Denies manic/hypomanic episodes since last visit. She went to [place] on vacation between visit - enjoyed this. Pt doing well with no decompensations since last visit.  Does note continued " "generalized worry. Feels restless, on edge.       During her 5/20 f/u visit pt reports "I've been getting into bed at night, and sometimes it feels like someone else is getting in bed with me. It got to the point where I couldn't sleep in my bed because I was kind of scared. I thought it was my cat but my cat wasn't in the room. Sometime it even touches me, like it's brushing on either my foot or back. Like someone is in here trying to wake me up, but no one is in there. I usually just go and sleep on the sofa." Denies A/V hallucinations.     Seroquel was increased following this visit, Today, pt reports "I don't have those feelings in my bed anymore. I'm not afraid to get in bed anymore. Thanks goodness. I've been sleeping better." Does reports "feeling extremely tired when I take it in the morning." and requests to move to PM dosing. Tolerates well otherwise. Denies any gamal or hypomania between sessions.      Past Medical hx:   Past Medical History:   Diagnosis Date    Bipolar 1 disorder     Cancer     skin cancer to right arm    GERD (gastroesophageal reflux disease)     History of psychiatric hospitalization     Mental disorder     Thyroid disease         Interim hx:  Medication changes last visit: Inc seroquel to 50mg PO QAM, 100mg QHS  Anxiety: inc'd  Depression: denies     Denies suicidal/homicidal ideations.  Denies hopelessness/worthlessness.    Denies auditory/visual hallucinations       Tobacco: never used  Alcohol: denies   Drug use: denies  Caffeine: 1-2 cups coffee daily      Review of Systems   · PSYCHIATRIC: Pertinent items are noted in the narrative.        CONSTITUTIONAL: weight stable        M/S: no pain today         ENT: no allergies noted today        ABD: no n/v/d     Past Medical, Family and Social History: The patient's past medical, family and social history have been reviewed and updated as appropriate within the electronic medical record. See encounter notes.     Medication: " Lithium 300mg TID, and Xanax 0.5 mg QHS PRN for sleep, Sseroquel 200mg QHS     Compliance: yes      Side effects: dry mouth from Seroquel     Risk Parameters:  Patient reports no suicidal ideation  Patient reports no homicidal ideation  Patient reports no self-injurious behavior  Patient reports no violent behavior     Exam (detailed: at least 9 elements; comprehensive: all 15 elements)   Constitutional  Vitals:  Most recent vital signs, dated less than 90 days prior to this appointment, were reviewed. There were no vitals taken for this visit.     General:  unremarkable, age appropriate, casual attire, good eye contact, good rapport       Musculoskeletal  Muscle Strength/Tone:  no flaccidity, no tremor    Gait & Station:  normal      Psychiatric                       Speech:  normal tone, normal rate, rhythm, and volume   Mood & Affect:   Euthymic, congruent, appropriate         Thought Process:   Goal directed; Linear    Associations:   intact   Thought Content:   No SI/HI, + fixed delusions, or paranoia, no AV/VH   Insight & Judgement:   Good, adequate to circumstances   Orientation:   grossly intact; alert and oriented x 4    Memory:  intact for content of interview    Language:  grossly intact, can repeat    Attention Span  : Grossly intact for content of interview   Fund of Knowledge:   intact and appropriate to age and level of education        Assessment and Diagnosis   Status/Progress: Based on the examination today, the patient's problem(s) is/are under fair control.  New problems have not been presented today. Comorbidities are not currently complicating management of the primary condition.      Impression:     Pt is a 57 year old female with past psychiatric hx of Bipolar 1 disorder, anxiety NOS who presents for follow up treatment. She is currently taking Lithium 300mg BID, Xanax 0.5 mg QHS PRN for sleep (uses 2-3 x weekly), Seroquel 150mg QHS. Meds tolerated well and provide good symptomatic relief  overall. We recently dec'd seroquel from 200mg qhs to 150mg qhs to combat daytime fatigue. She has responded well and notes improving energy levels throughout the day.     Since last visit, pt notes that she has been waking up several times nightly and is not sleeping as soundly. Pt is concerned because poor sleep has historically preceded manic episodes. However, pt mood has been stable and she denies any gamal between sessions. She reports minimal anxiety and good stress management. Denies any excessive pr ruminative worrying. Pt high functioning.       Most recent Li: 0.3: 3/22 - pt stable    Diagnosis: Bipolar 1 disorder, partial remission, anxiety unspecified    Intervention/Counseling/Treatment Plan   · Medication Management:      1) Continue Lithium 300mg BID for mood. Pt instructed to monitor closely for mood destabilization as we have been gradually tapering due to inc'd TSH.    2) Continue Seroquel 150mg QHS. Typical ELFEGO's reviewed including weight gain, abnormal movements, EPS, TD, metabolic side effects.      3) Continue Xanax 0.5 mg QHS PRN for sleep (uses once weekly. Discussed risk of decreased RT, sedation, addictive potential, and not to mix with alcohol.      4. Call to report any worsening of symptoms or problems with the medication. Pt instructed to go to ER with thoughts of harming self, others     5. Patient given contact # for psychotherapists at [facility] and also instructed she may check with insurance for list of providers.      6. Labs: no new orders       Return to clinic: 8 weeks    -Spent 30min face to face with the pt; >50% time spent in counseling   -Supportive therapy and psychoeducation provided  -R/B/SE's of medications discussed with the pt who expresses understanding and chooses to take medications as prescribed.   -Pt instructed to call clinic, 911 or go to nearest emergency room if sxs worsen or pt is in   crisis. The pt expresses understanding.    [place] Aguilar,  NP

## 2022-08-03 ENCOUNTER — OFFICE VISIT (OUTPATIENT)
Dept: ORTHOPEDICS | Facility: CLINIC | Age: 57
End: 2022-08-03
Payer: MEDICAID

## 2022-08-03 VITALS — BODY MASS INDEX: 41.78 KG/M2 | WEIGHT: 260 LBS | HEIGHT: 66 IN

## 2022-08-03 DIAGNOSIS — G56.01 CARPAL TUNNEL SYNDROME ON RIGHT: Primary | ICD-10-CM

## 2022-08-03 PROCEDURE — 99213 OFFICE O/P EST LOW 20 MIN: CPT | Mod: PBBFAC,PN | Performed by: ORTHOPAEDIC SURGERY

## 2022-08-03 PROCEDURE — 99214 PR OFFICE/OUTPT VISIT, EST, LEVL IV, 30-39 MIN: ICD-10-PCS | Mod: S$PBB,,, | Performed by: ORTHOPAEDIC SURGERY

## 2022-08-03 PROCEDURE — 3044F HG A1C LEVEL LT 7.0%: CPT | Mod: CPTII,,, | Performed by: ORTHOPAEDIC SURGERY

## 2022-08-03 PROCEDURE — 1159F MED LIST DOCD IN RCRD: CPT | Mod: CPTII,,, | Performed by: ORTHOPAEDIC SURGERY

## 2022-08-03 PROCEDURE — 3008F BODY MASS INDEX DOCD: CPT | Mod: CPTII,,, | Performed by: ORTHOPAEDIC SURGERY

## 2022-08-03 PROCEDURE — 3008F PR BODY MASS INDEX (BMI) DOCUMENTED: ICD-10-PCS | Mod: CPTII,,, | Performed by: ORTHOPAEDIC SURGERY

## 2022-08-03 PROCEDURE — 3044F PR MOST RECENT HEMOGLOBIN A1C LEVEL <7.0%: ICD-10-PCS | Mod: CPTII,,, | Performed by: ORTHOPAEDIC SURGERY

## 2022-08-03 PROCEDURE — 99214 OFFICE O/P EST MOD 30 MIN: CPT | Mod: S$PBB,,, | Performed by: ORTHOPAEDIC SURGERY

## 2022-08-03 PROCEDURE — 99999 PR PBB SHADOW E&M-EST. PATIENT-LVL III: CPT | Mod: PBBFAC,,, | Performed by: ORTHOPAEDIC SURGERY

## 2022-08-03 PROCEDURE — 1159F PR MEDICATION LIST DOCUMENTED IN MEDICAL RECORD: ICD-10-PCS | Mod: CPTII,,, | Performed by: ORTHOPAEDIC SURGERY

## 2022-08-03 PROCEDURE — 99999 PR PBB SHADOW E&M-EST. PATIENT-LVL III: ICD-10-PCS | Mod: PBBFAC,,, | Performed by: ORTHOPAEDIC SURGERY

## 2022-08-03 NOTE — PROGRESS NOTES
8/3/2022    Chief Complaint:  Chief Complaint   Patient presents with    Right Hand - Pain       HPI:  Cristina Tan is a 57 y.o. female, who presents to clinic today she has a history of bilateral carpal tunnel syndrome.  She underwent a left carpal tunnel release and did very well.  She continues to have right carpal tunnel symptoms.  She has been injected in the past and did well.  She is here today to discuss further treatment options.    PMHX:  Past Medical History:   Diagnosis Date    Bipolar 1 disorder     Cancer     skin cancer to right arm    GERD (gastroesophageal reflux disease)     History of psychiatric hospitalization     Mental disorder     Thyroid disease        PSHX:  Past Surgical History:   Procedure Laterality Date    breast reduction      CARPAL TUNNEL RELEASE Left 2021    Procedure: RELEASE, CARPAL TUNNEL;  Surgeon: Ld Carbone MD;  Location: [facility];  Service: Orthopedics;  Laterality: Left;  Procedure:  Left carpal tunnel release    Position:  Supine    Anesthesia:  Local    Equipment:  Carpal tunnel set     SECTION      CHOLECYSTECTOMY      COLONOSCOPY N/A 2022    Procedure: COLONOSCOPY;  Surgeon: Rodrick Mccabe MD;  Location: [address]);  Service: Endoscopy;  Laterality: N/A;  Fully vaccinated, prep instr emailed -ml    gastric sleeve      HYSTERECTOMY      OOPHORECTOMY      TONSILLECTOMY      TOTAL REDUCTION MAMMOPLASTY Bilateral         tummy tuck         FMHX:  Family History   Problem Relation Age of Onset    Cancer Father     Colon polyps Sister     Breast cancer Neg Hx     Ovarian cancer Neg Hx     Diabetes Neg Hx     Hypertension Neg Hx     Thyroid disease Neg Hx        SOCHX:  Social History     Tobacco Use    Smoking status: Never Smoker    Smokeless tobacco: Never Used   Substance Use Topics    Alcohol use: Yes     Alcohol/week: 2.0 standard drinks     Types: 2 Shots of liquor per week  "      ALLERGIES:  Lamictal [lamotrigine]    CURRENT MEDICATIONS:  Current Outpatient Medications on File Prior to Visit   Medication Sig Dispense Refill    ALPRAZolam (XANAX) 0.5 MG tablet Take 1 tablet (0.5 mg total) by mouth daily as needed for Anxiety. 30 tablet 0    atorvastatin (LIPITOR) 20 MG tablet Take 1 tablet (20 mg total) by mouth once daily. 90 tablet 1    hydrOXYzine HCL (ATARAX) 25 MG tablet Take 1 tablet (25 mg total) by mouth nightly as needed for Anxiety. 30 tablet 0    levothyroxine (SYNTHROID) 50 MCG tablet TAKE 1 TABLET(50 MCG) BY MOUTH BEFORE BREAKFAST 30 tablet 11    lithium (ESKALITH) 300 MG capsule Take 1 capsule (300 mg total) by mouth 2 (two) times a day. 60 capsule 3    omeprazole (PRILOSEC) 20 MG capsule TAKE 1 CAPSULE(20 MG) BY MOUTH ONCE DAILY 30 capsule 11    QUEtiapine (SEROQUEL) 50 MG tablet Take 1 tablet (50 mg total) by mouth nightly WITH quetiapine 100 mg tablet 90 tablet 0     No current facility-administered medications on file prior to visit.       REVIEW OF SYSTEMS:  ROS    GENERAL PHYSICAL EXAM:   Ht 5' 6" (1.676 m)   Wt 117.9 kg (260 lb)   BMI 41.97 kg/m²    GEN: well developed, well nourished, no acute distress   HENT: Normocephalic, atraumatic   EYES: No discharge, conjunctiva normal   NECK: Supple, non-tender   PULM: No wheezing, no respiratory distress   CV: RRR   ABD: Soft, non-tender    ORTHO EXAM:   Examination the right hand and wrist reveals that there is no edema.  There are no skin changes.  She does have paresthesias in the median distribution.  Has 5/5 thenar muscular strength.  She has a positive Tinel's and a positive Durkan's test.  She has a 2+ radial pulse.    EMG/nerve conduction study:   Nerve conduction study has been reviewed.  It is consistent with severe left and moderate right carpal tunnel syndrome    ASSESSMENT:   Right carpal tunnel syndrome    PLAN:  1. I have discussed treatment options with the patient.  I have discussed the risks and " benefits of a carpal tunnel release.  After discussion of the risks and benefits informed consent has been obtained    2. Will proceed with right carpal tunnel release under general anesthesia as she had local with her last surgery and was significantly anxious    3. She will follow up with me 2 weeks postoperatively

## 2022-08-03 NOTE — PATIENT INSTRUCTIONS
Surgery Instructions:     Your surgery is scheduled on 08/09/22 at the surgery center: [address] [facility] [address], 1st floor, second entrance.    The pre-op department will be in contact with you prior to your procedure to review medications and instructions.       Nothing to eat or drink after midnight prior to day of surgery.    You should STOP taking any blood thinners 5 days prior to surgery.     Please obtain medical and cardiac clearance as advised prior to surgery. All preoperative testing should be done as soon as possible as it may be needed to obtain clearance.    Your pre op COVID-19 test collection is not needed due to being fully vaccinated.    The surgery center will contact you the day prior to surgery to advise you of your arrival time for surgery.     Your post op appointment is scheduled on 08/24/22 @ 10:40am.    You will be contacted by an automated text message every morning for for 14 days after your surgery inquiring if you have any COVID symptoms.  If you have any concerns regarding COVID please reply to the text message and then an [facility] On Call Registered Nurse will contact you later that day.

## 2022-08-04 ENCOUNTER — TELEPHONE (OUTPATIENT)
Dept: ORTHOPEDICS | Facility: CLINIC | Age: 57
End: 2022-08-04
Payer: MEDICAID

## 2022-08-04 NOTE — TELEPHONE ENCOUNTER
Chief Complaint   Patient presents with     New Patient     NEW self referral d/t Afib      Vitals were taken, medications reconciled, and EKG performed.    Donato Forte  1:03 PM     Returned call to pt, sx date r/s to 11/17.

## 2022-08-04 NOTE — TELEPHONE ENCOUNTER
Spoke with pt in regards to needing appt, stated to pt we currently have no new medicaid appts available and pt was given number to [facility] orthopedics. Pt verbalize understands.

## 2022-08-04 NOTE — TELEPHONE ENCOUNTER
----- Message from Ricardo Jefferson sent at 8/4/2022  4:31 PM CDT -----  Type:  Sooner Apoointment Request    Caller is requesting a sooner appointment.  Caller declined first available appointment listed below.  Caller will not accept being placed on the waitlist and is requesting a message be sent to doctor.  Name of Caller:Cristina Tan     When is the first available appointment?no available appointments     Symptoms:Right heal Pain     Would the patient rather a call back or a response via [facility]? Call back     Best Call Back Number:[phone] (mobile)     Additional Information:1) [facility]MEDICAID[facility]

## 2022-08-04 NOTE — TELEPHONE ENCOUNTER
----- Message from Lamar Salinas MA sent at 8/4/2022  9:11 AM CDT -----  Contact: pt  Wants to change surgery date   Call back    To 11/29

## 2022-08-08 DIAGNOSIS — E03.9 HYPOTHYROIDISM, UNSPECIFIED TYPE: ICD-10-CM

## 2022-08-08 RX ORDER — LEVOTHYROXINE SODIUM 50 UG/1
TABLET ORAL
Qty: 30 TABLET | Refills: 11 | OUTPATIENT
Start: 2022-08-08

## 2022-08-08 NOTE — TELEPHONE ENCOUNTER
No new care gaps identified.  [facility] Embedded Care Gaps. Reference number: [id number]. 8/08/2022   10:58:30 AM CDT

## 2022-08-09 NOTE — TELEPHONE ENCOUNTER
Refill Decision Note   Cristina Dominick  is requesting a refill authorization.  Brief Assessment and Rationale for Refill:  Quick Discontinue     Medication Therapy Plan:       Medication Reconciliation Completed: No   Comments:     No Care Gaps recommended.     Note composed:8:14 PM 08/08/2022

## 2022-08-23 ENCOUNTER — OFFICE VISIT (OUTPATIENT)
Dept: OPTOMETRY | Facility: CLINIC | Age: 57
End: 2022-08-23
Payer: MEDICAID

## 2022-08-23 DIAGNOSIS — H52.4 PRESBYOPIA: ICD-10-CM

## 2022-08-23 DIAGNOSIS — H52.203 ASTIGMATISM OF BOTH EYES, UNSPECIFIED TYPE: ICD-10-CM

## 2022-08-23 DIAGNOSIS — H25.13 NS (NUCLEAR SCLEROSIS), BILATERAL: Primary | ICD-10-CM

## 2022-08-23 PROCEDURE — 99999 PR PBB SHADOW E&M-EST. PATIENT-LVL II: CPT | Mod: PBBFAC,,, | Performed by: OPTOMETRIST

## 2022-08-23 PROCEDURE — 92015 DETERMINE REFRACTIVE STATE: CPT | Mod: ,,, | Performed by: OPTOMETRIST

## 2022-08-23 PROCEDURE — 1159F PR MEDICATION LIST DOCUMENTED IN MEDICAL RECORD: ICD-10-PCS | Mod: CPTII,,, | Performed by: OPTOMETRIST

## 2022-08-23 PROCEDURE — 92004 COMPRE OPH EXAM NEW PT 1/>: CPT | Mod: S$PBB,,, | Performed by: OPTOMETRIST

## 2022-08-23 PROCEDURE — 3044F HG A1C LEVEL LT 7.0%: CPT | Mod: CPTII,,, | Performed by: OPTOMETRIST

## 2022-08-23 PROCEDURE — 3044F PR MOST RECENT HEMOGLOBIN A1C LEVEL <7.0%: ICD-10-PCS | Mod: CPTII,,, | Performed by: OPTOMETRIST

## 2022-08-23 PROCEDURE — 99999 PR PBB SHADOW E&M-EST. PATIENT-LVL II: ICD-10-PCS | Mod: PBBFAC,,, | Performed by: OPTOMETRIST

## 2022-08-23 PROCEDURE — 92004 PR EYE EXAM, NEW PATIENT,COMPREHESV: ICD-10-PCS | Mod: S$PBB,,, | Performed by: OPTOMETRIST

## 2022-08-23 PROCEDURE — 92015 PR REFRACTION: ICD-10-PCS | Mod: ,,, | Performed by: OPTOMETRIST

## 2022-08-23 PROCEDURE — 1159F MED LIST DOCD IN RCRD: CPT | Mod: CPTII,,, | Performed by: OPTOMETRIST

## 2022-08-23 PROCEDURE — 99212 OFFICE O/P EST SF 10 MIN: CPT | Mod: PBBFAC | Performed by: OPTOMETRIST

## 2022-08-23 NOTE — PROGRESS NOTES
HPI     Concerns About Ocular Health      Additional comments: Patient Cristina Tan is a 57 year old female.              Comments     Pt here for new patient routine eye exam. Pt states that she has trouble   with both distance and near in glasses. Pt states that she can read better   without glasses and she relies in OD to see at near. Pt states occasional   flashes in light in [facility] when looking from side to side.    JEIMY: 1 year ago    Gtts: None    POHx: None    FOHx: (+)cataracts - mother            Last edited by Brittanie Almeida on 8/23/2022 10:29 AM. (History)            Assessment /Plan     For exam results, see Encounter Report.    NS (nuclear sclerosis), bilateral    Astigmatism of both eyes, unspecified type    Presbyopia      Good overall ocular health, monitor yearly  Rx specs

## 2022-08-24 ENCOUNTER — PATIENT MESSAGE (OUTPATIENT)
Dept: PSYCHIATRY | Facility: CLINIC | Age: 57
End: 2022-08-24

## 2022-08-25 ENCOUNTER — OFFICE VISIT (OUTPATIENT)
Dept: PSYCHIATRY | Facility: CLINIC | Age: 57
End: 2022-08-25
Payer: MEDICAID

## 2022-08-25 DIAGNOSIS — Z79.899 HIGH RISK MEDICATION USE: Primary | ICD-10-CM

## 2022-08-25 PROCEDURE — 1160F RVW MEDS BY RX/DR IN RCRD: CPT | Mod: SA,HB,CPTII,95 | Performed by: NURSE PRACTITIONER

## 2022-08-25 PROCEDURE — 99214 OFFICE O/P EST MOD 30 MIN: CPT | Mod: SA,HB,95, | Performed by: NURSE PRACTITIONER

## 2022-08-25 PROCEDURE — 99214 PR OFFICE/OUTPT VISIT, EST, LEVL IV, 30-39 MIN: ICD-10-PCS | Mod: SA,HB,95, | Performed by: NURSE PRACTITIONER

## 2022-08-25 PROCEDURE — 1159F PR MEDICATION LIST DOCUMENTED IN MEDICAL RECORD: ICD-10-PCS | Mod: SA,HB,CPTII,95 | Performed by: NURSE PRACTITIONER

## 2022-08-25 PROCEDURE — 3044F HG A1C LEVEL LT 7.0%: CPT | Mod: SA,HB,CPTII,95 | Performed by: NURSE PRACTITIONER

## 2022-08-25 PROCEDURE — 3044F PR MOST RECENT HEMOGLOBIN A1C LEVEL <7.0%: ICD-10-PCS | Mod: SA,HB,CPTII,95 | Performed by: NURSE PRACTITIONER

## 2022-08-25 PROCEDURE — 1159F MED LIST DOCD IN RCRD: CPT | Mod: SA,HB,CPTII,95 | Performed by: NURSE PRACTITIONER

## 2022-08-25 PROCEDURE — 1160F PR REVIEW ALL MEDS BY PRESCRIBER/CLIN PHARMACIST DOCUMENTED: ICD-10-PCS | Mod: SA,HB,CPTII,95 | Performed by: NURSE PRACTITIONER

## 2022-08-25 RX ORDER — ALPRAZOLAM 0.5 MG/1
0.5 TABLET ORAL DAILY PRN
Qty: 30 TABLET | Refills: 0 | Status: SHIPPED | OUTPATIENT
Start: 2022-08-25 | End: 2022-10-20 | Stop reason: SDUPTHER

## 2022-08-25 NOTE — PROGRESS NOTES
Outpatient Psychiatry Follow-Up Visit    Clinical Status of Patient: Outpatient (Virtual)  08/25/2022     [id number] [id number]  [phone]   Visit type: Virtual visit with synchronous audio and video  Each patient to whom he or she provides medical services by telemedicine is:  (1) informed of the relationship between the physician and patient and the respective role of any other health care provider with respect to management of the patient; and (2) notified that he or she may decline to receive medical services by telemedicine and may withdraw from such care at any time.      Chief Complaint: Pt is a 56 year old female who presents today for a follow-up. Met with patient.       Interval History and Content of Current Session:  Interim Events/Subjective Report/Content of Current Session:  follow up appointment.    Pt is a 57 year old female with past psychiatric hx of Bipolar 1 disorder, anxiety NOS who presents for follow up treatment. She is currently taking Lithium 300mg BID, Xanax 0.5 mg QHS PRN for sleep (uses 2-3 x weekly), Seroquel 150mg QHS. Meds tolerated well and provide good symptomatic relief overall.    Today, pt notes that she is doing well overall. Denies any episodes of depression or gamal/hypomania. Anxiety well-managed, denies any generalized/ruminative worrying. Sleeping well despite waking a few times throughout the night. Pt stable and high functioning.       Most recent Li: 0.3: 3/22 - pt stable    Past Psychiatric hx: Pt. is a 55 year old female  with a past psychiatric hx of Bipolar 1 disorder, depressed who established care with me 07/19 following the jail of her previous psych, Dr. Pelaez. She came to me taking Lithium 600mg BID and Xanax 0.5 mg QHS PRN for sleep (only uses this sparingly) , which had been previously prescribed and managed by Dr. Pelaez. She reports that her symptoms have stabilized over the last 7-8 months, and has been without a manic episode  "since November of 2018. Previous med trials of Lamictal (rash), Risperdal, Wellbutrin, some SSRIs (destablized mood, triggered manic episodes). Pt stable in clinic upon initial eval - all meds were continued at same dosage.      Notes a long standing history of "mood swings" dating to childhood, but first formal psych encounter in 1987 following the birth of her child. While admitted to the hospital following birth, and after being discharged, pt speaks to experiencing A/V hallucinations and euphoric gamal following a two-week period of going without sleep. "I was real paranoid after I had the baby. I felt that if I went to sleep, I wouldn't be able to hear the baby cry. I ended up going a couple weeks where I didn't sleep at all, started hearing things and seeing things that weren't there. I felt like people were trying to contact me and tell me things through the computer or the TV. I was paranoid and was thinking the vampires were after us. I started thinking I was someone I wasn't." She eventually sought psychiatric care in an outpatient seeing, but was not started on any psychiatric medication until 2002 (?). She was prescribed SSRIs and Wellbutrin at this time, which triggered a manic episode, and were d/c'd. She was eventually started on Lithium around 2005, and has achieved good results. States she has felt more stable than she's ever felt before.      Upon initial eval, pt reports manic episodes "once or twice a year" that are triggered by a lack of sleep. States that her manic episodes last for "weeks" and "I get hyper, my metabolism speeds up. I feel like I'm sped up. I always try to buy a car or a house. I had two houses at one time. It always ends with them putting me in the hospital." Does continue to experience delusional thinking and auditory hallucinations exclusively during manic episodes. Hx of several ED admissions in 03/19 r/t delusions, and "walking down the street talking to people who weren't " "there" Per - ED note. Reports inpatient hospitalization following manic episodes to [place] x 1 week in November of 2018. Also reports another admission to [facility] in March of 2018 for 3 days following a manic episode. During these episodes, "I get very happy during those episodes. I start thinking I'm [place] and can communicate through the TV".      Denies any depressive symptoms. Reports sleeping 7-9 hours of sleep each night, restful. No issues falling or staying asleep. Denies anhedonia. Denies guilt/worthlessness. Energy good, concentration good. Appetite good. Denies psychomotor slowing, denies SI.      On 10/16, pt daughter reported "She seems a little bit better during the day, but is still pretty manic and only sleeping 2-3 hours at night, despite trying to up-titrate the medication.  She had to have a meeting with her supervisors at work yesterday, but they have fortunately been very understanding about her bipolar" Episodes do not meet criteria for true gamal/or hypomania, but we have been up-titrating Seroquel (started 10/19) to address symptoms - pt has responded well but reports dry mouth.I advised pt to add dose of Seroquel 50mg Q AM. However, she states pt states this made her groggy so d/c this. Despite this, pt reports at f/u "I've been feeling so much better. I'm actually sleeping now and definitely haven't been feeling manic at all. I feel way more calm now." However,  Notes overall less mood lability, less agitation. Denies manic/hypomanic episodes since last visit. She went to [place] on vacation between visit - enjoyed this. Pt doing well with no decompensations since last visit.  Does note continued generalized worry. Feels restless, on edge.       During her 5/20 f/u visit pt reports "I've been getting into bed at night, and sometimes it feels like someone else is getting in bed with me. It got to the point where I couldn't sleep in my bed because I was kind of scared. I thought it " "was my cat but my cat wasn't in the room. Sometime it even touches me, like it's brushing on either my foot or back. Like someone is in here trying to wake me up, but no one is in there. I usually just go and sleep on the sofa." Denies A/V hallucinations.     Seroquel was increased following this visit, Today, pt reports "I don't have those feelings in my bed anymore. I'm not afraid to get in bed anymore. Thanks goodness. I've been sleeping better." Does reports "feeling extremely tired when I take it in the morning." and requests to move to PM dosing. Tolerates well otherwise. Denies any gamal or hypomania between sessions.      Past Medical hx:   Past Medical History:   Diagnosis Date    Bipolar 1 disorder     Cancer     skin cancer to right arm    GERD (gastroesophageal reflux disease)     History of psychiatric hospitalization     Mental disorder     Thyroid disease         Interim hx:  Medication changes last visit: Inc seroquel to 50mg PO QAM, 100mg QHS  Anxiety: inc'd  Depression: denies     Denies suicidal/homicidal ideations.  Denies hopelessness/worthlessness.    Denies auditory/visual hallucinations       Tobacco: never used  Alcohol: denies   Drug use: denies  Caffeine: 1-2 cups coffee daily      Review of Systems   · PSYCHIATRIC: Pertinent items are noted in the narrative.        CONSTITUTIONAL: weight stable        M/S: no pain today         ENT: no allergies noted today        ABD: no n/v/d     Past Medical, Family and Social History: The patient's past medical, family and social history have been reviewed and updated as appropriate within the electronic medical record. See encounter notes.     Medication: Lithium 300mg TID, and Xanax 0.5 mg QHS PRN for sleep, Sseroquel 200mg QHS     Compliance: yes      Side effects: dry mouth from Seroquel     Risk Parameters:  Patient reports no suicidal ideation  Patient reports no homicidal ideation  Patient reports no self-injurious behavior  Patient " reports no violent behavior     Exam (detailed: at least 9 elements; comprehensive: all 15 elements)   Constitutional  Vitals:  Most recent vital signs, dated less than 90 days prior to this appointment, were reviewed. There were no vitals taken for this visit.     General:  unremarkable, age appropriate, casual attire, good eye contact, good rapport       Musculoskeletal  Muscle Strength/Tone:  no flaccidity, no tremor    Gait & Station:  normal      Psychiatric                       Speech:  normal tone, normal rate, rhythm, and volume   Mood & Affect:   Euthymic, congruent, appropriate         Thought Process:   Goal directed; Linear    Associations:   intact   Thought Content:   No SI/HI, + fixed delusions, or paranoia, no AV/VH   Insight & Judgement:   Good, adequate to circumstances   Orientation:   grossly intact; alert and oriented x 4    Memory:  intact for content of interview    Language:  grossly intact, can repeat    Attention Span  : Grossly intact for content of interview   Fund of Knowledge:   intact and appropriate to age and level of education        Assessment and Diagnosis   Status/Progress: Based on the examination today, the patient's problem(s) is/are under fair control.  New problems have not been presented today. Comorbidities are not currently complicating management of the primary condition.      Impression:   Pt is a 57 year old female with past psychiatric hx of Bipolar 1 disorder, anxiety NOS who presents for follow up treatment. She is currently taking Lithium 300mg BID, Xanax 0.5 mg QHS PRN for sleep (uses 2-3 x weekly), Seroquel 150mg QHS. Meds tolerated well and provide good symptomatic relief overall.    Today, pt notes that she is doing well overall. Denies any episodes of depression or gamal/hypomania. Anxiety well-managed, denies any generalized/ruminative worrying. Sleeping well despite waking a few times throughout the night. Pt stable and high functioning.       Most recent  Li: 0.3: 3/22 - pt stable    Diagnosis: Bipolar 1 disorder, partial remission, anxiety unspecified    Intervention/Counseling/Treatment Plan   · Medication Management:      1) Continue Lithium 300mg BID for mood. Pt instructed to monitor closely for mood destabilization as we have been gradually tapering due to inc'd TSH.    2) Continue Seroquel 150mg QHS. Typical ELFEGO's reviewed including weight gain, abnormal movements, EPS, TD, metabolic side effects.      3) Continue Xanax 0.5 mg QHS PRN for sleep (uses once weekly. Discussed risk of decreased RT, sedation, addictive potential, and not to mix with alcohol.      4. Call to report any worsening of symptoms or problems with the medication. Pt instructed to go to ER with thoughts of harming self, others     5. Patient given contact # for psychotherapists at [facility] and also instructed she may check with insurance for list of providers.      6. Labs: no new orders       Return to clinic: 8 weeks    -Spent 30min face to face with the pt; >50% time spent in counseling   -Supportive therapy and psychoeducation provided  -R/B/SE's of medications discussed with the pt who expresses understanding and chooses to take medications as prescribed.   -Pt instructed to call clinic, 911 or go to nearest emergency room if sxs worsen or pt is in   crisis. The pt expresses understanding.    Montana Aguilar, NP

## 2022-08-26 ENCOUNTER — PATIENT MESSAGE (OUTPATIENT)
Dept: PSYCHIATRY | Facility: CLINIC | Age: 57
End: 2022-08-26

## 2022-08-27 ENCOUNTER — LAB VISIT (OUTPATIENT)
Dept: LAB | Facility: HOSPITAL | Age: 57
End: 2022-08-27
Payer: MEDICAID

## 2022-08-27 DIAGNOSIS — Z79.899 HIGH RISK MEDICATION USE: ICD-10-CM

## 2022-08-27 LAB — LITHIUM SERPL-SCNC: 0.6 MMOL/L (ref 0.6–1.2)

## 2022-08-27 PROCEDURE — 80178 ASSAY OF LITHIUM: CPT | Performed by: NURSE PRACTITIONER

## 2022-08-27 PROCEDURE — 36415 COLL VENOUS BLD VENIPUNCTURE: CPT | Performed by: NURSE PRACTITIONER

## 2022-08-29 ENCOUNTER — PATIENT MESSAGE (OUTPATIENT)
Dept: PSYCHIATRY | Facility: CLINIC | Age: 57
End: 2022-08-29

## 2022-09-01 ENCOUNTER — TELEPHONE (OUTPATIENT)
Dept: PODIATRY | Facility: CLINIC | Age: 57
End: 2022-09-01

## 2022-09-20 ENCOUNTER — OFFICE VISIT (OUTPATIENT)
Dept: PODIATRY | Facility: CLINIC | Age: 57
End: 2022-09-20
Payer: MEDICAID

## 2022-09-20 DIAGNOSIS — M21.6X2 ACQUIRED EQUINUS DEFORMITY OF BOTH FEET: ICD-10-CM

## 2022-09-20 DIAGNOSIS — M72.2 PLANTAR FASCIITIS OF RIGHT FOOT: Primary | ICD-10-CM

## 2022-09-20 DIAGNOSIS — M21.6X1 ACQUIRED EQUINUS DEFORMITY OF BOTH FEET: ICD-10-CM

## 2022-09-20 PROCEDURE — 1160F RVW MEDS BY RX/DR IN RCRD: CPT | Mod: CPTII,,, | Performed by: PODIATRIST

## 2022-09-20 PROCEDURE — 99214 OFFICE O/P EST MOD 30 MIN: CPT | Mod: 25,S$PBB,, | Performed by: PODIATRIST

## 2022-09-20 PROCEDURE — 1160F PR REVIEW ALL MEDS BY PRESCRIBER/CLIN PHARMACIST DOCUMENTED: ICD-10-PCS | Mod: CPTII,,, | Performed by: PODIATRIST

## 2022-09-20 PROCEDURE — 1159F MED LIST DOCD IN RCRD: CPT | Mod: CPTII,,, | Performed by: PODIATRIST

## 2022-09-20 PROCEDURE — 20550 PR INJECT TENDON SHEATH/LIGAMENT: ICD-10-PCS | Mod: S$PBB,RT,, | Performed by: PODIATRIST

## 2022-09-20 PROCEDURE — 3044F HG A1C LEVEL LT 7.0%: CPT | Mod: CPTII,,, | Performed by: PODIATRIST

## 2022-09-20 PROCEDURE — 99214 PR OFFICE/OUTPT VISIT, EST, LEVL IV, 30-39 MIN: ICD-10-PCS | Mod: 25,S$PBB,, | Performed by: PODIATRIST

## 2022-09-20 PROCEDURE — 20550 NJX 1 TENDON SHEATH/LIGAMENT: CPT | Mod: PBBFAC,PN,RT | Performed by: PODIATRIST

## 2022-09-20 PROCEDURE — 3044F PR MOST RECENT HEMOGLOBIN A1C LEVEL <7.0%: ICD-10-PCS | Mod: CPTII,,, | Performed by: PODIATRIST

## 2022-09-20 PROCEDURE — 1159F PR MEDICATION LIST DOCUMENTED IN MEDICAL RECORD: ICD-10-PCS | Mod: CPTII,,, | Performed by: PODIATRIST

## 2022-09-20 PROCEDURE — 99999 PR PBB SHADOW E&M-EST. PATIENT-LVL III: CPT | Mod: PBBFAC,,, | Performed by: PODIATRIST

## 2022-09-20 PROCEDURE — 99213 OFFICE O/P EST LOW 20 MIN: CPT | Mod: PBBFAC,PN | Performed by: PODIATRIST

## 2022-09-20 PROCEDURE — 99999 PR PBB SHADOW E&M-EST. PATIENT-LVL III: ICD-10-PCS | Mod: PBBFAC,,, | Performed by: PODIATRIST

## 2022-09-20 PROCEDURE — 20550 NJX 1 TENDON SHEATH/LIGAMENT: CPT | Mod: S$PBB,RT,, | Performed by: PODIATRIST

## 2022-09-20 RX ORDER — METHYLPREDNISOLONE ACETATE 40 MG/ML
40 INJECTION, SUSPENSION INTRA-ARTICULAR; INTRALESIONAL; INTRAMUSCULAR; SOFT TISSUE
Status: COMPLETED | OUTPATIENT
Start: 2022-09-20 | End: 2022-09-20

## 2022-09-20 RX ADMIN — METHYLPREDNISOLONE ACETATE 40 MG: 40 INJECTION, SUSPENSION INTRA-ARTICULAR; INTRALESIONAL; INTRAMUSCULAR; INTRASYNOVIAL; SOFT TISSUE at 03:09

## 2022-09-20 NOTE — PROGRESS NOTES
Subjective:      Patient ID: Cristina Tan is a 57 y.o. female.    Chief Complaint: Heel Pain (Rt, foot)    Cristina is a 57 y.o. female who presents to the podiatry clinic  with complaint of  right heel pain, worse first thing in the morning or after rest, sharp throbbing pain at times. No prior treatments.Pain rated 5/10        Review of Systems   Constitutional: Negative for chills and fever.   Cardiovascular:  Negative for claudication and leg swelling.   Respiratory:  Negative for shortness of breath.    Skin:  Negative for itching, nail changes and rash.   Musculoskeletal:  Negative for muscle cramps, muscle weakness and myalgias.   Gastrointestinal:  Negative for nausea and vomiting.   Neurological:  Negative for focal weakness, loss of balance, numbness and paresthesias.         Objective:      Physical Exam  Constitutional:       General: She is not in acute distress.     Appearance: She is well-developed. She is not diaphoretic.   Cardiovascular:      Pulses:           Dorsalis pedis pulses are 2+ on the right side and 2+ on the left side.        Posterior tibial pulses are 2+ on the right side and 2+ on the left side.      Comments: < 3 sec capillary refill time to toes 1-5 bilateral. Toes and feet are warm to touch proximally with normal distal cooling b/l. There is some hair growth on the feet and toes b/l. There is no edema b/l. No spider veins or varicosities present b/l.     Musculoskeletal:      Comments: Equinus noted b/l ankles with < 10 deg DF noted. MMT 5/5 in DF/PF/Inv/Ev resistance with no reproduction of pain in any direction. Passive range of motion of ankle and pedal joints is painless b/l.    Pain on palpation plantar medial and central heel right foot. No pain with ROM or MMT. No pain with medial and lateral compression of heel.     Skin:     General: Skin is warm and dry.      Coloration: Skin is not pale.      Findings: No abrasion, bruising, burn, ecchymosis, erythema, laceration,  lesion, petechiae or rash.      Nails: There is no clubbing.      Comments: Skin temperature, texture and turgor within normal limits.   Neurological:      Mental Status: She is alert and oriented to person, place, and time.      Sensory: No sensory deficit.      Motor: No tremor, atrophy or abnormal muscle tone.      Comments: Negative tinel sign bilateral.   Psychiatric:         Behavior: Behavior normal.             Assessment:       Encounter Diagnoses   Name Primary?    Plantar fasciitis of right foot Yes    Acquired equinus deformity of both feet            Plan:       Cristina was seen today for heel pain.    Diagnoses and all orders for this visit:    Plantar fasciitis of right foot    Acquired equinus deformity of both feet    Other orders  -     methylPREDNISolone acetate injection 40 mg      I counseled the patient on her conditions, their implications and medical management.    Patient will obtain over the counter arch supports and wear them in shoes whenever possible.  Athletic shoes intended for walking or running are usually best.    Avoid barefoot or flats    Conservative vs surgical treatment options for Plantar Fasciitis were explained in detail. Today the patient received an injection in plantar right heel. The area was prepped with alcohol, skin anesthestized with cold spray and a mixture of 40 mg Depomedrol 1 mL 1% plain lidocaine was injected. The patient tolerated the procedure well. Instructed to rest, ice and elevate.    Patient will stretch the tendo achilles complex three times daily as demonstrated in the office.  Literature was dispensed illustrating proper stretching technique.    Return 6 weeks follow up    Jalil Waller DPM

## 2022-09-20 NOTE — PATIENT INSTRUCTIONS
1. Stretch calf and plantar fascia at least 3x per day for 30 sec and before getting out of bed.    2. Supportive shoes at all times (athletic shoe including connelly, new balance, asics, HOKA or casual shoes like Dansko, Brianna, Naot, Vionoic, Fit flop  clog or wedge with extra heel padding and arch support. For house slippers would recommend Fitflop or Spenco found on amazon.com, Never walk barefoot or in flats.    (Varsity sports, Phidippides, [place] running company, Masseys, Goodfeet, Cantilever, Feet First, Foot Solutions, Therapeutic shoes, SAS, [facility] pro shop) http://www.[employer]/    3. Orthotic (recommend the following brands: Superfeet, Spenco, Powerstep, Sof Sole Fit Series)              4. NSAID's for 2-3 weeks if no GI or Kidney problems (example: ibuprofen 600 mg 2 x per day)    5. ICE massage with frozen water bottle 2x per day for 30 minutes.    6. Consider night splint, custom orthotics, therapy and/or steroid injection    What Is Plantar Fasciitis?   The plantar fascia is a ligament-like band running from your heel to the ball of your foot. This band pulls on the heel bone, raising the arch of your foot as it pushes off the ground. But if your foot moves incorrectly, the plantar fascia may become strained. The fascia may swell and its tiny fibers may begin to fray, causing plantar fasciitis.  Causes  Plantar fasciitis is often caused by poor foot mechanics. If your foot flattens too much, the fascia may overstretch and swell. If your foot flattens too little, the fascia may ache from being pulled too tight.    The plantar fascia is a thick, fibrous layer of tissue that covers the bones on the bottom of your foot. It holds the foot bones in an arched position. Plantar fasciitis is a painful swelling of the plantar fascia.  A heel spur is an overgrowth of bone where the plantar fascia attaches to the heel bone. The heel spur itself usually doesnt cause pain. However, the heel  spur might be a sign of plantar fasciitis which may cause your foot pain. There is no specific treatment for heel spurs.   Plantar fasciitis can develop slowly or suddenly. It usually affects one foot at a time. Heel pain can feel sharp, like a knife sticking into the bottom of your foot. You may feel pain after exercising, long-distance jogging, stair climbing, long periods of standing, or after standing up.  Risk factors for plantar fasciitis include: arthritis, diabetes, obesity or recent weight gain, flat foot, and having high arches. Wearing high heels, loose shoes, or shoes with poor arch support adds to the risk.    Foot pain is usually worse in the morning. But it improves with walking. By the end of the day there may be a dull aching. Treatment includes short-term rest and controlling inflammation. It may take up to 9 months before all symptoms go away. In rare cases, a steroid injection in the foot, or surgery, may be needed.  Home care  If you are overweight, lose weight to help healing.  Choose supportive shoes with good arch support and shock absorbency. Replace athletic shoes when they become worn out. Dont walk or run barefoot.  Premade or custom-fitted shoe inserts may be helpful. Inserts made of silicone seem to be the most effective. Custom-made inserts can be provided by a podiatrist or foot specialist, physical therapist, or orthopedist.  Premade or custom-made night splints keep the heel stretched out while you sleep. They may prevent morning pain.  Avoid activities that stress the feet: jogging, prolonged standing or walking, contact sports, etc.  First thing in the morning and before sports, stretch the bottom of your foot. Gently flex your ankle so the toes move toward your knee.  Icing may help control heel pain. Apply an ice pack to the heel for 10-20 minutes as a preventive. Or ice your heel after a severe flare-up of symptoms. You may repeat this every 1-2 hours as needed.  You may use  over-the-counter pain medicine to control pain, unless another medicine was prescribed. Anti-inflammatory pain medicines, such as ibuprofen or naproxen, may work better than acetaminophen. If you have chronic liver or kidney disease or ever had a stomach ulcer or GI bleeding, talk with your healthcare provider before using these medicines.  Shoe inserts, a night splint, or a special boot may be needed. Use these as directed by your healthcare provider.      Treating Plantar Fasciitis    First, your doctor relieves pain. Then, the cause of your problem may be found and corrected. If your pain is due to poor foot mechanics, custom-made shoe inserts (orthoses) may help.        Reduce Symptoms:  To relieve mild symptoms, try aspirin, ibuprofen, or other medications as directed. Rubbing ice on the affected area may also help.  To reduce severe pain and swelling, your doctor may prescribe pills or injections or a walking cast in some instances. Physical therapy, such as ultrasound or a daily stretching program, may also be recommended. Surgery is rarely required.  To reduce symptoms caused by poor foot mechanics, your foot may be taped. This supports the arch and temporarily controls movement. Night splints may also help by stretching the fascia.    Control Movement  If taping helps, your doctor may prescribe orthoses. Built from plaster casts of your feet, these inserts control the way your foot moves. As a result, your symptoms should go away.  If Surgery Is Needed  Your doctor may consider surgery if other types of treatment don't control your pain. During surgery, the plantar fascia is partially cut to release tension. As you heal, fibrous tissue fills the space between the heel bone and the plantar fascia.   Reduce Overuse  Every time your foot strikes the ground, the plantar fascia is stretched. You can reduce the strain on the plantar fascia and the possibility of overuse by following these suggestions:  Lose any  excess weight.  Avoid running on hard or uneven ground.  Use orthoses at all times in your shoes and house slippers.  © 2847-8533 [employer]. [address] [place] [address]. All rights reserved. This information is not intended as a substitute for professional medical care. Always follow your healthcare professional's instructions.            _                Lower Body Exercises: Calf Stretch    This exercise both stretches and strengthens your lower body to help your back. Do the exercise as often as suggested by your health care provider. As you work out, dont rush or strain. Use an exercise mat, pillow, or folded towel to protect your knees and other sensitive areas.  Face a wall 2 feet away. Step toward the wall with one foot.  Place both palms on the wall and bend your front knee.  Lean forward, keeping the back leg straight and the heel on the floor.  Hold for 20 seconds. Switch legs.  © 6508-2821 [employer]. [address] [place] [address]. All rights reserved. This information is not intended as a substitute for professional medical care. Always follow your healthcare professional's instructions.    These instructions are for your right foot. Switch sides for your left foot.  Sit in a chair. Rest your right ankle on your left knee.  Hold your toes with your right hand. Gently bend the toes backward. Feel a stretch in the undersides of the toes and ball of the foot. Hold for 30 to 60 seconds.  Then gently bend the toes in the other direction. Gently press on them until your foot is pointed. Hold for 30 to 60 seconds.  Repeat 5 times, or as instructed.  Date Last Reviewed: 5/1/2016  © 0543-1143 [employer]. [address] [place] [address]. All rights reserved. This information is not intended as a substitute for professional medical care. Always follow your healthcare professional's instructions.

## 2022-09-21 DIAGNOSIS — Z12.31 VISIT FOR SCREENING MAMMOGRAM: Primary | ICD-10-CM

## 2022-09-26 RX ORDER — HYDROXYZINE HYDROCHLORIDE 25 MG/1
TABLET, FILM COATED ORAL
Qty: 30 TABLET | Refills: 0 | Status: SHIPPED | OUTPATIENT
Start: 2022-09-26 | End: 2022-09-27 | Stop reason: SDUPTHER

## 2022-09-27 NOTE — TELEPHONE ENCOUNTER
Patient called saying script was denied.   Called the pharmacy to see what their end is saying because our end says it was sent.   Pharmacy said it did not come through that they need for you to cancel the one you sent and to send it again.   This is for the hydroxyzine (atarax)

## 2022-09-28 RX ORDER — HYDROXYZINE HYDROCHLORIDE 25 MG/1
TABLET, FILM COATED ORAL
Qty: 30 TABLET | Refills: 0 | Status: SHIPPED | OUTPATIENT
Start: 2022-09-28 | End: 2022-10-20 | Stop reason: SDUPTHER

## 2022-09-28 NOTE — TELEPHONE ENCOUNTER
Called pharmacy back again to make sure they received the prescription for the hydroxyzine, they did not but on our end it said that it was sent. I asked if they needed the provider to call it in over the phone.   She asked my title I told MA and she said I can do it if I can read the directions to her.   The pharmacist asked for the directions of the prescription, what provider, any refills, and the date to start it at.   I read the directions from the rx, said [facility] Aguilar, and no refills, and the start date will be today.     Just noting this for future reference.

## 2022-10-04 ENCOUNTER — TELEPHONE (OUTPATIENT)
Dept: PSYCHIATRY | Facility: CLINIC | Age: 57
End: 2022-10-04

## 2022-10-04 RX ORDER — QUETIAPINE FUMARATE 100 MG/1
100 TABLET, FILM COATED ORAL DAILY
Qty: 30 TABLET | Refills: 3 | Status: SHIPPED | OUTPATIENT
Start: 2022-10-04 | End: 2022-10-25 | Stop reason: SDUPTHER

## 2022-10-04 NOTE — TELEPHONE ENCOUNTER
Patient called needing seroquel 100mg sent to her pharmacy but I am not seeing that in her chart can you please send that over to kimberly bella

## 2022-10-06 ENCOUNTER — HOSPITAL ENCOUNTER (OUTPATIENT)
Dept: RADIOLOGY | Facility: HOSPITAL | Age: 57
Discharge: HOME | End: 2022-10-06
Attending: SPECIALIST
Payer: MEDICAID

## 2022-10-06 ENCOUNTER — PATIENT MESSAGE (OUTPATIENT)
Dept: OBSTETRICS AND GYNECOLOGY | Facility: CLINIC | Age: 57
End: 2022-10-06

## 2022-10-06 ENCOUNTER — TELEPHONE (OUTPATIENT)
Dept: ORTHOPEDICS | Facility: CLINIC | Age: 57
End: 2022-10-06

## 2022-10-06 ENCOUNTER — OFFICE VISIT (OUTPATIENT)
Dept: OBSTETRICS AND GYNECOLOGY | Facility: CLINIC | Age: 57
End: 2022-10-06
Payer: MEDICAID

## 2022-10-06 VITALS
SYSTOLIC BLOOD PRESSURE: 126 MMHG | BODY MASS INDEX: 41.2 KG/M2 | DIASTOLIC BLOOD PRESSURE: 84 MMHG | HEIGHT: 66 IN | WEIGHT: 256.38 LBS

## 2022-10-06 DIAGNOSIS — Z12.31 VISIT FOR SCREENING MAMMOGRAM: ICD-10-CM

## 2022-10-06 DIAGNOSIS — Z01.419 WELL WOMAN EXAM: Primary | ICD-10-CM

## 2022-10-06 PROCEDURE — 3079F PR MOST RECENT DIASTOLIC BLOOD PRESSURE 80-89 MM HG: ICD-10-PCS | Mod: CPTII,,, | Performed by: SPECIALIST

## 2022-10-06 PROCEDURE — 3044F PR MOST RECENT HEMOGLOBIN A1C LEVEL <7.0%: ICD-10-PCS | Mod: CPTII,,, | Performed by: SPECIALIST

## 2022-10-06 PROCEDURE — 99999 PR PBB SHADOW E&M-EST. PATIENT-LVL III: CPT | Mod: PBBFAC,,, | Performed by: SPECIALIST

## 2022-10-06 PROCEDURE — 77067 SCR MAMMO BI INCL CAD: CPT | Mod: TC,PN

## 2022-10-06 PROCEDURE — 99999 PR PBB SHADOW E&M-EST. PATIENT-LVL III: ICD-10-PCS | Mod: PBBFAC,,, | Performed by: SPECIALIST

## 2022-10-06 PROCEDURE — 3044F HG A1C LEVEL LT 7.0%: CPT | Mod: CPTII,,, | Performed by: SPECIALIST

## 2022-10-06 PROCEDURE — 3008F BODY MASS INDEX DOCD: CPT | Mod: CPTII,,, | Performed by: SPECIALIST

## 2022-10-06 PROCEDURE — 99396 PR PREVENTIVE VISIT,EST,40-64: ICD-10-PCS | Mod: S$PBB,,, | Performed by: SPECIALIST

## 2022-10-06 PROCEDURE — 88175 CYTOPATH C/V AUTO FLUID REDO: CPT | Performed by: SPECIALIST

## 2022-10-06 PROCEDURE — 77067 SCR MAMMO BI INCL CAD: CPT | Mod: 26,,, | Performed by: RADIOLOGY

## 2022-10-06 PROCEDURE — 77063 BREAST TOMOSYNTHESIS BI: CPT | Mod: TC,PN

## 2022-10-06 PROCEDURE — 3079F DIAST BP 80-89 MM HG: CPT | Mod: CPTII,,, | Performed by: SPECIALIST

## 2022-10-06 PROCEDURE — 77063 BREAST TOMOSYNTHESIS BI: CPT | Mod: 26,,, | Performed by: RADIOLOGY

## 2022-10-06 PROCEDURE — 1159F MED LIST DOCD IN RCRD: CPT | Mod: CPTII,,, | Performed by: SPECIALIST

## 2022-10-06 PROCEDURE — 99396 PREV VISIT EST AGE 40-64: CPT | Mod: S$PBB,,, | Performed by: SPECIALIST

## 2022-10-06 PROCEDURE — 3074F SYST BP LT 130 MM HG: CPT | Mod: CPTII,,, | Performed by: SPECIALIST

## 2022-10-06 PROCEDURE — 99213 OFFICE O/P EST LOW 20 MIN: CPT | Mod: PBBFAC,PN | Performed by: SPECIALIST

## 2022-10-06 PROCEDURE — 77063 MAMMO DIGITAL SCREENING BILAT WITH TOMO: ICD-10-PCS | Mod: 26,,, | Performed by: RADIOLOGY

## 2022-10-06 PROCEDURE — 1159F PR MEDICATION LIST DOCUMENTED IN MEDICAL RECORD: ICD-10-PCS | Mod: CPTII,,, | Performed by: SPECIALIST

## 2022-10-06 PROCEDURE — 77067 MAMMO DIGITAL SCREENING BILAT WITH TOMO: ICD-10-PCS | Mod: 26,,, | Performed by: RADIOLOGY

## 2022-10-06 PROCEDURE — 3074F PR MOST RECENT SYSTOLIC BLOOD PRESSURE < 130 MM HG: ICD-10-PCS | Mod: CPTII,,, | Performed by: SPECIALIST

## 2022-10-06 PROCEDURE — 3008F PR BODY MASS INDEX (BMI) DOCUMENTED: ICD-10-PCS | Mod: CPTII,,, | Performed by: SPECIALIST

## 2022-10-06 NOTE — PROGRESS NOTES
58 yo WF presents for annual gyn eval  History of suprcervical hyst    Past Medical History:   Diagnosis Date    Bipolar 1 disorder     Cancer     skin cancer to right arm    GERD (gastroesophageal reflux disease)     History of psychiatric hospitalization     Mental disorder     Thyroid disease        Past Surgical History:   Procedure Laterality Date    breast reduction      CARPAL TUNNEL RELEASE Left 2021    Procedure: RELEASE, CARPAL TUNNEL;  Surgeon: Ld Carbone MD;  Location: [facility];  Service: Orthopedics;  Laterality: Left;  Procedure:  Left carpal tunnel release    Position:  Supine    Anesthesia:  Local    Equipment:  Carpal tunnel set     SECTION      CHOLECYSTECTOMY      COLONOSCOPY N/A 2022    Procedure: COLONOSCOPY;  Surgeon: Rodrick Mccabe MD;  Location: [facility] ([address] [place] [address]);  Service: Endoscopy;  Laterality: N/A;  Fully vaccinated, prep instr emailed -ml    gastric sleeve      HYSTERECTOMY      OOPHORECTOMY      TONSILLECTOMY      TOTAL REDUCTION MAMMOPLASTY Bilateral         tummy tuck         Family History   Problem Relation Age of Onset    Cancer Father     Colon polyps Sister     Breast cancer Neg Hx     Ovarian cancer Neg Hx     Diabetes Neg Hx     Hypertension Neg Hx     Thyroid disease Neg Hx        Social History     Socioeconomic History    Marital status:    Tobacco Use    Smoking status: Never    Smokeless tobacco: Never   Substance and Sexual Activity    Alcohol use: Yes     Alcohol/week: 2.0 standard drinks     Types: 2 Shots of liquor per week    Drug use: No    Sexual activity: Yes     Partners: Male     Birth control/protection: See Surgical Hx     Social Determinants of Health     Financial Resource Strain: Low Risk     Difficulty of Paying Living Expenses: Not very hard   Food Insecurity: No Food Insecurity    Worried About Running Out of Food in the Last Year: Never true    Ran Out of Food in the Last Year: Never true   Transportation  Needs: No Transportation Needs    Lack of Transportation (Medical): No    Lack of Transportation (Non-Medical): No   Physical Activity: Sufficiently Active    Days of Exercise per Week: 4 days    Minutes of Exercise per Session: 40 min   Stress: No Stress Concern Present    Feeling of Stress : Not at all   Social Connections: Unknown    Frequency of Communication with Friends and Family: Three times a week    Frequency of Social Gatherings with Friends and Family: Twice a week    Active Member of Clubs or Organizations: No    Attends Club or Organization Meetings: Never    Marital Status:    Housing Stability: Low Risk     Unable to Pay for Housing in the Last Year: No    Number of Places Lived in the Last Year: 2    Unstable Housing in the Last Year: No       Current Outpatient Medications   Medication Sig Dispense Refill    ALPRAZolam (XANAX) 0.5 MG tablet Take 1 tablet (0.5 mg total) by mouth daily as needed for Anxiety. 30 tablet 0    atorvastatin (LIPITOR) 20 MG tablet Take 1 tablet (20 mg total) by mouth once daily. 90 tablet 1    hydrOXYzine HCL (ATARAX) 25 MG tablet TAKE 1 TABLET(25 MG) BY MOUTH EVERY NIGHT AS NEEDED FOR ANXIETY  Strength: 25 mg 30 tablet 0    levothyroxine (SYNTHROID) 50 MCG tablet Take 1 tablet (50 mcg total) by mouth before breakfast. 30 tablet 11    lithium (ESKALITH) 300 MG capsule Take 1 capsule (300 mg total) by mouth 2 (two) times a day. 60 capsule 3    omeprazole (PRILOSEC) 20 MG capsule TAKE 1 CAPSULE(20 MG) BY MOUTH ONCE DAILY 30 capsule 11    QUEtiapine (SEROQUEL) 100 MG Tab Take 1 tablet (100 mg total) by mouth once daily. 30 tablet 3    QUEtiapine (SEROQUEL) 50 MG tablet Take 1 tablet (50 mg total) by mouth nightly WITH quetiapine 100 mg tablet 90 tablet 0     No current facility-administered medications for this visit.       Review of patient's allergies indicates:   Allergen Reactions    Lamictal [lamotrigine] Rash       Review of System:   General: no chills, fever,  night sweats, weight gain or weight loss  Psychological: no depression or suicidal ideation  Breasts: no new or changing breast lumps, nipple discharge or masses.  Respiratory: no cough, shortness of breath, or wheezing  Cardiovascular: no chest pain or dyspnea on exertion  Gastrointestinal: no abdominal pain, change in bowel habits, or black or bloody stools  Genito-Urinary: no incontinence, urinary frequency/urgency or vulvar/vaginal symptoms, pelvic pain or abnormal vaginal bleeding.  Musculoskeletal: no gait disturbance or muscular weakness     PE:   APPEARANCE: Well nourished, well developed, in no acute distress.  NECK: Neck symmetric without masses or thyromegaly.  NODES: No inguinal lymph node enlargement.  ABDOMEN: Soft. No tenderness or masses. No hepatosplenomegaly. No hernias.  BREASTS: Symmetrical, no skin changes or visible lesions. No palpable masses, nipple discharge or adenopathy bilaterally.  PELVIC: Normal external female genitalia without lesions. Normal hair distribution. Adequate perineal body, normal urethral meatus. Vagina moist and well rugated without lesions or discharge. No significant cystocele or rectocele. Cervix nonfriableUterus  surgically absent. Bimanual exam revealed no masses, tenderness or abnormality.    NOTE  NURSING PERSONAL PRESENT FOR ENTIRE PHYSICAL EXAM     BSE monthly  Mammo screening yearly  RTO 1 year  PAP q 3 years  Answered all questions

## 2022-10-06 NOTE — TELEPHONE ENCOUNTER
----- Message from Tammy Doherty sent at 10/6/2022  9:22 AM CDT -----  Type:  Appointment Request       Name of Caller:Pt   When is the first available appointment?scheduled for  11/30 2wk PO  Symptoms:knee pain  Best Call Back Number:[phone]  Additional Information: Portal message         Message  Appointment Request From: Cristina Tan  With Provider: Ld Carbone MD [[facility]]  Preferred Date Range: 10/6/2022 - 10/11/2022  Preferred Times: Any Time  Reason for visit: Exray knees    Comments:  Exray knees bc of knee pain

## 2022-10-06 NOTE — TELEPHONE ENCOUNTER
Called and left naren and stated that Dr. Carbone does not treat knee knee.  We have her to come in on 11/30/2022 for 2 week PO CTR but will not treat her knee pain at that appointment.

## 2022-10-07 ENCOUNTER — PATIENT MESSAGE (OUTPATIENT)
Dept: FAMILY MEDICINE | Facility: CLINIC | Age: 57
End: 2022-10-07

## 2022-10-07 ENCOUNTER — TELEPHONE (OUTPATIENT)
Dept: FAMILY MEDICINE | Facility: CLINIC | Age: 57
End: 2022-10-07

## 2022-10-07 NOTE — TELEPHONE ENCOUNTER
----- Message from Juan Carey sent at 10/7/2022  6:40 AM CDT -----  Contact: Patient  The following request was sent through the pt portal    Appointment Request From: Cristina Tan    With Provider: Milli Leonard MD [[facility]]    Preferred Date Range: 10/11/2022 - 10/13/2022    Preferred Times: Any Time    Reason for visit: Office Visit    Comments:  Knee pain. I need to get exrays.    The pt can be reached at [phone]

## 2022-10-11 ENCOUNTER — HOSPITAL ENCOUNTER (OUTPATIENT)
Dept: RADIOLOGY | Facility: HOSPITAL | Age: 57
Discharge: HOME | End: 2022-10-11
Attending: NURSE PRACTITIONER
Payer: MEDICAID

## 2022-10-11 ENCOUNTER — OFFICE VISIT (OUTPATIENT)
Dept: FAMILY MEDICINE | Facility: CLINIC | Age: 57
End: 2022-10-11
Payer: MEDICAID

## 2022-10-11 VITALS
DIASTOLIC BLOOD PRESSURE: 80 MMHG | OXYGEN SATURATION: 96 % | WEIGHT: 257.5 LBS | SYSTOLIC BLOOD PRESSURE: 120 MMHG | HEART RATE: 56 BPM | BODY MASS INDEX: 41.38 KG/M2 | HEIGHT: 66 IN

## 2022-10-11 DIAGNOSIS — M25.561 PAIN IN BOTH KNEES, UNSPECIFIED CHRONICITY: Primary | ICD-10-CM

## 2022-10-11 DIAGNOSIS — M25.562 PAIN IN BOTH KNEES, UNSPECIFIED CHRONICITY: ICD-10-CM

## 2022-10-11 DIAGNOSIS — M54.50 CHRONIC MIDLINE LOW BACK PAIN WITHOUT SCIATICA: ICD-10-CM

## 2022-10-11 DIAGNOSIS — M25.561 PAIN IN BOTH KNEES, UNSPECIFIED CHRONICITY: ICD-10-CM

## 2022-10-11 DIAGNOSIS — M25.562 PAIN IN BOTH KNEES, UNSPECIFIED CHRONICITY: Primary | ICD-10-CM

## 2022-10-11 DIAGNOSIS — G89.29 CHRONIC MIDLINE LOW BACK PAIN WITHOUT SCIATICA: ICD-10-CM

## 2022-10-11 DIAGNOSIS — L23.7 POISON IVY DERMATITIS: ICD-10-CM

## 2022-10-11 PROCEDURE — 99214 OFFICE O/P EST MOD 30 MIN: CPT | Mod: S$PBB,,, | Performed by: NURSE PRACTITIONER

## 2022-10-11 PROCEDURE — 3074F SYST BP LT 130 MM HG: CPT | Mod: CPTII,,, | Performed by: NURSE PRACTITIONER

## 2022-10-11 PROCEDURE — 73560 XR KNEE 1 OR 2 VIEW BILATERAL: ICD-10-PCS | Mod: 26,50,, | Performed by: RADIOLOGY

## 2022-10-11 PROCEDURE — 3074F PR MOST RECENT SYSTOLIC BLOOD PRESSURE < 130 MM HG: ICD-10-PCS | Mod: CPTII,,, | Performed by: NURSE PRACTITIONER

## 2022-10-11 PROCEDURE — 99214 PR OFFICE/OUTPT VISIT, EST, LEVL IV, 30-39 MIN: ICD-10-PCS | Mod: S$PBB,,, | Performed by: NURSE PRACTITIONER

## 2022-10-11 PROCEDURE — 99215 OFFICE O/P EST HI 40 MIN: CPT | Mod: PBBFAC,PO | Performed by: NURSE PRACTITIONER

## 2022-10-11 PROCEDURE — 99999 PR PBB SHADOW E&M-EST. PATIENT-LVL V: CPT | Mod: PBBFAC,,, | Performed by: NURSE PRACTITIONER

## 2022-10-11 PROCEDURE — 1159F PR MEDICATION LIST DOCUMENTED IN MEDICAL RECORD: ICD-10-PCS | Mod: CPTII,,, | Performed by: NURSE PRACTITIONER

## 2022-10-11 PROCEDURE — 3079F PR MOST RECENT DIASTOLIC BLOOD PRESSURE 80-89 MM HG: ICD-10-PCS | Mod: CPTII,,, | Performed by: NURSE PRACTITIONER

## 2022-10-11 PROCEDURE — 1159F MED LIST DOCD IN RCRD: CPT | Mod: CPTII,,, | Performed by: NURSE PRACTITIONER

## 2022-10-11 PROCEDURE — 3008F BODY MASS INDEX DOCD: CPT | Mod: CPTII,,, | Performed by: NURSE PRACTITIONER

## 2022-10-11 PROCEDURE — 3079F DIAST BP 80-89 MM HG: CPT | Mod: CPTII,,, | Performed by: NURSE PRACTITIONER

## 2022-10-11 PROCEDURE — 99999 PR PBB SHADOW E&M-EST. PATIENT-LVL V: ICD-10-PCS | Mod: PBBFAC,,, | Performed by: NURSE PRACTITIONER

## 2022-10-11 PROCEDURE — 3008F PR BODY MASS INDEX (BMI) DOCUMENTED: ICD-10-PCS | Mod: CPTII,,, | Performed by: NURSE PRACTITIONER

## 2022-10-11 PROCEDURE — 73560 X-RAY EXAM OF KNEE 1 OR 2: CPT | Mod: TC,50,FY,PO

## 2022-10-11 PROCEDURE — 3044F HG A1C LEVEL LT 7.0%: CPT | Mod: CPTII,,, | Performed by: NURSE PRACTITIONER

## 2022-10-11 PROCEDURE — 73560 X-RAY EXAM OF KNEE 1 OR 2: CPT | Mod: 26,50,, | Performed by: RADIOLOGY

## 2022-10-11 PROCEDURE — 3044F PR MOST RECENT HEMOGLOBIN A1C LEVEL <7.0%: ICD-10-PCS | Mod: CPTII,,, | Performed by: NURSE PRACTITIONER

## 2022-10-11 RX ORDER — PREDNISONE 20 MG/1
TABLET ORAL
Qty: 11 TABLET | Refills: 0 | Status: SHIPPED | OUTPATIENT
Start: 2022-10-11 | End: 2022-10-18

## 2022-10-11 NOTE — PATIENT INSTRUCTIONS
Xray of the knees.  We'll let you know the results.  If no unexpected finding, recommend PT.  Order in place.    Poison ivy:  Continue the topical med  Prednisone    If you are not better in 3-5 days, if worse or you have concerns or questions, please do not hesitate to call.  If you have any questions, please do not hesitate to call.  You can reach us at [phone] Monday through Friday 7 a.m. to 6:30 p.m., Saturday 9 a.m. to 4:30 p.m. and Sunday 9 a.m to 2:30 p.m.  Or call Dr. Leonard.    Thank you for using the [facility]!    JAYLEEN Costello, CNP, FNP-BC  [facility]    To rate your experience with NAPOLEON Costello, click on the link below:      [email or web address]

## 2022-10-11 NOTE — PROGRESS NOTES
"Doussant treating CTS and to do surgery 11/2022.  Does not treat knee pain.    Cristina Tan is a 57 y.o. female patient of Milli Leonard MD who presents to the clinic today for for an in-clinic visit.    HPI    Pt, who is not known to me, reports a  new problem to me:     LE positive for joint pain; bilateral knees         Denies joint swelling, joint redness,         Aggravating factors: variable--sometime after walking, sometimes at rest, occ weak.        Alleviating factors:  ice, OTC NSAIDS, rest, and knee sleeve    Back: lumbar/sacral, sharp pains intermittently--maybe from changing gait r/t knee pain.            Aggravating Factors bending forwards and standing.             Other symptoms: Denies numbness, tingling, weakness              Radiation:  without radiation            Alleviating factors:  OTC NSAIDS and rest    Medications:  Aleve, Ibuprofen used and beneficial    Review of Systems    MSK:  No pain, redness or swelling on the joints    Neuro:  Denies weakness, numbness or tingling.    Skin:  was pulling a vine at the house yesterday and thinks she contacted poison ivy on her face.  Using OTC cream with some relief.  Tolerates steroids well.    All other ROS negative.  Heart fluttering occ but cardiac evaluation neg.    Physical Exam    Alert, coop 57 y.o. female patient in no apparent distress distress, is not ill-appearing.    Vitals:    10/11/22 1140   BP: 120/80   Pulse: (!) 56   SpO2: 96%   Weight: 116.8 kg (257 lb 8 oz)   Height: 5' 6" (1.676 m)     VS reviewed.  VS  bradycardic HR but other VS stable ..  CC, nursing note, medications & PMH all reviewed today.    HEENT:  Ext nose/ears are without lesions or erythema.  No drainage noted.  Eyes lids symmetrical and with out lesions, conjunctivae not injected.  EOMs intact.                       Resp:  Breathing unlabored.  Lungs CTA bilat.  Moves air to bases bilat.  Resp excursion symmetrical.      Heart:  Heart regular rate. and Regular " rate and rhythm.                 MS:  Ambulates 3. Normal: Walks 20', No Assistive device, no evidence for imbalance. Normal gait pattern., with ambulation aid           Muscle strength intact knees and hip with flexion and extension bilat and symmetrical.  LE Exam:  no edema, redness or tenderness in the calves or thighs and no redness, swelling of the knees                  peripheral pulses 2+ and symmetric in  post tib areas.  Cap refill in LEs brisk.            Other MSK Exam:  knee: crepitus ant knees and tenderness over medial knee joint lines bilaterally                    Back Exam: Spine ROM normal. Muscular strength intact., negative findings: ROM of all joints is normal, strength normal, and no evidence of muscle atrophy    NEURO:  Alert and oriented x 4.  Responds appropriately during interaction.                  alert, oriented, normal speech, no focal findings or movement disorder noted LE DTRs:  L4 Patellar: responsive bilat and symmetrical.    Skin:  Warm, dry, color good.            no rash            Left cheek with area of erythematous, edematous skin without obvious vesicles.    Psych:  Responds appropriately throughout the visit.               Mood:  pleasant and appropriate               Appearance: well-groomed, appropriate .               Affect:  congruent and appropriate    Pain in both knees, unspecified chronicity  -     X-Ray Knee 1 or 2 View Bilateral; Future; Expected date: 10/11/2022  -     Ambulatory referral/consult to Physical/Occupational Therapy; Future; Expected date: 10/18/2022    Poison ivy dermatitis  -     predniSONE (DELTASONE) 20 MG tablet; 2 daily for 4 days then 1 daily for 3 days  Dispense: 11 tablet; Refill: 0    Chronic midline low back pain without sciatica          Pt today presents with   Chief Complaint   Patient presents with    Knee Pain     Knee pain in both knees    .  Pt's main symptoms/concerns/findings:   Knee Pain  Patient presents with knee pain  involving both knees. Onset of the symptoms was several years ago. Inciting event: none known. Current symptoms include crepitus sensation, giving out, and pain located ant knee . Pain is aggravated by standing and sometimes with her walking (Walks up to 2 miles nearly daily, when she can . Patient has had prior knee problems. Evaluation to date: none. Treatment to date: avoidance of offending activity and brace which is somewhat effective.  Also notes:  low back pain    This is a new problem to me.  the above  work up is planned.        Known Diagnostic Lab/Radiological/Pulmonary/CardiacTests Results   Xray bilat knees:  Normal      Any radiology study is interpreted by radiology.    Prescribing Considerations:  I have reviewed the following additional tests today:  n/a--no prescriptions given. .      Referred to Physical Therapy .     Education:    Pt advised to perform comfort measures/treatment recommended on patient instruction sheet, which were reviewed at the time of the visit..    Follow Up:    If not better in after PT course, the patient is advised to call here, PCP office or go for an in-person/follow up evaluation.  If worse or concerns, the patient is advised to call for advice to this office or the PCP office or call [facility] or go to the nearest URGENT CARE or ER.    Explained exam findings, diagnosis and treatment plan to patient alone.  Questions answered and patient states understanding.

## 2022-10-14 ENCOUNTER — PATIENT MESSAGE (OUTPATIENT)
Dept: FAMILY MEDICINE | Facility: CLINIC | Age: 57
End: 2022-10-14

## 2022-10-14 LAB
FINAL PATHOLOGIC DIAGNOSIS: NORMAL
Lab: NORMAL

## 2022-10-18 ENCOUNTER — PATIENT MESSAGE (OUTPATIENT)
Dept: FAMILY MEDICINE | Facility: CLINIC | Age: 57
End: 2022-10-18

## 2022-10-18 ENCOUNTER — PATIENT MESSAGE (OUTPATIENT)
Dept: PSYCHIATRY | Facility: CLINIC | Age: 57
End: 2022-10-18

## 2022-10-18 RX ORDER — PREDNISONE 20 MG/1
TABLET ORAL
Qty: 18 TABLET | Refills: 0 | Status: SHIPPED | OUTPATIENT
Start: 2022-10-18 | End: 2022-10-29

## 2022-10-24 RX ORDER — HYDROXYZINE HYDROCHLORIDE 25 MG/1
TABLET, FILM COATED ORAL
Qty: 30 TABLET | Refills: 0 | Status: ON HOLD | OUTPATIENT
Start: 2022-10-24 | End: 2022-11-17 | Stop reason: HOSPADM

## 2022-10-24 NOTE — TELEPHONE ENCOUNTER
DAUGHTER CALLED.     Patients daughter called.   Stating that she is very worried about her mom. Daughter wanted to push tomorrow's appointment back to a later time so she could be on the virtual call but there was nothing else available for tomorrow.   Daughter said to keep it where it is but would like for provider to call her after the visit tomorrow because she has some concerns about her mom that she wants to speak to him about.     Daughter said that patient is   - in manic state  - not sleeping  - selling things  - talking manically   - screaming at a restaurant   And more other things that have happened.   Daughter states that patient has been taking her medicines like she should be.

## 2022-10-25 ENCOUNTER — OFFICE VISIT (OUTPATIENT)
Dept: PSYCHIATRY | Facility: CLINIC | Age: 57
End: 2022-10-25
Payer: MEDICAID

## 2022-10-25 DIAGNOSIS — F31.77 BIPOLAR 1 DISORDER, MIXED, PARTIAL REMISSION: ICD-10-CM

## 2022-10-25 DIAGNOSIS — F41.9 ANXIETY DISORDER, UNSPECIFIED TYPE: Primary | ICD-10-CM

## 2022-10-25 PROCEDURE — 1159F MED LIST DOCD IN RCRD: CPT | Mod: SA,HB,CPTII,95 | Performed by: NURSE PRACTITIONER

## 2022-10-25 PROCEDURE — 99212 OFFICE O/P EST SF 10 MIN: CPT | Mod: PBBFAC,PO | Performed by: NURSE PRACTITIONER

## 2022-10-25 PROCEDURE — 90833 PSYTX W PT W E/M 30 MIN: CPT | Mod: SA,HB,95, | Performed by: NURSE PRACTITIONER

## 2022-10-25 PROCEDURE — 99214 OFFICE O/P EST MOD 30 MIN: CPT | Mod: S$PBB,SA,HB,95 | Performed by: NURSE PRACTITIONER

## 2022-10-25 PROCEDURE — 3044F HG A1C LEVEL LT 7.0%: CPT | Mod: SA,HB,CPTII,95 | Performed by: NURSE PRACTITIONER

## 2022-10-25 PROCEDURE — 99999 PR PBB SHADOW E&M-EST. PATIENT-LVL II: ICD-10-PCS | Mod: PBBFAC,SA,HB, | Performed by: NURSE PRACTITIONER

## 2022-10-25 PROCEDURE — 1160F PR REVIEW ALL MEDS BY PRESCRIBER/CLIN PHARMACIST DOCUMENTED: ICD-10-PCS | Mod: SA,HB,CPTII,95 | Performed by: NURSE PRACTITIONER

## 2022-10-25 PROCEDURE — 1160F RVW MEDS BY RX/DR IN RCRD: CPT | Mod: SA,HB,CPTII,95 | Performed by: NURSE PRACTITIONER

## 2022-10-25 PROCEDURE — 1159F PR MEDICATION LIST DOCUMENTED IN MEDICAL RECORD: ICD-10-PCS | Mod: SA,HB,CPTII,95 | Performed by: NURSE PRACTITIONER

## 2022-10-25 PROCEDURE — 99214 PR OFFICE/OUTPT VISIT, EST, LEVL IV, 30-39 MIN: ICD-10-PCS | Mod: S$PBB,SA,HB,95 | Performed by: NURSE PRACTITIONER

## 2022-10-25 PROCEDURE — 3044F PR MOST RECENT HEMOGLOBIN A1C LEVEL <7.0%: ICD-10-PCS | Mod: SA,HB,CPTII,95 | Performed by: NURSE PRACTITIONER

## 2022-10-25 PROCEDURE — 90833 PR PSYCHOTHERAPY W/PATIENT W/E&M, 30 MIN (ADD ON): ICD-10-PCS | Mod: SA,HB,95, | Performed by: NURSE PRACTITIONER

## 2022-10-25 PROCEDURE — 99999 PR PBB SHADOW E&M-EST. PATIENT-LVL II: CPT | Mod: PBBFAC,SA,HB, | Performed by: NURSE PRACTITIONER

## 2022-10-25 RX ORDER — QUETIAPINE FUMARATE 100 MG/1
200 TABLET, FILM COATED ORAL DAILY
Qty: 60 TABLET | Refills: 1 | Status: ON HOLD | OUTPATIENT
Start: 2022-10-25 | End: 2022-11-17 | Stop reason: HOSPADM

## 2022-10-25 RX ORDER — LITHIUM CARBONATE 300 MG/1
300 CAPSULE ORAL 3 TIMES DAILY
Qty: 90 CAPSULE | Refills: 3 | Status: ON HOLD | OUTPATIENT
Start: 2022-10-25 | End: 2022-11-17 | Stop reason: HOSPADM

## 2022-10-25 NOTE — PROGRESS NOTES
"  Outpatient Psychiatry Follow-Up Visit    Clinical Status of Patient: Outpatient (Virtual)  10/25/2022     [id number] [place] [id number]  [phone]   Visit type: Virtual visit with synchronous audio and video  Each patient to whom he or she provides medical services by telemedicine is:  (1) informed of the relationship between the physician and patient and the respective role of any other health care provider with respect to management of the patient; and (2) notified that he or she may decline to receive medical services by telemedicine and may withdraw from such care at any time.      Chief Complaint: Pt is a 56 year old female who presents today for a follow-up. Met with patient.       Interval History and Content of Current Session:  Interim Events/Subjective Report/Content of Current Session:  follow up appointment.    Pt is a 57 year old female with past psychiatric hx of Bipolar 1 disorder, anxiety NOS who presents for follow up treatment. She is currently taking Lithium 300mg BID, Xanax 0.5 mg QHS PRN for sleep (uses 2-3 x weekly), Seroquel 150mg QHS. Meds tolerated well.    Pt presented today for an urgent visit. Per pt's daughter via [employer] "Patients daughter called.   Stating that she is very worried about her mom. Daughter wanted to push tomorrow's appointment back to a later time so she could be on the virtual call but there was nothing else available for tomorrow.   Daughter said to keep it where it is but would like for provider to call her after the visit tomorrow because she has some concerns about her mom that she wants to speak to him about.      Daughter said that patient is   - in manic state  - not sleeping  - selling things  - talking manically   - screaming at a restaurant   And more other things that have happened.   Daughter states that patient has been taking her medicines like she should be."    Pt presents today highly irritable and in a labile mood. Pt reports that she has " "been feeling manic for the last 5 days or so, but attributes this to prednisone. Pt reports she went several days without sleep. However, pt notes that she recently d/c Since d/c, pt notes that she feels she has stabilized. She is not actively manic in today's session and is amenable to increasing mood stabilizers.    Past Psychiatric hx: Pt. is a 55 year old female  with a past psychiatric hx of Bipolar 1 disorder, depressed who established care with me 07/19 following the long-term of her previous psych, Dr. Pelaez. She came to me taking Lithium 600mg BID and Xanax 0.5 mg QHS PRN for sleep (only uses this sparingly) , which had been previously prescribed and managed by Dr. Pelaez. She reports that her symptoms have stabilized over the last 7-8 months, and has been without a manic episode since November of 2018. Previous med trials of Lamictal (rash), Risperdal, Wellbutrin, some SSRIs (destablized mood, triggered manic episodes). Pt stable in clinic upon initial eval - all meds were continued at same dosage.      Notes a long standing history of "mood swings" dating to childhood, but first formal psych encounter in 1987 following the birth of her child. While admitted to the hospital following birth, and after being discharged, pt speaks to experiencing A/V hallucinations and euphoric gamal following a two-week period of going without sleep. "I was real paranoid after I had the baby. I felt that if I went to sleep, I wouldn't be able to hear the baby cry. I ended up going a couple weeks where I didn't sleep at all, started hearing things and seeing things that weren't there. I felt like people were trying to contact me and tell me things through the computer or the TV. I was paranoid and was thinking the vampires were after us. I started thinking I was someone I wasn't." She eventually sought psychiatric care in an outpatient seeing, but was not started on any psychiatric medication until 2002 (?). She was " "prescribed SSRIs and Wellbutrin at this time, which triggered a manic episode, and were d/c'd. She was eventually started on Lithium around 2005, and has achieved good results. States she has felt more stable than she's ever felt before.      Upon initial eval, pt reports manic episodes "once or twice a year" that are triggered by a lack of sleep. States that her manic episodes last for "weeks" and "I get hyper, my metabolism speeds up. I feel like I'm sped up. I always try to buy a car or a house. I had two houses at one time. It always ends with them putting me in the hospital." Does continue to experience delusional thinking and auditory hallucinations exclusively during manic episodes. Hx of several ED admissions in 03/19 r/t delusions, and "walking down the street talking to people who weren't there" Per - ED note. Reports inpatient hospitalization following manic episodes to [place] x 1 week in November of 2018. Also reports another admission to [facility] in March of 2018 for 3 days following a manic episode. During these episodes, "I get very happy during those episodes. I start thinking I'm Colt and can communicate through the TV".      Denies any depressive symptoms. Reports sleeping 7-9 hours of sleep each night, restful. No issues falling or staying asleep. Denies anhedonia. Denies guilt/worthlessness. Energy good, concentration good. Appetite good. Denies psychomotor slowing, denies SI.      On 10/16, pt daughter reported "She seems a little bit better during the day, but is still pretty manic and only sleeping 2-3 hours at night, despite trying to up-titrate the medication.  She had to have a meeting with her supervisors at work yesterday, but they have fortunately been very understanding about her bipolar" Episodes do not meet criteria for true gamal/or hypomania, but we have been up-titrating Seroquel (started 10/19) to address symptoms - pt has responded well but reports dry mouth.I advised pt " "to add dose of Seroquel 50mg Q AM. However, she states pt states this made her groggy so d/c this. Despite this, pt reports at f/u "I've been feeling so much better. I'm actually sleeping now and definitely haven't been feeling manic at all. I feel way more calm now." However,  Notes overall less mood lability, less agitation. Denies manic/hypomanic episodes since last visit. She went to [place] on vacation between visit - enjoyed this. Pt doing well with no decompensations since last visit.  Does note continued generalized worry. Feels restless, on edge.       During her 5/20 f/u visit pt reports "I've been getting into bed at night, and sometimes it feels like someone else is getting in bed with me. It got to the point where I couldn't sleep in my bed because I was kind of scared. I thought it was my cat but my cat wasn't in the room. Sometime it even touches me, like it's brushing on either my foot or back. Like someone is in here trying to wake me up, but no one is in there. I usually just go and sleep on the sofa." Denies A/V hallucinations.     Seroquel was increased following this visit, Today, pt reports "I don't have those feelings in my bed anymore. I'm not afraid to get in bed anymore. Thanks goodness. I've been sleeping better." Does reports "feeling extremely tired when I take it in the morning." and requests to move to PM dosing. Tolerates well otherwise. Denies any gamal or hypomania between sessions.      Past Medical hx:   Past Medical History:   Diagnosis Date    Bipolar 1 disorder     Cancer     skin cancer to right arm    GERD (gastroesophageal reflux disease)     History of psychiatric hospitalization     Mental disorder     Thyroid disease         Interim hx:  Medication changes last visit: Inc seroquel to 50mg PO QAM, 100mg QHS  Anxiety: inc'd  Depression: denies     Denies suicidal/homicidal ideations.  Denies hopelessness/worthlessness.    Denies auditory/visual hallucinations       " Tobacco: never used  Alcohol: denies   Drug use: denies  Caffeine: 1-2 cups coffee daily      Review of Systems   PSYCHIATRIC: Pertinent items are noted in the narrative.        CONSTITUTIONAL: weight stable        M/S: no pain today         ENT: no allergies noted today        ABD: no n/v/d     Past Medical, Family and Social History: The patient's past medical, family and social history have been reviewed and updated as appropriate within the electronic medical record. See encounter notes.     Medication: Lithium 300mg TID, and Xanax 0.5 mg QHS PRN for sleep, Sseroquel 200mg QHS     Compliance: yes      Side effects: dry mouth from Seroquel     Risk Parameters:  Patient reports no suicidal ideation  Patient reports no homicidal ideation  Patient reports no self-injurious behavior  Patient reports no violent behavior     Exam (detailed: at least 9 elements; comprehensive: all 15 elements)   Constitutional  Vitals:  Most recent vital signs, dated less than 90 days prior to this appointment, were reviewed. There were no vitals taken for this visit.     General:  unremarkable, age appropriate, casual attire, good eye contact, good rapport       Musculoskeletal  Muscle Strength/Tone:  no flaccidity, no tremor    Gait & Station:  normal      Psychiatric                       Speech:  normal tone, normal rate, rhythm, and volume   Mood & Affect:   Euthymic, congruent, appropriate         Thought Process:   Goal directed; Linear    Associations:   intact   Thought Content:   No SI/HI, + fixed delusions, or paranoia, no AV/VH   Insight & Judgement:   Good, adequate to circumstances   Orientation:   grossly intact; alert and oriented x 4    Memory:  intact for content of interview    Language:  grossly intact, can repeat    Attention Span  : Grossly intact for content of interview   Fund of Knowledge:   intact and appropriate to age and level of education        Assessment and Diagnosis   Status/Progress: Based on the  examination today, the patient's problem(s) is/are under fair control.  New problems have not been presented today. Comorbidities are not currently complicating management of the primary condition.      Impression:   Pt is a 57 year old female with past psychiatric hx of Bipolar 1 disorder, anxiety NOS who presents for follow up treatment. She is currently taking Lithium 300mg BID, Xanax 0.5 mg QHS PRN for sleep (uses 2-3 x weekly), Seroquel 150mg QHS. Meds tolerated well and provide good symptomatic relief overall.    Today, pt notes that she is doing well overall. Denies any episodes of depression or gamal/hypomania. Anxiety well-managed, denies any generalized/ruminative worrying. Sleeping well despite waking a few times throughout the night. Pt stable and high functioning.       Most recent Li: 0.3: 3/22 - pt stable    Diagnosis: Bipolar 1 disorder, partial remission, anxiety unspecified    Intervention/Counseling/Treatment Plan   Medication Management:      1) Increase Lithium 300mg TID for mood. Pt instructed to monitor closely for mood destabilization as we have been gradually tapering due to inc'd TSH.    2) Increase Seroquel 200mg QHS. Typical ELFEGO's reviewed including weight gain, abnormal movements, EPS, TD, metabolic side effects.      3) Continue Xanax 0.5 mg QHS PRN for sleep (uses once weekly. Discussed risk of decreased RT, sedation, addictive potential, and not to mix with alcohol.      4. Call to report any worsening of symptoms or problems with the medication. Pt instructed to go to ER with thoughts of harming self, others     5. Patient given contact # for psychotherapists at [facility] and also instructed she may check with insurance for list of providers.      6. Labs: no new orders       Return to clinic: 2 weeks - self  Psychotherapy:   Target symptoms: inattention/distractibility, anxiety    Why chosen therapy is appropriate versus another modality: relevant to diagnosis, patient  responds to this modality  Outcome monitoring methods: self-report, observation, feedback from family   Therapeutic intervention type: supportive psychotherapy  Topics discussed/themes: building skills sets for symptom management, symptom recognition, nutrition, exercise  The patient's response to the intervention is accepting. The patient's progress toward treatment goals is positive progress.  Duration of intervention: 20 minutes      -Spent 30min face to face with the pt; >50% time spent in counseling   -Supportive therapy and psychoeducation provided  -R/B/SE's of medications discussed with the pt who expresses understanding and chooses to take medications as prescribed.   -Pt instructed to call clinic, 911 or go to nearest emergency room if sxs worsen or pt is in   crisis. The pt expresses understanding.    [place]ana Aguilar, NP

## 2022-10-26 ENCOUNTER — TELEPHONE (OUTPATIENT)
Dept: PSYCHIATRY | Facility: CLINIC | Age: 57
End: 2022-10-26

## 2022-10-26 NOTE — TELEPHONE ENCOUNTER
Daughter LVM that she is expecting a call back from provider, did not receive a call yesterday. Would like provider to return her call at his earliest convenience today or tomorrow.

## 2022-11-01 ENCOUNTER — TELEPHONE (OUTPATIENT)
Dept: FAMILY MEDICINE | Facility: CLINIC | Age: 57
End: 2022-11-01

## 2022-11-01 ENCOUNTER — TELEPHONE (OUTPATIENT)
Dept: PODIATRY | Facility: CLINIC | Age: 57
End: 2022-11-01

## 2022-11-01 NOTE — TELEPHONE ENCOUNTER
----- Message from Gracie Ling sent at 11/1/2022 12:58 PM CDT -----  Regarding: pt called  Name of Who is Calling: TATUM MADDOX [[id number]]      What is the request in detail: pt is requesting to be seen today for swollen left foot an ankle after 3:15. Please advise       Can the clinic reply by [facility]: No      [facility]: [phone]

## 2022-11-01 NOTE — TELEPHONE ENCOUNTER
Patient's leg and ankle are swollen twice it's size, Patient was advised to go urgent care or the ED

## 2022-11-01 NOTE — TELEPHONE ENCOUNTER
----- Message from Gracie Ling sent at 11/1/2022 12:58 PM CDT -----  Regarding: pt called  Name of Who is Calling: TATUM MADDOX [[id number]]      What is the request in detail: pt is requesting to be seen today for swollen left foot an ankle after 3:15. Please advise       Can the clinic reply by [facility]: No      [facility]: [phone]

## 2022-11-01 NOTE — TELEPHONE ENCOUNTER
----- Message from Karmen Caballero sent at 11/1/2022  1:25 PM CDT -----  Regarding: returning call  Contact: patient  Type:  Patient Returning Call    Who Called:  patient  Who Left Message for Patient:  Kelley  Does the patient know what this is regarding?:  appointment  Best Call Back Number:  [phone] (home)   Additional Information:  Please call patient. Thanks!

## 2022-11-07 DIAGNOSIS — G56.01 CARPAL TUNNEL SYNDROME ON RIGHT: Primary | ICD-10-CM

## 2022-11-08 ENCOUNTER — CLINICAL SUPPORT (OUTPATIENT)
Dept: REHABILITATION | Facility: HOSPITAL | Age: 57
End: 2022-11-08
Payer: MEDICAID

## 2022-11-08 DIAGNOSIS — M25.662 DECREASED ROM OF LEFT KNEE: Primary | ICD-10-CM

## 2022-11-08 DIAGNOSIS — R29.898 WEAKNESS OF LEFT LOWER EXTREMITY: ICD-10-CM

## 2022-11-08 DIAGNOSIS — M25.561 PAIN IN BOTH KNEES, UNSPECIFIED CHRONICITY: ICD-10-CM

## 2022-11-08 DIAGNOSIS — M25.562 PAIN IN BOTH KNEES, UNSPECIFIED CHRONICITY: ICD-10-CM

## 2022-11-08 PROCEDURE — 97161 PT EVAL LOW COMPLEX 20 MIN: CPT

## 2022-11-08 NOTE — PLAN OF CARE
[facility]   Physical Therapy Initial Evaluation      Date: 11/8/2022   Name: Cristina Tan  Clinic Number: [id number]    Therapy Diagnosis:   Encounter Diagnoses   Name Primary?    Pain in both knees, unspecified chronicity     Decreased ROM of left knee Yes    Weakness of left lower extremity      Physician: Shahrzad Paris, NP    Physician Orders: PT Eval and Treat   Medical Diagnosis from Referral: M25.561,M25.562 (ICD-10-CM) - Pain in both knees, unspecified chronicity  Evaluation Date: 11/8/2022  Authorization Period Expiration: TBD  Plan of Care Expiration: 2/3/2022  Progress Note Due: 12/8/2022  Visit # / Visits authorized: 1/ 1   FOTO: 1/5    Precautions: Standard     Time In: 840 (late)   Time Out: 915  Total Appointment Time (timed & untimed codes): 35 minutes      SUBJECTIVE     Date of onset: a year    History of current condition - Cristina reports: she has been having knee pain on both knees for about a year. Patient reports her knees have been hurting without a JANETH. Patient reports she has gained a lot of weight and thinks this is affecting her knees. Patient reports she wears connelly tennis shoes. Patient reports the past week she started having L sided swelling around her ankle and calf with no reason. Patient reports her L leg was red and tight. Patient reports it hurts to touch her swelling. Patient reports she almost went to the Urgent care because of her swelling. Patient has an appointment on Thursday with a primary care to discuss her leg swelling    Falls: Yes 2 weeks ago. Dancing in the park     Imaging, xrays: see imaging section     Prior Therapy: No  Social History:  lives alone  Occupation: Retired teacher  Prior Level of Function: Walking, occasional gym at the fitness center   Current Level of Function: Pain with walking    Pain:  Current 0/10, worst 9/10, best 0/10   Location: left knee  .   Description: Sharp  Aggravating Factors: Walking, stairs,  dancing   Easing Factors: ice and rest    Patients goals: To get rid of pain      Medical History:   Past Medical History:   Diagnosis Date    Bipolar 1 disorder     Cancer     skin cancer to right arm    GERD (gastroesophageal reflux disease)     History of psychiatric hospitalization     Mental disorder     Thyroid disease        Surgical History:   Cristina Tan  has a past surgical history that includes Hysterectomy; Oophorectomy;  section; breast reduction; tummy tuck; Tonsillectomy; gastric sleeve; Total Reduction Mammoplasty (Bilateral); Cholecystectomy; Carpal tunnel release (Left, 2021); and Colonoscopy (N/A, 2022).    Medications:   Cristina has a current medication list which includes the following prescription(s): alprazolam, atorvastatin, hydroxyzine hcl, levothyroxine, lithium, omeprazole, and quetiapine.    Allergies:   Review of patient's allergies indicates:   Allergen Reactions    Lamictal [lamotrigine] Rash          OBJECTIVE     Hip Right Right Left Left Pain/Dysfunction with Movement    AROM MMT AROM MMT    Flexion WNL 5/5 WNL 5/5    Abduction WNL 4+/5 WNL 4/5       Knee Right Right Right Left Left Left Pain/Dysfunction with Movement    AROM PROM MMT AROM PROM MMT    Flexion 125 135 5/5 110 115 ! 4+/5 Pain with PROM of L knee k   Extension 0 0 5/5 0 0 ! 4+/5 Pain in the back of L knee with PROM     Observation:   - Calf/ankle swelling on L side  - slight red and blotchy on L calf/ankle    Calf circumference   - L: 17 and 1/2 inches   - R: 17 inches     Emelina Test: negative     Thessaly Test: positive     Palpation:  - Joint Line: tender to L knee  - Patella: pain underneath patella with mobilization     Tight hamstring (L>R)      Limitation/Restriction for FOTO Knee Survey    Therapist reviewed FOTO scores for Cristina Tan on 2022.   FOTO documents entered into [employer] - see Media section.    Limitation Score: NA%         TREATMENT       PATIENT EDUCATION AND HOME  EXERCISES     Education provided:   - Purpose of PT  - educated to follow up with primary care about swelling in calf/ankle      ASSESSMENT     Cristina is a 57 y.o. female referred to outpatient Physical Therapy with a medical diagnosis of pain in both knees. Patient presents with a chronic history of knee pain. Patient presents with decreased L knee range of motion, L knee weakness, and swelling of L knee/calf/ankle. Patient has pain with special tests regarding the L meniscus and patella. Patient has tightness in hamstrings. Patient was educated to follow up with primary care about swelling of L leg.     Patient prognosis is Fair.   Patient will benefit from skilled outpatient Physical Therapy to address the deficits stated above and in the chart below, provide patient /family education, and to maximize patientt's level of independence.     Plan of care discussed with patient: Yes  Patient's spiritual, cultural and educational needs considered and patient is agreeable to the plan of care and goals as stated below:     Anticipated Barriers for therapy: Chronic symptoms     Medical Necessity is demonstrated by the following  History  Co-morbidities and personal factors that may impact the plan of care Co-morbidities:   None    Personal Factors:   no deficits     low   Examination  Body Structures and Functions, activity limitations and participation restrictions that may impact the plan of care Body Regions:   lower extremities    Body Systems:    ROM  strength    Participation Restrictions:   None    Activity limitations:   Learning and applying knowledge  no deficits    General Tasks and Commands  no deficits    Communication  no deficits    Mobility  walking    Self care  no deficits    Domestic Life  no deficits    Interactions/Relationships  no deficits    Life Areas  no deficits    Community and Social Life  recreation and leisure         moderate   Clinical Presentation stable and uncomplicated low   Decision  Making/ Complexity Score: low     Goals:    Short Term Goals: in 3 weeks    1. Pt will be independent with HEP supplement PT in improving functional mobility.  2. t will improve L knee AROM to at least 0-115  in order to improve gait    Long Term Goals: in 6 weeks  1. Pt will be independent with updated HEP supplement PT in improving functional mobility.  2. Pt will improve L LE strength to at least 5/5 LE strength in order to improve functional mobility  3. Pt will improve L knee AROM to at least 0-120 in order to improve gait and ability to perform ADLs  4. Pt will improve FOTO knee survey score to </= **% limited in order to demo improved functional mobility  5. Pt will perform at least 14 sit to stands without UE support on 30 second sit to stand test in order to demo improved ability to perform transfers      PLAN   Plan of care Certification: 11/8/2022 to 2/3/2022.    Outpatient Physical Therapy 2 times weekly for 6 weeks to include the following interventions: Manual Therapy, Moist Heat/ Ice, Neuromuscular Re-ed, Patient Education, Self Care, Therapeutic Activities, Therapeutic Exercise, and FDN.     Jacob Pham, PT      I CERTIFY THE NEED FOR THESE SERVICES FURNISHED UNDER THIS PLAN OF TREATMENT AND WHILE UNDER MY CARE   Physician's comments:     Physician's Signature: ___________________________________________________

## 2022-11-10 ENCOUNTER — HOSPITAL ENCOUNTER (EMERGENCY)
Facility: HOSPITAL | Age: 57
Discharge: PSYCHIATRIC OR SUBSTANCE USE FACILITY | End: 2022-11-11
Attending: EMERGENCY MEDICINE
Payer: MEDICAID

## 2022-11-10 ENCOUNTER — TELEPHONE (OUTPATIENT)
Dept: ORTHOPEDICS | Facility: CLINIC | Age: 57
End: 2022-11-10

## 2022-11-10 ENCOUNTER — TELEPHONE (OUTPATIENT)
Dept: PSYCHIATRY | Facility: CLINIC | Age: 57
End: 2022-11-10

## 2022-11-10 ENCOUNTER — OFFICE VISIT (OUTPATIENT)
Dept: INTERNAL MEDICINE | Facility: CLINIC | Age: 57
End: 2022-11-10
Payer: MEDICAID

## 2022-11-10 VITALS
RESPIRATION RATE: 18 BRPM | HEART RATE: 65 BPM | BODY MASS INDEX: 41.22 KG/M2 | WEIGHT: 256.5 LBS | OXYGEN SATURATION: 97 % | DIASTOLIC BLOOD PRESSURE: 74 MMHG | TEMPERATURE: 98 F | HEIGHT: 66 IN | SYSTOLIC BLOOD PRESSURE: 128 MMHG

## 2022-11-10 VITALS
WEIGHT: 250 LBS | HEART RATE: 69 BPM | BODY MASS INDEX: 40.35 KG/M2 | OXYGEN SATURATION: 99 % | RESPIRATION RATE: 16 BRPM | TEMPERATURE: 98 F | DIASTOLIC BLOOD PRESSURE: 103 MMHG | SYSTOLIC BLOOD PRESSURE: 166 MMHG

## 2022-11-10 DIAGNOSIS — F30.10 MANIC BEHAVIOR: Primary | ICD-10-CM

## 2022-11-10 DIAGNOSIS — Z00.8 MEDICAL CLEARANCE FOR PSYCHIATRIC ADMISSION: ICD-10-CM

## 2022-11-10 DIAGNOSIS — M25.572 ACUTE LEFT ANKLE PAIN: Primary | ICD-10-CM

## 2022-11-10 LAB
ALBUMIN SERPL BCP-MCNC: 3.8 G/DL (ref 3.5–5.2)
ALP SERPL-CCNC: 85 U/L (ref 55–135)
ALT SERPL W/O P-5'-P-CCNC: 21 U/L (ref 10–44)
AMPHET+METHAMPHET UR QL: NEGATIVE
ANION GAP SERPL CALC-SCNC: 12 MMOL/L (ref 8–16)
APAP SERPL-MCNC: <3 UG/ML (ref 10–20)
AST SERPL-CCNC: 19 U/L (ref 10–40)
BARBITURATES UR QL SCN>200 NG/ML: NEGATIVE
BASOPHILS # BLD AUTO: 0.06 K/UL (ref 0–0.2)
BASOPHILS NFR BLD: 0.5 % (ref 0–1.9)
BENZODIAZ UR QL SCN>200 NG/ML: ABNORMAL
BILIRUB SERPL-MCNC: 0.8 MG/DL (ref 0.1–1)
BILIRUB UR QL STRIP: NEGATIVE
BUN SERPL-MCNC: 15 MG/DL (ref 6–20)
BZE UR QL SCN: NEGATIVE
CALCIUM SERPL-MCNC: 9.7 MG/DL (ref 8.7–10.5)
CANNABINOIDS UR QL SCN: NEGATIVE
CHLORIDE SERPL-SCNC: 108 MMOL/L (ref 95–110)
CLARITY UR REFRACT.AUTO: ABNORMAL
CO2 SERPL-SCNC: 20 MMOL/L (ref 23–29)
COLOR UR AUTO: YELLOW
CREAT SERPL-MCNC: 0.7 MG/DL (ref 0.5–1.4)
CREAT UR-MCNC: 85 MG/DL (ref 15–325)
DIFFERENTIAL METHOD: ABNORMAL
EOSINOPHIL # BLD AUTO: 0.2 K/UL (ref 0–0.5)
EOSINOPHIL NFR BLD: 1.8 % (ref 0–8)
ERYTHROCYTE [DISTWIDTH] IN BLOOD BY AUTOMATED COUNT: 13.3 % (ref 11.5–14.5)
EST. GFR  (NO RACE VARIABLE): >60 ML/MIN/1.73 M^2
ETHANOL SERPL-MCNC: <10 MG/DL
GLUCOSE SERPL-MCNC: 111 MG/DL (ref 70–110)
GLUCOSE UR QL STRIP: NEGATIVE
HCT VFR BLD AUTO: 37.6 % (ref 37–48.5)
HCV AB SERPL QL IA: NORMAL
HGB BLD-MCNC: 12.9 G/DL (ref 12–16)
HGB UR QL STRIP: NEGATIVE
HIV 1+2 AB+HIV1 P24 AG SERPL QL IA: NORMAL
IMM GRANULOCYTES # BLD AUTO: 0.02 K/UL (ref 0–0.04)
IMM GRANULOCYTES NFR BLD AUTO: 0.2 % (ref 0–0.5)
KETONES UR QL STRIP: NEGATIVE
LEUKOCYTE ESTERASE UR QL STRIP: NEGATIVE
LYMPHOCYTES # BLD AUTO: 3 K/UL (ref 1–4.8)
LYMPHOCYTES NFR BLD: 26.9 % (ref 18–48)
MCH RBC QN AUTO: 31.1 PG (ref 27–31)
MCHC RBC AUTO-ENTMCNC: 34.3 G/DL (ref 32–36)
MCV RBC AUTO: 91 FL (ref 82–98)
METHADONE UR QL SCN>300 NG/ML: NEGATIVE
MONOCYTES # BLD AUTO: 0.6 K/UL (ref 0.3–1)
MONOCYTES NFR BLD: 5.8 % (ref 4–15)
NEUTROPHILS # BLD AUTO: 7.1 K/UL (ref 1.8–7.7)
NEUTROPHILS NFR BLD: 64.8 % (ref 38–73)
NITRITE UR QL STRIP: NEGATIVE
NRBC BLD-RTO: 0 /100 WBC
OPIATES UR QL SCN: NEGATIVE
PCP UR QL SCN>25 NG/ML: NEGATIVE
PH UR STRIP: 6 [PH] (ref 5–8)
PLATELET # BLD AUTO: 430 K/UL (ref 150–450)
PMV BLD AUTO: 9.2 FL (ref 9.2–12.9)
POTASSIUM SERPL-SCNC: 3.7 MMOL/L (ref 3.5–5.1)
PROT SERPL-MCNC: 7.3 G/DL (ref 6–8.4)
PROT UR QL STRIP: NEGATIVE
RBC # BLD AUTO: 4.15 M/UL (ref 4–5.4)
SARS-COV-2 RDRP RESP QL NAA+PROBE: NEGATIVE
SODIUM SERPL-SCNC: 140 MMOL/L (ref 136–145)
SP GR UR STRIP: 1.01 (ref 1–1.03)
TOXICOLOGY INFORMATION: ABNORMAL
TSH SERPL DL<=0.005 MIU/L-ACNC: 1.79 UIU/ML (ref 0.4–4)
URN SPEC COLLECT METH UR: ABNORMAL
WBC # BLD AUTO: 11 K/UL (ref 3.9–12.7)

## 2022-11-10 PROCEDURE — 80143 DRUG ASSAY ACETAMINOPHEN: CPT | Performed by: EMERGENCY MEDICINE

## 2022-11-10 PROCEDURE — 99999 PR PBB SHADOW E&M-EST. PATIENT-LVL IV: CPT | Mod: PBBFAC,,, | Performed by: INTERNAL MEDICINE

## 2022-11-10 PROCEDURE — 1160F RVW MEDS BY RX/DR IN RCRD: CPT | Mod: CPTII,,, | Performed by: INTERNAL MEDICINE

## 2022-11-10 PROCEDURE — 99213 OFFICE O/P EST LOW 20 MIN: CPT | Mod: S$PBB,,, | Performed by: INTERNAL MEDICINE

## 2022-11-10 PROCEDURE — 1159F PR MEDICATION LIST DOCUMENTED IN MEDICAL RECORD: ICD-10-PCS | Mod: CPTII,,, | Performed by: INTERNAL MEDICINE

## 2022-11-10 PROCEDURE — 3044F HG A1C LEVEL LT 7.0%: CPT | Mod: CPTII,,, | Performed by: INTERNAL MEDICINE

## 2022-11-10 PROCEDURE — 99213 PR OFFICE/OUTPT VISIT, EST, LEVL III, 20-29 MIN: ICD-10-PCS | Mod: S$PBB,,, | Performed by: INTERNAL MEDICINE

## 2022-11-10 PROCEDURE — 3008F PR BODY MASS INDEX (BMI) DOCUMENTED: ICD-10-PCS | Mod: CPTII,,, | Performed by: INTERNAL MEDICINE

## 2022-11-10 PROCEDURE — 1159F MED LIST DOCD IN RCRD: CPT | Mod: CPTII,,, | Performed by: INTERNAL MEDICINE

## 2022-11-10 PROCEDURE — 1160F PR REVIEW ALL MEDS BY PRESCRIBER/CLIN PHARMACIST DOCUMENTED: ICD-10-PCS | Mod: CPTII,,, | Performed by: INTERNAL MEDICINE

## 2022-11-10 PROCEDURE — U0002 COVID-19 LAB TEST NON-CDC: HCPCS | Performed by: EMERGENCY MEDICINE

## 2022-11-10 PROCEDURE — 99999 PR PBB SHADOW E&M-EST. PATIENT-LVL IV: ICD-10-PCS | Mod: PBBFAC,,, | Performed by: INTERNAL MEDICINE

## 2022-11-10 PROCEDURE — 3008F BODY MASS INDEX DOCD: CPT | Mod: CPTII,,, | Performed by: INTERNAL MEDICINE

## 2022-11-10 PROCEDURE — 3074F SYST BP LT 130 MM HG: CPT | Mod: CPTII,,, | Performed by: INTERNAL MEDICINE

## 2022-11-10 PROCEDURE — 86803 HEPATITIS C AB TEST: CPT | Performed by: EMERGENCY MEDICINE

## 2022-11-10 PROCEDURE — 85025 COMPLETE CBC W/AUTO DIFF WBC: CPT | Performed by: EMERGENCY MEDICINE

## 2022-11-10 PROCEDURE — 3074F PR MOST RECENT SYSTOLIC BLOOD PRESSURE < 130 MM HG: ICD-10-PCS | Mod: CPTII,,, | Performed by: INTERNAL MEDICINE

## 2022-11-10 PROCEDURE — 99284 EMERGENCY DEPT VISIT MOD MDM: CPT | Mod: CS,,, | Performed by: EMERGENCY MEDICINE

## 2022-11-10 PROCEDURE — 99284 PR EMERGENCY DEPT VISIT,LEVEL IV: ICD-10-PCS | Mod: CS,,, | Performed by: EMERGENCY MEDICINE

## 2022-11-10 PROCEDURE — 3078F PR MOST RECENT DIASTOLIC BLOOD PRESSURE < 80 MM HG: ICD-10-PCS | Mod: CPTII,,, | Performed by: INTERNAL MEDICINE

## 2022-11-10 PROCEDURE — 87389 HIV-1 AG W/HIV-1&-2 AB AG IA: CPT | Performed by: EMERGENCY MEDICINE

## 2022-11-10 PROCEDURE — 81003 URINALYSIS AUTO W/O SCOPE: CPT | Mod: 59 | Performed by: EMERGENCY MEDICINE

## 2022-11-10 PROCEDURE — 99214 OFFICE O/P EST MOD 30 MIN: CPT | Mod: PBBFAC | Performed by: INTERNAL MEDICINE

## 2022-11-10 PROCEDURE — 84443 ASSAY THYROID STIM HORMONE: CPT | Performed by: EMERGENCY MEDICINE

## 2022-11-10 PROCEDURE — 3078F DIAST BP <80 MM HG: CPT | Mod: CPTII,,, | Performed by: INTERNAL MEDICINE

## 2022-11-10 PROCEDURE — 99285 EMERGENCY DEPT VISIT HI MDM: CPT | Mod: 27

## 2022-11-10 PROCEDURE — 80053 COMPREHEN METABOLIC PANEL: CPT | Performed by: EMERGENCY MEDICINE

## 2022-11-10 PROCEDURE — 3044F PR MOST RECENT HEMOGLOBIN A1C LEVEL <7.0%: ICD-10-PCS | Mod: CPTII,,, | Performed by: INTERNAL MEDICINE

## 2022-11-10 PROCEDURE — 80307 DRUG TEST PRSMV CHEM ANLYZR: CPT | Performed by: EMERGENCY MEDICINE

## 2022-11-10 PROCEDURE — 82077 ASSAY SPEC XCP UR&BREATH IA: CPT | Performed by: EMERGENCY MEDICINE

## 2022-11-10 NOTE — TELEPHONE ENCOUNTER
"Patient left voicemail on clinic phone line requesting a call from Isaías Aguilar.  Return patient phone call to obtain reason for request.   Patient states, " I want to know if I should get the Carpal Tunnel Release procedure done?" I was going through a manic stage, but I'm coming back down."  Please advice.  Hayley"

## 2022-11-10 NOTE — PROGRESS NOTES
[facility] Note    Chief Complaint      Chief Complaint   Patient presents with    Ankle Pain    Joint Swelling       History of Present Illness      Cristina Tan is a 57 y.o. female who presents today for   Chief Complaint   Patient presents with    Ankle Pain    Joint Swelling   .  Patient comes to appointment here for acute visit related to above . She states she had a fall 2 weeks ago leading to ankle pain and edema. She is slowly improving . She states 3 days ago experienced some redness , no warmth noted . That has since gone away .     Problem List Items Addressed This Visit          Orthopedic    Acute left ankle pain - Primary    Overview     Happened after fall 2 weeks ago   Mild edema currently no evidence of cellulitis or dvt   Is able to ambulate with no difficulty , reassured compression , elevation and nsaids as needed               Past Medical History:  Past Medical History:   Diagnosis Date    Bipolar 1 disorder     Cancer     skin cancer to right arm    GERD (gastroesophageal reflux disease)     History of psychiatric hospitalization     Mental disorder     Thyroid disease        Past Surgical History:  Past Surgical History:   Procedure Laterality Date    breast reduction      BREAST SURGERY  Reduction    CARPAL TUNNEL RELEASE Left 2021    Procedure: RELEASE, CARPAL TUNNEL;  Surgeon: Ld Carbone MD;  Location: [facility];  Service: Orthopedics;  Laterality: Left;  Procedure:  Left carpal tunnel release    Position:  Supine    Anesthesia:  Local    Equipment:  Carpal tunnel set     SECTION      CHOLECYSTECTOMY      COLONOSCOPY N/A 2022    Procedure: COLONOSCOPY;  Surgeon: Rodrick Mccabe MD;  Location: [facility] ([address] [place] [address]);  Service: Endoscopy;  Laterality: N/A;  Fully vaccinated, prep instr emailed -ml    COSMETIC SURGERY      gastric sleeve      HYSTERECTOMY      OOPHORECTOMY      TONSILLECTOMY      TOTAL REDUCTION MAMMOPLASTY  Bilateral     2003    TUBAL LIGATION  6/24/97    tummy tuck         Family History:  family history includes Alcohol abuse in her father; Asthma in her mother; COPD in her mother; Cancer in her father and mother; Colon polyps in her sister.    Social History:  Social History     Socioeconomic History    Marital status:    Tobacco Use    Smoking status: Never    Smokeless tobacco: Never   Substance and Sexual Activity    Alcohol use: Yes     Alcohol/week: 2.0 standard drinks     Types: 2 Drinks containing 0.5 oz of alcohol per week    Drug use: No    Sexual activity: Not Currently     Partners: Male     Birth control/protection: None     Social Determinants of Health     Financial Resource Strain: Low Risk     Difficulty of Paying Living Expenses: Not hard at all   Food Insecurity: No Food Insecurity    Worried About Running Out of Food in the Last Year: Never true    Ran Out of Food in the Last Year: Never true   Transportation Needs: No Transportation Needs    Lack of Transportation (Medical): No    Lack of Transportation (Non-Medical): No   Physical Activity: Sufficiently Active    Days of Exercise per Week: 4 days    Minutes of Exercise per Session: 40 min   Stress: Stress Concern Present    Feeling of Stress : To some extent   Social Connections: Unknown    Frequency of Communication with Friends and Family: Three times a week    Frequency of Social Gatherings with Friends and Family: More than three times a week    Active Member of Clubs or Organizations: No    Attends Club or Organization Meetings: Never    Marital Status:    Housing Stability: Low Risk     Unable to Pay for Housing in the Last Year: No    Number of Places Lived in the Last Year: 2    Unstable Housing in the Last Year: No       Review of Systems:   Review of Systems   Constitutional:  Negative for fever and weight loss.   HENT:  Negative for congestion, hearing loss and sore throat.    Eyes:  Negative for blurred vision and  double vision.   Respiratory:  Negative for cough and shortness of breath.    Cardiovascular:  Negative for chest pain, palpitations, claudication and leg swelling.   Gastrointestinal:  Negative for abdominal pain, constipation, diarrhea and heartburn.   Genitourinary:  Negative for dysuria.   Musculoskeletal:  Positive for joint pain. Negative for back pain and myalgias.   Skin:  Negative for rash.   Neurological:  Negative for focal weakness and headaches.   Psychiatric/Behavioral:  Negative for depression and suicidal ideas.        Medications:  Outpatient Encounter Medications as of 11/10/2022   Medication Sig Dispense Refill    ALPRAZolam (XANAX) 0.5 MG tablet Take 1 tablet (0.5 mg total) by mouth daily as needed for Anxiety. 30 tablet 0    atorvastatin (LIPITOR) 20 MG tablet Take 1 tablet (20 mg total) by mouth once daily. 90 tablet 1    hydrOXYzine HCL (ATARAX) 25 MG tablet TAKE 1 TABLETA BY MOUTH EVERY NIGHT AS NEEDED FOR ANXIETY 30 tablet 0    levothyroxine (SYNTHROID) 50 MCG tablet Take 1 tablet (50 mcg total) by mouth before breakfast. 30 tablet 11    lithium (ESKALITH) 300 MG capsule Take 1 capsule (300 mg total) by mouth 3 (three) times daily. 90 capsule 3    omeprazole (PRILOSEC) 20 MG capsule TAKE 1 CAPSULE(20 MG) BY MOUTH ONCE DAILY 30 capsule 11    QUEtiapine (SEROQUEL) 100 MG Tab Take 2 tablets (200 mg total) by mouth once daily. 60 tablet 1     No facility-administered encounter medications on file as of 11/10/2022.        Allergies:  Review of patient's allergies indicates:   Allergen Reactions    Lamictal [lamotrigine] Rash         Physical Exam         Vitals:    11/10/22 1603   BP: 128/74   Pulse: 65   Resp: 18   Temp: 98 °F (36.7 °C)         Physical Exam  Constitutional:       Appearance: She is well-developed.   Eyes:      Pupils: Pupils are equal, round, and reactive to light.   Neck:      Thyroid: No thyromegaly.   Cardiovascular:      Rate and Rhythm: Normal rate.      Heart sounds:  Normal heart sounds. No murmur heard.    No friction rub. No gallop.      Comments: Trace left ankle edema  Pulmonary:      Breath sounds: Normal breath sounds.   Abdominal:      General: Bowel sounds are normal.      Palpations: Abdomen is soft.   Musculoskeletal:         General: Normal range of motion.      Cervical back: Normal range of motion.   Lymphadenopathy:      Cervical: No cervical adenopathy.   Skin:     General: Skin is warm.      Findings: No rash.   Neurological:      Mental Status: She is alert and oriented to person, place, and time.      Cranial Nerves: No cranial nerve deficit.   Psychiatric:         Behavior: Behavior normal.        Laboratory:  CBC:  No results for input(s): WBC, RBC, HGB, HCT, PLT, MCV, MCH, MCHC in the last 2160 hours.  CMP:  No results for input(s): GLU, CALCIUM, ALBUMIN, PROT, NA, K, CO2, CL, BUN, ALKPHOS, ALT, AST, BILITOT in the last 2160 hours.    Invalid input(s): CREATININ  URINALYSIS:  No results for input(s): COLORU, CLARITYU, SPECGRAV, PHUR, PROTEINUA, GLUCOSEU, BILIRUBINCON, BLOODU, WBCU, RBCU, BACTERIA, MUCUS, NITRITE, LEUKOCYTESUR, UROBILINOGEN, HYALINECASTS in the last 2160 hours.   LIPIDS:  No results for input(s): TSH, HDL, CHOL, TRIG, LDLCALC, CHOLHDL, NONHDLCHOL, TOTALCHOLEST in the last 2160 hours.  TSH:  No results for input(s): TSH in the last 2160 hours.  A1C:  No results for input(s): HGBA1C in the last 2160 hours.    Radiology:        Assessment:     Cristina Tan is a 57 y.o.female with:    Acute left ankle pain        Plan:     Problem List Items Addressed This Visit          Orthopedic    Acute left ankle pain - Primary    Overview     Happened after fall 2 weeks ago   Mild edema currently no evidence of cellulitis or dvt   Is able to ambulate with no difficulty , reassured compression , elevation and nsaids as needed             As above, continue current medications and maintain follow up with specialists.  Return to clinic tahir Espino  [facility]

## 2022-11-10 NOTE — TELEPHONE ENCOUNTER
----- Message from Eliud Pina MA sent at 11/10/2022  2:13 PM CST -----  Regarding: Cancellation of Surgery  Contact: patient  Patient called in and wanted to cancel her surgery on 11/17/22.  Patient is holding off for awhile & will call us back if needed.    Call back number is [phone]

## 2022-11-10 NOTE — TELEPHONE ENCOUNTER
Returned call to pt, pt stated she needs to cancel her sx for now as she is having a manic episode and wants to cancel her sx. Informed she can call back if she wants to r/s.

## 2022-11-11 PROBLEM — R51.9 HEADACHE: Status: ACTIVE | Noted: 2022-11-11

## 2022-11-11 PROBLEM — E07.9 THYROID DISEASE: Status: ACTIVE | Noted: 2022-11-11

## 2022-11-11 NOTE — CONSULTS
Emergency Psychiatry Consult Note    11/10/2022 9:07 PM  Cristina Tan  MRN: [id number]    Chief Complaint / Reason for Consult: gamal with psychosis     SUBJECTIVE     History of Present Illness:   Cristina Tan is a 57 y.o. female with a past psychiatric history of bipolar 1 disorder, anxiety NOS currently presenting with manic episode. Emergency Psychiatry was originally consulted to address the patient's symptoms of manic episode with psychosis.     Per ED Psych MD:  Upon initiation of interview, pt was engaged, cooperative with circumferential, rapid thoughts. Speech pressured, rapid with euphoric affect. Pt voiced several delusions including that she is God, her daughter is half devil, and that she can tell if the devil has taken over someone's body. She also reports no sleep for the past 5 days, increased energy and mood. Patient attributes her gamal to starting home prednisone last week on recommendation from her PCP. Ever since onset of gamal, daughter and son have been staying with her at her house and trying to manage on an outpatient basis.They took pt to [facility] (psych NP) on 10/25/22 who increased her Lithium to 300 TID and Seroquel to 200 HS, however this has been minimally effective. Pt denies current SI, HI, AVH.     Daughter at bedside who confirms that pt hasn't been sleeping for the past 2 weeks, and in addition, pt has been demonstrating increasingly odd behavior. Today, daughter came home and pt started screaming at her that she's half devil. Pt also believed neighborhood dog was possessed by the devil today and released it to the road so it could get hit by a car. They've tried to manage her manic episode on an outpatient basis, however the recent medication change didn't work. Daughter requesting inpatient admission at this time.     Psychiatric Review of Systems:  sleep: yes- no sleep past 5 days and only 2-3 hours sleep last 2 weeks  appetite: no  weight: no  energy/anergy:  yes-increased  interest/pleasure/anhedonia: no  somatic symptoms: no  guilty/hopelessness: no  concentration: no  S.I.B.s/risky behavior: yes- attempting to harm dogs  SI/SA:  no    anxiety/panic: no  Agoraphobia:  no  Social phobia:  no  Recurrent nightmares:  no  hyper startle response:  no  Avoidance: no  Recurrent thoughts:  no  Recurrent behaviors:  no    Irritability: yes  Racing thoughts: yes  Impulsive behaviors: yes  Pressured speech:  yes    Paranoia:yes- believes some people are fully the devil including her ex  and that her son and daughter are half devils. Believed a neighborhood dog got possessed by the devil today and feared it would harm her.  Delusions: yes-believes some people are fully the devil including her ex  and that her son and daughter are half devils. Believed a neighborhood dog got possessed by the devil today and feared it would harm her.  AVH:no    Medical Review of Symptoms:  History obtained from the patient  General : NO chills or fever  Eyes: NO  visual changes  ENT: NO hearing change, nasal discharge or sore throat  Endocrine: NO weight changes or polydipsia/polyuria  Dermatological: NO rashes  Respiratory: NO cough, shortness of breath  Cardiovascular: NO chest pain, palpitations or racing heart  Gastrointestinal: NO nausea, vomiting, constipation or diarrhea  Musculoskeletal: NO muscle pain or stiffness  Neurological: NO confusion, dizziness, headaches or tremors  Psychiatric: please see HPI    Psychiatric History:  Diagnose(s): Yes - bipolar 1 disorder  Previous Medication Trials: Yes - lithium, xanax, seroquel  Previous Psychiatric Hospitalizations: Yes - last hospitalization 2018  Family Psychiatric History: No  Outpatient Psychiatrist: Yes - [facility] Aguilar last visited on 10/25/22    Suicide/Violence Risk Assessment:  Current/active suicidal ideation/plan/intent: No  Previous suicide attempts: No  Current/active homicidal ideation/plan/intent: No  History of  threats/arrests associated with violent conduct - No  Access to firearms/lethal weapons - No    Social History:  Marital Status: not /.  Children: 2 - lives in [place]  Employment Status: on disability  Education: high school diploma/GED  Special Ed: no  Housing Status: Yes - lives alone but her son and daughter visit her on a daily basis  Developmental History: No  History of Abuse: No    Substance Abuse History:  Recreational Drugs:  denies  Use of Alcohol: occasional, social use  Rehab History: No  Tobacco Use: No  Use of Caffeine: No  Use of OTC: No  Is the patient aware of the biomedical complications associated with substance abuse and mental illness? No  Legal consequences of chemical use: No    Legal History:  Past Charges/Incarcerations: No  Pending Charges: No    Psychosocial Factors:  Stressors: recently kicked out her boyfriend for not  his wife.   Functioning Relationships: good support system  Maladaptive or problem behaviors: Yes - takes actions in response to delusions  Peer group, social, ethic, cultural, emotional, and health factors: No  Living situation, family constellation, family circumstances/home: No- lives alone and is independent, has daughter and son who check in on her on a daily basis  Community resources used by patient: Yes - outpatient psychiatric NP Isaías Aguilar   Treatment acceptance/motivation for change: Yes - accepting of inpatient admission    Collateral:   Yes - daughter at bedside. Please refer to HPI.    Scheduled Meds:    Psychotherapeutics (From admission, onward)      None          PRN Meds:    Home Meds:  Prior to Admission medications    Medication Sig Start Date End Date Taking? Authorizing Provider   ALPRAZolam (XANAX) 0.5 MG tablet Take 1 tablet (0.5 mg total) by mouth daily as needed for Anxiety. 10/20/22   [place] Aguilar, NP   atorvastatin (LIPITOR) 20 MG tablet Take 1 tablet (20 mg total) by mouth once daily. 6/3/22 6/3/23  Milli ALVAREZ  MD Aisha   hydrOXYzine HCL (ATARAX) 25 MG tablet TAKE 1 TABLETA BY MOUTH EVERY NIGHT AS NEEDED FOR ANXIETY 10/24/22   [place] Aguilar NP   levothyroxine (SYNTHROID) 50 MCG tablet Take 1 tablet (50 mcg total) by mouth before breakfast. 22   Milli Leonard MD   lithium (ESKALITH) 300 MG capsule Take 1 capsule (300 mg total) by mouth 3 (three) times daily. 10/25/22   [place] Aguilar NP   omeprazole (PRILOSEC) 20 MG capsule TAKE 1 CAPSULE(20 MG) BY MOUTH ONCE DAILY 21   Milli Leonard MD   QUEtiapine (SEROQUEL) 100 MG Tab Take 2 tablets (200 mg total) by mouth once daily. 10/25/22 10/25/23  [place] Aguilar NP     Psychotherapeutics (From admission, onward)      None          Allergies:  Lamictal [lamotrigine]  Past Medical/Surgical History:  Past Medical History:   Diagnosis Date    Bipolar 1 disorder     Cancer     skin cancer to right arm    GERD (gastroesophageal reflux disease)     History of psychiatric hospitalization     Mental disorder     Thyroid disease      Past Surgical History:   Procedure Laterality Date    breast reduction      BREAST SURGERY  Reduction    CARPAL TUNNEL RELEASE Left 2021    Procedure: RELEASE, CARPAL TUNNEL;  Surgeon: Ld Carbone MD;  Location: [facility];  Service: Orthopedics;  Laterality: Left;  Procedure:  Left carpal tunnel release    Position:  Supine    Anesthesia:  Local    Equipment:  Carpal tunnel set     SECTION      CHOLECYSTECTOMY      COLONOSCOPY N/A 2022    Procedure: COLONOSCOPY;  Surgeon: Rodrick Mccabe MD;  Location: [address]);  Service: Endoscopy;  Laterality: N/A;  Fully vaccinated, prep instr emailed -ml    COSMETIC SURGERY      gastric sleeve      HYSTERECTOMY      OOPHORECTOMY      TONSILLECTOMY      TOTAL REDUCTION MAMMOPLASTY Bilateral         TUBAL LIGATION  97    tummy tuck       OBJECTIVE     Vital Signs:  Temp:  [97.9 °F (36.6 °C)-98 °F (36.7 °C)]   Pulse:  []   Resp:  [16-18]  "  BP: (128-145)/(69-74)   SpO2:  [97 %-100 %]     Musculoskeletal Exam:  Muscle Strength and Tone: normal strength and tone  Gait and Station: ambulates with steady gait without assistance    Mental Status Exam:  Appearance: unremarkable, age appropriate  Level of Consciousness: alert,awake  Behavior/Cooperation: normal, cooperative, talks over interviewer and daughter at times  Psychomotor: within normal limits   Speech: pressured, rapid  Language: english, fluid  Orientation: person, place, situation, month of year, year  Attention Span/Concentration: spelled "WORLD" forwards and backwards  Memory: Recent and remote intact  Mood: "feeling  manic"  Affect: euphoric  Thought Process: linear, circumstantial, flight of ideas  Associations: circumstantial, flight of ideas  Thought Content: normal, no suicidality, no homicidality, delusions, or paranoia  Fund of Knowledge: Aware of current events  Abstraction: proverbs were abstract, similarities were abstract  Insight: limited- aware she may be manic, however unaware that her delusions are not true and doesn't understand the impact of her behavior on others (thought the dog was the devil and released it on road in attempt for to to get hit by car).   Judgment: poor - kidnapped a dog and tried harm it by releasing it on road to get hit by car    Laboratory Data:  No results found for this or any previous visit (from the past 48 hour(s)).   No results found for: PHENYTOIN, PHENOBARB, VALPROATE, CBMZ  Imaging:  Imaging Results    None        ASSESSMENT     Cristina Tan is a 57 y.o. female with a past psychiatric history of bipolar 1 disorder, anxiety NOS currently presenting with manic episode. Emergency Psychiatry was originally consulted to address the patient's symptoms of manic episode with psychosis.     IMPRESSION  Bipolar 1 Disorder, with psychotic features, MRE manic    RECOMMENDATION(S)      1. Scheduled Medication(s):  Defer to inpatient treatment team    2. " PRN Medication(s):  None at this time    3. Legal Status/Precaution(s):  Continue PEC-CEC, as patient remains at imminent risk of danger to self or others, and is gravely disabled secondary to a major mental illness. Once medically cleared by ED/Primary MD, recommend placement in an acute psychiatric facility for safety and medical stabilization of current psychiatric symptomatology.    Spoke to [facility] who state they are reserving a bed on unit 2000 for the patient. Please facilitate transfer to [facility] once medically cleared.     In cases of emergency, daily coverage provided by Acute/ED Psych MD, NP, or SW, with associated contact numbers listed in the [facility] On Call Schedule.    Case discussed with emergency psychiatry staff: Dr. Sami Oliver, , [facility], PGY-2

## 2022-11-11 NOTE — ED NOTES
Pt remains in paper scrubs, resting in stretcher comfortably - with side rails up, locked, and in lowest position. Chest rise and fall noted; breathing equal, even, and unlabored. Sitter remains at bedside in direct visual contact, charting per protocol every 15 minutes. No equipment or belongings are in the patients room to prevent self harm or injury. Pt aware of plan of care. No acute distress noted and no needs expressed at this time. Will continue to assess periodically.

## 2022-11-11 NOTE — ED NOTES
"Cristina Tan, a 57 y.o. female presents to the ED w/ complaint of     Triage note:  Chief Complaint   Patient presents with    Manic Behavior     Pt states she is having "a manic epsiode". PMH of bipolar. Per Pt.'s daughter Pt stated both her daughter and  are half devils. Pt.'s daughter states Pt tried to kill her dog today.      Review of patient's allergies indicates:   Allergen Reactions    Lamictal [lamotrigine] Rash     Past Medical History:   Diagnosis Date    Bipolar 1 disorder     Cancer     skin cancer to right arm    GERD (gastroesophageal reflux disease)     History of psychiatric hospitalization     Mental disorder     Thyroid disease     Patient identifiers for Cristina Tan checked and correct.    LOC: The patient is awake, alert and aware of environment with an appropriate affect, the patient is oriented x 4 and speaking appropriately.  APPEARANCE: Patient resting comfortably and in no acute distress, patient is clean and well groomed, patient's clothing is properly fastened.  SKIN: The skin is warm and dry, color consistent with ethnicity, patient has normal skin turgor and moist mucus membranes, skin intact, no breakdown or bruising noted.  MUSCULOSKELETAL: Patient moving all extremities well, no obvious swelling or deformities noted.  RESPIRATORY: Airway is open and patent, respirations are spontaneous and even, patient has a normal effort and rate.  CARDIAC: Patient has a normal rate and rhythm, no periphreal edema noted, capillary refill < 3 seconds. Normal +2 pedal pulses present.  ABDOMEN: Soft and non tender to palpation, no distention noted. Patient denies any nausea, vomiting, diarrhea, or constipation.   NEUROLOGIC: Eyes open spontaneously, PERRL, behavior appropriate to situation, follows commands, facial expression symmetrical, bilateral hand grasp equal and even, purposeful motor response noted, normal sensation in all extremities.      "

## 2022-11-11 NOTE — ED PROVIDER NOTES
"Encounter Date: 11/10/2022       History     Chief Complaint   Patient presents with    Manic Behavior     Pt states she is having "a manic epsiode". PMH of bipolar. Per Pt.'s daughter Pt stated both her daughter and  are half devils. Pt.'s daughter states Pt tried to kill her dog today.      56 yo W with pmhx bipolar disorder, thyroid disease, GERD presents for psych evaluation.  History is assisted by the patient's daughter.  The patient endorses a "manic episode".  Symptoms been ongoing for 2-3 weeks.  The patient believes it was more severe earlier and she is since improved.  She notes that when she is manic she only sleeps 3-4 hours a night but last night she slept 7 and feels that she could sleep 8-9 today.  She denies any SI, HI, AVH.  She does believe that her dog is the devil.  When questioned about that as being abnormal thinking, she notes that "the devil can appear as whoever he wants." History is assisted by the patient's daughter.  Daughter notes that patient has had increasingly odd behavior.  They tried to adjust the patient's psychiatric medications including increasing her lithium to 900 mg and Seroquel from 150-200 mg.  Patient also takes p.r.n. Xanax and hydroxyzine to help with sleep.  Despite these medication adjustments, she feels that her mother is not improving.  The patient subjectively denies SI, HI, AVH and does not feel she warrants psychiatric facility.  The patient's daughter feels she would benefit from 1.  She has a history of psychiatric hospitalizations.  Patient does not believe she is manic because when she is manic she "spells out words that sound the same, like N-O and K-N-O-W, and T-O-O and T-W-O." Her daughter was able to get her to take her Xanax today to get her to calm down to bring her in. Denies any illicts or EtOH.    Review of patient's allergies indicates:   Allergen Reactions    Lamictal [lamotrigine] Rash     Past Medical History:   Diagnosis Date    Bipolar " 1 disorder     Cancer     skin cancer to right arm    GERD (gastroesophageal reflux disease)     History of psychiatric hospitalization     Mental disorder     Thyroid disease      Past Surgical History:   Procedure Laterality Date    breast reduction      BREAST SURGERY  Reduction    CARPAL TUNNEL RELEASE Left 2021    Procedure: RELEASE, CARPAL TUNNEL;  Surgeon: Ld Carbone MD;  Location: [facility] OR;  Service: Orthopedics;  Laterality: Left;  Procedure:  Left carpal tunnel release    Position:  Supine    Anesthesia:  Local    Equipment:  Carpal tunnel set     SECTION      CHOLECYSTECTOMY      COLONOSCOPY N/A 2022    Procedure: COLONOSCOPY;  Surgeon: Rodrick Mccabe MD;  Location: [facility] ENDO ([address] [place] [address]);  Service: Endoscopy;  Laterality: N/A;  Fully vaccinated, prep instr emailed -ml    COSMETIC SURGERY      gastric sleeve      HYSTERECTOMY      OOPHORECTOMY      TONSILLECTOMY      TOTAL REDUCTION MAMMOPLASTY Bilateral         TUBAL LIGATION  97    tummy tuck       Family History   Problem Relation Age of Onset    Cancer Father     Alcohol abuse Father     Colon polyps Sister     Asthma Mother     Cancer Mother     COPD Mother     Breast cancer Neg Hx     Ovarian cancer Neg Hx     Diabetes Neg Hx     Hypertension Neg Hx     Thyroid disease Neg Hx      Social History     Tobacco Use    Smoking status: Never    Smokeless tobacco: Never   Substance Use Topics    Alcohol use: Yes     Alcohol/week: 2.0 standard drinks     Types: 2 Drinks containing 0.5 oz of alcohol per week    Drug use: No     Review of Systems   Constitutional:  Negative for fever.   HENT:  Negative for sore throat.    Respiratory:  Negative for shortness of breath.    Cardiovascular:  Negative for chest pain.   Gastrointestinal:  Negative for nausea.   Genitourinary:  Negative for dysuria.   Musculoskeletal:  Negative for back pain.   Skin:  Negative for rash.   Neurological:  Negative for weakness.    Hematological:  Does not bruise/bleed easily.   Psychiatric/Behavioral:  Positive for agitation and sleep disturbance. Negative for self-injury and suicidal ideas. The patient is hyperactive. The patient is not nervous/anxious.      Physical Exam     Initial Vitals [11/10/22 1955]   BP Pulse Resp Temp SpO2   (!) 145/69 100 16 97.9 °F (36.6 °C) 100 %      MAP       --         Physical Exam    Nursing note and vitals reviewed.  Constitutional: She appears well-developed and well-nourished. She is not diaphoretic. No distress.   HENT:   Head: Normocephalic and atraumatic.   Eyes: No scleral icterus.   Cardiovascular:  Normal rate.           Pulmonary/Chest: No stridor. No respiratory distress.   Abdominal: She exhibits no distension.   Musculoskeletal:         General: Normal range of motion.     Neurological: She is alert and oriented to person, place, and time.   Skin: Skin is dry.   Psychiatric: Her speech is normal. She is not actively hallucinating. Thought content is delusional. Cognition and memory are impaired. She expresses no homicidal ideation.   Somewhat delusional  Tangental She is attentive.       ED Course   Procedures  Labs Reviewed   CBC W/ AUTO DIFFERENTIAL - Abnormal; Notable for the following components:       Result Value    MCH 31.1 (*)     All other components within normal limits    Narrative:     Release to patient->Immediate   COMPREHENSIVE METABOLIC PANEL - Abnormal; Notable for the following components:    CO2 20 (*)     Glucose 111 (*)     All other components within normal limits    Narrative:     Release to patient->Immediate   URINALYSIS, REFLEX TO URINE CULTURE - Abnormal; Notable for the following components:    Appearance, UA Hazy (*)     All other components within normal limits    Narrative:     Specimen Source->Urine   DRUG SCREEN PANEL, URINE EMERGENCY - Abnormal; Notable for the following components:    Benzodiazepines Presumptive Positive (*)     All other components within  normal limits    Narrative:     Specimen Source->Urine   ACETAMINOPHEN LEVEL - Abnormal; Notable for the following components:    Acetaminophen (Tylenol), Serum <3.0 (*)     All other components within normal limits    Narrative:     Release to patient->Immediate   TSH    Narrative:     Release to patient->Immediate   ALCOHOL,MEDICAL (ETHANOL)    Narrative:     Release to patient->Immediate   SARS-COV-2 RNA AMPLIFICATION, QUAL   HIV 1 / 2 ANTIBODY    Narrative:     Release to patient->Immediate   HEPATITIS C ANTIBODY    Narrative:     Release to patient->Immediate          Imaging Results    None          Medications - No data to display  Medical Decision Making:   History:   I obtained history from: someone other than patient.  Old Medical Records: I decided to obtain old medical records.  Initial Assessment:   56 yo W with pmhx bipolar disorder, thyroid disease, GERD presents for psych evaluation.   Differential Diagnosis:   Susanne, psychosis, medical clearance prior to psychiatric admission, substance intoxication versus withdrawal, delusional  Clinical Tests:   Lab Tests: Ordered  ED Management:  Will obtain psych screening labs and complete pec.  Psychiatry consulted.    Reassessment: Psych screening labs unremarkable.  Patient was seen by psychiatry who agree transfer to psych facility.  He is medically clear for transfer to psych facility.                        Clinical Impression:   Final diagnoses:  [F30.10] Manic behavior (Primary)  [Z00.8] Medical clearance for psychiatric admission      ED Disposition Condition    Transfer to Psych Facility Stable          ED Prescriptions    None       Follow-up Information    None          Angelo Colunga MD  11/10/22 2451

## 2022-11-17 PROBLEM — F31.2 SEVERE MANIC BIPOLAR 1 DISORDER WITH PSYCHOTIC BEHAVIOR: Status: ACTIVE | Noted: 2022-11-17

## 2022-11-18 ENCOUNTER — TELEPHONE (OUTPATIENT)
Dept: PSYCHIATRY | Facility: CLINIC | Age: 57
End: 2022-11-18

## 2022-11-18 NOTE — TELEPHONE ENCOUNTER
PATIENT CALLED  Patient just got out of [facility] in patient.   Doesn't know if she should stay on the medicine that the in patient  Gave her or if she should take the medicine you prescribed before she went in patient.   I advised patient to also call the inpatient doctor to see what they want her to do.   And that I would also send harmony william the message stating this to see what he would like for you to do as well.     Please advise

## 2022-11-21 ENCOUNTER — TELEPHONE (OUTPATIENT)
Dept: PSYCHIATRY | Facility: CLINIC | Age: 57
End: 2022-11-21

## 2022-11-21 NOTE — TELEPHONE ENCOUNTER
Patient called and lvm about wanting her visit to be virtual instead of in person on 11/22/22    Patient has already cancelled the appointment.     I called patient back to let her know that the appointment has to be in person it can not be virtual.

## 2022-11-22 ENCOUNTER — TELEPHONE (OUTPATIENT)
Dept: ORTHOPEDICS | Facility: CLINIC | Age: 57
End: 2022-11-22

## 2022-11-22 NOTE — TELEPHONE ENCOUNTER
----- Message from Eliud Pina MA sent at 11/22/2022 10:18 AM CST -----  Contact: patient  Patient would like to re-schedule her carpal tunnel surgery.    Call back number is [phone]

## 2022-11-28 DIAGNOSIS — G56.01 CARPAL TUNNEL SYNDROME ON RIGHT: Primary | ICD-10-CM

## 2022-11-30 ENCOUNTER — PATIENT MESSAGE (OUTPATIENT)
Dept: PSYCHIATRY | Facility: CLINIC | Age: 57
End: 2022-11-30

## 2022-12-01 ENCOUNTER — LAB VISIT (OUTPATIENT)
Dept: LAB | Facility: HOSPITAL | Age: 57
End: 2022-12-01
Payer: MEDICAID

## 2022-12-01 ENCOUNTER — OFFICE VISIT (OUTPATIENT)
Dept: PSYCHIATRY | Facility: CLINIC | Age: 57
End: 2022-12-01
Payer: MEDICAID

## 2022-12-01 VITALS
HEART RATE: 54 BPM | WEIGHT: 253.31 LBS | HEIGHT: 66 IN | BODY MASS INDEX: 40.71 KG/M2 | SYSTOLIC BLOOD PRESSURE: 132 MMHG | DIASTOLIC BLOOD PRESSURE: 83 MMHG

## 2022-12-01 DIAGNOSIS — F31.2 SEVERE MANIC BIPOLAR 1 DISORDER WITH PSYCHOTIC BEHAVIOR: ICD-10-CM

## 2022-12-01 DIAGNOSIS — F31.77 BIPOLAR 1 DISORDER, MIXED, PARTIAL REMISSION: Primary | ICD-10-CM

## 2022-12-01 DIAGNOSIS — Z79.899 HIGH RISK MEDICATION USE: ICD-10-CM

## 2022-12-01 DIAGNOSIS — F41.9 ANXIETY DISORDER, UNSPECIFIED TYPE: ICD-10-CM

## 2022-12-01 LAB — LITHIUM SERPL-SCNC: 0.4 MMOL/L (ref 0.6–1.2)

## 2022-12-01 PROCEDURE — 1160F RVW MEDS BY RX/DR IN RCRD: CPT | Mod: SA,HB,CPTII, | Performed by: NURSE PRACTITIONER

## 2022-12-01 PROCEDURE — 90833 PSYTX W PT W E/M 30 MIN: CPT | Mod: SA,HB,, | Performed by: NURSE PRACTITIONER

## 2022-12-01 PROCEDURE — 1111F PR DISCHARGE MEDS RECONCILED W/ CURRENT OUTPATIENT MED LIST: ICD-10-PCS | Mod: SA,HB,CPTII, | Performed by: NURSE PRACTITIONER

## 2022-12-01 PROCEDURE — 1159F PR MEDICATION LIST DOCUMENTED IN MEDICAL RECORD: ICD-10-PCS | Mod: SA,HB,CPTII, | Performed by: NURSE PRACTITIONER

## 2022-12-01 PROCEDURE — 1160F PR REVIEW ALL MEDS BY PRESCRIBER/CLIN PHARMACIST DOCUMENTED: ICD-10-PCS | Mod: SA,HB,CPTII, | Performed by: NURSE PRACTITIONER

## 2022-12-01 PROCEDURE — 1159F MED LIST DOCD IN RCRD: CPT | Mod: SA,HB,CPTII, | Performed by: NURSE PRACTITIONER

## 2022-12-01 PROCEDURE — 3075F SYST BP GE 130 - 139MM HG: CPT | Mod: SA,HB,CPTII, | Performed by: NURSE PRACTITIONER

## 2022-12-01 PROCEDURE — 90833 PR PSYCHOTHERAPY W/PATIENT W/E&M, 30 MIN (ADD ON): ICD-10-PCS | Mod: SA,HB,, | Performed by: NURSE PRACTITIONER

## 2022-12-01 PROCEDURE — 3075F PR MOST RECENT SYSTOLIC BLOOD PRESS GE 130-139MM HG: ICD-10-PCS | Mod: SA,HB,CPTII, | Performed by: NURSE PRACTITIONER

## 2022-12-01 PROCEDURE — 3008F PR BODY MASS INDEX (BMI) DOCUMENTED: ICD-10-PCS | Mod: SA,HB,CPTII, | Performed by: NURSE PRACTITIONER

## 2022-12-01 PROCEDURE — 99999 PR PBB SHADOW E&M-EST. PATIENT-LVL III: CPT | Mod: PBBFAC,SA,HB, | Performed by: NURSE PRACTITIONER

## 2022-12-01 PROCEDURE — 36415 COLL VENOUS BLD VENIPUNCTURE: CPT | Mod: PO | Performed by: NURSE PRACTITIONER

## 2022-12-01 PROCEDURE — 3044F HG A1C LEVEL LT 7.0%: CPT | Mod: SA,HB,CPTII, | Performed by: NURSE PRACTITIONER

## 2022-12-01 PROCEDURE — 99213 OFFICE O/P EST LOW 20 MIN: CPT | Mod: PBBFAC,PO | Performed by: NURSE PRACTITIONER

## 2022-12-01 PROCEDURE — 3008F BODY MASS INDEX DOCD: CPT | Mod: SA,HB,CPTII, | Performed by: NURSE PRACTITIONER

## 2022-12-01 PROCEDURE — 3079F PR MOST RECENT DIASTOLIC BLOOD PRESSURE 80-89 MM HG: ICD-10-PCS | Mod: SA,HB,CPTII, | Performed by: NURSE PRACTITIONER

## 2022-12-01 PROCEDURE — 3044F PR MOST RECENT HEMOGLOBIN A1C LEVEL <7.0%: ICD-10-PCS | Mod: SA,HB,CPTII, | Performed by: NURSE PRACTITIONER

## 2022-12-01 PROCEDURE — 99214 PR OFFICE/OUTPT VISIT, EST, LEVL IV, 30-39 MIN: ICD-10-PCS | Mod: S$PBB,SA,HB, | Performed by: NURSE PRACTITIONER

## 2022-12-01 PROCEDURE — 99214 OFFICE O/P EST MOD 30 MIN: CPT | Mod: S$PBB,SA,HB, | Performed by: NURSE PRACTITIONER

## 2022-12-01 PROCEDURE — 80178 ASSAY OF LITHIUM: CPT | Performed by: NURSE PRACTITIONER

## 2022-12-01 PROCEDURE — 1111F DSCHRG MED/CURRENT MED MERGE: CPT | Mod: SA,HB,CPTII, | Performed by: NURSE PRACTITIONER

## 2022-12-01 PROCEDURE — 99999 PR PBB SHADOW E&M-EST. PATIENT-LVL III: ICD-10-PCS | Mod: PBBFAC,SA,HB, | Performed by: NURSE PRACTITIONER

## 2022-12-01 PROCEDURE — 3079F DIAST BP 80-89 MM HG: CPT | Mod: SA,HB,CPTII, | Performed by: NURSE PRACTITIONER

## 2022-12-01 NOTE — PROGRESS NOTES
"    Outpatient Psychiatry Follow-Up Visit    Clinical Status of Patient: Outpatient (Virtual)  12/01/2022     [address] [place] [address]  [phone]   Visit type: Virtual visit with synchronous audio and video  Each patient to whom he or she provides medical services by telemedicine is:  (1) informed of the relationship between the physician and patient and the respective role of any other health care provider with respect to management of the patient; and (2) notified that he or she may decline to receive medical services by telemedicine and may withdraw from such care at any time.      Chief Complaint: Pt is a 56 year old female who presents today for a follow-up. Met with patient.       Interval History and Content of Current Session:  Interim Events/Subjective Report/Content of Current Session:  follow up appointment.    Pt is a 57 year old female with past psychiatric hx of Bipolar 1 disorder, anxiety NOS who presents for follow up treatment. She is currently taking Lithium 300mg TID, Xanax 0.5 mg QHS PRN for sleep (uses 2-3 x weekly), Abilify 10mg qd, trazodone 150mg qhs, hydroxyzine 50mg qd prn. Meds are tolerated well.     Between sessions, pt was hospitalized following a manic episode. During her stay they adjusted meds - changes are reflected above.Pt notes that her last manic episode was triggered by taking steroids for poison ivy. Pt was admitted to [facility] for 1 week where she was stabilized. Since discharge, pt reports feeling "a little hyper" but has not been manic/hypomanic. She is sleeping well with regular use of trazodone. Pt is stable today and displays good reality testing.     Past Psychiatric hx: Pt. is a 55 year old female  with a past psychiatric hx of Bipolar 1 disorder, depressed who established care with me 07/19 following the custodial of her previous psych, Dr. Pelaez. She came to me taking Lithium 600mg BID and Xanax 0.5 mg QHS PRN for sleep (only uses this " "sparingly) , which had been previously prescribed and managed by Dr. Pelaez. She reports that her symptoms have stabilized over the last 7-8 months, and has been without a manic episode since November of 2018. Previous med trials of Lamictal (rash), Risperdal, Wellbutrin, some SSRIs (destablized mood, triggered manic episodes). Pt stable in clinic upon initial eval - all meds were continued at same dosage.      Notes a long standing history of "mood swings" dating to childhood, but first formal psych encounter in 1987 following the birth of her child. While admitted to the hospital following birth, and after being discharged, pt speaks to experiencing A/V hallucinations and euphoric gamal following a two-week period of going without sleep. "I was real paranoid after I had the baby. I felt that if I went to sleep, I wouldn't be able to hear the baby cry. I ended up going a couple weeks where I didn't sleep at all, started hearing things and seeing things that weren't there. I felt like people were trying to contact me and tell me things through the computer or the TV. I was paranoid and was thinking the vampires were after us. I started thinking I was someone I wasn't." She eventually sought psychiatric care in an outpatient seeing, but was not started on any psychiatric medication until 2002 (?). She was prescribed SSRIs and Wellbutrin at this time, which triggered a manic episode, and were d/c'd. She was eventually started on Lithium around 2005, and has achieved good results. States she has felt more stable than she's ever felt before.      Upon initial eval, pt reports manic episodes "once or twice a year" that are triggered by a lack of sleep. States that her manic episodes last for "weeks" and "I get hyper, my metabolism speeds up. I feel like I'm sped up. I always try to buy a car or a house. I had two houses at one time. It always ends with them putting me in the hospital." Does continue to experience " "delusional thinking and auditory hallucinations exclusively during manic episodes. Hx of several ED admissions in 03/19 r/t delusions, and "walking down the street talking to people who weren't there" Per - ED note. Reports inpatient hospitalization following manic episodes to [facility] x 1 week in November of 2018. Also reports another admission to [facility] in March of 2018 for 3 days following a manic episode. During these episodes, "I get very happy during those episodes. I start thinking I'm Colt and can communicate through the TV".      Denies any depressive symptoms. Reports sleeping 7-9 hours of sleep each night, restful. No issues falling or staying asleep. Denies anhedonia. Denies guilt/worthlessness. Energy good, concentration good. Appetite good. Denies psychomotor slowing, denies SI.      On 10/16, pt daughter reported "She seems a little bit better during the day, but is still pretty manic and only sleeping 2-3 hours at night, despite trying to up-titrate the medication.  She had to have a meeting with her supervisors at work yesterday, but they have fortunately been very understanding about her bipolar" Episodes do not meet criteria for true gamal/or hypomania, but we have been up-titrating Seroquel (started 10/19) to address symptoms - pt has responded well but reports dry mouth.I advised pt to add dose of Seroquel 50mg Q AM. However, she states pt states this made her groggy so d/c this. Despite this, pt reports at f/u "I've been feeling so much better. I'm actually sleeping now and definitely haven't been feeling manic at all. I feel way more calm now." However,  Notes overall less mood lability, less agitation. Denies manic/hypomanic episodes since last visit. She went to [place] on vacation between visit - enjoyed this. Pt doing well with no decompensations since last visit.  Does note continued generalized worry. Feels restless, on edge.       During her 5/20 f/u visit pt reports "I've " "been getting into bed at night, and sometimes it feels like someone else is getting in bed with me. It got to the point where I couldn't sleep in my bed because I was kind of scared. I thought it was my cat but my cat wasn't in the room. Sometime it even touches me, like it's brushing on either my foot or back. Like someone is in here trying to wake me up, but no one is in there. I usually just go and sleep on the sofa." Denies A/V hallucinations.     Seroquel was increased following this visit, Today, pt reports "I don't have those feelings in my bed anymore. I'm not afraid to get in bed anymore. Thanks goodness. I've been sleeping better." Does reports "feeling extremely tired when I take it in the morning." and requests to move to PM dosing. Tolerates well otherwise. Denies any gamal or hypomania between sessions.      Past Medical hx:   Past Medical History:   Diagnosis Date    Bipolar 1 disorder     Cancer     skin cancer to right arm    GERD (gastroesophageal reflux disease)     History of psychiatric hospitalization     Mental disorder     Thyroid disease         Interim hx:  Medication changes last visit: Inc seroquel to 50mg PO QAM, 100mg QHS  Anxiety: inc'd  Depression: denies     Denies suicidal/homicidal ideations.  Denies hopelessness/worthlessness.    Denies auditory/visual hallucinations       Tobacco: never used  Alcohol: denies   Drug use: denies  Caffeine: 1-2 cups coffee daily      Review of Systems   PSYCHIATRIC: Pertinent items are noted in the narrative.        CONSTITUTIONAL: weight stable        M/S: no pain today         ENT: no allergies noted today        ABD: no n/v/d     Past Medical, Family and Social History: The patient's past medical, family and social history have been reviewed and updated as appropriate within the electronic medical record. See encounter notes.     Medication: Lithium 300mg TID, and Xanax 0.5 mg QHS PRN for sleep, Sseroquel 200mg QHS     Compliance: yes      Side " effects: dry mouth from Seroquel     Risk Parameters:  Patient reports no suicidal ideation  Patient reports no homicidal ideation  Patient reports no self-injurious behavior  Patient reports no violent behavior     Exam (detailed: at least 9 elements; comprehensive: all 15 elements)   Constitutional  Vitals:  Most recent vital signs, dated less than 90 days prior to this appointment, were reviewed. There were no vitals taken for this visit.     General:  unremarkable, age appropriate, casual attire, good eye contact, good rapport       Musculoskeletal  Muscle Strength/Tone:  no flaccidity, no tremor    Gait & Station:  normal      Psychiatric                       Speech:  normal tone, normal rate, rhythm, and volume   Mood & Affect:   Euthymic, congruent, appropriate         Thought Process:   Goal directed; Linear    Associations:   intact   Thought Content:   No SI/HI, + fixed delusions, or paranoia, no AV/VH   Insight & Judgement:   Good, adequate to circumstances   Orientation:   grossly intact; alert and oriented x 4    Memory:  intact for content of interview    Language:  grossly intact, can repeat    Attention Span  : Grossly intact for content of interview   Fund of Knowledge:   intact and appropriate to age and level of education        Assessment and Diagnosis   Status/Progress: Based on the examination today, the patient's problem(s) is/are under fair control.  New problems have not been presented today. Comorbidities are not currently complicating management of the primary condition.      Impression:   Pt is a 57 year old female with past psychiatric hx of Bipolar 1 disorder, anxiety NOS who presents for follow up treatment. She is currently taking Lithium 300mg BID, Xanax 0.5 mg QHS PRN for sleep (uses 2-3 x weekly), Seroquel 150mg QHS. Meds tolerated well and provide good symptomatic relief overall.    Today, pt notes that she is doing well overall. Denies any episodes of depression or  gamal/hypomania. Anxiety well-managed, denies any generalized/ruminative worrying. Sleeping well despite waking a few times throughout the night. Pt stable and high functioning.       Most recent Li: 0.3: 3/22 - pt stable    Diagnosis: Bipolar 1 disorder, partial remission, anxiety unspecified    Intervention/Counseling/Treatment Plan   Medication Management:      1) Increase Lithium 300mg TID for mood. Pt instructed to monitor closely for mood destabilization as we have been gradually tapering due to inc'd TSH.    2) Increase Seroquel 200mg QHS. Typical ELFEGO's reviewed including weight gain, abnormal movements, EPS, TD, metabolic side effects.      3) Continue Xanax 0.5 mg QHS PRN for sleep (uses once weekly. Discussed risk of decreased RT, sedation, addictive potential, and not to mix with alcohol.      4. Call to report any worsening of symptoms or problems with the medication. Pt instructed to go to ER with thoughts of harming self, others     5. Patient given contact # for psychotherapists at [facility] and also instructed she may check with insurance for list of providers.      6. Labs: no new orders       Return to clinic: 8 weeks - self  Psychotherapy:   Target symptoms: inattention/distractibility, anxiety    Why chosen therapy is appropriate versus another modality: relevant to diagnosis, patient responds to this modality  Outcome monitoring methods: self-report, observation, feedback from family   Therapeutic intervention type: supportive psychotherapy  Topics discussed/themes: building skills sets for symptom management, symptom recognition, nutrition, exercise  The patient's response to the intervention is accepting. The patient's progress toward treatment goals is positive progress.  Duration of intervention: 20 minutes      -Spent 30min face to face with the pt; >50% time spent in counseling   -Supportive therapy and psychoeducation provided  -R/B/SE's of medications discussed with the pt who  expresses understanding and chooses to take medications as prescribed.   -Pt instructed to call clinic, 911 or go to nearest emergency room if sxs worsen or pt is in   crisis. The pt expresses understanding.    [place]ana Aguilar, NP

## 2022-12-05 ENCOUNTER — PATIENT MESSAGE (OUTPATIENT)
Dept: SURGERY | Facility: HOSPITAL | Age: 57
End: 2022-12-05

## 2022-12-05 ENCOUNTER — CLINICAL SUPPORT (OUTPATIENT)
Dept: REHABILITATION | Facility: HOSPITAL | Age: 57
End: 2022-12-05
Payer: MEDICAID

## 2022-12-05 PROCEDURE — 97110 THERAPEUTIC EXERCISES: CPT

## 2022-12-05 NOTE — PROGRESS NOTES
[facility]   Physical Therapy Treatment Note     Name: Cristina Gonzálesarco  Clinic Number: [id number]    Therapy Diagnosis: No diagnosis found.  Physician: Shahrzad Paris NP    Visit Date: 12/5/2022    Physician Orders: PT Eval and Treat   Medical Diagnosis from Referral: M25.561,M25.562 (ICD-10-CM) - Pain in both knees, unspecified chronicity  Evaluation Date: 11/8/2022  Authorization Period Expiration: TBD  Plan of Care Expiration: 2/3/2022  Progress Note Due: 12/8/2022  Visit # / Visits authorized: 1/ 1   FOTO: 1/5     Precautions: Standard     PTA Visit #: 0/5     Time In: 915  Time Out: 1000  Total Billable Time: 45 minutes    SUBJECTIVE     Pt reports: her knees are still bothersome. Patient reports she has missed multiple appointments due to being in the hospital for 10 days because of bipolar disorder. Patient has a wrist surgery this Thursday.  Response to previous treatment: No adverse effects  Functional change: none    Pain: 6/10  Location: bilateral knee      OBJECTIVE     Objective Measures updated at progress report unless specified.     Treatment     Cristina received the treatments listed below:      therapeutic exercises to develop strength, endurance, ROM, and flexibility for 45 minutes including:    Bike 6 min   Gastroc Stretch 30s x 3  Hamstring Stretch 30s x 3  LAQ 4# 2x15  Clams 2x15  SLR 2x15   Bridging 2x15         Patient Education and Home Exercises     Home Exercises Provided and Patient Education Provided     Education provided:   - HEP    Written Home Exercises Provided: yes. Exercises were reviewed and Cristina was able to demonstrate them prior to the end of the session.  Cristina demonstrated good  understanding of the education provided. See EMR under Patient Instructions for exercises provided during therapy sessions    ASSESSMENT     Patient was progressed today since it was her initial first after appointment. Patient did well with no worsening symptoms  today. Cont to progress strength training as tolerated.     Cristina Is progressing well towards her goals.   Pt prognosis is Fair.     Pt will continue to benefit from skilled outpatient physical therapy to address the deficits listed in the problem list box on initial evaluation, provide pt/family education and to maximize pt's level of independence in the home and community environment.     Pt's spiritual, cultural and educational needs considered and pt agreeable to plan of care and goals.     Anticipated barriers to physical therapy: Chronic symptoms     Goals:   1. Pt will be independent with HEP supplement PT in improving functional mobility.  2. t will improve L knee AROM to at least 0-115  in order to improve gait     Long Term Goals: in 6 weeks  1. Pt will be independent with updated HEP supplement PT in improving functional mobility.  2. Pt will improve L LE strength to at least 5/5 LE strength in order to improve functional mobility  3. Pt will improve L knee AROM to at least 0-120 in order to improve gait and ability to perform ADLs  4. Pt will improve FOTO knee survey score to </= **% limited in order to demo improved functional mobility  5. Pt will perform at least 14 sit to stands without UE support on 30 second sit to stand test in order to demo improved ability to perform transfers        PLAN   Plan of care Certification: 11/8/2022 to 2/3/2022.     Outpatient Physical Therapy 2 times weekly for 6 weeks to include the following interventions: Manual Therapy, Moist Heat/ Ice, Neuromuscular Re-ed, Patient Education, Self Care, Therapeutic Activities, Therapeutic Exercise, and FDN.        Jacob Pham, PT

## 2022-12-07 ENCOUNTER — ANESTHESIA EVENT (OUTPATIENT)
Dept: SURGERY | Facility: HOSPITAL | Age: 57
End: 2022-12-07
Payer: MEDICAID

## 2022-12-08 ENCOUNTER — HOSPITAL ENCOUNTER (OUTPATIENT)
Facility: HOSPITAL | Age: 57
Discharge: HOME | End: 2022-12-08
Attending: ORTHOPAEDIC SURGERY | Admitting: ORTHOPAEDIC SURGERY
Payer: MEDICAID

## 2022-12-08 ENCOUNTER — ANESTHESIA (OUTPATIENT)
Dept: SURGERY | Facility: HOSPITAL | Age: 57
End: 2022-12-08
Payer: MEDICAID

## 2022-12-08 DIAGNOSIS — G56.01 CARPAL TUNNEL SYNDROME ON RIGHT: ICD-10-CM

## 2022-12-08 PROCEDURE — 71000015 HC POSTOP RECOV 1ST HR: Mod: PO | Performed by: ORTHOPAEDIC SURGERY

## 2022-12-08 PROCEDURE — 37000008 HC ANESTHESIA 1ST 15 MINUTES: Mod: PO | Performed by: ORTHOPAEDIC SURGERY

## 2022-12-08 PROCEDURE — 01810 ANES PX NRV MUSC F/ARM WRST: CPT | Mod: PO | Performed by: ORTHOPAEDIC SURGERY

## 2022-12-08 PROCEDURE — 64721 CARPAL TUNNEL SURGERY: CPT | Mod: RT,,, | Performed by: ORTHOPAEDIC SURGERY

## 2022-12-08 PROCEDURE — 63600175 PHARM REV CODE 636 W HCPCS: Mod: PO | Performed by: PHYSICIAN ASSISTANT

## 2022-12-08 PROCEDURE — 36000707: Mod: PO | Performed by: ORTHOPAEDIC SURGERY

## 2022-12-08 PROCEDURE — 63600175 PHARM REV CODE 636 W HCPCS: Mod: PO | Performed by: NURSE ANESTHETIST, CERTIFIED REGISTERED

## 2022-12-08 PROCEDURE — 36000706: Mod: PO | Performed by: ORTHOPAEDIC SURGERY

## 2022-12-08 PROCEDURE — D9220A PRA ANESTHESIA: Mod: ANES,,, | Performed by: ANESTHESIOLOGY

## 2022-12-08 PROCEDURE — 25000003 PHARM REV CODE 250: Mod: PO | Performed by: PHYSICIAN ASSISTANT

## 2022-12-08 PROCEDURE — D9220A PRA ANESTHESIA: Mod: CRNA,,, | Performed by: NURSE ANESTHETIST, CERTIFIED REGISTERED

## 2022-12-08 PROCEDURE — 25000003 PHARM REV CODE 250: Mod: PO | Performed by: NURSE ANESTHETIST, CERTIFIED REGISTERED

## 2022-12-08 PROCEDURE — 25000003 PHARM REV CODE 250: Mod: PO | Performed by: ORTHOPAEDIC SURGERY

## 2022-12-08 PROCEDURE — 27200651 HC AIRWAY, LMA: Mod: PO | Performed by: ANESTHESIOLOGY

## 2022-12-08 PROCEDURE — 64721 PR REVISE MEDIAN N/CARPAL TUNNEL SURG: ICD-10-PCS | Mod: RT,,, | Performed by: ORTHOPAEDIC SURGERY

## 2022-12-08 PROCEDURE — 37000009 HC ANESTHESIA EA ADD 15 MINS: Mod: PO | Performed by: ORTHOPAEDIC SURGERY

## 2022-12-08 PROCEDURE — 71000033 HC RECOVERY, INTIAL HOUR: Mod: PO | Performed by: ORTHOPAEDIC SURGERY

## 2022-12-08 PROCEDURE — 63600175 PHARM REV CODE 636 W HCPCS: Mod: PO | Performed by: ANESTHESIOLOGY

## 2022-12-08 PROCEDURE — D9220A PRA ANESTHESIA: ICD-10-PCS | Mod: CRNA,,, | Performed by: NURSE ANESTHETIST, CERTIFIED REGISTERED

## 2022-12-08 PROCEDURE — D9220A PRA ANESTHESIA: ICD-10-PCS | Mod: ANES,,, | Performed by: ANESTHESIOLOGY

## 2022-12-08 RX ORDER — MIDAZOLAM HYDROCHLORIDE 1 MG/ML
INJECTION, SOLUTION INTRAMUSCULAR; INTRAVENOUS
Status: DISCONTINUED | OUTPATIENT
Start: 2022-12-08 | End: 2022-12-08

## 2022-12-08 RX ORDER — PROPOFOL 10 MG/ML
VIAL (ML) INTRAVENOUS
Status: DISCONTINUED | OUTPATIENT
Start: 2022-12-08 | End: 2022-12-08

## 2022-12-08 RX ORDER — FENTANYL CITRATE 50 UG/ML
25 INJECTION, SOLUTION INTRAMUSCULAR; INTRAVENOUS EVERY 5 MIN PRN
Status: DISCONTINUED | OUTPATIENT
Start: 2022-12-08 | End: 2022-12-08 | Stop reason: HOSPADM

## 2022-12-08 RX ORDER — OXYCODONE HYDROCHLORIDE 5 MG/1
5 TABLET ORAL
Status: DISCONTINUED | OUTPATIENT
Start: 2022-12-08 | End: 2022-12-08 | Stop reason: HOSPADM

## 2022-12-08 RX ORDER — IBUPROFEN 800 MG/1
800 TABLET ORAL EVERY 6 HOURS PRN
Qty: 8 TABLET | Refills: 0 | Status: SHIPPED | OUTPATIENT
Start: 2022-12-08 | End: 2023-01-27

## 2022-12-08 RX ORDER — BUPIVACAINE HYDROCHLORIDE 5 MG/ML
INJECTION, SOLUTION EPIDURAL; INTRACAUDAL
Status: DISCONTINUED | OUTPATIENT
Start: 2022-12-08 | End: 2022-12-08 | Stop reason: HOSPADM

## 2022-12-08 RX ORDER — LIDOCAINE HYDROCHLORIDE 10 MG/ML
1 INJECTION, SOLUTION EPIDURAL; INFILTRATION; INTRACAUDAL; PERINEURAL ONCE
Status: DISCONTINUED | OUTPATIENT
Start: 2022-12-08 | End: 2022-12-08 | Stop reason: HOSPADM

## 2022-12-08 RX ORDER — ONDANSETRON 2 MG/ML
INJECTION INTRAMUSCULAR; INTRAVENOUS
Status: DISCONTINUED | OUTPATIENT
Start: 2022-12-08 | End: 2022-12-08

## 2022-12-08 RX ORDER — LIDOCAINE HYDROCHLORIDE 20 MG/ML
INJECTION INTRAVENOUS
Status: DISCONTINUED | OUTPATIENT
Start: 2022-12-08 | End: 2022-12-08

## 2022-12-08 RX ORDER — HYDROMORPHONE HYDROCHLORIDE 2 MG/ML
0.2 INJECTION, SOLUTION INTRAMUSCULAR; INTRAVENOUS; SUBCUTANEOUS EVERY 5 MIN PRN
Status: DISCONTINUED | OUTPATIENT
Start: 2022-12-08 | End: 2022-12-08 | Stop reason: HOSPADM

## 2022-12-08 RX ORDER — ACETAMINOPHEN 10 MG/ML
INJECTION, SOLUTION INTRAVENOUS
Status: DISCONTINUED | OUTPATIENT
Start: 2022-12-08 | End: 2022-12-08

## 2022-12-08 RX ORDER — FENTANYL CITRATE 50 UG/ML
INJECTION, SOLUTION INTRAMUSCULAR; INTRAVENOUS
Status: DISCONTINUED | OUTPATIENT
Start: 2022-12-08 | End: 2022-12-08

## 2022-12-08 RX ORDER — ONDANSETRON 8 MG/1
8 TABLET, ORALLY DISINTEGRATING ORAL EVERY 8 HOURS PRN
Qty: 2 TABLET | Refills: 0 | Status: SHIPPED | OUTPATIENT
Start: 2022-12-08 | End: 2023-01-27 | Stop reason: ALTCHOICE

## 2022-12-08 RX ORDER — LIDOCAINE HYDROCHLORIDE 10 MG/ML
INJECTION, SOLUTION EPIDURAL; INFILTRATION; INTRACAUDAL; PERINEURAL
Status: DISCONTINUED | OUTPATIENT
Start: 2022-12-08 | End: 2022-12-08 | Stop reason: HOSPADM

## 2022-12-08 RX ORDER — KETOROLAC TROMETHAMINE 30 MG/ML
INJECTION, SOLUTION INTRAMUSCULAR; INTRAVENOUS
Status: DISCONTINUED | OUTPATIENT
Start: 2022-12-08 | End: 2022-12-08

## 2022-12-08 RX ORDER — SODIUM CHLORIDE, SODIUM LACTATE, POTASSIUM CHLORIDE, CALCIUM CHLORIDE 600; 310; 30; 20 MG/100ML; MG/100ML; MG/100ML; MG/100ML
INJECTION, SOLUTION INTRAVENOUS CONTINUOUS
Status: DISCONTINUED | OUTPATIENT
Start: 2022-12-08 | End: 2022-12-08 | Stop reason: HOSPADM

## 2022-12-08 RX ADMIN — MIDAZOLAM HYDROCHLORIDE 2 MG: 1 INJECTION, SOLUTION INTRAMUSCULAR; INTRAVENOUS at 09:12

## 2022-12-08 RX ADMIN — FENTANYL CITRATE 50 MCG: 50 INJECTION, SOLUTION INTRAMUSCULAR; INTRAVENOUS at 09:12

## 2022-12-08 RX ADMIN — FENTANYL CITRATE 50 MCG: 50 INJECTION, SOLUTION INTRAMUSCULAR; INTRAVENOUS at 10:12

## 2022-12-08 RX ADMIN — LIDOCAINE HYDROCHLORIDE 75 MG: 20 INJECTION INTRAVENOUS at 09:12

## 2022-12-08 RX ADMIN — SODIUM CHLORIDE, SODIUM LACTATE, POTASSIUM CHLORIDE, AND CALCIUM CHLORIDE: .6; .31; .03; .02 INJECTION, SOLUTION INTRAVENOUS at 09:12

## 2022-12-08 RX ADMIN — DEXTROSE 2 G: 50 INJECTION, SOLUTION INTRAVENOUS at 09:12

## 2022-12-08 RX ADMIN — PROPOFOL 200 MG: 10 INJECTION, EMULSION INTRAVENOUS at 09:12

## 2022-12-08 RX ADMIN — KETOROLAC TROMETHAMINE 30 MG: 30 INJECTION, SOLUTION INTRAMUSCULAR at 10:12

## 2022-12-08 RX ADMIN — ACETAMINOPHEN 1000 MG: 10 INJECTION, SOLUTION INTRAVENOUS at 10:12

## 2022-12-08 RX ADMIN — ONDANSETRON 4 MG: 2 INJECTION, SOLUTION INTRAMUSCULAR; INTRAVENOUS at 10:12

## 2022-12-08 NOTE — OP NOTE
Cristina Tan  1965    DATE OF SURGERY: 12/8/2022     PRE-OPERATIVE DIAGNOSIS: right Carpal Tunnel Syndrome    POST-OPERATIVE DIAGNOSIS: right Carpal Tunnel Syndrome     ANESTHESIA TYPE: Local    BLOOD LOSS:  Less than 10 cc    TOURNIQUET TIME:  8 minutes    SURGEON: Dr. Carbone    ASSISTANT:  Gilles Ortez    PROCEDURE: right Carpal Tunnel Release    IMPLANTS: None    SPECIMENS: None    INDICATION:     Ms. Tan presented to my clinic with a history of right carpal tunnel symptoms. Conservative treatments were initially tried. The patient did have relief with attempts at conservative treatment however has had a return of symptoms. An EMG and nerve conduction study has been performed. That study has shown compression of the median nerve at the wrist consistent with carpal tunnel syndrome. A discussion of the risks and benefits of carpal tunnel release has been performed, and informed consent for the procedure has been obtained.    PROCEDURE IN DETAIL:     Ms. Tan was transported to the operating room and was placed supine on the operating room table. All appropriate points were padded. The right hand and arm was prepped and draped in the normal sterile fashion. Time out was called. The correct patient, correct operative site, correct procedure, antibiotic administration which consisted of 2 g of Ancef, and allergies to medications which are to Prednisone and Lamictal [lamotrigine]  were reviewed. Time in was then called.     Attention was turned to right hand where a 3 cm incision was made in the palm of the hand. This was carried through the skin and subcutaneous tissues were dissected bluntly. The superficial palmar fascia was identified and was split in line with its fibers. The transverse carpal ligament was then identified and was incised for a distance of approximately 1 cm sharply.The median nerve was visualized and a carpal tunnel sled was placed below the transverse carpal ligament but above  the median nerve. Blunt dissection proximally over the transverse carpal ligament was performed and elevator was placed just above the transverse carpal ligament proximally. The ligament was identified. There were noted to be no penetrating nerve branches and at that point the carpal tunnel knife was used to incise the proximal transverse carpal ligament. Attention was then turned to the distal portion of the transverse carpal ligament. The carpal tunnel sled was again placed underneath the transverse carpal ligament but above the median nerve distally. Blunt dissection above the transverse carpal ligament distally was performed and an elevator was placed. The distal portion of the transverse carpal ligament was visualized and there were noted to be no penetrating branches of the nerve. At that point, tenotomy scissors were used to transect the distal portion of the transverse carpal ligament under direct visualization. The median nerve was then visualized. There were noted to be no specific lesions of the nerve and all of its branches were noted to be intact. The wound was then irrigated copiously. The tourniquet was let down and meticulous hemostasis was obtained with bipolar cautery. The wound was closed with 4-0 nylon suture superficially. The wound was then dressed with Xeroform, 4 x 4's, cast padding and a 3 inch Ace wrap was placed.      The patient was stable in the operating room and was transported to the recovery room in stable condition. All lap, needle, sponge, and equipment counts were correct at the end of the case.    POST-OPERATIVE PLAN:     The patient will keep a soft dressing in place for two weeks at which time she will followup with me. The dressing will be taken down, and the sutures will be removed at that time. She is not to get the dressing wet or to take it off. She will have a 2 pound weight limit of the left upper extremity for a total of 4 weeks.

## 2022-12-08 NOTE — DISCHARGE INSTRUCTIONS
AFTER HAND SURGERY    DO:  Keep affected extremity elevated above the level of your heart.  Check circulation frequently in fingers by pressing on the nail bed. Nail bed should turn white and then pink when released.  Exercise fingers to promote circulation.  Advance diet as tolerated.  Resume home medications.      DO NOT:  Do not drive for 24 hours or while taking narcotic pain medication.  Do not take additional tylenol/acetaminophen while taking narcotic pain medication that contains tylenol/acetaminophen.     CALL PHYSICIAN FOR:  Obvious bleeding.  Excessive swelling.  Drainage (pus) from the wound.  Pain unrelieved by pain medication.    Contact your physician for emergencies.  [phone]

## 2022-12-08 NOTE — TRANSFER OF CARE
Anesthesia Transfer of Care Note    Patient: Cristina Tan    Procedure(s) Performed: Procedure(s) (LRB):  Right carpal tunnel release (Right)    Patient location: PACU    Anesthesia Type: general    Transport from OR: Transported from OR on room air with adequate spontaneous ventilation    Post pain: adequate analgesia    Post assessment: no apparent anesthetic complications and tolerated procedure well    Post vital signs: stable    Level of consciousness: awake and alert    Nausea/Vomiting: no nausea/vomiting    Complications: none    Transfer of care protocol was followed      Last vitals:   Visit Vitals  /64 (BP Location: Left arm, Patient Position: Lying)   Pulse 63   Temp 36.7 °C (98 °F) (Skin)   Resp 16   SpO2 (!) 92%   Breastfeeding No

## 2022-12-08 NOTE — H&P
2022    Chief Complaint:  No chief complaint on file.      HPI:  Cristina Tan is a 57 y.o. female, who presents to the surgery center today.  She is here to undergo a right carpal tunnel release.  She states that she is still having numbness tingling and some pain to the right hand.  She has no other complaints.    PMHX:  Past Medical History:   Diagnosis Date    Bipolar 1 disorder     Cancer     skin cancer to right arm    GERD (gastroesophageal reflux disease)     History of psychiatric hospitalization     Mental disorder     Thyroid disease        PSHX:  Past Surgical History:   Procedure Laterality Date    breast reduction      BREAST SURGERY  Reduction    CARPAL TUNNEL RELEASE Left 2021    Procedure: RELEASE, CARPAL TUNNEL;  Surgeon: Ld Carbone MD;  Location: [facility];  Service: Orthopedics;  Laterality: Left;  Procedure:  Left carpal tunnel release    Position:  Supine    Anesthesia:  Local    Equipment:  Carpal tunnel set     SECTION      CHOLECYSTECTOMY      COLONOSCOPY N/A 2022    Procedure: COLONOSCOPY;  Surgeon: Rodrick Mccabe MD;  Location: [facility] ([address] [place] [address]);  Service: Endoscopy;  Laterality: N/A;  Fully vaccinated, prep instr emailed -ml    COSMETIC SURGERY      gastric sleeve      HYSTERECTOMY      OOPHORECTOMY      TONSILLECTOMY      TOTAL REDUCTION MAMMOPLASTY Bilateral         TUBAL LIGATION  97    tummy tuck         FMHX:  Family History   Problem Relation Age of Onset    Cancer Father     Alcohol abuse Father     Colon polyps Sister     Asthma Mother     Cancer Mother     COPD Mother     Breast cancer Neg Hx     Ovarian cancer Neg Hx     Diabetes Neg Hx     Hypertension Neg Hx     Thyroid disease Neg Hx        SOCHX:  Social History     Tobacco Use    Smoking status: Never    Smokeless tobacco: Never   Substance Use Topics    Alcohol use: Yes     Alcohol/week: 2.0 standard drinks     Types: 2 Drinks containing 0.5 oz of alcohol per week        ALLERGIES:  Prednisone and Lamictal [lamotrigine]    CURRENT MEDICATIONS:  No current facility-administered medications on file prior to encounter.     Current Outpatient Medications on File Prior to Encounter   Medication Sig Dispense Refill    ALPRAZolam (XANAX) 0.5 MG tablet Take 1 tablet (0.5 mg total) by mouth daily as needed for Anxiety. 30 tablet 0    ARIPiprazole (ABILIFY) 10 MG Tab Take 1 tablet (10 mg total) by mouth once daily. 30 tablet 1    atorvastatin (LIPITOR) 20 MG tablet Take 1 tablet (20 mg total) by mouth every evening. 30 tablet 1    hydrOXYzine pamoate (VISTARIL) 50 MG Cap Take 1 capsule (50 mg total) by mouth every 6 (six) hours as needed (anxiety or insomnia). 30 capsule 1    levothyroxine (SYNTHROID) 50 MCG tablet Take 1 tablet (50 mcg total) by mouth before breakfast. 30 tablet 11    lithium (LITHOBID) 300 MG CR tablet Take 1 tablet (300 mg total) by mouth 3 (three) times daily. 90 tablet 1    pantoprazole (PROTONIX) 40 MG tablet Take 1 tablet (40 mg total) by mouth once daily. 30 tablet 1    traZODone (DESYREL) 150 MG tablet Take 1 tablet (150 mg total) by mouth every evening. 30 tablet 1       REVIEW OF SYSTEMS:  ROS    GENERAL PHYSICAL EXAM:   /62 (BP Location: Right arm, Patient Position: Lying)   Pulse 60   Temp 98.2 °F (36.8 °C)   Resp 17   SpO2 96%   Breastfeeding No    GEN: well developed, well nourished, no acute distress   HENT: Normocephalic, atraumatic   EYES: No discharge, conjunctiva normal   NECK: Supple, non-tender   PULM: No wheezing, no respiratory distress   CV: RRR   ABD: Soft, non-tender    ORTHO EXAM:   Examination the right hand and wrist reveals that there is no edema.  There are no major skin changes.  Palpation produces no areas of tenderness.  She does report paresthesias in the median distribution.  She has a positive Tinel's and a positive Durkan's test.  She is a 2+ radial pulse    RADIOLOGY:   EMG nerve conduction study was reviewed.  It is  consistent with moderate right carpal tunnel syndrome    ASSESSMENT:   Right carpal tunnel syndrome    PLAN:  1. I have discussed the risks and benefits of the surgical procedure again with the patient.  Informed consent for the procedure has been confirmed     2. Will proceed with right carpal tunnel release under general anesthesia     3.  She will follow up with me in 2 weeks

## 2022-12-08 NOTE — PLAN OF CARE
Patient AAOx4. Resp even, unlabored. No complaints of pain at this time. NAD noted. Patient tolerating PO well. Discharge and follow-up instructions discussed. Patient and sister verbalized understanding. Patient meets criteria for discharge home.

## 2022-12-08 NOTE — ANESTHESIA PROCEDURE NOTES
Intubation    Date/Time: 12/8/2022 9:53 AM  Performed by: Jonathan Rosales CRNA  Authorized by: Bull Doyle MD     Intubation:     Induction:  Intravenous    Intubated:  Postinduction    Mask Ventilation:  Easy mask    Attempts:  1    Attempted By:  CRNA    Difficult Airway Encountered?: No      Complications:  None    Airway Device:  Supraglottic airway/LMA    Airway Device Size:  4.0    Style/Cuff Inflation:  Cuffed    Inflation Amount (mL):  28    Secured at:  The lips    Placement Verified By:  Capnometry    Complicating Factors:  Poor neck/head extension, obesity and short neck    Findings Post-Intubation:  BS equal bilateral and atraumatic/condition of teeth unchanged

## 2022-12-08 NOTE — PATIENT INSTRUCTIONS
Procedure: right carpal tunnel release    1. Please keep the dressing clean, dry, and in place. Do not take it off and do not get it wet.    2. Please keep the right arm and hand elevated for the 1st 24-48 hours to prevent swelling    3. Flexion and extension of the exposed fingers is encouraged, but do not attempt to push off or lift more than 1-2 pounds with right arm or hand    4. Please limit weightbearing to the right hand to 1-2 pounds. Light activity such as brushing your teeth, using a fork, or lifting a small drink is allowed starting today.    5. Pain medication and nausea medication have been prescribed. Please take them as necessary    6. If there are any questions or concerns please call Dr. Carbone's office at [phone]    7.  Follow up with Dr. Carbone in 2 weeks

## 2022-12-08 NOTE — DISCHARGE SUMMARY
[facility] - Surgery  Discharge Note  Short Stay    Procedure(s) (LRB):  Right carpal tunnel release (Right)      OUTCOME: Patient tolerated treatment/procedure well without complication and is now ready for discharge.    DISPOSITION: Home or Self Care    FINAL DIAGNOSIS:  Carpal tunnel syndrome on right    FOLLOWUP: In clinic    DISCHARGE INSTRUCTIONS:    Discharge Procedure Orders   Diet general     Activity as tolerated     Keep surgical extremity elevated     Lifting restrictions   Order Comments: Please limit lifting with the right arm and hand to 2-3 lb     Leave dressing on - Keep it clean, dry, and intact until clinic visit     Call MD for:  temperature >100.4     Call MD for:  persistent nausea and vomiting     Call MD for:  severe uncontrolled pain     Call MD for:  difficulty breathing, headache or visual disturbances     Call MD for:  redness, tenderness, or signs of infection (pain, swelling, redness, odor or green/yellow discharge around incision site)     Call MD for:  hives     Call MD for:  persistent dizziness or light-headedness     Call MD for:  extreme fatigue        TIME SPENT ON DISCHARGE: 15 minutes

## 2022-12-08 NOTE — ANESTHESIA POSTPROCEDURE EVALUATION
Anesthesia Post Evaluation    Patient: Cristina Tan    Procedure(s) Performed: Procedure(s) (LRB):  Right carpal tunnel release (Right)    Final Anesthesia Type: general      Patient location during evaluation: PACU  Patient participation: Yes- Able to Participate  Level of consciousness: awake and alert and oriented  Post-procedure vital signs: reviewed and stable  Pain management: adequate  Airway patency: patent  MARLY mitigation strategies: Multimodal analgesia and Extubation while patient is awake  PONV status at discharge: No PONV  Anesthetic complications: no      Cardiovascular status: blood pressure returned to baseline  Respiratory status: unassisted, spontaneous ventilation and room air  Hydration status: euvolemic  Follow-up not needed.          Vitals Value Taken Time   /66 12/08/22 1115   Temp 36.7 °C (98 °F) 12/08/22 1115   Pulse 59 12/08/22 1115   Resp 14 12/08/22 1115   SpO2 98 % 12/08/22 1115         Event Time   Out of Recovery 11:00:00         Pain/Jimbo Score: Jimbo Score: 10 (12/8/2022 11:08 AM)

## 2022-12-08 NOTE — ANESTHESIA PREPROCEDURE EVALUATION
12/08/2022  Cristina Tan is a 57 y.o., female.      Pre-op Assessment    I have reviewed the NPO Status.   I have reviewed the Medications.     Review of Systems  Anesthesia Hx:  No problems with previous Anesthesia    Cardiovascular:   Exercise tolerance: good    Hepatic/GI:   GERD    Neurological:   Neuromuscular Disease, Headaches    Endocrine:  Morbid Obesity / BMI > 40  Psych:   Psychiatric History anxiety depression          Physical Exam  General: Well nourished        Anesthesia Plan  Type of Anesthesia, risks & benefits discussed:    Anesthesia Type: Gen Supraglottic Airway  Intra-op Monitoring Plan: Standard ASA Monitors  Post Op Pain Control Plan: multimodal analgesia and IV/PO Opioids PRN  Induction:  IV  Airway Plan: Direct  Informed Consent: Informed consent signed with the Patient and all parties understand the risks and agree with anesthesia plan.  All questions answered. Patient consented to blood products? No  ASA Score: 3    Ready For Surgery From Anesthesia Perspective.     .

## 2022-12-09 VITALS
OXYGEN SATURATION: 98 % | SYSTOLIC BLOOD PRESSURE: 135 MMHG | RESPIRATION RATE: 14 BRPM | DIASTOLIC BLOOD PRESSURE: 66 MMHG | HEART RATE: 59 BPM | TEMPERATURE: 98 F

## 2022-12-19 ENCOUNTER — TELEPHONE (OUTPATIENT)
Dept: PSYCHIATRY | Facility: CLINIC | Age: 57
End: 2022-12-19

## 2022-12-19 NOTE — TELEPHONE ENCOUNTER
Patient called  Patient states that her pharmacy turned down the lithium rx that you wrote and will only fill the lithium CR that the psych in patient doctor prescribed her. She wants to know if she should continue what in patient prescribed or does she have to take what you prescribed.     Please advise

## 2022-12-21 ENCOUNTER — OFFICE VISIT (OUTPATIENT)
Dept: ORTHOPEDICS | Facility: CLINIC | Age: 57
End: 2022-12-21
Payer: MEDICAID

## 2022-12-21 DIAGNOSIS — Z98.890 STATUS POST CARPAL TUNNEL RELEASE: Primary | ICD-10-CM

## 2022-12-21 PROCEDURE — 99024 POSTOP FOLLOW-UP VISIT: CPT | Mod: ,,, | Performed by: ORTHOPAEDIC SURGERY

## 2022-12-21 PROCEDURE — 1159F MED LIST DOCD IN RCRD: CPT | Mod: CPTII,,, | Performed by: ORTHOPAEDIC SURGERY

## 2022-12-21 PROCEDURE — 99999 PR PBB SHADOW E&M-EST. PATIENT-LVL III: CPT | Mod: PBBFAC,,, | Performed by: ORTHOPAEDIC SURGERY

## 2022-12-21 PROCEDURE — 99999 PR PBB SHADOW E&M-EST. PATIENT-LVL III: ICD-10-PCS | Mod: PBBFAC,,, | Performed by: ORTHOPAEDIC SURGERY

## 2022-12-21 PROCEDURE — 3044F PR MOST RECENT HEMOGLOBIN A1C LEVEL <7.0%: ICD-10-PCS | Mod: CPTII,,, | Performed by: ORTHOPAEDIC SURGERY

## 2022-12-21 PROCEDURE — 99213 OFFICE O/P EST LOW 20 MIN: CPT | Mod: PBBFAC,PN | Performed by: ORTHOPAEDIC SURGERY

## 2022-12-21 PROCEDURE — 3044F HG A1C LEVEL LT 7.0%: CPT | Mod: CPTII,,, | Performed by: ORTHOPAEDIC SURGERY

## 2022-12-21 PROCEDURE — 99024 PR POST-OP FOLLOW-UP VISIT: ICD-10-PCS | Mod: ,,, | Performed by: ORTHOPAEDIC SURGERY

## 2022-12-21 PROCEDURE — 1159F PR MEDICATION LIST DOCUMENTED IN MEDICAL RECORD: ICD-10-PCS | Mod: CPTII,,, | Performed by: ORTHOPAEDIC SURGERY

## 2022-12-21 NOTE — PROGRESS NOTES
Cristina Tan is a 57 y.o. female who is approximately 2 weeks status post right carpal tunnel release. She is doing very well. She states that the majority of carpal tunnel symptoms are improved or resolved.    Physical exam: Examination of the right hand reveals that the incision is healing well. There is no significant edema,  erythema or drainage. Sensation is grossly intact median radial and ulnar distributions. Motor function of the thenar musculature is intact. Capillary refill less than 2 seconds in all of the digits. The Patient is able to make a full composite fist and fully extend the fingers without significant pain.    Assessment: Status post right carpal tunnel release    Plan:    1. Sutures were removed today and Steri-Strips are placed    2. Can begin hand washing in running water tomorrow    3. Continue a 2-3 pound weight limit to the arm and hand    4. Follow up with me in 2 weeks for repeat evaluation

## 2022-12-29 ENCOUNTER — NURSE TRIAGE (OUTPATIENT)
Dept: ADMINISTRATIVE | Facility: CLINIC | Age: 57
End: 2022-12-29

## 2022-12-29 NOTE — TELEPHONE ENCOUNTER
Mrs. Tan is calling with a positive home COVID test, the test is .  She is having mild symptoms of nasal congestion and cough.  She has been taking Advil and Tylenol frequently due to recent carpal tunnel surgery, unsure if she has a fever or not, no fever is registered when she takes her temp.  I advised she could manage symptoms at home, and gave home care advice; she verbalized understanding.  Because her partner is going to have a home visit with [facility], she would like to be seen, as well.  Shari transferred the call over to Krysten with [facility].      Reason for Disposition   [1] COVID-19 diagnosed by positive lab test (e.g., PCR, rapid self-test kit) AND [2] mild symptoms (e.g., cough, fever, others) AND [3] no complications or SOB    Additional Information   Negative: SEVERE difficulty breathing (e.g., struggling for each breath, speaks in single words)   Negative: Difficult to awaken or acting confused (e.g., disoriented, slurred speech)   Negative: Bluish (or gray) lips or face now   Negative: Shock suspected (e.g., cold/pale/clammy skin, too weak to stand, low BP, rapid pulse)   Negative: Sounds like a life-threatening emergency to the triager   Negative: SEVERE or constant chest pain or pressure  (Exception: Mild central chest pain, present only when coughing.)   Negative: MODERATE difficulty breathing (e.g., speaks in phrases, SOB even at rest, pulse 100-120)   Negative: [1] Headache AND [2] stiff neck (can't touch chin to chest)   Negative: Oxygen level (e.g., pulse oximetry) 90 percent or lower   Negative: Chest pain or pressure  (Exception: MILD central chest pain, present only when coughing)   Negative: Patient sounds very sick or weak to the triager   Negative: MILD difficulty breathing (e.g., minimal/no SOB at rest, SOB with walking, pulse <100)   Negative: Fever > 103 F (39.4 C)   Negative: [1] Fever > 101 F (38.3 C) AND [2] age > 60 years   Negative: [1] Fever > 100.0 F (37.8 C) AND [2]  bedridden (e.g., CVA, chronic illness, recovering from surgery)   Negative: Oxygen level (e.g., pulse oximetry) 91 to 94 percent   Negative: [1] HIGH RISK for severe COVID complications (e.g., weak immune system, age > 64 years, obesity with BMI 30 or higher, pregnant, chronic lung disease or other chronic medical condition) AND [2] COVID symptoms (e.g., cough, fever)  (Exceptions: Already seen by PCP and no new or worsening symptoms.)   Negative: [1] HIGH RISK patient AND [2] influenza is widespread in the community AND [3] ONE OR MORE respiratory symptoms: cough, sore throat, runny or stuffy nose   Negative: [1] HIGH RISK patient AND [2] influenza exposure within the last 7 days AND [3] ONE OR MORE respiratory symptoms: cough, sore throat, runny or stuffy nose   Negative: Fever present > 3 days (72 hours)   Negative: [1] Fever returns after gone for over 24 hours AND [2] symptoms worse or not improved   Negative: [1] Continuous (nonstop) coughing interferes with work or school AND [2] no improvement using cough treatment per Care Advice   Negative: [1] COVID-19 infection suspected by caller or triager AND [2] mild symptoms (cough, fever, or others) AND [3] negative COVID-19 rapid test   Negative: Cough present > 3 weeks   Negative: [1] COVID-19 diagnosed by positive lab test (e.g., PCR, rapid self-test kit) AND [2] NO symptoms (e.g., cough, fever, others)    Protocols used: Coronavirus (COVID-19) Diagnosed or [id number]

## 2022-12-29 NOTE — TELEPHONE ENCOUNTER
Spoke with patient she stated that Krysten with [facility] was going to see her. And she will keep the office informed

## 2023-01-11 ENCOUNTER — OFFICE VISIT (OUTPATIENT)
Dept: ORTHOPEDICS | Facility: CLINIC | Age: 58
End: 2023-01-11
Payer: MEDICAID

## 2023-01-11 VITALS — HEIGHT: 66 IN | BODY MASS INDEX: 40.66 KG/M2 | WEIGHT: 253 LBS

## 2023-01-11 DIAGNOSIS — Z98.890 STATUS POST CARPAL TUNNEL RELEASE: Primary | ICD-10-CM

## 2023-01-11 PROCEDURE — 99024 POSTOP FOLLOW-UP VISIT: CPT | Mod: ,,, | Performed by: PHYSICIAN ASSISTANT

## 2023-01-11 PROCEDURE — 3008F BODY MASS INDEX DOCD: CPT | Mod: CPTII,,, | Performed by: PHYSICIAN ASSISTANT

## 2023-01-11 PROCEDURE — 99999 PR PBB SHADOW E&M-EST. PATIENT-LVL III: ICD-10-PCS | Mod: PBBFAC,,, | Performed by: PHYSICIAN ASSISTANT

## 2023-01-11 PROCEDURE — 99999 PR PBB SHADOW E&M-EST. PATIENT-LVL III: CPT | Mod: PBBFAC,,, | Performed by: PHYSICIAN ASSISTANT

## 2023-01-11 PROCEDURE — 1159F PR MEDICATION LIST DOCUMENTED IN MEDICAL RECORD: ICD-10-PCS | Mod: CPTII,,, | Performed by: PHYSICIAN ASSISTANT

## 2023-01-11 PROCEDURE — 99213 OFFICE O/P EST LOW 20 MIN: CPT | Mod: PBBFAC,PN | Performed by: PHYSICIAN ASSISTANT

## 2023-01-11 PROCEDURE — 1159F MED LIST DOCD IN RCRD: CPT | Mod: CPTII,,, | Performed by: PHYSICIAN ASSISTANT

## 2023-01-11 PROCEDURE — 1160F RVW MEDS BY RX/DR IN RCRD: CPT | Mod: CPTII,,, | Performed by: PHYSICIAN ASSISTANT

## 2023-01-11 PROCEDURE — 3008F PR BODY MASS INDEX (BMI) DOCUMENTED: ICD-10-PCS | Mod: CPTII,,, | Performed by: PHYSICIAN ASSISTANT

## 2023-01-11 PROCEDURE — 1160F PR REVIEW ALL MEDS BY PRESCRIBER/CLIN PHARMACIST DOCUMENTED: ICD-10-PCS | Mod: CPTII,,, | Performed by: PHYSICIAN ASSISTANT

## 2023-01-11 PROCEDURE — 99024 PR POST-OP FOLLOW-UP VISIT: ICD-10-PCS | Mod: ,,, | Performed by: PHYSICIAN ASSISTANT

## 2023-01-11 RX ORDER — ARIPIPRAZOLE 10 MG/1
10 TABLET ORAL DAILY
Qty: 30 TABLET | Refills: 1 | Status: SHIPPED | OUTPATIENT
Start: 2023-01-11 | End: 2023-01-20 | Stop reason: SDUPTHER

## 2023-01-11 NOTE — PROGRESS NOTES
Cristina Tan is a 57 y.o. female who presents to clinic for follow-up status post right carpal tunnel release.  She is approximately 5 weeks status post surgery.  States her carpal tunnel symptoms have resolved.  States he is noticed hypersensitivity the surgical site and pain with activity.  States pain on average at rest is 2/10.  States pain with activity is 8/10.  Denies any other complaints at this time.    Physical Exam:  Examination of the right hand reveals a well-healed surgical site.  No edema, erythema, ecchymosis, or skin breakdown.  Able make composite fist and fully extend all fingers.  Thenar motor function remains intact.  Strength not tested secondary to pain.  Normal sensation in the radial, ulnar, median nerve distributions.  Capillary refill less than 2 seconds in all fingers.    Radiology:  None.    Assessment:  Status post right carpal tunnel release    Plan:  1. I discussed with Cristina Tan that she continues to progress appropriately in the postoperative course.  We discussed the best course of action this time is perform a short course of occupational therapy to decrease the hypersensitivity increase the function of the right hand/wrist.  We also discussed the importance of having limited weight-bearing of the right hand/arm until seen again in clinic.  She verbally agreed with the treatment plan.      2. She was referred occupational therapy in clinic today.      3. I would like her follow up in clinic in 4 weeks for final evaluation.  She was instructed to contact clinic for any problems or concerns in the interim.

## 2023-01-19 ENCOUNTER — PATIENT MESSAGE (OUTPATIENT)
Dept: PSYCHIATRY | Facility: CLINIC | Age: 58
End: 2023-01-19
Payer: MEDICAID

## 2023-01-20 ENCOUNTER — OFFICE VISIT (OUTPATIENT)
Dept: PSYCHIATRY | Facility: CLINIC | Age: 58
End: 2023-01-20
Payer: MEDICAID

## 2023-01-20 DIAGNOSIS — F41.9 ANXIETY DISORDER, UNSPECIFIED TYPE: ICD-10-CM

## 2023-01-20 DIAGNOSIS — F31.2 SEVERE MANIC BIPOLAR 1 DISORDER WITH PSYCHOTIC BEHAVIOR: Primary | ICD-10-CM

## 2023-01-20 DIAGNOSIS — F31.9 BIPOLAR 1 DISORDER: ICD-10-CM

## 2023-01-20 PROCEDURE — 1159F PR MEDICATION LIST DOCUMENTED IN MEDICAL RECORD: ICD-10-PCS | Mod: CPTII,95,, | Performed by: NURSE PRACTITIONER

## 2023-01-20 PROCEDURE — 99214 OFFICE O/P EST MOD 30 MIN: CPT | Mod: SA,HB,95, | Performed by: NURSE PRACTITIONER

## 2023-01-20 PROCEDURE — 1159F MED LIST DOCD IN RCRD: CPT | Mod: CPTII,95,, | Performed by: NURSE PRACTITIONER

## 2023-01-20 PROCEDURE — 90833 PSYTX W PT W E/M 30 MIN: CPT | Mod: SA,HB,95, | Performed by: NURSE PRACTITIONER

## 2023-01-20 PROCEDURE — 99214 PR OFFICE/OUTPT VISIT, EST, LEVL IV, 30-39 MIN: ICD-10-PCS | Mod: SA,HB,95, | Performed by: NURSE PRACTITIONER

## 2023-01-20 PROCEDURE — 1160F RVW MEDS BY RX/DR IN RCRD: CPT | Mod: CPTII,95,, | Performed by: NURSE PRACTITIONER

## 2023-01-20 PROCEDURE — 90833 PR PSYCHOTHERAPY W/PATIENT W/E&M, 30 MIN (ADD ON): ICD-10-PCS | Mod: SA,HB,95, | Performed by: NURSE PRACTITIONER

## 2023-01-20 PROCEDURE — 1160F PR REVIEW ALL MEDS BY PRESCRIBER/CLIN PHARMACIST DOCUMENTED: ICD-10-PCS | Mod: CPTII,95,, | Performed by: NURSE PRACTITIONER

## 2023-01-20 RX ORDER — LITHIUM CARBONATE 300 MG/1
300 TABLET, FILM COATED, EXTENDED RELEASE ORAL 3 TIMES DAILY
Qty: 90 TABLET | Refills: 5 | Status: SHIPPED | OUTPATIENT
Start: 2023-01-20 | End: 2023-08-01

## 2023-01-20 RX ORDER — ARIPIPRAZOLE 10 MG/1
10 TABLET ORAL DAILY
Qty: 90 TABLET | Refills: 0 | Status: SHIPPED | OUTPATIENT
Start: 2023-01-20 | End: 2023-02-07 | Stop reason: SDUPTHER

## 2023-01-20 NOTE — PROGRESS NOTES
"    Outpatient Psychiatry Follow-Up Visit    Clinical Status of Patient: Outpatient (Virtual)  01/20/2023     [id number] [place] [id number]  [phone]   Visit type: Virtual visit with synchronous audio and video  Each patient to whom he or she provides medical services by telemedicine is:  (1) informed of the relationship between the physician and patient and the respective role of any other health care provider with respect to management of the patient; and (2) notified that he or she may decline to receive medical services by telemedicine and may withdraw from such care at any time.      Chief Complaint: Pt is a 56 year old female who presents today for a follow-up. Met with patient.       Interval History and Content of Current Session:  Interim Events/Subjective Report/Content of Current Session:  follow up appointment.    Pt is a 57 year old female with past psychiatric hx of Bipolar 1 disorder, anxiety NOS who presents for follow up treatment. She is currently taking Lithium 300mg TID, Xanax 0.5 mg QHS PRN for sleep (uses 2-3 x weekly), Abilify 10mg QD, hydroxyzine 50mg qd prn. Meds are tolerated well.     From last session 12/22: "Between sessions, pt was hospitalized following a manic episode. During her stay they adjusted meds - changes are reflected above.Pt notes that her last manic episode was triggered by taking steroids for poison ivy. Pt was admitted to [facility] for 1 week where she was stabilized. Since discharge, pt reports feeling "a little hyper" but has not been manic/hypomanic. She is sleeping well with regular use of trazodone. Pt is stable today and displays good reality testing. "    Between sessions, pt reports she is feeling well. Her mood is stable and she has been without any major decompensations of depression or bipolar disorder since last visit. Displays good reality testing in today's visit. Anxiety is well-managed. Sleeping well.      Past Psychiatric hx: Pt. is a 55 " "year old female  with a past psychiatric hx of Bipolar 1 disorder, depressed who established care with me 07/19 following the senior care of her previous psych, Dr. Pelaez. She came to me taking Lithium 600mg BID and Xanax 0.5 mg QHS PRN for sleep (only uses this sparingly) , which had been previously prescribed and managed by Dr. Pelaez. She reports that her symptoms have stabilized over the last 7-8 months, and has been without a manic episode since November of 2018. Previous med trials of Lamictal (rash), Risperdal, Wellbutrin, some SSRIs (destablized mood, triggered manic episodes). Pt stable in clinic upon initial eval - all meds were continued at same dosage.      Notes a long standing history of "mood swings" dating to childhood, but first formal psych encounter in 1987 following the birth of her child. While admitted to the hospital following birth, and after being discharged, pt speaks to experiencing A/V hallucinations and euphoric gamal following a two-week period of going without sleep. "I was real paranoid after I had the baby. I felt that if I went to sleep, I wouldn't be able to hear the baby cry. I ended up going a couple weeks where I didn't sleep at all, started hearing things and seeing things that weren't there. I felt like people were trying to contact me and tell me things through the computer or the TV. I was paranoid and was thinking the vampires were after us. I started thinking I was someone I wasn't." She eventually sought psychiatric care in an outpatient seeing, but was not started on any psychiatric medication until 2002 (?). She was prescribed SSRIs and Wellbutrin at this time, which triggered a manic episode, and were d/c'd. She was eventually started on Lithium around 2005, and has achieved good results. States she has felt more stable than she's ever felt before.      Upon initial eval, pt reports manic episodes "once or twice a year" that are triggered by a lack of sleep. States " "that her manic episodes last for "weeks" and "I get hyper, my metabolism speeds up. I feel like I'm sped up. I always try to buy a car or a house. I had two houses at one time. It always ends with them putting me in the hospital." Does continue to experience delusional thinking and auditory hallucinations exclusively during manic episodes. Hx of several ED admissions in 03/19 r/t delusions, and "walking down the street talking to people who weren't there" Per - ED note. Reports inpatient hospitalization following manic episodes to [facility] x 1 week in November of 2018. Also reports another admission to [facility] in March of 2018 for 3 days following a manic episode. During these episodes, "I get very happy during those episodes. I start thinking I'm Colt and can communicate through the TV".      Denies any depressive symptoms. Reports sleeping 7-9 hours of sleep each night, restful. No issues falling or staying asleep. Denies anhedonia. Denies guilt/worthlessness. Energy good, concentration good. Appetite good. Denies psychomotor slowing, denies SI.      On 10/16, pt daughter reported "She seems a little bit better during the day, but is still pretty manic and only sleeping 2-3 hours at night, despite trying to up-titrate the medication.  She had to have a meeting with her supervisors at work yesterday, but they have fortunately been very understanding about her bipolar" Episodes do not meet criteria for true gamal/or hypomania, but we have been up-titrating Seroquel (started 10/19) to address symptoms - pt has responded well but reports dry mouth.I advised pt to add dose of Seroquel 50mg Q AM. However, she states pt states this made her groggy so d/c this. Despite this, pt reports at f/u "I've been feeling so much better. I'm actually sleeping now and definitely haven't been feeling manic at all. I feel way more calm now." However,  Notes overall less mood lability, less agitation. Denies manic/hypomanic " "episodes since last visit. She went to [place] on vacation between visit - enjoyed this. Pt doing well with no decompensations since last visit.  Does note continued generalized worry. Feels restless, on edge.       During her 5/20 f/u visit pt reports "I've been getting into bed at night, and sometimes it feels like someone else is getting in bed with me. It got to the point where I couldn't sleep in my bed because I was kind of scared. I thought it was my cat but my cat wasn't in the room. Sometime it even touches me, like it's brushing on either my foot or back. Like someone is in here trying to wake me up, but no one is in there. I usually just go and sleep on the sofa." Denies A/V hallucinations.     Seroquel was increased following this visit, Today, pt reports "I don't have those feelings in my bed anymore. I'm not afraid to get in bed anymore. Thanks goodness. I've been sleeping better." Does reports "feeling extremely tired when I take it in the morning." and requests to move to PM dosing. Tolerates well otherwise. Denies any gamal or hypomania between sessions.      Past Medical hx:   Past Medical History:   Diagnosis Date    Bipolar 1 disorder     Cancer     skin cancer to right arm    GERD (gastroesophageal reflux disease)     History of psychiatric hospitalization     Mental disorder     Thyroid disease         Interim hx:  Medication changes last visit: Inc seroquel to 50mg PO QAM, 100mg QHS  Anxiety: inc'd  Depression: denies     Denies suicidal/homicidal ideations.  Denies hopelessness/worthlessness.    Denies auditory/visual hallucinations       Tobacco: never used  Alcohol: denies   Drug use: denies  Caffeine: 1-2 cups coffee daily      Review of Systems   PSYCHIATRIC: Pertinent items are noted in the narrative.        CONSTITUTIONAL: weight stable        M/S: no pain today         ENT: no allergies noted today        ABD: no n/v/d     Past Medical, Family and Social History: The patient's past " medical, family and social history have been reviewed and updated as appropriate within the electronic medical record. See encounter notes.     Medication: Lithium 300mg TID, and Xanax 0.5 mg QHS PRN for sleep, Sseroquel 200mg QHS     Compliance: yes      Side effects: dry mouth from Seroquel     Risk Parameters:  Patient reports no suicidal ideation  Patient reports no homicidal ideation  Patient reports no self-injurious behavior  Patient reports no violent behavior     Exam (detailed: at least 9 elements; comprehensive: all 15 elements)   Constitutional  Vitals:  Most recent vital signs, dated less than 90 days prior to this appointment, were reviewed. There were no vitals taken for this visit.     General:  unremarkable, age appropriate, casual attire, good eye contact, good rapport       Musculoskeletal  Muscle Strength/Tone:  no flaccidity, no tremor    Gait & Station:  normal      Psychiatric                       Speech:  normal tone, normal rate, rhythm, and volume   Mood & Affect:   Euthymic, congruent, appropriate         Thought Process:   Goal directed; Linear    Associations:   intact   Thought Content:   No SI/HI, + fixed delusions, or paranoia, no AV/VH   Insight & Judgement:   Good, adequate to circumstances   Orientation:   grossly intact; alert and oriented x 4    Memory:  intact for content of interview    Language:  grossly intact, can repeat    Attention Span  : Grossly intact for content of interview   Fund of Knowledge:   intact and appropriate to age and level of education        Assessment and Diagnosis   Status/Progress: Based on the examination today, the patient's problem(s) is/are under fair control.  New problems have not been presented today. Comorbidities are not currently complicating management of the primary condition.      Impression:     Pt is a 57 year old female with past psychiatric hx of Bipolar 1 disorder, anxiety NOS who presents for follow up treatment. She is currently  "taking Lithium 300mg TID, Xanax 0.5 mg QHS PRN for sleep (uses 2-3 x weekly), Abilify 10mg QD, hydroxyzine 50mg qd prn. Meds are tolerated well.     From last session 12/22: "Between sessions, pt was hospitalized following a manic episode. During her stay they adjusted meds - changes are reflected above.Pt notes that her last manic episode was triggered by taking steroids for poison ivy. Pt was admitted to [facility] for 1 week where she was stabilized. Since discharge, pt reports feeling "a little hyper" but has not been manic/hypomanic. She is sleeping well with regular use of trazodone. Pt is stable today and displays good reality testing. "    Between sessions, pt reports she is feeling well. Her mood is stable and she has been without any major decompensations of depression or bipolar disorder since last visit. Displays good reality testing in today's visit. Anxiety is well-managed. Sleeping well.      Most recent Li: 0.3: 3/22 - pt stable    Diagnosis: Bipolar 1 disorder, partial remission, anxiety unspecified    Intervention/Counseling/Treatment Plan   Medication Management:      1) Cont Lithium 300mg TID for mood. Pt instructed to monitor closely for mood destabilization as we have been gradually tapering due to inc'd TSH.    2) Cont Abilify 10mg QD. Typical ELFEGO's reviewed including weight gain, abnormal movements, EPS, TD, metabolic side effects.     4) Continue Xanax 0.5 mg QHS PRN for sleep (uses once weekly. Discussed risk of decreased RT, sedation, addictive potential, and not to mix with alcohol.      4. Call to report any worsening of symptoms or problems with the medication. Pt instructed to go to ER with thoughts of harming self, others     5. Patient given contact # for psychotherapists at [facility] and also instructed she may check with insurance for list of providers.      6. Labs: no new orders       Return to clinic: 8 weeks - self  Psychotherapy:   Target symptoms: " inattention/distractibility, anxiety    Why chosen therapy is appropriate versus another modality: relevant to diagnosis, patient responds to this modality  Outcome monitoring methods: self-report, observation, feedback from family   Therapeutic intervention type: supportive psychotherapy  Topics discussed/themes: building skills sets for symptom management, symptom recognition, nutrition, exercise  The patient's response to the intervention is accepting. The patient's progress toward treatment goals is positive progress.  Duration of intervention: 20 minutes      -Spent 30min face to face with the pt; >50% time spent in counseling   -Supportive therapy and psychoeducation provided  -R/B/SE's of medications discussed with the pt who expresses understanding and chooses to take medications as prescribed.   -Pt instructed to call clinic, 911 or go to nearest emergency room if sxs worsen or pt is in   crisis. The pt expresses understanding.    Montana Aguilar, NP

## 2023-01-23 ENCOUNTER — CLINICAL SUPPORT (OUTPATIENT)
Dept: REHABILITATION | Facility: HOSPITAL | Age: 58
End: 2023-01-23
Payer: MEDICAID

## 2023-01-23 DIAGNOSIS — Z98.890 STATUS POST CARPAL TUNNEL RELEASE: ICD-10-CM

## 2023-01-23 DIAGNOSIS — M79.641 RIGHT HAND PAIN: ICD-10-CM

## 2023-01-23 PROCEDURE — 97530 THERAPEUTIC ACTIVITIES: CPT

## 2023-01-23 PROCEDURE — 97165 OT EVAL LOW COMPLEX 30 MIN: CPT

## 2023-01-23 NOTE — PATIENT INSTRUCTIONS
[facility], OCCUPATIONAL THERAPY  HOME EXERCISE PROGRAM               Complete massage 2-3 minutes 4 times a day:        Vibratory massage      Complete 10 repetitions of each exercise 4-6 times a day:                     Copyright © [employer]. All rights reserved.     Therapist: TARYN Owens/L, CHT

## 2023-01-23 NOTE — PLAN OF CARE
"[facility]  Occupational Therapy Initial Evaluation    Date: 1/23/2023  Name: Cristina Tan  Clinic Number: [id number]    Therapy Diagnosis:   Encounter Diagnosis   Name Primary?    Status post carpal tunnel release      Physician: Gilles Ortez PA-C    Physician Orders: OT  Eval and Treat  Medical Diagnosis:   Surgical Procedure and Date R CTR, DOS: 12/8/2023  Date of Onset: "a couple of years ago" Received injection and had no relief of symptomolgy    Evaluation Date: 1/23/2023  Insurance Authorization Period Expiration: 12/31/2023  Plan of Care Expiration: 3/24/2023  Date of Return to MD: TBD  Visit # / Visits authorized: 1 / 1  FOTO: 53% Limitation 1/23/2023    Precautions:  Standard    Time In: 11:00 am  Time Out: 11:45 am  Total Appointment Time (timed & untimed codes): 45 minutes      SUBJECTIVE     Date of Onset/ History of Current Condition/Mechanism of Injury: Cristina reports: progressive onset of CTS symptomolgy. Stated "It would go to sleep all the time." "I had it done on this hand an had no problems. Stated that she has pain in the sx site and periphery. "I won't do things with this hand because it is sore but I am using it more." Reports "sharp pain" if she "hits it a certain way'"    Falls: none    Dominant Side: Right  Involved Side: RIght  Imaging:  Reviewed  Prior Therapy: NA  Occupation and work duties:  Retired    Functional Limitations/Social History:    Previous functional status includes: Independent with all ADLs.           Limitation of Functional Status as follows:   ADLs/IADLs:     - Feeding: independent    - Bathing: Pain with washing hair    - Dressing/Grooming: Not using the blow dryer due to pain    - Driving: Not using the R hand due to pain     Leisure: not using treadmill, not using the right hand to lift grandson, not watching her grandson as much as she normally does    Pain:  Functional Pain Scale Rating 0-10 and Location: 5/10   Current " "3/10,worst 8/10 if the sx site is hit during use  best 1 to 2/10     Description:  "aching", "throbbing"  Aggravating and Easing Factors: use, pressure, hitting       Patient's Goals for Therapy: "TO have full use of my hand and not have any pain."    Medical History:   Past Medical History:   Diagnosis Date    Bipolar 1 disorder     Cancer     skin cancer to right arm    GERD (gastroesophageal reflux disease)     History of psychiatric hospitalization     Mental disorder     Thyroid disease        Surgical History:    has a past surgical history that includes Hysterectomy; Oophorectomy;  section; breast reduction; tummy tuck; Tonsillectomy; gastric sleeve; Total Reduction Mammoplasty (Bilateral); Cholecystectomy; Carpal tunnel release (Left, 2021); Colonoscopy (N/A, 2022); Breast surgery (Reduction); Tubal ligation (97); Cosmetic surgery (); and Carpal tunnel release (Right, 2022).    Medications:   has a current medication list which includes the following prescription(s): alprazolam, aripiprazole, atorvastatin, hydroxyzine pamoate, ibuprofen, levothyroxine, lithium, ondansetron, and pantoprazole.    Allergies:   Review of patient's allergies indicates:   Allergen Reactions    Prednisone Other (See Comments)     MEDICATION CAUSES PATIENT TO HAVE MANIC EPISODES    Lamictal [lamotrigine] Rash          OBJECTIVE     Observation/Appearance: Sx scar and periphery dense and painful to palpation amanda radial to the scar. Mild localized edema visible at sx site as expected at this time.        Elbow and Wrist ROM. Measured in degrees.   2023         Elbow Ext/Flex     Supination/Pronation     Wrist Ext/Flex Wnl with pain upon full flex    Wrist RD/UD       Hand ROM. Measured in degrees.   2023         Finger ROM wnl    Thumb: MP Thumb Arom                 IP Wnl with         Rad ADD/ABD Report of         Pal ADD/ABD pulling           Opposition        Strength (Dynamometer) " and Pinch Strength (Pinch Gauge)  Measured in pounds. Deferred due to pain   1/23/2023         Rung II     Harley Pinch     3pt Pinch     2pt Pinch       Sensation Denies deificits          Limitation/Restriction for FOTO Hand Survey    Therapist reviewed FOTO scores for Cristina Tan on 1/23/2023.   FOTO documents entered into [employer] - see Media section.    Limitation Score: 53%         Treatment   Total Treatment time (time-based codes) separate from Evaluation: 20 minutes    Cristina received the treatments listed below:     Therapeutic Activities including:    -Patient received ultrasound to  sx site and periphery  to increase blood flow, circulation, tissue elasticity, pain management and for wound/scar management for 8 minutes @ 3 Mhz, Intensity 1.0 w/cm2 at constant duty cycle.      -Friction Massage and Vibratory Massage were applied to the: Sx site and periphery    Exercise Reps/Time       Wrist AROM: e/f x 10   TGE: Straight Fis, Full Fist X 10 (attempted the hook, however R ring finger was popping at the PIP due to hyperextension)   Thumb AROM:Pinky Slides X 10    Educated in HEP of arom and scar massages           Patient Education and Home Exercises      Education provided:   - Role of OT, HEP, and POC    Written Home Exercises Provided: yes.  Exercises were reviewed and Cristina was able to demonstrate them prior to the end of the session.  Cristina demonstrated good  understanding of the education provided. See EMR under Patient Instructions for exercises provided during therapy sessions.     Pt was advised to perform these exercises free of pain, and to stop performing them if pain occurs.    Patient/Family Education: role of OT, goals for OT, scheduling/cancellations - pt verbalized understanding. Discussed insurance limitations with patient.    ASSESSMENT     Cristina Tan is a 57 y.o. female referred to outpatient occupational therapy and presents with a medical diagnosis of CTS.  Patient presents with  the following therapy deficits: Decreased  strength, Decreased pinch strength, Decreased functional hand use, Increased pain, and Scar Adhesions and demonstrates limitations as described in the chart below. Following medical record review it is determined that pt will benefit from occupational therapy services in order to maximize pain free and/or functional use of right hand. The following goals were discussed with the patient and patient is in agreement with them as to be addressed in the treatment plan. The patient's rehab potential is Excellent.     Anticipated barriers to occupational therapy: none  Pt has no cultural, educational or language barriers to learning provided.    Profile and History Assessment of Occupational Performance Level of Clinical Decision Making Complexity Score   Occupational Profile:   Cristina Tan is a 57 y.o. female who lives alone and is retired Cristina Tan has difficulty with  ADLs and IADLs as listed previously, which  [facility].      Comorbidities:    has a past medical history of Bipolar 1 disorder, Cancer, GERD (gastroesophageal reflux disease), History of psychiatric hospitalization, Mental disorder, and Thyroid disease.    Medical and Therapy History Review:   Brief               Performance Deficits    Physical:  Muscle Power/Strength  Skin Integrity/Scar Formation   Strength  Pinch Strength  Pain    Cognitive:  No Deficits    Psychosocial:    No Deficits     Clinical Decision Making:  low    Assessment Process:  Problem-Focused Assessments    Modification/Need for Assistance:  Not Necessary    Intervention Selection:  Several Treatment Options       low  Based on PMHX, co morbidities , data from assessments and functional level of assistance required with task and clinical presentation directly impacting function.         Goals:   The following goals were discussed with the patient and patient is in agreement with them as to be  addressed in the treatment plan.     Long Term Goals (LTGs); to be met by discharge.  LTG #1: Pt will report a pain level of 2 out of 10 with heavy lifting   LTG #2: Pt will demo improved FOTO score by 35 points.   LTG #3: Pt will return to prior level of function for ADLs and household management.     Short Term Goals (STGs); to be met within 4 weeks (2/20/2023).  STG #1: Pt will report 4 out of 10 pain level with in clinic scar maturation techniques.  STG #2: Pt will report/demo Modified [place] with cutting food.  STG #3: Pt will demo improved FOTO score by 15 points.   STG #4: Pt will demonstrate independence with issued HEP.   STG #5: Pt will report pain at a level 2/10 with use in ADL's      PLAN   Plan of Care Certification: 1/23/2023 to 3/24/2023.     Outpatient Occupational Therapy 2 times weekly for 8 weeks to include the following interventions: Paraffin, Fluidotherapy, Manual therapy/joint mobilizations, Modalities for pain management, US 3 mhz, Therapeutic exercises/activities., Strengthening, and Scar Management.      ELLI Umana, CHT        I CERTIFY THE NEED FOR THESE SERVICES FURNISHED UNDER THIS PLAN OF TREATMENT AND WHILE UNDER MY CARE  Physician's comments:      Physician's Signature: ___________________________________________________

## 2023-01-24 NOTE — PROGRESS NOTES
"  [facility]  Occupational Therapy Treatment Note    Date: 1/25/2023  Name: Cristina Tan  Clinic Number: [id number]    Therapy Diagnosis:   Encounter Diagnosis   Name Primary?    Right hand pain Yes     Physician: Gilles Ortez PA-C    Physician Orders: OT  Eval and Treat  Medical Diagnosis: R CTS  Surgical Procedure and Date: R CTR, 12/8/2023,    Date of Injury/Onset: "a couple of years ago" Received injection and had no relief of symptomolgy  Evaluation Date: 1/23/2023  Insurance Authorization Period Expiration: 12/31/2023  Plan of Care Expiration: 3/24/2023  Progress Note Due: ongoing   Date of Return to MD: TBD  Visit # / Visits authorized: 2 / 21  FOTO: 53% Limitation 1/23/2023        Precautions:  Standard    Time In: 2:30 pm  Time Out: 3:30 pm  Total Billable Time: 55 minutes      SUBJECTIVE     Pt reports: she had burning at her surgery site and the periphery post tx last session that lasted through the night. Stated that it did not keep her up that night, however.   She  that she forgot the handout of her HEP at the  last session but remembered some of the exercises.    Response to previous treatment:See above  Functional change: Nothing noted    Pain: Rating not obtained today/10  Location: right sx site and periphery    OBJECTIVE   Objective Measures updated at progress report unless specified.    Observation/Appearance: Sx scar and periphery dense and painful to palpation amanda radial to the scar. Mild localized edema visible at sx site as expected at this time.           Elbow and Wrist ROM. Measured in degrees.    1/23/2023             Elbow Ext/Flex       Supination/Pronation       Wrist Ext/Flex Wnl with pain upon full flex     Wrist RD/UD          Hand ROM. Measured in degrees.    1/23/2023             Finger ROM wnl     Thumb: MP Thumb Arom                  IP Wnl with          Rad ADD/ABD Report of          Pal ADD/ABD pulling            Opposition        "    Strength (Dynamometer) and Pinch Strength (Pinch Gauge)  Measured in pounds. Deferred due to pain    1/23/2023             Rung II       Harley Pinch       3pt Pinch       2pt Pinch          Sensation Denies deificits               Treatment     Cristina received the treatments listed below:   Therapeutic Activities x 55 min, including:     -Patient received ultrasound to  sx site and periphery  to increase blood flow, circulation, tissue elasticity, pain management and for wound/scar management for 8 minutes @ 3 Mhz, Intensity 1.0 w/cm2 at constant duty cycle.     Fluidotherapy: To right hand for 10 min, continuous air, 115 deg, air speed 50 to decrease pain, edema & scar tissue, sensory re- education, and increased tissue extensibility prior to therex   -Friction Massage and Vibratory Massage were applied to the: Sx site and periphery     Exercise Reps/Time         Wrist AROM: e/f x 10   TGE: Straight Fis, Full Fist X 10 (attempted the hook, however R ring finger was popping at the PIP due to hyperextension)   Thumb AROM:Pinky Slides X 10   Theraputty strengthening exercises  Yellow/ orange mix: twirling x 3 min, squeezing x 20 reps, 3 sec ea, pinching 5 logs ea for 3-jaw and key pinches  Educated in HEP   Issued the putty for home use.   Therabar rolling 3 min post US       Patient Education and Home Exercises      Education provided:   - putty HEP  - Progress towards goals     Written Home Exercises Provided: yes.  Exercises were reviewed and Cristina was able to demonstrate them prior to the end of the session.  Cristina demonstrated good  understanding of the HEP provided. See EMR under Patient Instructions for exercises provided during therapy sessions.      ASSESSMENT        Cristina is progressing well towards her goals and there are no updates to goals at this time. Pt prognosis is Excellent. Cristina tolerated putty strengthening exercises very well.     Pt will continue to benefit from skilled outpatient  occupational therapy to address the deficits listed in the problem list on initial evaluation, provide pt/family education and to maximize pt's level of independence in the home and community environment.     Pt's spiritual, cultural and educational needs considered and pt agreeable to plan of care and goals.    Anticipated barriers to occupational therapy: none    Goals:  Long Term Goals (LTGs); to be met by discharge.  LTG #1: Pt will report a pain level of 2 out of 10 with heavy lifting          LTG #2: Pt will demo improved FOTO score by 35 points.   LTG #3: Pt will return to prior level of function for ADLs and household management.      Short Term Goals (STGs); to be met within 4 weeks (2/20/2023).  STG #1: Pt will report 4 out of 10 pain level with in clinic scar maturation techniques.  STG #2: Pt will report/demo Modified [place] with cutting food.  STG #3: Pt will demo improved FOTO score by 15 points.   STG #4: Pt will demonstrate independence with issued HEP.   STG #5: Pt will report pain at a level 2/10 with use in ADL's    PLAN     Continue skilled occupational therapy with individualized plan of care focusing on scar maturation, decreasing pain, and increase strength    Updates/Grading for next session: continue to progress resistive exercises    Alis Flynn OT

## 2023-01-25 ENCOUNTER — CLINICAL SUPPORT (OUTPATIENT)
Dept: REHABILITATION | Facility: HOSPITAL | Age: 58
End: 2023-01-25
Payer: MEDICAID

## 2023-01-25 DIAGNOSIS — M79.641 RIGHT HAND PAIN: Primary | ICD-10-CM

## 2023-01-25 PROCEDURE — 97530 THERAPEUTIC ACTIVITIES: CPT

## 2023-01-25 NOTE — PATIENT INSTRUCTIONS
[facility]  OCCUPATIONAL THERAPY  HOME EXERCISE PROGRAM     Complete the following strengthening program 1x/day.          3 min        3 sec squeezes x 20 reps               5 logs  _    5 logs                   ELLI Nam, SAM  Certified Hand Therapist  Occupational Therapist

## 2023-01-27 RX ORDER — HYDROXYZINE PAMOATE 50 MG/1
50 CAPSULE ORAL EVERY 6 HOURS PRN
Qty: 30 CAPSULE | Refills: 1 | Status: SHIPPED | OUTPATIENT
Start: 2023-01-27 | End: 2023-03-17 | Stop reason: SDUPTHER

## 2023-01-31 ENCOUNTER — CLINICAL SUPPORT (OUTPATIENT)
Dept: REHABILITATION | Facility: HOSPITAL | Age: 58
End: 2023-01-31
Payer: MEDICAID

## 2023-01-31 DIAGNOSIS — M79.641 RIGHT HAND PAIN: Primary | ICD-10-CM

## 2023-01-31 PROCEDURE — 97530 THERAPEUTIC ACTIVITIES: CPT | Mod: CO

## 2023-01-31 NOTE — PROGRESS NOTES
"  [facility]  Occupational Therapy Treatment Note    Date: 1/31/2023  Name: Cristina Tan  Clinic Number: [id number]    Therapy Diagnosis:   Encounter Diagnosis   Name Primary?    Right hand pain Yes       Physician: Gilles Ortez PA-C    Physician Orders: OT  Eval and Treat  Medical Diagnosis: R CTS  Surgical Procedure and Date: R CTR, 12/8/2022,    Date of Injury/Onset: "a couple of years ago" Received injection and had no relief of symptomolgy  Evaluation Date: 1/23/2023  Insurance Authorization Period Expiration: 12/31/2023  Plan of Care Expiration: 3/24/2023  Progress Note Due: ongoing   Date of Return to MD: TBD  Visit # / Visits authorized: 3 / 21  FOTO: 53% Limitation 1/23/2023        Precautions:  Standard    Time In: 2:43 pm  Time Out: 3:25 pm  Total Billable Time: 42 minutes      SUBJECTIVE     Pt reports: "It's feeling much better."  She  that she forgot the handout of her HEP at the  last session but remembered some of the exercises.    Response to previous treatment:See above  Functional change: Nothing noted    Pain: Rating not obtained today/10  Location: right sx site and periphery    OBJECTIVE   Objective Measures updated at progress report unless specified.    Observation/Appearance: Sx scar and periphery dense and painful to palpation amanda radial to the scar. Mild localized edema visible at sx site as expected at this time.           Elbow and Wrist ROM. Measured in degrees.    1/23/2023             Elbow Ext/Flex       Supination/Pronation       Wrist Ext/Flex Wnl with pain upon full flex     Wrist RD/UD          Hand ROM. Measured in degrees.    1/23/2023             Finger ROM wnl     Thumb: MP Thumb Arom                  IP Wnl with          Rad ADD/ABD Report of          Pal ADD/ABD pulling            Opposition           Strength (Dynamometer) and Pinch Strength (Pinch Gauge)  Measured in pounds. Deferred due to pain    1/23/2023           "   Rung II       Harley Pinch       3pt Pinch       2pt Pinch          Sensation Denies deificits               Treatment     Cristina received the treatments listed below:   Therapeutic Activities x 42 min, including:     -Patient received ultrasound to  sx site and periphery  to increase blood flow, circulation, tissue elasticity, pain management and for wound/scar management for 8 minutes @ 3 Mhz, Intensity 1.0 w/cm2 at constant duty cycle.     Fluidotherapy: To right hand for 10 min, continuous air, 115 deg, air speed 50 to decrease pain, edema & scar tissue, sensory re- education, and increased tissue extensibility prior to therex   -Friction Massage and Vibratory Massage were applied to the: Sx site and periphery     Exercise Reps/Time         Wrist AROM: e/f x 10   TGE: Straight Fis, Full Fist X 10 (attempted the hook, however R ring finger was popping at the PIP due to hyperextension)   Thumb AROM:Pinky Slides X 10   Theraputty strengthening exercises  Yellow/ orange mix: twirling x 3 min, squeezing x 20 reps, 3 sec ea, pinching 5 logs ea for 3-jaw and key pinches  Educated in HEP   Issued the putty for home use.   Therabar rolling 3 min post US NT   Isospheres  X 2 min   In hand manip with coins 2 set   Wrist PRE 3 ways with med dowel 2 x 10   Wrist maze  X2 min   Prayer stretch  3 x 30 sec        Patient Education and Home Exercises      Education provided:   - putty HEP  - Progress towards goals     Written Home Exercises Provided: yes.  Exercises were reviewed and Cristina was able to demonstrate them prior to the end of the session.  Cristina demonstrated good  understanding of the HEP provided. See EMR under Patient Instructions for exercises provided during therapy sessions.      ASSESSMENT      Pt tolerated session well. Reported she is having less pain at her sx site.   Cristina is progressing well towards her goals and there are no updates to goals at this time. Pt prognosis is Excellent. Cristina tolerated  putty strengthening exercises very well.     Pt will continue to benefit from skilled outpatient occupational therapy to address the deficits listed in the problem list on initial evaluation, provide pt/family education and to maximize pt's level of independence in the home and community environment.     Pt's spiritual, cultural and educational needs considered and pt agreeable to plan of care and goals.    Anticipated barriers to occupational therapy: none    Goals:  Long Term Goals (LTGs); to be met by discharge.  LTG #1: Pt will report a pain level of 2 out of 10 with heavy lifting          LTG #2: Pt will demo improved FOTO score by 35 points.   LTG #3: Pt will return to prior level of function for ADLs and household management.      Short Term Goals (STGs); to be met within 4 weeks (2/20/2023).  STG #1: Pt will report 4 out of 10 pain level with in clinic scar maturation techniques.  STG #2: Pt will report/demo Modified [place] with cutting food.  STG #3: Pt will demo improved FOTO score by 15 points.   STG #4: Pt will demonstrate independence with issued HEP.   STG #5: Pt will report pain at a level 2/10 with use in ADL's    PLAN     Continue skilled occupational therapy with individualized plan of care focusing on scar maturation, decreasing pain, and increase strength    Updates/Grading for next session: continue to progress resistive exercises    MINESH Araujo/L        Client Care conference completed with evaluating therapist in regards to this patients POC as evidenced by co signature of supervising therapist.

## 2023-02-02 ENCOUNTER — CLINICAL SUPPORT (OUTPATIENT)
Dept: REHABILITATION | Facility: HOSPITAL | Age: 58
End: 2023-02-02
Payer: MEDICAID

## 2023-02-02 DIAGNOSIS — M79.641 RIGHT HAND PAIN: Primary | ICD-10-CM

## 2023-02-02 PROCEDURE — 97530 THERAPEUTIC ACTIVITIES: CPT | Mod: CO

## 2023-02-02 NOTE — PROGRESS NOTES
"  [facility]  Occupational Therapy Treatment Note    Date: 2/2/2023  Name: Cristina Tan  Clinic Number: [id number]    Therapy Diagnosis:   Encounter Diagnosis   Name Primary?    Right hand pain Yes         Physician: Gilles Ortez PA-C    Physician Orders: OT  Eval and Treat  Medical Diagnosis: R CTS  Surgical Procedure and Date: R CTR, 12/8/2022,    Date of Injury/Onset: "a couple of years ago" Received injection and had no relief of symptomolgy  Evaluation Date: 1/23/2023  Insurance Authorization Period Expiration: 12/31/2023  Plan of Care Expiration: 3/24/2023  Progress Note Due: ongoing   Date of Return to MD: 2/8/2023  Visit # / Visits authorized: 4 / 21  FOTO: 53% Limitation 1/23/2023        Precautions:  Standard    Time In: 2:27 pm  Time Out: 3:20 pm  Total Billable Time: 53 minutes      SUBJECTIVE     Pt reports: "It's feeling much better."   She was compliant with her HEP   Response to previous treatment: good   Functional change: Nothing noted    Pain: 0/10  Location: right sx site and periphery    OBJECTIVE   Objective Measures updated at progress report unless specified.    Observation/Appearance: Sx scar and periphery dense and painful to palpation amanda radial to the scar. Mild localized edema visible at sx site as expected at this time.           Elbow and Wrist ROM. Measured in degrees.    1/23/2023             Elbow Ext/Flex       Supination/Pronation       Wrist Ext/Flex Wnl with pain upon full flex     Wrist RD/UD          Hand ROM. Measured in degrees.    1/23/2023             Finger ROM wnl     Thumb: MP Thumb Arom                  IP Wnl with          Rad ADD/ABD Report of          Pal ADD/ABD pulling            Opposition           Strength (Dynamometer) and Pinch Strength (Pinch Gauge)  Measured in pounds. Deferred due to pain    1/23/2023             Rung II       Harley Pinch       3pt Pinch       2pt Pinch          Sensation Denies deificits           "     Treatment     Cristina received the treatments listed below:   Therapeutic Activities x 53 min, including:     -Patient received ultrasound to  sx site and periphery  to increase blood flow, circulation, tissue elasticity, pain management and for wound/scar management for 8 minutes @ 3 Mhz, Intensity 1.0 w/cm2 at constant duty cycle.     Fluidotherapy: To right hand for 10 min, continuous air, 115 deg, air speed 50 to decrease pain, edema & scar tissue, sensory re- education, and increased tissue extensibility prior to therex   -Friction Massage and Vibratory Massage were applied to the: Sx site and periphery     Exercise Reps/Time         Wrist AROM: e/f x 10   TGE: Straight Fis, Full Fist X 10 (attempted the hook, however R ring finger was popping at the PIP due to hyperextension)   Thumb AROM:Pinky Slides NT  Thumb wheel on SF and RF     X 10 ea   Theraputty strengthening exercises (NT) Yellow/ orange mix: twirling x 3 min, squeezing x 20 reps, 3 sec ea, pinching 5 logs ea for 3-jaw and key pinches  Educated in HEP   Issued the putty for home use.   Therabar rolling 3 min post US NT   Isospheres  X 2 min   In hand manip with coins 2 set   Wrist PRE 3 ways with med dowel 2 x 10   Wrist maze  X2 min   Prayer stretch  3 x 30 sec    Hand gripper 2 yellow RB X20   Digi extender yellow  X20        Patient Education and Home Exercises      Education provided:   - putty HEP  - Progress towards goals     Written Home Exercises Provided: yes.  Exercises were reviewed and Cristina was able to demonstrate them prior to the end of the session.  Cristina demonstrated good  understanding of the HEP provided. See EMR under Patient Instructions for exercises provided during therapy sessions.      ASSESSMENT      Pt tolerated session and progression of exercises well.    Cristina is progressing well towards her goals and there are no updates to goals at this time. Pt prognosis is Excellent. Cristina tolerated putty strengthening  exercises very well.     Pt will continue to benefit from skilled outpatient occupational therapy to address the deficits listed in the problem list on initial evaluation, provide pt/family education and to maximize pt's level of independence in the home and community environment.     Pt's spiritual, cultural and educational needs considered and pt agreeable to plan of care and goals.    Anticipated barriers to occupational therapy: none    Goals:  Long Term Goals (LTGs); to be met by discharge.  LTG #1: Pt will report a pain level of 2 out of 10 with heavy lifting          LTG #2: Pt will demo improved FOTO score by 35 points.   LTG #3: Pt will return to prior level of function for ADLs and household management.      Short Term Goals (STGs); to be met within 4 weeks (2/20/2023).  STG #1: Pt will report 4 out of 10 pain level with in clinic scar maturation techniques.  STG #2: Pt will report/demo Modified [place] with cutting food.  STG #3: Pt will demo improved FOTO score by 15 points.   STG #4: Pt will demonstrate independence with issued HEP.   STG #5: Pt will report pain at a level 2/10 with use in ADL's    PLAN     Continue skilled occupational therapy with individualized plan of care focusing on scar maturation, decreasing pain, and increase strength    Updates/Grading for next session: continue to progress resistive exercises    MINESH Araujo/TONYA

## 2023-02-07 ENCOUNTER — TELEPHONE (OUTPATIENT)
Dept: FAMILY MEDICINE | Facility: CLINIC | Age: 58
End: 2023-02-07
Payer: MEDICAID

## 2023-02-07 ENCOUNTER — CLINICAL SUPPORT (OUTPATIENT)
Dept: REHABILITATION | Facility: HOSPITAL | Age: 58
End: 2023-02-07
Payer: MEDICAID

## 2023-02-07 ENCOUNTER — PATIENT MESSAGE (OUTPATIENT)
Dept: FAMILY MEDICINE | Facility: CLINIC | Age: 58
End: 2023-02-07
Payer: MEDICAID

## 2023-02-07 DIAGNOSIS — M79.641 RIGHT HAND PAIN: Primary | ICD-10-CM

## 2023-02-07 PROCEDURE — 97530 THERAPEUTIC ACTIVITIES: CPT

## 2023-02-07 PROCEDURE — 97110 THERAPEUTIC EXERCISES: CPT

## 2023-02-07 NOTE — TELEPHONE ENCOUNTER
----- Message from Tammy Doherty sent at 2/7/2023 12:21 PM CST -----  Type:  Appointment Request       Name of Caller:Pt   When is the first available appointment?N/A  Symptoms:weight loss/ medication refill  Best Call Back Number:[phone]  Additional Information: Pt Portal Message        Message  Appointment Request From: Cristina Tan  With Provider: Milli Leonard MD [[facility]]  Preferred Date Range: Any date 2/8/2023 or later  Preferred Times: Wednesday Morning  Reason for visit: Office Visit    Comments:  Weight loss and refill prescriptions

## 2023-02-07 NOTE — PROGRESS NOTES
"  [facility]  Occupational Therapy Treatment Note    Date: 2/7/2023  Name: Cristina Tan  Clinic Number: [id number]    Therapy Diagnosis:   Encounter Diagnosis   Name Primary?    Right hand pain Yes           Physician: Gilles Ortez PA-C    Physician Orders: OT  Eval and Treat  Medical Diagnosis: R CTS  Surgical Procedure and Date: R CTR, 12/8/2022,    Date of Injury/Onset: "a couple of years ago" Received injection and had no relief of symptomolgy  Evaluation Date: 1/23/2023  Insurance Authorization Period Expiration: 12/31/2023  Plan of Care Expiration: 3/24/2023  Progress Note Due: ongoing   Date of Return to MD: 2/8/2023  Visit # / Visits authorized: 4 / 21  FOTO: 53% Limitation 1/23/2023        Precautions:  Standard    Time In: 3:00 pm  Time Out: 4:00 pm  Total Billable Time: 60 minutes      SUBJECTIVE     Pt reports: "It's feeling much better." She stated that she did have pain at the sx site and in the thenars and hypothenar muscles when picking up a clothes  today. She does find that the sx scar is softening.     She was compliant with her HEP   Response to previous treatment: good   Functional change: Nothing noted    Pain: 0/10 except with pressure to sx site  Location: right sx site and periphery    OBJECTIVE   Objective Measures updated at progress report unless specified.    Observation/Appearance: Sx scar and periphery dense and painful to palpation amanda radial to the scar. Mild localized edema visible at sx site as expected at this time.           Elbow and Wrist ROM. Measured in degrees.    1/23/2023 2/7/2023           Elbow Ext/Flex       Supination/Pronation       Wrist Ext/Flex Wnl with pain upon full flex Wnl with min pain upon full flex    Wrist RD/UD          Hand ROM. Measured in degrees.    1/23/2023             Finger ROM wnl     Thumb: MP Thumb Arom                  IP Wnl with          Rad ADD/ABD Report of          Pal ADD/ABD pulling            " Opposition           Strength (Dynamometer) and Pinch Strength (Pinch Gauge)  Measured in pounds. Deferred due to pain    2/7/2023 2/7/2023      right   left   Rung II  63  70   Harley Pinch 18  23   3pt Pinch  21 20    2pt Pinch          3/10 pain with  assessment  Sensation Denies deificits               Treatment     Cristina received the treatments listed below:     Therapeutic Activities x 60 min, including:     -Assessment of  and pinch strengths   -Patient received ultrasound to  sx site and periphery  to increase blood flow, circulation, tissue elasticity, pain management and for wound/scar management for 8 minutes @ 3 Mhz, Intensity 1.0 w/cm2 at constant duty cycle.     Fluidotherapy: To right hand for 10 min, continuous air, 115 deg, air speed 50 to decrease pain, edema & scar tissue, sensory re- education, and increased tissue extensibility prior to therex   -Friction Massage and Vibratory Massage were applied to the: Sx site and periphery x 8 min     Exercise Reps/Time    PROM Wrist ext and flex 10 reps ea   Wrist AROM: e/f x 10   Red therabar 20 reps ea of sup, pro, wrist ext, wrist flex, wrist ud, and wrist rd   Therabar rolling 3 min post US    Wrist PRE 3 ways with 2 lbs dumbbell 2 x 10 each way   TGE: Straight Fis, Full Fist  NT X 10 (attempted the hook, however R ring finger was popping at the PIP due to hyperextension)   Thumb AROM:Pinky Slides  Thumb wheel on SF and RF    NT     Theraputty strengthening exercises (NT) Pt doing this at home           Wrist maze  NT   Prayer stretch  NT   Hand gripper 2 yellow RB NT   Digi extender yellow  NT       Patient Education and Home Exercises      Education provided:   - putty HEP  - Progress towards goals     Written Home Exercises Provided: yes.  Exercises were reviewed and Cristina was able to demonstrate them prior to the end of the session.  Cristina demonstrated good  understanding of the HEP provided. See EMR under Patient Instructions for  exercises provided during therapy sessions.      ASSESSMENT      Pt tolerated session and progression of exercises well. Mild weakness noted upon assessment of  and pinch strengths, as compared to her non   Cristina is progressing well towards her goals and there are no updates to goals at this time. Pt prognosis is Excellent.      Pt will continue to benefit from skilled outpatient occupational therapy to address the deficits listed in the problem list on initial evaluation, provide pt/family education and to maximize pt's level of independence in the home and community environment.     Pt's spiritual, cultural and educational needs considered and pt agreeable to plan of care and goals.    Anticipated barriers to occupational therapy: none    Goals:  Long Term Goals (LTGs); to be met by discharge.  LTG #1: Pt will report a pain level of 2 out of 10 with heavy lifting          LTG #2: Pt will demo improved FOTO score by 35 points.   LTG #3: Pt will return to prior level of function for ADLs and household management.      Short Term Goals (STGs); to be met within 4 weeks (2/20/2023).  STG #1: Pt will report 4 out of 10 pain level with in clinic scar maturation techniques.  STG #2: Pt will report/demo Modified [place] with cutting food.  STG #3: Pt will demo improved FOTO score by 15 points.   STG #4: Pt will demonstrate independence with issued HEP.   STG #5: Pt will report pain at a level 2/10 with use in ADL's    PLAN     Continue skilled occupational therapy with individualized plan of care focusing on scar maturation, decreasing pain, and increase strength    Updates/Grading for next session: continue to progress resistive exercises    Alis Flynn, OT

## 2023-02-08 ENCOUNTER — PATIENT MESSAGE (OUTPATIENT)
Dept: FAMILY MEDICINE | Facility: CLINIC | Age: 58
End: 2023-02-08
Payer: MEDICAID

## 2023-02-08 ENCOUNTER — OFFICE VISIT (OUTPATIENT)
Dept: ORTHOPEDICS | Facility: CLINIC | Age: 58
End: 2023-02-08
Payer: MEDICAID

## 2023-02-08 DIAGNOSIS — Z98.890 STATUS POST CARPAL TUNNEL RELEASE: Primary | ICD-10-CM

## 2023-02-08 PROCEDURE — 99999 PR PBB SHADOW E&M-EST. PATIENT-LVL II: ICD-10-PCS | Mod: PBBFAC,,, | Performed by: PHYSICIAN ASSISTANT

## 2023-02-08 PROCEDURE — 1159F MED LIST DOCD IN RCRD: CPT | Mod: CPTII,,, | Performed by: PHYSICIAN ASSISTANT

## 2023-02-08 PROCEDURE — 99024 POSTOP FOLLOW-UP VISIT: CPT | Mod: ,,, | Performed by: PHYSICIAN ASSISTANT

## 2023-02-08 PROCEDURE — 99024 PR POST-OP FOLLOW-UP VISIT: ICD-10-PCS | Mod: ,,, | Performed by: PHYSICIAN ASSISTANT

## 2023-02-08 PROCEDURE — 1160F PR REVIEW ALL MEDS BY PRESCRIBER/CLIN PHARMACIST DOCUMENTED: ICD-10-PCS | Mod: CPTII,,, | Performed by: PHYSICIAN ASSISTANT

## 2023-02-08 PROCEDURE — 1159F PR MEDICATION LIST DOCUMENTED IN MEDICAL RECORD: ICD-10-PCS | Mod: CPTII,,, | Performed by: PHYSICIAN ASSISTANT

## 2023-02-08 PROCEDURE — 99212 OFFICE O/P EST SF 10 MIN: CPT | Mod: PBBFAC,PN | Performed by: PHYSICIAN ASSISTANT

## 2023-02-08 PROCEDURE — 1160F RVW MEDS BY RX/DR IN RCRD: CPT | Mod: CPTII,,, | Performed by: PHYSICIAN ASSISTANT

## 2023-02-08 PROCEDURE — 99999 PR PBB SHADOW E&M-EST. PATIENT-LVL II: CPT | Mod: PBBFAC,,, | Performed by: PHYSICIAN ASSISTANT

## 2023-02-08 NOTE — TELEPHONE ENCOUNTER
----- Message from Cathleen Monahan sent at 2/8/2023  2:21 PM CST -----  Contact: Pt [phone]  No blue slot available to schedule an appointment for the patient.  Patient is established with which PCP:  Milli Leonard MD   Reason for the visit: Refills  Would the patient like a call back, or a response through their [facility] portal?:  Portal    Patient is requesting an appointment for Next week.    Please call and advise.    Thank You

## 2023-02-08 NOTE — PROGRESS NOTES
Cristina Tan is a 57 y.o. female who presents to clinic for follow-up status post right carpal tunnel release.  She is approximately 9 weeks status post surgery.  States she is doing very well.  States pain on average is 0/10.  States the majority of her carpal tunnel symptoms have completely resolved.  Denies any other complaints at this time.    Physical Exam:  Examination of the right hand reveals a well-healed surgical site.  No edema, erythema, ecchymosis, or skin breakdown.  Able make composite fist and fully extend all fingers.  5/5 thenar strength.  5/5 /intrinsic strength.  Normal sensation in the radial, ulnar, median nerve distributions.  Capillary refill less than 2 seconds in all fingers.    Radiology:  None.    Assessment:  Status post right carpal tunnel release    Plan:  1. I discussed with Cristina Tan that she is progressed very well in the postoperative course.  We discussed the best course of action this time is to return to normal activity as tolerated.  She verbally agreed with the treatment plan.      2. I would like him to follow up in clinic on a p.r.n. basis for any worsening of her symptoms or for any hand, wrist, or elbow problems/concerns.  She was instructed to contact the clinic for any problems or concerns in the interim.

## 2023-02-08 NOTE — TELEPHONE ENCOUNTER
----- Message from Whitney Esteban sent at 2/8/2023  8:49 AM CST -----  Regarding: return call  Contact: Patient  Type:  Patient Returning Call    Who Called:  Patient   Who Left Message for Patient:  Jenni   Does the patient know what this is regarding?:    Best Call Back Number:  [phone] (home)     Additional Information:  Patient stated that she missed a call concerning an appointment. Please contact patient to advise. Thanks!

## 2023-02-09 ENCOUNTER — OFFICE VISIT (OUTPATIENT)
Dept: FAMILY MEDICINE | Facility: CLINIC | Age: 58
End: 2023-02-09
Payer: MEDICAID

## 2023-02-09 ENCOUNTER — HOSPITAL ENCOUNTER (OUTPATIENT)
Dept: RADIOLOGY | Facility: HOSPITAL | Age: 58
Discharge: HOME | End: 2023-02-09
Attending: INTERNAL MEDICINE
Payer: MEDICAID

## 2023-02-09 VITALS
SYSTOLIC BLOOD PRESSURE: 130 MMHG | HEART RATE: 59 BPM | BODY MASS INDEX: 41.31 KG/M2 | DIASTOLIC BLOOD PRESSURE: 70 MMHG | WEIGHT: 257.06 LBS | OXYGEN SATURATION: 97 % | HEIGHT: 66 IN

## 2023-02-09 DIAGNOSIS — M79.673 PAIN OF FOOT, UNSPECIFIED LATERALITY: ICD-10-CM

## 2023-02-09 DIAGNOSIS — F31.77 BIPOLAR 1 DISORDER, MIXED, PARTIAL REMISSION: ICD-10-CM

## 2023-02-09 DIAGNOSIS — M79.673 PAIN OF FOOT, UNSPECIFIED LATERALITY: Primary | ICD-10-CM

## 2023-02-09 DIAGNOSIS — E66.9 OBESITY, UNSPECIFIED CLASSIFICATION, UNSPECIFIED OBESITY TYPE, UNSPECIFIED WHETHER SERIOUS COMORBIDITY PRESENT: ICD-10-CM

## 2023-02-09 PROCEDURE — 99214 OFFICE O/P EST MOD 30 MIN: CPT | Mod: S$PBB,,, | Performed by: INTERNAL MEDICINE

## 2023-02-09 PROCEDURE — 99999 PR PBB SHADOW E&M-EST. PATIENT-LVL III: CPT | Mod: PBBFAC,,, | Performed by: INTERNAL MEDICINE

## 2023-02-09 PROCEDURE — 99214 PR OFFICE/OUTPT VISIT, EST, LEVL IV, 30-39 MIN: ICD-10-PCS | Mod: S$PBB,,, | Performed by: INTERNAL MEDICINE

## 2023-02-09 PROCEDURE — 73630 XR FOOT COMPLETE 3 VIEW RIGHT: ICD-10-PCS | Mod: 26,RT,, | Performed by: RADIOLOGY

## 2023-02-09 PROCEDURE — 99999 PR PBB SHADOW E&M-EST. PATIENT-LVL III: ICD-10-PCS | Mod: PBBFAC,,, | Performed by: INTERNAL MEDICINE

## 2023-02-09 PROCEDURE — 3078F DIAST BP <80 MM HG: CPT | Mod: CPTII,,, | Performed by: INTERNAL MEDICINE

## 2023-02-09 PROCEDURE — 3075F PR MOST RECENT SYSTOLIC BLOOD PRESS GE 130-139MM HG: ICD-10-PCS | Mod: CPTII,,, | Performed by: INTERNAL MEDICINE

## 2023-02-09 PROCEDURE — 3008F BODY MASS INDEX DOCD: CPT | Mod: CPTII,,, | Performed by: INTERNAL MEDICINE

## 2023-02-09 PROCEDURE — 99213 OFFICE O/P EST LOW 20 MIN: CPT | Mod: PBBFAC,PO | Performed by: INTERNAL MEDICINE

## 2023-02-09 PROCEDURE — 73630 X-RAY EXAM OF FOOT: CPT | Mod: TC,FY,PO,RT

## 2023-02-09 PROCEDURE — 3075F SYST BP GE 130 - 139MM HG: CPT | Mod: CPTII,,, | Performed by: INTERNAL MEDICINE

## 2023-02-09 PROCEDURE — 3008F PR BODY MASS INDEX (BMI) DOCUMENTED: ICD-10-PCS | Mod: CPTII,,, | Performed by: INTERNAL MEDICINE

## 2023-02-09 PROCEDURE — 1159F MED LIST DOCD IN RCRD: CPT | Mod: CPTII,,, | Performed by: INTERNAL MEDICINE

## 2023-02-09 PROCEDURE — 3078F PR MOST RECENT DIASTOLIC BLOOD PRESSURE < 80 MM HG: ICD-10-PCS | Mod: CPTII,,, | Performed by: INTERNAL MEDICINE

## 2023-02-09 PROCEDURE — 73630 X-RAY EXAM OF FOOT: CPT | Mod: 26,RT,, | Performed by: RADIOLOGY

## 2023-02-09 PROCEDURE — 1159F PR MEDICATION LIST DOCUMENTED IN MEDICAL RECORD: ICD-10-PCS | Mod: CPTII,,, | Performed by: INTERNAL MEDICINE

## 2023-02-09 RX ORDER — LEVOTHYROXINE SODIUM 50 UG/1
50 TABLET ORAL
Qty: 90 TABLET | Refills: 3 | Status: SHIPPED | OUTPATIENT
Start: 2023-02-09 | End: 2024-03-20

## 2023-02-09 RX ORDER — ATORVASTATIN CALCIUM 20 MG/1
20 TABLET, FILM COATED ORAL NIGHTLY
Qty: 90 TABLET | Refills: 3 | Status: SHIPPED | OUTPATIENT
Start: 2023-02-09 | End: 2024-02-26

## 2023-02-09 NOTE — PROGRESS NOTES
Subjective:       Patient ID: Cristina Tan is a 57 y.o. female.    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   Respiratory:  Negative for shortness of breath.    Cardiovascular:  Negative for chest pain.   Neurological:  Negative for headaches.       Objective:      Physical Exam  Constitutional:       Appearance: Normal appearance.   HENT:      Head: Normocephalic.   Cardiovascular:      Rate and Rhythm: Normal rate and regular rhythm.   Pulmonary:      Effort: Pulmonary effort is normal.      Breath sounds: Normal breath sounds.   Musculoskeletal:         General: Normal range of motion.      Cervical back: Normal range of motion.   Neurological:      General: No focal deficit present.      Mental Status: She is alert.   Psychiatric:         Mood and Affect: Mood normal.         Behavior: Behavior normal.       Assessment:       Problem List Items Addressed This Visit    None  Visit Diagnoses       Pain of foot, unspecified laterality    -  Primary    Relevant Orders    X-Ray Foot Complete Right    Bipolar 1 disorder, mixed, partial remission        Relevant Orders    Comprehensive Metabolic Panel    LITHIUM LEVEL    Obesity, unspecified classification, unspecified obesity type, unspecified whether serious comorbidity present        Relevant Orders    Hemoglobin A1C              Plan:       Oebsity- pt to try ozempic from [facility] or why weight  Bipolar- check lithium level  Foot pain xray today

## 2023-02-14 ENCOUNTER — CLINICAL SUPPORT (OUTPATIENT)
Dept: REHABILITATION | Facility: HOSPITAL | Age: 58
End: 2023-02-14
Payer: MEDICAID

## 2023-02-14 DIAGNOSIS — M79.641 RIGHT HAND PAIN: Primary | ICD-10-CM

## 2023-02-14 PROCEDURE — 97530 THERAPEUTIC ACTIVITIES: CPT

## 2023-02-14 NOTE — PROGRESS NOTES
"  [facility]  Occupational Therapy Treatment Note    Date: 2/14/2023  Name: Cristina Tan  Clinic Number: [id number]    Therapy Diagnosis:   Encounter Diagnosis   Name Primary?    Right hand pain Yes             Physician: Gilles Ortez PA-C    Physician Orders: OT  Eval and Treat  Medical Diagnosis: R CTS  Surgical Procedure and Date: R CTR, 12/8/2022,    Date of Injury/Onset: "a couple of years ago" Received injection and had no relief of symptomolgy  Evaluation Date: 1/23/2023  Insurance Authorization Period Expiration: 12/31/2023  Plan of Care Expiration: 3/24/2023  Progress Note Due: ongoing   Date of Return to MD: 2/8/2023  Visit # / Visits authorized: 4 / 21  FOTO: 53% Limitation 1/23/2023        Precautions:  Standard    Time In: 3:00 pm  Time Out: 4:00 pm  Total Billable Time: 60 minutes      SUBJECTIVE     Pt reports: "It's feeling much better." She stated that she did have pain at the sx site and in the thenars and hypothenar muscles when picking up a clothes  today. She does find that the sx scar is softening.     She was compliant with her HEP   Response to previous treatment: good   Functional change: Nothing noted    Pain: 0/10 except with pressure to sx site  Location: right sx site and periphery    OBJECTIVE   Objective Measures updated at progress report unless specified.    Observation/Appearance: Sx scar and periphery dense and painful to palpation amanda radial to the scar. Mild localized edema visible at sx site as expected at this time.           Elbow and Wrist ROM. Measured in degrees.    1/23/2023 2/7/2023           Elbow Ext/Flex       Supination/Pronation       Wrist Ext/Flex Wnl with pain upon full flex Wnl with min pain upon full flex    Wrist RD/UD          Hand ROM. Measured in degrees.    1/23/2023             Finger ROM wnl     Thumb: MP Thumb Arom                  IP Wnl with          Rad ADD/ABD Report of          Pal ADD/ABD pulling           "  Opposition           Strength (Dynamometer) and Pinch Strength (Pinch Gauge)  Measured in pounds. Deferred due to pain    2/7/2023 2/7/2023      right   left   Rung II  63  70   Harley Pinch 18  23   3pt Pinch  21 20    2pt Pinch          3/10 pain with  assessment  Sensation Denies deificits               Treatment     Cristina received the treatments listed below:     Therapeutic Activities x 65 min, including:     -Assessment of  and pinch strengths   -Patient received ultrasound to  sx site and periphery  to increase blood flow, circulation, tissue elasticity, pain management and for wound/scar management for 8 minutes @ 3 Mhz, Intensity 1.0 w/cm2 at constant duty cycle.     Fluidotherapy: To right hand for 10 min, continuous air, 115 deg, air speed 50 to decrease pain, edema & scar tissue, sensory re- education, and increased tissue extensibility prior to therex   -Friction Massage was applied to the: Sx site and periphery x 5 min   - exercises below to increase strength and promote decrease in pain and density of the scarring  Exercise Reps/Time    PROM Wrist ext and flex 10 reps ea   Wrist AAROM: e/f x 10+   Red therabar 20 reps ea of sup, pro, wrist ext, wrist flex, wrist ud, and wrist rd   Therabar rolling 3 min post US    Wrist PRE 3 ways with 3 lbs dumbbell 2 x 10 each way   TGE: Hook, Full Fist     X 10 reps ea     Thumb AROM:Pinky Slides     X 10 reps     Theraputty strengthening exercises (NT) Pt doing this at home   Calibrated gripper Silver spring on rung x 20 reps   Pompom pickup 1 container each for 3-jaw and key pinches       Patient Education and Home Exercises      Education provided:   - putty HEP  - Progress towards goals     Written Home Exercises Provided: yes.  Exercises were reviewed and Cristina was able to demonstrate them prior to the end of the session.  Cristina demonstrated good  understanding of the HEP provided. See EMR under Patient Instructions for exercises provided during  therapy sessions.      ASSESSMENT      Pt tolerated session and progression of exercises well. Mild weakness noted upon assessment of  and pinch strengths, as compared to her non   Cristina is progressing well towards her goals and there are no updates to goals at this time. Pt prognosis is Excellent.      Pt will continue to benefit from skilled outpatient occupational therapy to address the deficits listed in the problem list on initial evaluation, provide pt/family education and to maximize pt's level of independence in the home and community environment.     Pt's spiritual, cultural and educational needs considered and pt agreeable to plan of care and goals.    Anticipated barriers to occupational therapy: none    Goals:  Long Term Goals (LTGs); to be met by discharge.  LTG #1: Pt will report a pain level of 2 out of 10 with heavy lifting          LTG #2: Pt will demo improved FOTO score by 35 points.   LTG #3: Pt will return to prior level of function for ADLs and household management.      Short Term Goals (STGs); to be met within 4 weeks (2/20/2023).  STG #1: Pt will report 4 out of 10 pain level with in clinic scar maturation techniques.  STG #2: Pt will report/demo Modified [place] with cutting food.  STG #3: Pt will demo improved FOTO score by 15 points.   STG #4: Pt will demonstrate independence with issued HEP.   STG #5: Pt will report pain at a level 2/10 with use in ADL's    PLAN     Continue skilled occupational therapy with individualized plan of care focusing on scar maturation, decreasing pain, and increase strength    Updates/Grading for next session: continue to progress resistive exercises    Alis Flynn, OT

## 2023-02-16 ENCOUNTER — CLINICAL SUPPORT (OUTPATIENT)
Dept: REHABILITATION | Facility: HOSPITAL | Age: 58
End: 2023-02-16
Payer: MEDICAID

## 2023-02-16 DIAGNOSIS — M79.641 RIGHT HAND PAIN: Primary | ICD-10-CM

## 2023-02-16 PROCEDURE — 97530 THERAPEUTIC ACTIVITIES: CPT

## 2023-02-16 NOTE — PROGRESS NOTES
"  [facility]  Occupational Therapy Treatment Note    Date: 2/16/2023  Name: Cristina Tan  Clinic Number: [id number]    Therapy Diagnosis:   Encounter Diagnosis   Name Primary?    Right hand pain Yes     Physician: Gilles Ortez PA-C    Physician Orders: OT  Eval and Treat  Medical Diagnosis: R CTS  Surgical Procedure and Date: R CTR, 12/8/2022,    Date of Injury/Onset: "a couple of years ago" Received injection and had no relief of symptomolgy  Evaluation Date: 1/23/2023  Insurance Authorization Period Expiration: 12/31/2023  Plan of Care Expiration: 3/24/2023  Progress Note Due: ongoing   Date of Return to MD: 2/8/2023  Visit # / Visits authorized: 6 / 21  FOTO: 53% Limitation 1/23/2023        Precautions:  Standard    Time In: 3:00 pm  Time Out: 4:00 pm  Total Billable Time: 60 minutes      SUBJECTIVE     Pt reports: Pain when weightbearing and when it hits something. "Sometimes it just aches."    She was compliant with her HEP   Response to previous treatment: good   Functional change: Nothing noted    Pain: 3/10 at rest sometimes.     6/10 with pressure to sx site  Location: right sx site and periphery    OBJECTIVE   Objective Measures updated at progress report unless specified.    Observation/Appearance: Sx scar and periphery dense and painful to palpation amanda radial to the scar. Mild localized edema visible at sx site as expected at this time.        Elbow and Wrist ROM. Measured in degrees.    1/23/2023 2/7/2023           Elbow Ext/Flex       Supination/Pronation       Wrist Ext/Flex Wnl with pain upon full flex Wnl with min pain upon full flex    Wrist RD/UD          Hand ROM. Measured in degrees.    1/23/2023             Finger ROM wnl     Thumb: MP Thumb Arom                  IP Wnl with          Rad ADD/ABD Report of          Pal ADD/ABD pulling            Opposition           Strength (Dynamometer) and Pinch Strength (Pinch Gauge)  Measured in pounds. Deferred due " to pain    2/7/2023 2/7/2023      right   left   Rung II  63  70   Harley Pinch 18  23   3pt Pinch  21 20    2pt Pinch          3/10 pain with  assessment  Sensation Denies deificits         Treatment     Cristina received the treatments listed below:     Therapeutic Activities x 60 min, including:     -Assessment of  and pinch strengths   -Patient received ultrasound to  sx site and periphery  to increase blood flow, circulation, tissue elasticity, pain management and for wound/scar management for 8 minutes @ 3 Mhz, Intensity 1.0 w/cm2 at constant duty cycle.     Fluidotherapy: To right hand for 10 min, continuous air, 115 deg, air speed 50 to decrease pain, edema & scar tissue, sensory re- education, and increased tissue extensibility prior to therex   -Friction Massage was applied to the: Sx site and periphery x 5 min   - exercises below to increase strength and promote decrease in pain and density of the scarring  Exercise Reps/Time    PROM Wrist ext and flex 10 reps ea   Wrist AAROM: e/f x 10+   Red therabar 20 reps ea of sup, pro, wrist ext, wrist flex, wrist ud, and wrist rd   Therabar rolling 3 min post US    Wrist PRE 3 ways with 4 lbs dumbbell 2 x 10 each way   TGE: Hook, Full Fist     X 10 reps ea     Thumb AROM:Pinky Slides     X 10 reps     Theraputty strengthening exercises (NT) Pt doing this at home   Calibrated gripper [place] on rung x 20 reps   Pompom pickup - green clip 1 container each for 3-jaw and key pinches       Patient Education and Home Exercises      Education provided:   - putty HEP  - Progress towards goals     Written Home Exercises Provided: Patient instructed to cont prior HEP.  Exercises were reviewed and Cristina was able to demonstrate them prior to the end of the session.  Cristina demonstrated good  understanding of the HEP provided. See EMR under Patient Instructions for exercises provided during therapy sessions.      ASSESSMENT      Pt tolerated session and progression  "of exercises well (wrist strengthening upgraded to 4lbs). She responded well to US, reporting "loosened things up." She reports continues hypersensitivity to scar with manual massage.     Cristina is progressing well towards her goals and there are no updates to goals at this time. Pt prognosis is Excellent.      Pt will continue to benefit from skilled outpatient occupational therapy to address the deficits listed in the problem list on initial evaluation, provide pt/family education and to maximize pt's level of independence in the home and community environment.     Pt's spiritual, cultural and educational needs considered and pt agreeable to plan of care and goals.    Anticipated barriers to occupational therapy: none    Goals:  Long Term Goals (LTGs); to be met by discharge.  LTG #1: Pt will report a pain level of 2 out of 10 with heavy lifting          LTG #2: Pt will demo improved FOTO score by 35 points.   LTG #3: Pt will return to prior level of function for ADLs and household management.      Short Term Goals (STGs); to be met within 4 weeks (2/20/2023).  STG #1: Pt will report 4 out of 10 pain level with in clinic scar maturation techniques.  STG #2: Pt will report/demo Modified [place] with cutting food.  STG #3: Pt will demo improved FOTO score by 15 points.   STG #4: Pt will demonstrate independence with issued HEP.   STG #5: Pt will report pain at a level 2/10 with use in ADL's    PLAN     Continue skilled occupational therapy with individualized plan of care focusing on scar maturation, decreasing pain, and increase strength    Updates/Grading for next session: continue to progress resistive exercises    Bernadette Mcgrath OT                  "

## 2023-03-17 ENCOUNTER — OFFICE VISIT (OUTPATIENT)
Dept: PSYCHIATRY | Facility: CLINIC | Age: 58
End: 2023-03-17
Payer: MEDICAID

## 2023-03-17 DIAGNOSIS — F41.9 ANXIETY DISORDER, UNSPECIFIED TYPE: Primary | ICD-10-CM

## 2023-03-17 DIAGNOSIS — F31.9 BIPOLAR 1 DISORDER: ICD-10-CM

## 2023-03-17 PROCEDURE — 90833 PSYTX W PT W E/M 30 MIN: CPT | Mod: SA,HB,95, | Performed by: NURSE PRACTITIONER

## 2023-03-17 PROCEDURE — 99214 PR OFFICE/OUTPT VISIT, EST, LEVL IV, 30-39 MIN: ICD-10-PCS | Mod: SA,HB,95, | Performed by: NURSE PRACTITIONER

## 2023-03-17 PROCEDURE — 1159F PR MEDICATION LIST DOCUMENTED IN MEDICAL RECORD: ICD-10-PCS | Mod: SA,HB,CPTII,95 | Performed by: NURSE PRACTITIONER

## 2023-03-17 PROCEDURE — 3044F HG A1C LEVEL LT 7.0%: CPT | Mod: SA,HB,CPTII,95 | Performed by: NURSE PRACTITIONER

## 2023-03-17 PROCEDURE — 1160F PR REVIEW ALL MEDS BY PRESCRIBER/CLIN PHARMACIST DOCUMENTED: ICD-10-PCS | Mod: SA,HB,CPTII,95 | Performed by: NURSE PRACTITIONER

## 2023-03-17 PROCEDURE — 3044F PR MOST RECENT HEMOGLOBIN A1C LEVEL <7.0%: ICD-10-PCS | Mod: SA,HB,CPTII,95 | Performed by: NURSE PRACTITIONER

## 2023-03-17 PROCEDURE — 99214 OFFICE O/P EST MOD 30 MIN: CPT | Mod: SA,HB,95, | Performed by: NURSE PRACTITIONER

## 2023-03-17 PROCEDURE — 1160F RVW MEDS BY RX/DR IN RCRD: CPT | Mod: SA,HB,CPTII,95 | Performed by: NURSE PRACTITIONER

## 2023-03-17 PROCEDURE — 1159F MED LIST DOCD IN RCRD: CPT | Mod: SA,HB,CPTII,95 | Performed by: NURSE PRACTITIONER

## 2023-03-17 PROCEDURE — 90833 PR PSYCHOTHERAPY W/PATIENT W/E&M, 30 MIN (ADD ON): ICD-10-PCS | Mod: SA,HB,95, | Performed by: NURSE PRACTITIONER

## 2023-03-17 RX ORDER — ARIPIPRAZOLE 5 MG/1
5 TABLET ORAL DAILY
Qty: 30 TABLET | Refills: 2 | Status: SHIPPED | OUTPATIENT
Start: 2023-03-17 | End: 2023-06-12

## 2023-03-17 RX ORDER — HYDROXYZINE HYDROCHLORIDE 50 MG/1
50 TABLET, FILM COATED ORAL DAILY PRN
Qty: 30 TABLET | Refills: 3 | Status: SHIPPED | OUTPATIENT
Start: 2023-03-17 | End: 2023-04-12 | Stop reason: SDUPTHER

## 2023-03-17 NOTE — PROGRESS NOTES
Outpatient Psychiatry Follow-Up Visit    Clinical Status of Patient: Outpatient (Virtual)  03/17/2023     [id number] [id number]  [phone]   Visit type: Virtual visit with synchronous audio and video  Each patient to whom he or she provides medical services by telemedicine is:  (1) informed of the relationship between the physician and patient and the respective role of any other health care provider with respect to management of the patient; and (2) notified that he or she may decline to receive medical services by telemedicine and may withdraw from such care at any time.      Chief Complaint: Pt is a 56 year old female who presents today for a follow-up. Met with patient.       Interval History and Content of Current Session:  Interim Events/Subjective Report/Content of Current Session:  follow up appointment.    Pt is a 57 year old female with past psychiatric hx of Bipolar 1 disorder, anxiety NOS who presents for follow up treatment. She is currently taking Lithium 300mg TID, Xanax 0.5 mg QHS PRN for sleep (uses 2-3 x weekly), Abilify 10mg QD, hydroxyzine 50mg qd prn. Meds are tolerated well.    Since last session, pt reports a good, stable mood. She denies any manic or hypomanic episodes and denies any overt symptoms of depression. She displays good reality testing and there is no evidence of a formal thought or speech disorder.      She does note that she has recently developed an involuntary shaking of her R foot, and restless legs. This issues do seem have started around the time she was started on Abilify while hospitalized. Pt has never registered a significant AIMS score during office visits with me. OK to dec Abilify to see if symptoms subside. Will try cogentin to manage if pt's mood destabilizes. She is stable, high functioning in session today.       Past Psychiatric hx: Pt. is a 55 year old female  with a past psychiatric hx of Bipolar 1 disorder, depressed who established  "care with me 07/19 following the [facility]PC of her previous psych, Dr. Pelaez. She came to me taking Lithium 600mg BID and Xanax 0.5 mg QHS PRN for sleep (only uses this sparingly) , which had been previously prescribed and managed by Dr. Pelaez. She reports that her symptoms have stabilized over the last 7-8 months, and has been without a manic episode since November of 2018. Previous med trials of Lamictal (rash), Risperdal, Wellbutrin, some SSRIs (destablized mood, triggered manic episodes). Pt stable in clinic upon initial eval - all meds were continued at same dosage.      Notes a long standing history of "mood swings" dating to childhood, but first formal psych encounter in 1987 following the birth of her child. While admitted to the hospital following birth, and after being discharged, pt speaks to experiencing A/V hallucinations and euphoric gamal following a two-week period of going without sleep. "I was real paranoid after I had the baby. I felt that if I went to sleep, I wouldn't be able to hear the baby cry. I ended up going a couple weeks where I didn't sleep at all, started hearing things and seeing things that weren't there. I felt like people were trying to contact me and tell me things through the computer or the TV. I was paranoid and was thinking the vampires were after us. I started thinking I was someone I wasn't." She eventually sought psychiatric care in an outpatient seeing, but was not started on any psychiatric medication until 2002 (?). She was prescribed SSRIs and Wellbutrin at this time, which triggered a manic episode, and were d/c'd. She was eventually started on Lithium around 2005, and has achieved good results. States she has felt more stable than she's ever felt before.      Upon initial eval, pt reports manic episodes "once or twice a year" that are triggered by a lack of sleep. States that her manic episodes last for "weeks" and "I get hyper, my metabolism speeds up. I feel like " "I'm sped up. I always try to buy a car or a house. I had two houses at one time. It always ends with them putting me in the hospital." Does continue to experience delusional thinking and auditory hallucinations exclusively during manic episodes. Hx of several ED admissions in 03/19 r/t delusions, and "walking down the street talking to people who weren't there" Per - ED note. Reports inpatient hospitalization following manic episodes to [facility] x 1 week in November of 2018. Also reports another admission to [facility] in March of 2018 for 3 days following a manic episode. During these episodes, "I get very happy during those episodes. I start thinking I'm Colt and can communicate through the TV".      Denies any depressive symptoms. Reports sleeping 7-9 hours of sleep each night, restful. No issues falling or staying asleep. Denies anhedonia. Denies guilt/worthlessness. Energy good, concentration good. Appetite good. Denies psychomotor slowing, denies SI.      On 10/16, pt daughter reported "She seems a little bit better during the day, but is still pretty manic and only sleeping 2-3 hours at night, despite trying to up-titrate the medication.  She had to have a meeting with her supervisors at work yesterday, but they have fortunately been very understanding about her bipolar" Episodes do not meet criteria for true gamal/or hypomania, but we have been up-titrating Seroquel (started 10/19) to address symptoms - pt has responded well but reports dry mouth.I advised pt to add dose of Seroquel 50mg Q AM. However, she states pt states this made her groggy so d/c this. Despite this, pt reports at f/u "I've been feeling so much better. I'm actually sleeping now and definitely haven't been feeling manic at all. I feel way more calm now." However,  Notes overall less mood lability, less agitation. Denies manic/hypomanic episodes since last visit. She went to [place] on vacation between visit - enjoyed this. Pt doing " "well with no decompensations since last visit.  Does note continued generalized worry. Feels restless, on edge.       During her 5/20 f/u visit pt reports "I've been getting into bed at night, and sometimes it feels like someone else is getting in bed with me. It got to the point where I couldn't sleep in my bed because I was kind of scared. I thought it was my cat but my cat wasn't in the room. Sometime it even touches me, like it's brushing on either my foot or back. Like someone is in here trying to wake me up, but no one is in there. I usually just go and sleep on the sofa." Denies A/V hallucinations.     Seroquel was increased following this visit, Today, pt reports "I don't have those feelings in my bed anymore. I'm not afraid to get in bed anymore. Thanks goodness. I've been sleeping better." Does reports "feeling extremely tired when I take it in the morning." and requests to move to PM dosing. Tolerates well otherwise. Denies any gamal or hypomania between sessions.      Past Medical hx:   Past Medical History:   Diagnosis Date    Bipolar 1 disorder     Cancer     skin cancer to right arm    GERD (gastroesophageal reflux disease)     History of psychiatric hospitalization     Mental disorder     Thyroid disease         Interim hx:  Medication changes last visit: Inc seroquel to 50mg PO QAM, 100mg QHS  Anxiety: inc'd  Depression: denies     Denies suicidal/homicidal ideations.  Denies hopelessness/worthlessness.    Denies auditory/visual hallucinations       Tobacco: never used  Alcohol: denies   Drug use: denies  Caffeine: 1-2 cups coffee daily      Review of Systems   PSYCHIATRIC: Pertinent items are noted in the narrative.        CONSTITUTIONAL: weight stable        M/S: no pain today         ENT: no allergies noted today        ABD: no n/v/d     Past Medical, Family and Social History: The patient's past medical, family and social history have been reviewed and updated as appropriate within the " electronic medical record. See encounter notes.     Medication: Lithium 300mg TID, and Xanax 0.5 mg QHS PRN for sleep, Sseroquel 200mg QHS     Compliance: yes      Side effects: dry mouth from Seroquel     Risk Parameters:  Patient reports no suicidal ideation  Patient reports no homicidal ideation  Patient reports no self-injurious behavior  Patient reports no violent behavior     Exam (detailed: at least 9 elements; comprehensive: all 15 elements)   Constitutional  Vitals:  Most recent vital signs, dated less than 90 days prior to this appointment, were reviewed. There were no vitals taken for this visit.     General:  unremarkable, age appropriate, casual attire, good eye contact, good rapport       Musculoskeletal  Muscle Strength/Tone:  no flaccidity, no tremor    Gait & Station:  normal      Psychiatric                       Speech:  normal tone, normal rate, rhythm, and volume   Mood & Affect:   Euthymic, congruent, appropriate         Thought Process:   Goal directed; Linear    Associations:   intact   Thought Content:   No SI/HI, + fixed delusions, or paranoia, no AV/VH   Insight & Judgement:   Good, adequate to circumstances   Orientation:   grossly intact; alert and oriented x 4    Memory:  intact for content of interview    Language:  grossly intact, can repeat    Attention Span  : Grossly intact for content of interview   Fund of Knowledge:   intact and appropriate to age and level of education        Assessment and Diagnosis   Status/Progress: Based on the examination today, the patient's problem(s) is/are under fair control.  New problems have not been presented today. Comorbidities are not currently complicating management of the primary condition.      Impression:         Pt is a 57 year old female with past psychiatric hx of Bipolar 1 disorder, anxiety NOS who presents for follow up treatment. She is currently taking Lithium 300mg TID, Xanax 0.5 mg QHS PRN for sleep (uses 2-3 x weekly), Abilify  10mg QD, hydroxyzine 50mg qd prn. Meds are tolerated well.    Since last session, pt reports a good, stable mood. She denies any manic or hypomanic episodes and denies any overt symptoms of depression. She displays good reality testing and there is no evidence of a formal thought or speech disorder.      She does note that she has recently developed an involuntary shaking of her R foot, and restless legs. This issues do seem have started around the time she was started on Abilify while hospitalized. Pt has never registered a significant AIMS score during office visits with me. OK to dec Abilify to see if symptoms subside. Will try cogentin to manage if pt's mood destabilizes. She is stable, high functioning in session today.   Diagnosis: Bipolar 1 disorder, partial remission, anxiety unspecified    Intervention/Counseling/Treatment Plan   Medication Management:      1) Cont Lithium 300mg TID for mood. Pt instructed to monitor closely for mood destabilization as we have been gradually tapering due to inc'd TSH.    2) Cont Abilify 10mg QD. Typical ELFEGO's reviewed including weight gain, abnormal movements, EPS, TD, metabolic side effects.     4) Continue Xanax 0.5 mg QHS PRN for sleep (uses once weekly. Discussed risk of decreased RT, sedation, addictive potential, and not to mix with alcohol.      4. Call to report any worsening of symptoms or problems with the medication. Pt instructed to go to ER with thoughts of harming self, others     5. Patient given contact # for psychotherapists at [facility] and also instructed she may check with insurance for list of providers.      6. Labs: no new orders       Return to clinic: 8 weeks - self  Psychotherapy:   Target symptoms: inattention/distractibility, anxiety    Why chosen therapy is appropriate versus another modality: relevant to diagnosis, patient responds to this modality  Outcome monitoring methods: self-report, observation, feedback from family   Therapeutic  intervention type: supportive psychotherapy  Topics discussed/themes: building skills sets for symptom management, symptom recognition, nutrition, exercise  The patient's response to the intervention is accepting. The patient's progress toward treatment goals is positive progress.  Duration of intervention: 20 minutes      -Spent 30min face to face with the pt; >50% time spent in counseling   -Supportive therapy and psychoeducation provided  -R/B/SE's of medications discussed with the pt who expresses understanding and chooses to take medications as prescribed.   -Pt instructed to call clinic, 911 or go to nearest emergency room if sxs worsen or pt is in   crisis. The pt expresses understanding.    Montana Aguilar, NP

## 2023-03-22 ENCOUNTER — TELEPHONE (OUTPATIENT)
Dept: PODIATRY | Facility: CLINIC | Age: 58
End: 2023-03-22
Payer: MEDICAID

## 2023-03-22 NOTE — TELEPHONE ENCOUNTER
Called and spoke with pt. Schedule pt with Dr. Waller on 03/27/2023.    ----- Message from Rambo Saenz sent at 3/22/2023  8:45 AM CDT -----  Contact: self  Type: Sooner Appointment Request        Caller is requesting a sooner appointment. Caller declined first available appointment listed below. Caller will not accept being placed on the waitlist and is requesting a message be sent to doctor.        Name of Caller: patient   Best Call Back Number: [phone]  Additional Information: Pt wants  to be scheduled sooner having sharp pains in her right foot. Pt says her pains started Saturday. Thanks

## 2023-03-27 ENCOUNTER — OFFICE VISIT (OUTPATIENT)
Dept: PODIATRY | Facility: CLINIC | Age: 58
End: 2023-03-27
Payer: MEDICAID

## 2023-03-27 DIAGNOSIS — M21.6X2 ACQUIRED EQUINUS DEFORMITY OF BOTH FEET: ICD-10-CM

## 2023-03-27 DIAGNOSIS — M21.6X1 ACQUIRED EQUINUS DEFORMITY OF BOTH FEET: ICD-10-CM

## 2023-03-27 DIAGNOSIS — M72.2 PLANTAR FASCIITIS OF RIGHT FOOT: Primary | ICD-10-CM

## 2023-03-27 PROCEDURE — 99213 PR OFFICE/OUTPT VISIT, EST, LEVL III, 20-29 MIN: ICD-10-PCS | Mod: S$PBB,,, | Performed by: PODIATRIST

## 2023-03-27 PROCEDURE — 99999 PR PBB SHADOW E&M-EST. PATIENT-LVL III: CPT | Mod: PBBFAC,,, | Performed by: PODIATRIST

## 2023-03-27 PROCEDURE — 3044F HG A1C LEVEL LT 7.0%: CPT | Mod: CPTII,,, | Performed by: PODIATRIST

## 2023-03-27 PROCEDURE — 99213 OFFICE O/P EST LOW 20 MIN: CPT | Mod: S$PBB,,, | Performed by: PODIATRIST

## 2023-03-27 PROCEDURE — 1159F PR MEDICATION LIST DOCUMENTED IN MEDICAL RECORD: ICD-10-PCS | Mod: CPTII,,, | Performed by: PODIATRIST

## 2023-03-27 PROCEDURE — 3044F PR MOST RECENT HEMOGLOBIN A1C LEVEL <7.0%: ICD-10-PCS | Mod: CPTII,,, | Performed by: PODIATRIST

## 2023-03-27 PROCEDURE — 99213 OFFICE O/P EST LOW 20 MIN: CPT | Mod: PBBFAC,PN | Performed by: PODIATRIST

## 2023-03-27 PROCEDURE — 1160F RVW MEDS BY RX/DR IN RCRD: CPT | Mod: CPTII,,, | Performed by: PODIATRIST

## 2023-03-27 PROCEDURE — 1160F PR REVIEW ALL MEDS BY PRESCRIBER/CLIN PHARMACIST DOCUMENTED: ICD-10-PCS | Mod: CPTII,,, | Performed by: PODIATRIST

## 2023-03-27 PROCEDURE — 1159F MED LIST DOCD IN RCRD: CPT | Mod: CPTII,,, | Performed by: PODIATRIST

## 2023-03-27 PROCEDURE — 99999 PR PBB SHADOW E&M-EST. PATIENT-LVL III: ICD-10-PCS | Mod: PBBFAC,,, | Performed by: PODIATRIST

## 2023-03-27 NOTE — PROGRESS NOTES
Subjective:      Patient ID: Cristina Tan is a 57 y.o. female.    Chief Complaint: Foot Pain (SHARP PAIN WHEN STEPPING ON IT BUT NOT ALL THE TIME .. CANT WALK ON A ANGLE )      Cristina is a 57 y.o. female who presents to the podiatry clinic  with complaint of  right heel pain, worse first thing in the morning or after rest, sharp throbbing pain at times. No prior treatments.Pain rated 5/10    3/27/23: Patient returns for follow up right plantar fasciitis she relates it is worse when walking down a ramp. Injection helped for about a month tries wearing a soft heel inserts.     Review of Systems   Constitutional: Negative for chills and fever.   Cardiovascular:  Negative for claudication and leg swelling.   Respiratory:  Negative for shortness of breath.    Skin:  Negative for itching, nail changes and rash.   Musculoskeletal:  Negative for muscle cramps, muscle weakness and myalgias.   Gastrointestinal:  Negative for nausea and vomiting.   Neurological:  Negative for focal weakness, loss of balance, numbness and paresthesias.         Objective:      Physical Exam  Constitutional:       General: She is not in acute distress.     Appearance: She is well-developed. She is not diaphoretic.   Cardiovascular:      Pulses:           Dorsalis pedis pulses are 2+ on the right side and 2+ on the left side.        Posterior tibial pulses are 2+ on the right side and 2+ on the left side.      Comments: < 3 sec capillary refill time to toes 1-5 bilateral. Toes and feet are warm to touch proximally with normal distal cooling b/l. There is some hair growth on the feet and toes b/l. There is no edema b/l. No spider veins or varicosities present b/l.     Musculoskeletal:      Comments: Equinus noted b/l ankles with < 10 deg DF noted. MMT 5/5 in DF/PF/Inv/Ev resistance with no reproduction of pain in any direction. Passive range of motion of ankle and pedal joints is painless b/l.    Pain on palpation plantar medial and central  heel right foot. No pain with ROM or MMT. No pain with medial and lateral compression of heel.     Skin:     General: Skin is warm and dry.      Coloration: Skin is not pale.      Findings: No abrasion, bruising, burn, ecchymosis, erythema, laceration, lesion, petechiae or rash.      Nails: There is no clubbing.      Comments: Skin temperature, texture and turgor within normal limits.   Neurological:      Mental Status: She is alert and oriented to person, place, and time.      Sensory: No sensory deficit.      Motor: No tremor, atrophy or abnormal muscle tone.      Comments: Negative tinel sign bilateral.   Psychiatric:         Behavior: Behavior normal.             Assessment:       Encounter Diagnoses   Name Primary?    Plantar fasciitis of right foot Yes    Acquired equinus deformity of both feet            Plan:       Cristina was seen today for foot pain.    Diagnoses and all orders for this visit:    Plantar fasciitis of right foot  -     Ambulatory referral/consult to Physical/Occupational Therapy; Future    Acquired equinus deformity of both feet  -     Ambulatory referral/consult to Physical/Occupational Therapy; Future      I counseled the patient on her conditions, their implications and medical management.    Patient will obtain over the counter arch supports and wear them in shoes whenever possible.  Athletic shoes intended for walking or running are usually best.    Avoid barefoot or flats    Declined another injection    Patient will stretch the tendo achilles complex three times daily as demonstrated in the office.  Literature was dispensed illustrating proper stretching technique.    Start PT    Return 6 weeks follow up    Jalil Waller DPM

## 2023-03-28 ENCOUNTER — CLINICAL SUPPORT (OUTPATIENT)
Dept: REHABILITATION | Facility: HOSPITAL | Age: 58
End: 2023-03-28
Payer: MEDICAID

## 2023-03-28 DIAGNOSIS — M72.2 PLANTAR FASCIITIS OF RIGHT FOOT: ICD-10-CM

## 2023-03-28 DIAGNOSIS — M21.6X1 ACQUIRED EQUINUS DEFORMITY OF BOTH FEET: ICD-10-CM

## 2023-03-28 DIAGNOSIS — M21.6X2 ACQUIRED EQUINUS DEFORMITY OF BOTH FEET: ICD-10-CM

## 2023-03-28 DIAGNOSIS — M79.671 RIGHT FOOT PAIN: ICD-10-CM

## 2023-03-28 PROCEDURE — 97110 THERAPEUTIC EXERCISES: CPT

## 2023-03-28 PROCEDURE — 97162 PT EVAL MOD COMPLEX 30 MIN: CPT

## 2023-03-28 NOTE — PROGRESS NOTES
See evaluation in POC for goals and assessment     Eval Date: 03/30/2023    Socorro Gardiner, PT, DPT, CLT

## 2023-03-30 ENCOUNTER — CLINICAL SUPPORT (OUTPATIENT)
Dept: REHABILITATION | Facility: HOSPITAL | Age: 58
End: 2023-03-30
Payer: MEDICAID

## 2023-03-30 DIAGNOSIS — M79.671 RIGHT FOOT PAIN: Primary | ICD-10-CM

## 2023-03-30 PROCEDURE — 97110 THERAPEUTIC EXERCISES: CPT | Mod: CQ

## 2023-03-30 NOTE — PLAN OF CARE
[facility]   Physical Therapy Initial Evaluation       Name: Cristina Tan  [facility] Number: [id number]    Therapy Diagnosis:   Encounter Diagnoses   Name Primary?    Plantar fasciitis of right foot     Acquired equinus deformity of both feet     Right foot pain         Physician: Jalil Waller DPM    Physician Orders: PT Eval and Treat   Medical Diagnosis from Referral:   M72.2 (ICD-10-CM) - Plantar fasciitis of right foot   M21.6X1,M21.6X2 (ICD-10-CM) - Acquired equinus deformity of both feet     Evaluation Date: 3/28/2023  Authorization Period Expiration: 3/26/2023  Plan of Care Expiration: 4/27/2023  Progress Note Due: 4/12/2023  Visit # / Visits authorized: 1/ 1   FOTO: 1/3    Precautions: Standard     Time In: 3:00pm   Time Out: 3:45pm   Total Appointment Time (timed & untimed codes): 45 minutes      SUBJECTIVE     Date of onset: 2 weeks ago    History of current condition - Cristina reports: She has had this issue once before and she used some stretches. The most recent incident started about 2 weeks ago and it seems to be getting worse. It is particuarlly hard to walk up inclines. She had to use a cane at one point but isn't anymore     Falls: none     Imaging:        FINDINGS:  There is bunion formation at the great toe metatarsal head.  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  There is peroneus brevis tendon insertion enthesophyte formation at the base of the 5th metatarsal.  There is an accessory os peroneum ossicle present as an anatomic variant.  There is Achilles insertion calcaneal enthesophyte formation, and there is a large plantar calcaneal spur.  There is degenerative change of the talonavicular joint.  There is minimal marginal osteophyte formation at the anterior margin of the distal tibia.  There is degenerative change of the 1st-4th TMT joints.  No radiographically evident acute, displaced fracture,  dislocation, or osseous destructive process.     Impression:     As above    Prior Therapy: Hand OT   Social History: Lives with boyfriend   Occupation: not working   Prior Level of Function: usually walks 2 miles   Current Level of Function: walking, walking up ramps     Pain:   Current 0/10, worst 8/10, best 0/10   Location: R foot, inside between heel and arch   Description: sharp   Aggravating Factors: walking, can't step up with R leg as stance leg   Easing Factors: ibeprofeun , arch support in shoes     Patients goals: just to get out of pain      Medical History:   Past Medical History:   Diagnosis Date    Bipolar 1 disorder     Cancer     skin cancer to right arm    GERD (gastroesophageal reflux disease)     History of psychiatric hospitalization     Mental disorder     Thyroid disease        Surgical History:   Cristina Tan  has a past surgical history that includes Hysterectomy; Oophorectomy;  section; breast reduction; tummy tuck; Tonsillectomy; gastric sleeve; Total Reduction Mammoplasty (Bilateral); Cholecystectomy; Carpal tunnel release (Left, 2021); Colonoscopy (N/A, 2022); Breast surgery (Reduction); Tubal ligation (97); Cosmetic surgery (); and Carpal tunnel release (Right, 2022).    Medications:   Cristina has a current medication list which includes the following prescription(s): alprazolam, aripiprazole, atorvastatin, hydroxyzine, levothyroxine, and lithium.    Allergies:   Review of patient's allergies indicates:   Allergen Reactions    Prednisone Other (See Comments)     MEDICATION CAUSES PATIENT TO HAVE MANIC EPISODES    Lamictal [lamotrigine] Rash          OBJECTIVE     Observation: Pt ambulates with no AD      Postural examination: rounded shoulder and Forward Head, valgus in standing, ankle pronation in standing     Palpation:tender plantar aspect of foot around heel       Strength: manual muscle test grades below      Lower Extremity Strength  Right LE    Left LE     Hip Ext 4/5 Hip Ext 4/5   Hip Flexion: 4/5 Hip Flexion: 4/5   Hip Adduction (seated) 4/5 Hip Adduction (seated) 4/5   Knee Extension: 4+/5 Knee Extension: 4+/5   Knee Flexion: 4+/5 Knee Flexion: 4+/5   Ankle Dorsiflexion: 4+/5 Ankle Dorsiflexion: 4+/5   Ankle Plantarflexion: 4+/4 Ankle Plantarflexion: 4+/5        R ankle  L ankle    DF 4+/5 DF 4+/5   PF 4+/5 PF 4+/5   INV 4+/5 INV 4+/5   EV 4+/5 EV 4+/5         Range of Motion:  R ankle Active(Passive) L ankle Active (Passive)   DF Lacking 1  DF To neutral    PF 59 PF 65   INV 25 INV 25   EV 13 EV 10       Balance Assessment:       Evaluation   Single Limb Stance R LE 13 seconds  (<10 sec = HIGH FALL RISK)   Single Limb Stance L LE 30 seconds  (<10 sec = HIGH FALL RISK)                Limitation/Restriction for FOTO foot Survey    Therapist reviewed FOTO scores for Cristina Tan on 3/28/2023.   FOTO documents entered into EPIC - see Media section.    Limitation Score: 44%         TREATMENT     Total Treatment time (time-based codes) separate from Evaluation: 15 minutes      Cristina received the treatments listed below:      therapeutic exercises to develop strength, endurance, ROM, flexibility, and posture for 15 minutes including:  PF stretch with towel     PATIENT EDUCATION AND HOME EXERCISES     Education provided:   - HEP, POC, stop HEP if painful     Written Home Exercises Provided: yes. Exercises were reviewed and Cristina was able to demonstrate them prior to the end of the session.  Cristina demonstrated good  understanding of the education provided. See EMR under Patient Instructions for exercises provided during therapy sessions.    ASSESSMENT     Cristina is a 57 y.o. female referred to outpatient Physical Therapy with a medical diagnosis of   M72.2 (ICD-10-CM) - Plantar fasciitis of right foot   M21.6X1,M21.6X2 (ICD-10-CM) - Acquired equinus deformity of both feet   . Patient presents with R foot pain, decreased balance, decreased ankle ROM, and  decreased exercise tolerance. Pts signs and symptoms consistent with plantar fascitis and decreased abillity to perform heel raises also suggest some achilles tendonopathy.  Pt would benefit from therapy to address the deficits listed above, increase independence, and prepare pt for home management.     Patient prognosis is Good.   Patient will benefit from skilled outpatient Physical Therapy to address the deficits stated above and in the chart below, provide patient /family education, and to maximize patientt's level of independence.     Plan of care discussed with patient: Yes  Patient's spiritual, cultural and educational needs considered and patient is agreeable to the plan of care and goals as stated below:     Anticipated Barriers for therapy: none    Medical Necessity is demonstrated by the following  History  Co-morbidities and personal factors that may impact the plan of care Co-morbidities:   Bipolar 1    Personal Factors:   no deficits     moderate   Examination  Body Structures and Functions, activity limitations and participation restrictions that may impact the plan of care Body Regions:   lower extremities    Body Systems:    gross symmetry  ROM  strength  gross coordinated movement  balance  gait  transfers  motor control    Participation Restrictions:   Exercising     Activity limitations:   Learning and applying knowledge  no deficits    General Tasks and Commands  no deficits    Communication  no deficits    Mobility  walking    Self care  no deficits    Domestic Life  no deficits    Interactions/Relationships  no deficits    Life Areas  no deficits    Community and Social Life  no deficits         moderate   Clinical Presentation evolving clinical presentation with changing clinical characteristics moderate   Decision Making/ Complexity Score: moderate     Goals:  Short Term Goals: 2 weeks   Pt will report 30% improvement in symptoms   Pt will be independent with HEP   Pt will be able to perform  10 heel raises with no increase in pain     Long Term Goals: 4 weeks   Pt will have full ROM as compared to non affected foot   Pt will have 5/5 ankle MMTs   Pt will tolerate 15 min walking program to return to exercise tolerance    PLAN   Plan of care Certification: 3/28/2023 to 4/27/2023.    Outpatient Physical Therapy 2 times weekly for 4 weeks to include the following interventions: Gait Training, Manual Therapy, Neuromuscular Re-ed, Patient Education, Self Care, Therapeutic Activities, and Therapeutic Exercise.     Socorro Gardiner, PT      I CERTIFY THE NEED FOR THESE SERVICES FURNISHED UNDER THIS PLAN OF TREATMENT AND WHILE UNDER MY CARE   Physician's comments:     Physician's Signature: ___________________________________________________

## 2023-03-30 NOTE — PROGRESS NOTES
[facility]   Physical Therapy Treatment Note     Name: Cristina BARAJAS [facility] Number: [id number]    Therapy Diagnosis:   Encounter Diagnosis   Name Primary?    Right foot pain Yes     Physician: Shahrzad Paris NP    Visit Date: 3/30/2023    Physician Orders: PT Eval and Treat   Medical Diagnosis from Referral:   M72.2 (ICD-10-CM) - Plantar fasciitis of right foot   M21.6X1,M21.6X2 (ICD-10-CM) - Acquired equinus deformity of both feet      Evaluation Date: 3/28/2023  Authorization Period Expiration: 12/31/2023  Plan of Care Expiration: 4/27/2023  Progress Note Due: 4/12/2023  Visit # / Visits authorized: 1/ 20  FOTO: 1/3    PTA Visit #: 1/5     Time In: 9:00 am  Time Out: 10:00 am  Total Billable Time: 30 minutes    Precautions: Standard     SUBJECTIVE     Pt reports: doing well with no pain . It is typically painful in the morning in her heel and achilles . It will hurt in her foot if she walks long distances .   She was compliant with home exercise program.  Response to previous treatment: good , no adverse effects   Functional change: none    Pain: 0/10  Location: right feet      OBJECTIVE     Objective Measures updated at progress report unless specified.     Treatment     Cristina received the treatments listed below:      therapeutic exercises to develop strength, endurance, ROM, and flexibility for 60 minutes including:    Plantar fascia stretch : 3 x 30''  Gastroc stretch : 3 x 30''  Soleus stretch : 3 x 30''  Ankle 4 way RTB/GTB : 2 x 10 reps each  Towel crunches : 3 x 10 reps   Marbles : x 2 rounds  Arch raises: 2 x 10 reps   Toe yoga : 2 x 10 reps      Patient Education and Home Exercises     Home Exercises Provided and Patient Education Provided     Education provided:   - HEP     Written Home Exercises Provided: yes. Exercises were reviewed and Cristina was able to demonstrate them prior to the end of the session.  Cristina demonstrated good  understanding of the education  provided. See EMR under Patient Instructions for exercises provided during therapy sessions    ASSESSMENT     Pt presents for first session following initial eval. Pt presents with no pain currently. Progressed with mobility and strengthening which pt tolerated well . Will continue to progress next.     Cristina Is progressing well towards her goals.   Pt prognosis is Good.     Pt will continue to benefit from skilled outpatient physical therapy to address the deficits listed in the problem list box on initial evaluation, provide pt/family education and to maximize pt's level of independence in the home and community environment.   Pt's spiritual, cultural and educational needs considered and pt agreeable to plan of care and goals.     Anticipated barriers to physical therapy: none    Goals:  Short Term Goals: 2 weeks   Pt will report 30% improvement in symptoms   Pt will be independent with HEP   Pt will be able to perform 10 heel raises with no increase in pain      Long Term Goals: 4 weeks   Pt will have full ROM as compared to non affected foot   Pt will have 5/5 ankle MMTs   Pt will tolerate 15 min walking program to return to exercise tolerance    PLAN     Continue to progress per PT POC.     Xochitl Flores, PTA

## 2023-04-04 ENCOUNTER — CLINICAL SUPPORT (OUTPATIENT)
Dept: REHABILITATION | Facility: HOSPITAL | Age: 58
End: 2023-04-04
Payer: MEDICAID

## 2023-04-04 DIAGNOSIS — M79.671 RIGHT FOOT PAIN: Primary | ICD-10-CM

## 2023-04-04 PROCEDURE — 97110 THERAPEUTIC EXERCISES: CPT | Mod: CQ

## 2023-04-04 NOTE — PROGRESS NOTES
[facility]   Physical Therapy Treatment Note     Name: Cristina BARAJAS [facility] Number: [id number]    Therapy Diagnosis:   Encounter Diagnosis   Name Primary?    Right foot pain Yes       Physician: Shahrzad Paris NP    Visit Date: 4/4/2023    Physician Orders: PT Eval and Treat   Medical Diagnosis from Referral:   M72.2 (ICD-10-CM) - Plantar fasciitis of right foot   M21.6X1,M21.6X2 (ICD-10-CM) - Acquired equinus deformity of both feet      Evaluation Date: 3/28/2023  Authorization Period Expiration: 4/28/2023  Plan of Care Expiration: 4/27/2023  Progress Note Due: 4/12/2023  Visit # / Visits authorized: 2/ 20  FOTO: 1/3    PTA Visit #: 0/5     Time In: 1:03pm   Time Out: 1:58pm   Total Billable Time: 55 minutes    Precautions: Standard     SUBJECTIVE     Pt reports: doing well with no pain currently. She walked 7/10th of a mile yesterday and now she is having occasional heel pain.    She was compliant with home exercise program.  Response to previous treatment: good , no adverse effects   Functional change: none    Pain: 0/10  Location: right feet      OBJECTIVE     Objective Measures updated at progress report unless specified.     Treatment     Cristina received the treatments listed below:      therapeutic exercises to develop strength, endurance, ROM, and flexibility for 55 minutes including:    Plantar fascia stretch : 3 x 30''  Gastroc stretch : 3 x 30''  Soleus stretch : 3 x 30''  Ankle 4 way RTB/GTB : 2 x 10 reps each  Towel crunches : 3 x 10 reps   Marbles : x 2 rounds  Arch raises: 2 x 10 reps   Toe yoga : 2 x 10 reps      Patient Education and Home Exercises     Home Exercises Provided and Patient Education Provided     Education provided:   - HEP     Written Home Exercises Provided: yes. Exercises were reviewed and Cristina was able to demonstrate them prior to the end of the session.  Cristina demonstrated good  understanding of the education provided. See EMR under Patient  Instructions for exercises provided during therapy sessions    ASSESSMENT     Pt presents with decreased pain and has begun a walking program, as recommended by PT. Will continue to progress. Pt was co-treated by Xochitl Flores PTA with permission from pt.     Cristina Is progressing well towards her goals.   Pt prognosis is Good.     Pt will continue to benefit from skilled outpatient physical therapy to address the deficits listed in the problem list box on initial evaluation, provide pt/family education and to maximize pt's level of independence in the home and community environment.   Pt's spiritual, cultural and educational needs considered and pt agreeable to plan of care and goals.     Anticipated barriers to physical therapy: none    Goals:  Short Term Goals: 2 weeks   Pt will report 30% improvement in symptoms   Pt will be independent with HEP   Pt will be able to perform 10 heel raises with no increase in pain      Long Term Goals: 4 weeks   Pt will have full ROM as compared to non affected foot   Pt will have 5/5 ankle MMTs   Pt will tolerate 15 min walking program to return to exercise tolerance    PLAN     Continue to progress per PT POC.     Socorro Gardiner, PT   Co-treated by Xochitl Flores PTA

## 2023-04-06 ENCOUNTER — CLINICAL SUPPORT (OUTPATIENT)
Dept: REHABILITATION | Facility: HOSPITAL | Age: 58
End: 2023-04-06
Payer: MEDICAID

## 2023-04-06 DIAGNOSIS — M79.671 RIGHT FOOT PAIN: Primary | ICD-10-CM

## 2023-04-06 PROCEDURE — 97110 THERAPEUTIC EXERCISES: CPT

## 2023-04-06 NOTE — PROGRESS NOTES
[facility]   Physical Therapy Treatment Note     Name: Cristina BARAJAS [facility]  Clinic Number: [id number]    Therapy Diagnosis:   Encounter Diagnosis   Name Primary?    Right foot pain Yes       Physician: Shahrzad Paris NP    Visit Date: 4/6/2023    Physician Orders: PT Eval and Treat   Medical Diagnosis from Referral:   M72.2 (ICD-10-CM) - Plantar fasciitis of right foot   M21.6X1,M21.6X2 (ICD-10-CM) - Acquired equinus deformity of both feet      Evaluation Date: 3/28/2023  Authorization Period Expiration: 4/28/2023  Plan of Care Expiration: 4/27/2023  Progress Note Due: 4/12/2023  Visit # / Visits authorized: 3/ 20  FOTO: 1/3    PTA Visit #: 0/5     Time In: 2:00pm   Time Out: 2:55pm    Total Billable Time: 55 minutes    Precautions: Standard     SUBJECTIVE     Pt reports: She walked 3/4 of a mile and had no issues after   She was compliant with home exercise program.  Response to previous treatment: good , no adverse effects   Functional change: none    Pain: 0/10  Location: right feet      OBJECTIVE     Objective Measures updated at progress report unless specified.     Treatment     Cristina received the treatments listed below:      therapeutic exercises to develop strength, endurance, ROM, and flexibility for 55 minutes including:    Plantar fascia stretch : 3 x 30''  Gastroc stretch : 3 x 30''  Soleus stretch : 3 x 30''  Ankle 4 way RTB/GTB : 2 x 10 reps each  Towel crunches : 3 x 10 reps   Marbles : x 2 rounds  Arch raises: 2 x 10 reps   Toe yoga : 2 x 10 reps    SL Hip ABD 3x10   SL clamshells GTB 3x10       Patient Education and Home Exercises     Home Exercises Provided and Patient Education Provided     Education provided:   - HEP     Written Home Exercises Provided: yes. Exercises were reviewed and Cristina was able to demonstrate them prior to the end of the session.  Cristina demonstrated good  understanding of the education provided. See EMR under Patient Instructions for  exercises provided during therapy sessions    ASSESSMENT     Pt presents with decreased pain and improved mobility as reported by walking program she continues to progress with. Introduced hip strength exercises to further progress LE stability and reduce stress through feet. Will continue to progress     Cristina Is progressing well towards her goals.   Pt prognosis is Good.     Pt will continue to benefit from skilled outpatient physical therapy to address the deficits listed in the problem list box on initial evaluation, provide pt/family education and to maximize pt's level of independence in the home and community environment.   Pt's spiritual, cultural and educational needs considered and pt agreeable to plan of care and goals.     Anticipated barriers to physical therapy: none    Goals:  Short Term Goals: 2 weeks   Pt will report 30% improvement in symptoms   Pt will be independent with HEP   Pt will be able to perform 10 heel raises with no increase in pain      Long Term Goals: 4 weeks   Pt will have full ROM as compared to non affected foot   Pt will have 5/5 ankle MMTs   Pt will tolerate 15 min walking program to return to exercise tolerance    PLAN     Continue to progress per PT POC.     Socorro Gardiner, PT   Co-treated by Socorro Gardiner, PT

## 2023-04-11 ENCOUNTER — CLINICAL SUPPORT (OUTPATIENT)
Dept: REHABILITATION | Facility: HOSPITAL | Age: 58
End: 2023-04-11
Payer: MEDICAID

## 2023-04-11 DIAGNOSIS — M79.671 RIGHT FOOT PAIN: Primary | ICD-10-CM

## 2023-04-11 PROCEDURE — 97110 THERAPEUTIC EXERCISES: CPT

## 2023-04-11 NOTE — PROGRESS NOTES
[facility]   Physical Therapy Treatment Note     Name: Cristina BARAJAS [facility] Number: [id number]    Therapy Diagnosis:   Encounter Diagnosis   Name Primary?    Right foot pain Yes       Physician: Shahrzad Paris NP    Visit Date: 4/11/2023    Physician Orders: PT Eval and Treat   Medical Diagnosis from Referral:   M72.2 (ICD-10-CM) - Plantar fasciitis of right foot   M21.6X1,M21.6X2 (ICD-10-CM) - Acquired equinus deformity of both feet      Evaluation Date: 3/28/2023  Authorization Period Expiration: 4/28/2023  Plan of Care Expiration: 4/27/2023  Progress Note Due: 4/12/2023  Visit # / Visits authorized: 4/ 20  FOTO: 1/3    PTA Visit #: 0/5     Time In: 4:00pm   Time Out: 4:55pm   Total Billable Time: 55 minutes    Precautions: Standard     SUBJECTIVE     Pt reports: She continues to increase her walking program with good tolerance   She was compliant with home exercise program.  Response to previous treatment: good , no adverse effects   Functional change: none    Pain: 0/10  Location: right feet      OBJECTIVE     Objective Measures updated at progress report unless specified.     Treatment     Cristina received the treatments listed below:      therapeutic exercises to develop strength, endurance, ROM, and flexibility for 55 minutes including:    Plantar fascia stretch : 3 x 30''  Gastroc stretch : 3 x 30''  Soleus stretch : 3 x 30''  Ankle 4 way RTB/GTB : 2 x 10 reps each  Towel crunches : 2x30 3# weight   Marbles : x 2 rounds    SL Hip ABD 3x10   SL clamshells BlueTB 3x10   Lateral walks GTB 4 laps       Patient Education and Home Exercises     Home Exercises Provided and Patient Education Provided     Education provided:   - HEP     Written Home Exercises Provided: yes. Exercises were reviewed and Cristina was able to demonstrate them prior to the end of the session.  Cristina demonstrated good  understanding of the education provided. See EMR under Patient Instructions for exercises  provided during therapy sessions    ASSESSMENT     Pt continues to show improvement in overall foot pain and was able to tolerate progressions In hip strength. Will continue to progress     Cristina Is progressing well towards her goals.   Pt prognosis is Good.     Pt will continue to benefit from skilled outpatient physical therapy to address the deficits listed in the problem list box on initial evaluation, provide pt/family education and to maximize pt's level of independence in the home and community environment.   Pt's spiritual, cultural and educational needs considered and pt agreeable to plan of care and goals.     Anticipated barriers to physical therapy: none    Goals:  Short Term Goals: 2 weeks   Pt will report 30% improvement in symptoms   Pt will be independent with HEP   Pt will be able to perform 10 heel raises with no increase in pain      Long Term Goals: 4 weeks   Pt will have full ROM as compared to non affected foot   Pt will have 5/5 ankle MMTs   Pt will tolerate 15 min walking program to return to exercise tolerance    PLAN     Continue to progress per PT POC.     Socorro Gardiner, PT   Co-treated by Socorro Gardiner, PT

## 2023-04-12 RX ORDER — HYDROXYZINE HYDROCHLORIDE 50 MG/1
50 TABLET, FILM COATED ORAL DAILY PRN
Qty: 30 TABLET | Refills: 3 | Status: SHIPPED | OUTPATIENT
Start: 2023-04-12 | End: 2023-04-12

## 2023-04-12 RX ORDER — HYDROXYZINE PAMOATE 50 MG/1
50 CAPSULE ORAL DAILY PRN
Qty: 30 CAPSULE | Refills: 3 | Status: SHIPPED | OUTPATIENT
Start: 2023-04-12 | End: 2023-08-01

## 2023-04-12 NOTE — TELEPHONE ENCOUNTER
Phone call from patient stating that she would like the capsule instead of tablets of the Hydroxyzine 50 mg.

## 2023-04-13 ENCOUNTER — CLINICAL SUPPORT (OUTPATIENT)
Dept: REHABILITATION | Facility: HOSPITAL | Age: 58
End: 2023-04-13
Payer: MEDICAID

## 2023-04-13 DIAGNOSIS — M79.671 RIGHT FOOT PAIN: Primary | ICD-10-CM

## 2023-04-13 PROCEDURE — 97110 THERAPEUTIC EXERCISES: CPT

## 2023-04-13 NOTE — PROGRESS NOTES
[facility]   Physical Therapy Treatment Note     Name: Cristina BARAJAS [facility]  Clinic Number: [id number]    Therapy Diagnosis:   Encounter Diagnosis   Name Primary?    Right foot pain Yes         Physician: Shahrzad Paris NP    Visit Date: 4/13/2023    Physician Orders: PT Eval and Treat   Medical Diagnosis from Referral:   M72.2 (ICD-10-CM) - Plantar fasciitis of right foot   M21.6X1,M21.6X2 (ICD-10-CM) - Acquired equinus deformity of both feet      Evaluation Date: 3/28/2023  Authorization Period Expiration: 4/28/2023  Plan of Care Expiration: 4/27/2023  Progress Note Due: 4/12/2023  Visit # / Visits authorized: 4/ 20  FOTO: 1/3    PTA Visit #: 0/5     Time In: 3:00pm   Time Out: 3:55pm    Total Billable Time: 55 minutes    Precautions: Standard     SUBJECTIVE     Pt reports: She continues to tolerate her walking program well   She was compliant with home exercise program.  Response to previous treatment: good , no adverse effects   Functional change: none    Pain: 0/10  Location: right feet      OBJECTIVE     Objective Measures updated at progress report unless specified.     Treatment     Cristina received the treatments listed below:      therapeutic exercises to develop strength, endurance, ROM, and flexibility for 55 minutes including:    Plantar fascia stretch : 3 x 30''  Gastroc stretch : 3 x 30''  Soleus stretch : 3 x 30''  Ankle 4 way RTB/GTB : 2 x 10 reps each    SL Hip ABD 3x10   SL clamshells BlueTB 3x10   BAPS board:   DF/ PF 2x30  INV/ EV 2x30   Circles 2x30       Patient Education and Home Exercises     Home Exercises Provided and Patient Education Provided     Education provided:   - HEP     Written Home Exercises Provided: yes. Exercises were reviewed and Cristina was able to demonstrate them prior to the end of the session.  Cristina demonstrated good  understanding of the education provided. See EMR under Patient Instructions for exercises provided during therapy  sessions    ASSESSMENT     Pt's pain continues to stay consistently low, allowing for progressions into standing exercise and more dynamic ankle stability. Will continue to progress     Cristina Is progressing well towards her goals.   Pt prognosis is Good.     Pt will continue to benefit from skilled outpatient physical therapy to address the deficits listed in the problem list box on initial evaluation, provide pt/family education and to maximize pt's level of independence in the home and community environment.   Pt's spiritual, cultural and educational needs considered and pt agreeable to plan of care and goals.     Anticipated barriers to physical therapy: none    Goals:  Short Term Goals: 2 weeks   Pt will report 30% improvement in symptoms   Pt will be independent with HEP   Pt will be able to perform 10 heel raises with no increase in pain      Long Term Goals: 4 weeks   Pt will have full ROM as compared to non affected foot   Pt will have 5/5 ankle MMTs   Pt will tolerate 15 min walking program to return to exercise tolerance    PLAN     Continue to progress per PT POC.     Socorro Gardiner, PT   Co-treated by Socorro Gardiner, PT

## 2023-04-18 ENCOUNTER — CLINICAL SUPPORT (OUTPATIENT)
Dept: REHABILITATION | Facility: HOSPITAL | Age: 58
End: 2023-04-18
Payer: MEDICAID

## 2023-04-18 DIAGNOSIS — M79.671 RIGHT FOOT PAIN: Primary | ICD-10-CM

## 2023-04-18 PROCEDURE — 97112 NEUROMUSCULAR REEDUCATION: CPT

## 2023-04-18 PROCEDURE — 97110 THERAPEUTIC EXERCISES: CPT

## 2023-04-18 NOTE — PROGRESS NOTES
"[facility]   Physical Therapy Treatment Note/ Progress Note     Name: Cristina Tan  Clinic Number: [id number]    Therapy Diagnosis:   Encounter Diagnosis   Name Primary?    Right foot pain Yes         Physician: Shahrzad Paris NP    Visit Date: 4/18/2023    Physician Orders: PT Eval and Treat   Medical Diagnosis from Referral:   M72.2 (ICD-10-CM) - Plantar fasciitis of right foot   M21.6X1,M21.6X2 (ICD-10-CM) - Acquired equinus deformity of both feet      Evaluation Date: 3/28/2023  Authorization Period Expiration: 4/28/2023  Plan of Care Expiration: 4/27/2023  Progress Note Due: 4/12/2023  Visit # / Visits authorized: 6/ 20  FOTO: 1/3    PTA Visit #: 0/5     Time In: 4:00pm   Time Out: 5:00pm   Total Billable Time: 60 minutes    Precautions: Standard     SUBJECTIVE     Pt reports: She feels like her feet are 70% improvement so far   She was compliant with home exercise program.  Response to previous treatment: good , no adverse effects   Functional change: none    Pain: 1/10  Location: right feet      OBJECTIVE     Functional assessment:  Reports 70% improvement in pain so far   Pt able to perform 10 heel raises with "stretching" feeling but not pain     Treatment     Cristina received the treatments listed below:      therapeutic exercises to develop strength, endurance, ROM, and flexibility for 30 minutes including:    Plantar fascia stretch : 3 x 30''  Gastroc stretch : 3 x 30''  Soleus stretch : 3 x 30''  Ankle 4 way RTB/GTB : 2 x 10 reps each  Marbles : x 2 rounds  Arch raises: 2 x 10 reps   Toe yoga : 2 x 10 reps    SL Hip ABD 3x10   SL clamshells BlueTB 3x10     Neuomuscular re-ed: 30 minutes   Standing marches with airex 3x10  Step ups on airex and 6in step 2x10   SL stance 10' x30      Patient Education and Home Exercises     Home Exercises Provided and Patient Education Provided     Education provided:   - HEP     Written Home Exercises Provided: yes. Exercises were " reviewed and Cristina was able to demonstrate them prior to the end of the session.  Cristina demonstrated good  understanding of the education provided. See EMR under Patient Instructions for exercises provided during therapy sessions    ASSESSMENT     Pt was introduced to balance exercises to challenge dynamic ankle stability. Will continue to progress     Cristina Is progressing well towards her goals.   Pt prognosis is Good.     Pt will continue to benefit from skilled outpatient physical therapy to address the deficits listed in the problem list box on initial evaluation, provide pt/family education and to maximize pt's level of independence in the home and community environment.   Pt's spiritual, cultural and educational needs considered and pt agreeable to plan of care and goals.     Anticipated barriers to physical therapy: none    Goals:  Short Term Goals: 2 weeks   Pt will report 30% improvement in symptoms   Pt will be independent with HEP   Pt will be able to perform 10 heel raises with no increase in pain      Long Term Goals: 4 weeks   Pt will have full ROM as compared to non affected foot   Pt will have 5/5 ankle MMTs   Pt will tolerate 15 min walking program to return to exercise tolerance    PLAN     Continue to progress per PT POC.     Socorro Gardiner, PT   Co-treated by Socorro Gardiner, PT

## 2023-04-20 ENCOUNTER — CLINICAL SUPPORT (OUTPATIENT)
Dept: REHABILITATION | Facility: HOSPITAL | Age: 58
End: 2023-04-20
Payer: MEDICAID

## 2023-04-20 DIAGNOSIS — M79.671 RIGHT FOOT PAIN: Primary | ICD-10-CM

## 2023-04-20 PROCEDURE — 97110 THERAPEUTIC EXERCISES: CPT | Mod: CQ

## 2023-04-20 PROCEDURE — 97112 NEUROMUSCULAR REEDUCATION: CPT | Mod: CQ

## 2023-04-20 NOTE — PROGRESS NOTES
"[facility]   Physical Therapy Treatment Note/ Progress Note     Name: Cristina Tan  Clinic Number: [id number]    Therapy Diagnosis:   Encounter Diagnosis   Name Primary?    Right foot pain Yes       Physician: Shahrzad Paris NP    Visit Date: 4/20/2023    Physician Orders: PT Eval and Treat   Medical Diagnosis from Referral:   M72.2 (ICD-10-CM) - Plantar fasciitis of right foot   M21.6X1,M21.6X2 (ICD-10-CM) - Acquired equinus deformity of both feet      Evaluation Date: 3/28/2023  Authorization Period Expiration: 4/28/2023  Plan of Care Expiration: 4/27/2023  Progress Note Due: 4/12/2023  Visit # / Visits authorized: 7/ 20  FOTO: 1/3    PTA Visit #: 1/5     Time In: 2:50 pm  Time Out: 3:40 pm  Total Billable Time: 50 minutes    Precautions: Standard     SUBJECTIVE     Pt reports: she is doing well . She has been able to walk about a mile but only as long as she wears her supportive shoes .   She was compliant with home exercise program.  Response to previous treatment: good , no adverse effects   Functional change: none    Pain: 2/10  Location: right feet      OBJECTIVE     Functional assessment:  Reports 70% improvement in pain so far   Pt able to perform 10 heel raises with "stretching" feeling but not pain     Treatment     Cristina received the treatments listed below:      therapeutic exercises to develop strength, endurance, ROM, and flexibility for 20 minutes including:    Plantar fascia stretch : 3 x 30''  Gastroc stretch : 3 x 30''  Soleus stretch : 3 x 30''  Shuttle DL press : 3 black cords : 3 x 10 reps   Shuttle SL press 1.5 cords : 3 x 10 reps     Neuomuscular re-ed: 30 minutes   Standing marches with airex 3x10  Step ups on airex and 6in step 2x10   SL stance 3 x 30'' on airex   SL Hip ABD 3x10   SL clamshells BlueTB 3x10       NP Continue HEP:   Ankle 4 way RTB/GTB : 2 x 10 reps each  Arch raises: 2 x 10 reps   Toe yoga : 2 x 10 reps  Towel scrunches       Patient " Education and Home Exercises     Home Exercises Provided and Patient Education Provided     Education provided:   - HEP     Written Home Exercises Provided: yes. Exercises were reviewed and Cristina was able to demonstrate them prior to the end of the session.  Cristina demonstrated good  understanding of the education provided. See EMR under Patient Instructions for exercises provided during therapy sessions    ASSESSMENT     Progressed with hip strengthening and balance activities which pt tolerated well . No pain reported upon session completion. Will continue to progress     Cristina Is progressing well towards her goals.   Pt prognosis is Good.     Pt will continue to benefit from skilled outpatient physical therapy to address the deficits listed in the problem list box on initial evaluation, provide pt/family education and to maximize pt's level of independence in the home and community environment.   Pt's spiritual, cultural and educational needs considered and pt agreeable to plan of care and goals.     Anticipated barriers to physical therapy: none    Goals:  Short Term Goals: 2 weeks   Pt will report 30% improvement in symptoms   Pt will be independent with HEP   Pt will be able to perform 10 heel raises with no increase in pain      Long Term Goals: 4 weeks   Pt will have full ROM as compared to non affected foot   Pt will have 5/5 ankle MMTs   Pt will tolerate 15 min walking program to return to exercise tolerance    PLAN     Continue to progress per PT POC.     Xochitl Flores, PTA

## 2023-05-02 ENCOUNTER — OFFICE VISIT (OUTPATIENT)
Dept: PSYCHIATRY | Facility: CLINIC | Age: 58
End: 2023-05-02
Payer: MEDICAID

## 2023-05-02 DIAGNOSIS — F41.9 ANXIETY DISORDER, UNSPECIFIED TYPE: Primary | ICD-10-CM

## 2023-05-02 DIAGNOSIS — F31.9 BIPOLAR 1 DISORDER: ICD-10-CM

## 2023-05-02 PROCEDURE — 1159F PR MEDICATION LIST DOCUMENTED IN MEDICAL RECORD: ICD-10-PCS | Mod: SA,HB,CPTII,95 | Performed by: NURSE PRACTITIONER

## 2023-05-02 PROCEDURE — 1160F RVW MEDS BY RX/DR IN RCRD: CPT | Mod: SA,HB,CPTII,95 | Performed by: NURSE PRACTITIONER

## 2023-05-02 PROCEDURE — 99214 OFFICE O/P EST MOD 30 MIN: CPT | Mod: SA,HB,95, | Performed by: NURSE PRACTITIONER

## 2023-05-02 PROCEDURE — 1159F MED LIST DOCD IN RCRD: CPT | Mod: SA,HB,CPTII,95 | Performed by: NURSE PRACTITIONER

## 2023-05-02 PROCEDURE — 99214 PR OFFICE/OUTPT VISIT, EST, LEVL IV, 30-39 MIN: ICD-10-PCS | Mod: SA,HB,95, | Performed by: NURSE PRACTITIONER

## 2023-05-02 PROCEDURE — 90833 PSYTX W PT W E/M 30 MIN: CPT | Mod: SA,HB,95, | Performed by: NURSE PRACTITIONER

## 2023-05-02 PROCEDURE — 3044F HG A1C LEVEL LT 7.0%: CPT | Mod: SA,HB,CPTII,95 | Performed by: NURSE PRACTITIONER

## 2023-05-02 PROCEDURE — 3044F PR MOST RECENT HEMOGLOBIN A1C LEVEL <7.0%: ICD-10-PCS | Mod: SA,HB,CPTII,95 | Performed by: NURSE PRACTITIONER

## 2023-05-02 PROCEDURE — 1160F PR REVIEW ALL MEDS BY PRESCRIBER/CLIN PHARMACIST DOCUMENTED: ICD-10-PCS | Mod: SA,HB,CPTII,95 | Performed by: NURSE PRACTITIONER

## 2023-05-02 PROCEDURE — 90833 PR PSYCHOTHERAPY W/PATIENT W/E&M, 30 MIN (ADD ON): ICD-10-PCS | Mod: SA,HB,95, | Performed by: NURSE PRACTITIONER

## 2023-05-02 NOTE — PROGRESS NOTES
"    Outpatient Psychiatry Follow-Up Visit    Clinical Status of Patient: Outpatient (Virtual)  05/02/2023     [address] [place] [address]  [phone]   Visit type: Virtual visit with synchronous audio and video  Each patient to whom he or she provides medical services by telemedicine is:  (1) informed of the relationship between the physician and patient and the respective role of any other health care provider with respect to management of the patient; and (2) notified that he or she may decline to receive medical services by telemedicine and may withdraw from such care at any time.      Chief Complaint: Pt is a 56 year old female who presents today for a follow-up. Met with patient.       Interval History and Content of Current Session:  Interim Events/Subjective Report/Content of Current Session:  follow up appointment.    Pt is a 58 year old female with past psychiatric hx of Bipolar 1 disorder, anxiety NOS who presents for follow up treatment. She is currently taking Lithium 300mg TID, Xanax 0.5 mg QHS PRN for sleep (uses 2-3 x weekly), Abilify 5mg QD, hydroxyzine 50mg qd prn. Meds are tolerated well overall, but Abilify was dec'd to 5mg qd last session to combat akathisia and mild tremors and involuntary foot movement.    Today, pt notes significant reductions in these abnormal movements. She states "I don't feel like I have those movements at all anymore." Her mood is stable, and she denies any gamal or hypomania since last visit. Denies any major depressive episodes and displays good reality testing in todays session. Anxiety well-managed - denies excessive or ruminative worrying. Sleeping well, good appetite.       Past Psychiatric hx: Pt. is a 55 year old female  with a past psychiatric hx of Bipolar 1 disorder, depressed who established care with me 07/19 following the snf of her previous psych, Dr. Pelaez. She came to me taking Lithium 600mg BID and Xanax 0.5 mg QHS PRN for sleep " "(only uses this sparingly) , which had been previously prescribed and managed by Dr. Pelaez. She reports that her symptoms have stabilized over the last 7-8 months, and has been without a manic episode since November of 2018. Previous med trials of Lamictal (rash), Risperdal, Wellbutrin, some SSRIs (destablized mood, triggered manic episodes). Pt stable in clinic upon initial eval - all meds were continued at same dosage.      Notes a long standing history of "mood swings" dating to childhood, but first formal psych encounter in 1987 following the birth of her child. While admitted to the hospital following birth, and after being discharged, pt speaks to experiencing A/V hallucinations and euphoric gamal following a two-week period of going without sleep. "I was real paranoid after I had the baby. I felt that if I went to sleep, I wouldn't be able to hear the baby cry. I ended up going a couple weeks where I didn't sleep at all, started hearing things and seeing things that weren't there. I felt like people were trying to contact me and tell me things through the computer or the TV. I was paranoid and was thinking the vampires were after us. I started thinking I was someone I wasn't." She eventually sought psychiatric care in an outpatient seeing, but was not started on any psychiatric medication until 2002 (?). She was prescribed SSRIs and Wellbutrin at this time, which triggered a manic episode, and were d/c'd. She was eventually started on Lithium around 2005, and has achieved good results. States she has felt more stable than she's ever felt before.      Upon initial eval, pt reports manic episodes "once or twice a year" that are triggered by a lack of sleep. States that her manic episodes last for "weeks" and "I get hyper, my metabolism speeds up. I feel like I'm sped up. I always try to buy a car or a house. I had two houses at one time. It always ends with them putting me in the hospital." Does continue to " "experience delusional thinking and auditory hallucinations exclusively during manic episodes. Hx of several ED admissions in 03/19 r/t delusions, and "walking down the street talking to people who weren't there" Per - ED note. Reports inpatient hospitalization following manic episodes to [facility] x 1 week in November of 2018. Also reports another admission to [facility] in March of 2018 for 3 days following a manic episode. During these episodes, "I get very happy during those episodes. I start thinking I'm [place] and can communicate through the TV".      Denies any depressive symptoms. Reports sleeping 7-9 hours of sleep each night, restful. No issues falling or staying asleep. Denies anhedonia. Denies guilt/worthlessness. Energy good, concentration good. Appetite good. Denies psychomotor slowing, denies SI.      On 10/16, pt daughter reported "She seems a little bit better during the day, but is still pretty manic and only sleeping 2-3 hours at night, despite trying to up-titrate the medication.  She had to have a meeting with her supervisors at work yesterday, but they have fortunately been very understanding about her bipolar" Episodes do not meet criteria for true gamal/or hypomania, but we have been up-titrating Seroquel (started 10/19) to address symptoms - pt has responded well but reports dry mouth.I advised pt to add dose of Seroquel 50mg Q AM. However, she states pt states this made her groggy so d/c this. Despite this, pt reports at f/u "I've been feeling so much better. I'm actually sleeping now and definitely haven't been feeling manic at all. I feel way more calm now." However,  Notes overall less mood lability, less agitation. Denies manic/hypomanic episodes since last visit. She went to [place] on vacation between visit - enjoyed this. Pt doing well with no decompensations since last visit.  Does note continued generalized worry. Feels restless, on edge.       During her 5/20 f/u visit pt " "reports "I've been getting into bed at night, and sometimes it feels like someone else is getting in bed with me. It got to the point where I couldn't sleep in my bed because I was kind of scared. I thought it was my cat but my cat wasn't in the room. Sometime it even touches me, like it's brushing on either my foot or back. Like someone is in here trying to wake me up, but no one is in there. I usually just go and sleep on the sofa." Denies A/V hallucinations.     Seroquel was increased following this visit, Today, pt reports "I don't have those feelings in my bed anymore. I'm not afraid to get in bed anymore. Thanks goodness. I've been sleeping better." Does reports "feeling extremely tired when I take it in the morning." and requests to move to PM dosing. Tolerates well otherwise. Denies any gamal or hypomania between sessions.      Past Medical hx:   Past Medical History:   Diagnosis Date    Bipolar 1 disorder     Cancer     skin cancer to right arm    GERD (gastroesophageal reflux disease)     History of psychiatric hospitalization     Mental disorder     Thyroid disease         Interim hx:  Medication changes last visit: Inc seroquel to 50mg PO QAM, 100mg QHS  Anxiety: inc'd  Depression: denies     Denies suicidal/homicidal ideations.  Denies hopelessness/worthlessness.    Denies auditory/visual hallucinations       Tobacco: never used  Alcohol: denies   Drug use: denies  Caffeine: 1-2 cups coffee daily      Review of Systems   PSYCHIATRIC: Pertinent items are noted in the narrative.        CONSTITUTIONAL: weight stable        M/S: no pain today         ENT: no allergies noted today        ABD: no n/v/d     Past Medical, Family and Social History: The patient's past medical, family and social history have been reviewed and updated as appropriate within the electronic medical record. See encounter notes.     Medication: Lithium 300mg TID, and Xanax 0.5 mg QHS PRN for sleep, Sseroquel 200mg QHS     Compliance: " yes      Side effects: dry mouth from Seroquel     Risk Parameters:  Patient reports no suicidal ideation  Patient reports no homicidal ideation  Patient reports no self-injurious behavior  Patient reports no violent behavior     Exam (detailed: at least 9 elements; comprehensive: all 15 elements)   Constitutional  Vitals:  Most recent vital signs, dated less than 90 days prior to this appointment, were reviewed. There were no vitals taken for this visit.     General:  unremarkable, age appropriate, casual attire, good eye contact, good rapport       Musculoskeletal  Muscle Strength/Tone:  no flaccidity, no tremor    Gait & Station:  normal      Psychiatric                       Speech:  normal tone, normal rate, rhythm, and volume   Mood & Affect:   Euthymic, congruent, appropriate         Thought Process:   Goal directed; Linear    Associations:   intact   Thought Content:   No SI/HI, + fixed delusions, or paranoia, no AV/VH   Insight & Judgement:   Good, adequate to circumstances   Orientation:   grossly intact; alert and oriented x 4    Memory:  intact for content of interview    Language:  grossly intact, can repeat    Attention Span  : Grossly intact for content of interview   Fund of Knowledge:   intact and appropriate to age and level of education        Assessment and Diagnosis   Status/Progress: Based on the examination today, the patient's problem(s) is/are under fair control.  New problems have not been presented today. Comorbidities are not currently complicating management of the primary condition.      Impression:         Pt is a 57 year old female with past psychiatric hx of Bipolar 1 disorder, anxiety NOS who presents for follow up treatment. She is currently taking Lithium 300mg TID, Xanax 0.5 mg QHS PRN for sleep (uses 2-3 x weekly), Abilify 10mg QD, hydroxyzine 50mg qd prn. Meds are tolerated well.    Since last session, pt reports a good, stable mood. She denies any manic or hypomanic episodes  and denies any overt symptoms of depression. She displays good reality testing and there is no evidence of a formal thought or speech disorder.      She does note that she has recently developed an involuntary shaking of her R foot, and restless legs. This issues do seem have started around the time she was started on Abilify while hospitalized. Pt has never registered a significant AIMS score during office visits with me. OK to dec Abilify to see if symptoms subside. Will try cogentin to manage if pt's mood destabilizes. She is stable, high functioning in session today.   Diagnosis: Bipolar 1 disorder, partial remission, anxiety unspecified    Intervention/Counseling/Treatment Plan   Medication Management:      1) Cont Lithium 300mg TID for mood. Pt instructed to monitor closely for mood destabilization as we have been gradually tapering due to inc'd TSH.    2) Cont Abilify 10mg QD. Typical ELFEGO's reviewed including weight gain, abnormal movements, EPS, TD, metabolic side effects.     4) Continue Xanax 0.5 mg QHS PRN for sleep (uses once weekly. Discussed risk of decreased RT, sedation, addictive potential, and not to mix with alcohol.      4. Call to report any worsening of symptoms or problems with the medication. Pt instructed to go to ER with thoughts of harming self, others     5. Patient given contact # for psychotherapists at [facility] and also instructed she may check with insurance for list of providers.      6. Labs: no new orders       Return to clinic: 8 weeks - self  Psychotherapy:   Target symptoms: inattention/distractibility, anxiety    Why chosen therapy is appropriate versus another modality: relevant to diagnosis, patient responds to this modality  Outcome monitoring methods: self-report, observation, feedback from family   Therapeutic intervention type: supportive psychotherapy  Topics discussed/themes: building skills sets for symptom management, symptom recognition, nutrition,  exercise  The patient's response to the intervention is accepting. The patient's progress toward treatment goals is positive progress.  Duration of intervention: 20 minutes      -Spent 30min face to face with the pt; >50% time spent in counseling   -Supportive therapy and psychoeducation provided  -R/B/SE's of medications discussed with the pt who expresses understanding and chooses to take medications as prescribed.   -Pt instructed to call clinic, 911 or go to nearest emergency room if sxs worsen or pt is in   crisis. The pt expresses understanding.    Montana Aguilar, NP

## 2023-05-04 ENCOUNTER — PATIENT MESSAGE (OUTPATIENT)
Dept: PSYCHIATRY | Facility: CLINIC | Age: 58
End: 2023-05-04
Payer: MEDICAID

## 2023-05-08 ENCOUNTER — TELEPHONE (OUTPATIENT)
Dept: PODIATRY | Facility: CLINIC | Age: 58
End: 2023-05-08
Payer: MEDICAID

## 2023-05-08 NOTE — TELEPHONE ENCOUNTER
----- Message from Rambo Saenz sent at 5/8/2023  8:50 AM CDT -----  Contact: self  Type: Patient Returning Call        Who Called: Patient   Who Left Message for Patient: Nurse nora   Does the patient know what this is regarding?: yes r/s   Best Call Back Number: [phone]  Additional Information: Pl call pt back to get r/s. Thanks

## 2023-05-16 ENCOUNTER — OFFICE VISIT (OUTPATIENT)
Dept: PODIATRY | Facility: CLINIC | Age: 58
End: 2023-05-16
Payer: MEDICAID

## 2023-05-16 VITALS — HEIGHT: 66 IN | BODY MASS INDEX: 41.31 KG/M2 | WEIGHT: 257.06 LBS

## 2023-05-16 DIAGNOSIS — M76.61 RIGHT ACHILLES TENDINITIS: ICD-10-CM

## 2023-05-16 DIAGNOSIS — M89.8X7 RETROCALCANEAL EXOSTOSIS: Primary | ICD-10-CM

## 2023-05-16 PROCEDURE — 1159F MED LIST DOCD IN RCRD: CPT | Mod: CPTII,,, | Performed by: PODIATRIST

## 2023-05-16 PROCEDURE — 3008F PR BODY MASS INDEX (BMI) DOCUMENTED: ICD-10-PCS | Mod: CPTII,,, | Performed by: PODIATRIST

## 2023-05-16 PROCEDURE — 3044F PR MOST RECENT HEMOGLOBIN A1C LEVEL <7.0%: ICD-10-PCS | Mod: CPTII,,, | Performed by: PODIATRIST

## 2023-05-16 PROCEDURE — 3044F HG A1C LEVEL LT 7.0%: CPT | Mod: CPTII,,, | Performed by: PODIATRIST

## 2023-05-16 PROCEDURE — 1160F RVW MEDS BY RX/DR IN RCRD: CPT | Mod: CPTII,,, | Performed by: PODIATRIST

## 2023-05-16 PROCEDURE — 99213 PR OFFICE/OUTPT VISIT, EST, LEVL III, 20-29 MIN: ICD-10-PCS | Mod: S$PBB,,, | Performed by: PODIATRIST

## 2023-05-16 PROCEDURE — 99999 PR PBB SHADOW E&M-EST. PATIENT-LVL III: ICD-10-PCS | Mod: PBBFAC,,, | Performed by: PODIATRIST

## 2023-05-16 PROCEDURE — 1159F PR MEDICATION LIST DOCUMENTED IN MEDICAL RECORD: ICD-10-PCS | Mod: CPTII,,, | Performed by: PODIATRIST

## 2023-05-16 PROCEDURE — 99213 OFFICE O/P EST LOW 20 MIN: CPT | Mod: PBBFAC,PN | Performed by: PODIATRIST

## 2023-05-16 PROCEDURE — 99213 OFFICE O/P EST LOW 20 MIN: CPT | Mod: S$PBB,,, | Performed by: PODIATRIST

## 2023-05-16 PROCEDURE — 1160F PR REVIEW ALL MEDS BY PRESCRIBER/CLIN PHARMACIST DOCUMENTED: ICD-10-PCS | Mod: CPTII,,, | Performed by: PODIATRIST

## 2023-05-16 PROCEDURE — 3008F BODY MASS INDEX DOCD: CPT | Mod: CPTII,,, | Performed by: PODIATRIST

## 2023-05-16 PROCEDURE — 99999 PR PBB SHADOW E&M-EST. PATIENT-LVL III: CPT | Mod: PBBFAC,,, | Performed by: PODIATRIST

## 2023-05-16 NOTE — PROGRESS NOTES
Subjective:      Patient ID: Cristina Tan is a 58 y.o. female.    Chief Complaint: Follow-up      Cristina is a 58 y.o. female who presents to the podiatry clinic  with complaint of  right heel pain, worse first thing in the morning or after rest, sharp throbbing pain at times. No prior treatments.Pain rated 5/10    3/27/23: Patient returns for follow up right plantar fasciitis she relates it is worse when walking down a ramp. Injection helped for about a month tries wearing a soft heel inserts.   +  5/16/23: patient returns for right plantar fasciitis that is better with PT, there is new pain in the back of the heel mainly with walking exercises and in closed shoes    Review of Systems   Constitutional: Negative for chills and fever.   Cardiovascular:  Negative for claudication and leg swelling.   Respiratory:  Negative for shortness of breath.    Skin:  Negative for itching, nail changes and rash.   Musculoskeletal:  Negative for muscle cramps, muscle weakness and myalgias.   Gastrointestinal:  Negative for nausea and vomiting.   Neurological:  Negative for focal weakness, loss of balance, numbness and paresthesias.         Objective:      Physical Exam  Constitutional:       General: She is not in acute distress.     Appearance: She is well-developed. She is not diaphoretic.   Cardiovascular:      Pulses:           Dorsalis pedis pulses are 2+ on the right side and 2+ on the left side.        Posterior tibial pulses are 2+ on the right side and 2+ on the left side.      Comments: < 3 sec capillary refill time to toes 1-5 bilateral. Toes and feet are warm to touch proximally with normal distal cooling b/l. There is some hair growth on the feet and toes b/l. There is no edema b/l. No spider veins or varicosities present b/l.     Musculoskeletal:      Comments: Equinus noted b/l ankles with < 10 deg DF noted. MMT 5/5 in DF/PF/Inv/Ev resistance with no reproduction of pain in any direction. Passive range of motion  of ankle and pedal joints is painless b/l.    No longer has Pain on palpation plantar medial and central heel right foot. No pain with ROM or MMT. No pain with medial and lateral compression of heel.    There is a palpable exostosis with pain to palpation at the posterior heel at the achilles insertion     Skin:     General: Skin is warm and dry.      Coloration: Skin is not pale.      Findings: No abrasion, bruising, burn, ecchymosis, erythema, laceration, lesion, petechiae or rash.      Nails: There is no clubbing.      Comments: Skin temperature, texture and turgor within normal limits.   Neurological:      Mental Status: She is alert and oriented to person, place, and time.      Sensory: No sensory deficit.      Motor: No tremor, atrophy or abnormal muscle tone.      Comments: Negative tinel sign bilateral.   Psychiatric:         Behavior: Behavior normal.             Assessment:       Encounter Diagnoses   Name Primary?    Retrocalcaneal exostosis Yes    Right Achilles tendinitis            Plan:       Cristina was seen today for follow-up.    Diagnoses and all orders for this visit:    Retrocalcaneal exostosis    Right Achilles tendinitis        I counseled the patient on her conditions, their implications and medical management.    Patient will try to wear ope back shoes or well padded.    Avoid barefoot or flats    Patient will stretch the tendo achilles complex three times daily as demonstrated in the office.  Literature was dispensed illustrating proper stretching technique.    Discussed going into a boot or even surgery if the pain persists    Return DELICIA Waller DPM

## 2023-06-12 RX ORDER — ARIPIPRAZOLE 5 MG/1
TABLET ORAL
Qty: 30 TABLET | Refills: 2 | Status: SHIPPED | OUTPATIENT
Start: 2023-06-12 | End: 2023-09-18

## 2023-06-15 ENCOUNTER — PATIENT MESSAGE (OUTPATIENT)
Dept: PSYCHIATRY | Facility: CLINIC | Age: 58
End: 2023-06-15
Payer: MEDICAID

## 2023-07-06 ENCOUNTER — LAB VISIT (OUTPATIENT)
Dept: LAB | Facility: HOSPITAL | Age: 58
End: 2023-07-06
Payer: MEDICAID

## 2023-07-06 DIAGNOSIS — E78.49 OTHER HYPERLIPIDEMIA: ICD-10-CM

## 2023-07-06 LAB
CHOLEST SERPL-MCNC: 171 MG/DL (ref 120–199)
CHOLEST/HDLC SERPL: 2.8 {RATIO} (ref 2–5)
HDLC SERPL-MCNC: 61 MG/DL (ref 40–75)
HDLC SERPL: 35.7 % (ref 20–50)
LDLC SERPL CALC-MCNC: 82.6 MG/DL (ref 63–159)
NONHDLC SERPL-MCNC: 110 MG/DL
TRIGL SERPL-MCNC: 137 MG/DL (ref 30–150)

## 2023-07-06 PROCEDURE — 80061 LIPID PANEL: CPT | Performed by: INTERNAL MEDICINE

## 2023-07-06 PROCEDURE — 36415 COLL VENOUS BLD VENIPUNCTURE: CPT | Performed by: INTERNAL MEDICINE

## 2023-07-19 ENCOUNTER — PATIENT MESSAGE (OUTPATIENT)
Dept: PSYCHIATRY | Facility: CLINIC | Age: 58
End: 2023-07-19
Payer: MEDICAID

## 2023-07-19 ENCOUNTER — TELEPHONE (OUTPATIENT)
Dept: PSYCHIATRY | Facility: CLINIC | Age: 58
End: 2023-07-19
Payer: MEDICAID

## 2023-07-19 NOTE — TELEPHONE ENCOUNTER
GUILHERME in attempt to confirm pt virtual appointment for 7/21/23 at 2 pm with Isaías Aguilar. LM for pt to call clinic back whenever she gets a chance.

## 2023-07-21 ENCOUNTER — OFFICE VISIT (OUTPATIENT)
Dept: PSYCHIATRY | Facility: CLINIC | Age: 58
End: 2023-07-21
Payer: MEDICAID

## 2023-07-21 DIAGNOSIS — F31.9 BIPOLAR 1 DISORDER: ICD-10-CM

## 2023-07-21 DIAGNOSIS — F41.9 ANXIETY DISORDER, UNSPECIFIED TYPE: Primary | ICD-10-CM

## 2023-07-21 PROCEDURE — 1159F PR MEDICATION LIST DOCUMENTED IN MEDICAL RECORD: ICD-10-PCS | Mod: SA,HB,CPTII,95 | Performed by: NURSE PRACTITIONER

## 2023-07-21 PROCEDURE — 99214 OFFICE O/P EST MOD 30 MIN: CPT | Mod: SA,HB,95, | Performed by: NURSE PRACTITIONER

## 2023-07-21 PROCEDURE — 99214 PR OFFICE/OUTPT VISIT, EST, LEVL IV, 30-39 MIN: ICD-10-PCS | Mod: SA,HB,95, | Performed by: NURSE PRACTITIONER

## 2023-07-21 PROCEDURE — 1159F MED LIST DOCD IN RCRD: CPT | Mod: SA,HB,CPTII,95 | Performed by: NURSE PRACTITIONER

## 2023-07-21 PROCEDURE — 1160F PR REVIEW ALL MEDS BY PRESCRIBER/CLIN PHARMACIST DOCUMENTED: ICD-10-PCS | Mod: SA,HB,CPTII,95 | Performed by: NURSE PRACTITIONER

## 2023-07-21 PROCEDURE — 3044F HG A1C LEVEL LT 7.0%: CPT | Mod: SA,HB,CPTII,95 | Performed by: NURSE PRACTITIONER

## 2023-07-21 PROCEDURE — 1160F RVW MEDS BY RX/DR IN RCRD: CPT | Mod: SA,HB,CPTII,95 | Performed by: NURSE PRACTITIONER

## 2023-07-21 PROCEDURE — 3044F PR MOST RECENT HEMOGLOBIN A1C LEVEL <7.0%: ICD-10-PCS | Mod: SA,HB,CPTII,95 | Performed by: NURSE PRACTITIONER

## 2023-07-21 NOTE — PROGRESS NOTES
Outpatient Psychiatry Follow-Up Visit    Clinical Status of Patient: Outpatient (Virtual)  07/21/2023     [id number] [place] [id number]  [phone]   Visit type: Virtual visit with synchronous audio and video  Each patient to whom he or she provides medical services by telemedicine is:  (1) informed of the relationship between the physician and patient and the respective role of any other health care provider with respect to management of the patient; and (2) notified that he or she may decline to receive medical services by telemedicine and may withdraw from such care at any time.      Chief Complaint: Pt is a 56 year old female who presents today for a follow-up. Met with patient.       Interval History and Content of Current Session:  Interim Events/Subjective Report/Content of Current Session:  follow up appointment.    Pt is a 58 year old female with past psychiatric hx of Bipolar 1 disorder, anxiety NOS who presents for follow up treatment. She is currently taking Lithium 300mg TID, Xanax 0.5 mg QHS PRN for sleep (uses 2-3 x weekly), Abilify 5mg QD, hydroxyzine 50mg qd prn. Meds are tolerated well overall and provide good symptomatic relief.    Today, pt reports a stable mood. She has been without gamal or hypomania since last visit. She denies depressive episodes in recent history and is not overtly depressed today. Anxiety well managed, denies generalized anxiety. Sleeping well, normal appetite.        Past Psychiatric hx: Pt. is a 55 year old female  with a past psychiatric hx of Bipolar 1 disorder, depressed who established care with me 07/19 following the intermediate of her previous psych, Dr. Pelaez. She came to me taking Lithium 600mg BID and Xanax 0.5 mg QHS PRN for sleep (only uses this sparingly) , which had been previously prescribed and managed by Dr. Pelaez. She reports that her symptoms have stabilized over the last 7-8 months, and has been without a manic episode since November of  "2018. Previous med trials of Lamictal (rash), Risperdal, Wellbutrin, some SSRIs (destablized mood, triggered manic episodes). Pt stable in clinic upon initial eval - all meds were continued at same dosage.      Notes a long standing history of "mood swings" dating to childhood, but first formal psych encounter in 1987 following the birth of her child. While admitted to the hospital following birth, and after being discharged, pt speaks to experiencing A/V hallucinations and euphoric gamal following a two-week period of going without sleep. "I was real paranoid after I had the baby. I felt that if I went to sleep, I wouldn't be able to hear the baby cry. I ended up going a couple weeks where I didn't sleep at all, started hearing things and seeing things that weren't there. I felt like people were trying to contact me and tell me things through the computer or the TV. I was paranoid and was thinking the vampires were after us. I started thinking I was someone I wasn't." She eventually sought psychiatric care in an outpatient seeing, but was not started on any psychiatric medication until 2002 (?). She was prescribed SSRIs and Wellbutrin at this time, which triggered a manic episode, and were d/c'd. She was eventually started on Lithium around 2005, and has achieved good results. States she has felt more stable than she's ever felt before.      Upon initial eval, pt reports manic episodes "once or twice a year" that are triggered by a lack of sleep. States that her manic episodes last for "weeks" and "I get hyper, my metabolism speeds up. I feel like I'm sped up. I always try to buy a car or a house. I had two houses at one time. It always ends with them putting me in the hospital." Does continue to experience delusional thinking and auditory hallucinations exclusively during manic episodes. Hx of several ED admissions in 03/19 r/t delusions, and "walking down the street talking to people who weren't there" Per - ED " "note. Reports inpatient hospitalization following manic episodes to [facility] x 1 week in November of 2018. Also reports another admission to [facility] in March of 2018 for 3 days following a manic episode. During these episodes, "I get very happy during those episodes. I start thinking I'm [place] and can communicate through the TV".      Denies any depressive symptoms. Reports sleeping 7-9 hours of sleep each night, restful. No issues falling or staying asleep. Denies anhedonia. Denies guilt/worthlessness. Energy good, concentration good. Appetite good. Denies psychomotor slowing, denies SI.      On 10/16, pt daughter reported "She seems a little bit better during the day, but is still pretty manic and only sleeping 2-3 hours at night, despite trying to up-titrate the medication.  She had to have a meeting with her supervisors at work yesterday, but they have fortunately been very understanding about her bipolar" Episodes do not meet criteria for true gamal/or hypomania, but we have been up-titrating Seroquel (started 10/19) to address symptoms - pt has responded well but reports dry mouth.I advised pt to add dose of Seroquel 50mg Q AM. However, she states pt states this made her groggy so d/c this. Despite this, pt reports at f/u "I've been feeling so much better. I'm actually sleeping now and definitely haven't been feeling manic at all. I feel way more calm now." However,  Notes overall less mood lability, less agitation. Denies manic/hypomanic episodes since last visit. She went to [place] on vacation between visit - enjoyed this. Pt doing well with no decompensations since last visit.  Does note continued generalized worry. Feels restless, on edge.       During her 5/20 f/u visit pt reports "I've been getting into bed at night, and sometimes it feels like someone else is getting in bed with me. It got to the point where I couldn't sleep in my bed because I was kind of scared. I thought it was my cat but " "my cat wasn't in the room. Sometime it even touches me, like it's brushing on either my foot or back. Like someone is in here trying to wake me up, but no one is in there. I usually just go and sleep on the sofa." Denies A/V hallucinations.     Seroquel was increased following this visit, Today, pt reports "I don't have those feelings in my bed anymore. I'm not afraid to get in bed anymore. Thanks goodness. I've been sleeping better." Does reports "feeling extremely tired when I take it in the morning." and requests to move to PM dosing. Tolerates well otherwise. Denies any gamal or hypomania between sessions.      Past Medical hx:   Past Medical History:   Diagnosis Date    Bipolar 1 disorder     Cancer     skin cancer to right arm    GERD (gastroesophageal reflux disease)     History of psychiatric hospitalization     Mental disorder     Thyroid disease         Interim hx:  Medication changes last visit: Inc seroquel to 50mg PO QAM, 100mg QHS  Anxiety: inc'd  Depression: denies     Denies suicidal/homicidal ideations.  Denies hopelessness/worthlessness.    Denies auditory/visual hallucinations       Tobacco: never used  Alcohol: denies   Drug use: denies  Caffeine: 1-2 cups coffee daily      Review of Systems   PSYCHIATRIC: Pertinent items are noted in the narrative.        CONSTITUTIONAL: weight stable        M/S: no pain today         ENT: no allergies noted today        ABD: no n/v/d     Past Medical, Family and Social History: The patient's past medical, family and social history have been reviewed and updated as appropriate within the electronic medical record. See encounter notes.     Medication: Lithium 300mg TID, and Xanax 0.5 mg QHS PRN for sleep, Sseroquel 200mg QHS     Compliance: yes      Side effects: dry mouth from Seroquel     Risk Parameters:  Patient reports no suicidal ideation  Patient reports no homicidal ideation  Patient reports no self-injurious behavior  Patient reports no violent " behavior     Exam (detailed: at least 9 elements; comprehensive: all 15 elements)   Constitutional  Vitals:  Most recent vital signs, dated less than 90 days prior to this appointment, were reviewed. There were no vitals taken for this visit.     General:  unremarkable, age appropriate, casual attire, good eye contact, good rapport       Musculoskeletal  Muscle Strength/Tone:  no flaccidity, no tremor    Gait & Station:  normal      Psychiatric                       Speech:  normal tone, normal rate, rhythm, and volume   Mood & Affect:   Euthymic, congruent, appropriate         Thought Process:   Goal directed; Linear    Associations:   intact   Thought Content:   No SI/HI, + fixed delusions, or paranoia, no AV/VH   Insight & Judgement:   Good, adequate to circumstances   Orientation:   grossly intact; alert and oriented x 4    Memory:  intact for content of interview    Language:  grossly intact, can repeat    Attention Span  : Grossly intact for content of interview   Fund of Knowledge:   intact and appropriate to age and level of education        Assessment and Diagnosis   Status/Progress: Based on the examination today, the patient's problem(s) is/are under fair control.  New problems have not been presented today. Comorbidities are not currently complicating management of the primary condition.      Impression:         Pt is a 57 year old female with past psychiatric hx of Bipolar 1 disorder, anxiety NOS who presents for follow up treatment. She is currently taking Lithium 300mg TID, Xanax 0.5 mg QHS PRN for sleep (uses 2-3 x weekly), Abilify 10mg QD, hydroxyzine 50mg qd prn. Meds are tolerated well.    Since last session, pt reports a good, stable mood. She denies any manic or hypomanic episodes and denies any overt symptoms of depression. She displays good reality testing and there is no evidence of a formal thought or speech disorder.      She does note that she has recently developed an involuntary shaking  of her R foot, and restless legs. This issues do seem have started around the time she was started on Abilify while hospitalized. Pt has never registered a significant AIMS score during office visits with me. OK to dec Abilify to see if symptoms subside. Will try cogentin to manage if pt's mood destabilizes. She is stable, high functioning in session today.   Diagnosis: Bipolar 1 disorder, partial remission, anxiety unspecified    Intervention/Counseling/Treatment Plan   Medication Management:      1) Cont Lithium 300mg TID for mood. Pt instructed to monitor closely for mood destabilization as we have been gradually tapering due to inc'd TSH.    2) Cont Abilify 10mg QD. Typical ELFEGO's reviewed including weight gain, abnormal movements, EPS, TD, metabolic side effects.     4) Continue Xanax 0.5 mg QHS PRN for sleep (uses once weekly. Discussed risk of decreased RT, sedation, addictive potential, and not to mix with alcohol.      4. Call to report any worsening of symptoms or problems with the medication. Pt instructed to go to ER with thoughts of harming self, others     5. Patient given contact # for psychotherapists at [facility] and also instructed she may check with insurance for list of providers.      6. Labs: no new orders       Return to clinic: 8 weeks - self      -Spent 30min face to face with the pt; >50% time spent in counseling   -Supportive therapy and psychoeducation provided  -R/B/SE's of medications discussed with the pt who expresses understanding and chooses to take medications as prescribed.   -Pt instructed to call clinic, 911 or go to nearest emergency room if sxs worsen or pt is in   crisis. The pt expresses understanding.    [place] Aguilar, NP

## 2023-08-01 RX ORDER — LITHIUM CARBONATE 300 MG/1
TABLET, FILM COATED, EXTENDED RELEASE ORAL
Qty: 90 TABLET | Refills: 5 | Status: SHIPPED | OUTPATIENT
Start: 2023-08-01 | End: 2024-02-26

## 2023-08-01 RX ORDER — HYDROXYZINE PAMOATE 50 MG/1
CAPSULE ORAL
Qty: 30 CAPSULE | Refills: 3 | Status: SHIPPED | OUTPATIENT
Start: 2023-08-01 | End: 2023-12-01

## 2023-09-18 RX ORDER — ARIPIPRAZOLE 5 MG/1
5 TABLET ORAL
Qty: 30 TABLET | Refills: 2 | Status: SHIPPED | OUTPATIENT
Start: 2023-09-18 | End: 2024-01-25

## 2023-09-25 ENCOUNTER — TELEPHONE (OUTPATIENT)
Dept: PSYCHIATRY | Facility: CLINIC | Age: 58
End: 2023-09-25
Payer: MEDICAID

## 2023-09-25 NOTE — TELEPHONE ENCOUNTER
Notified patient of Isaías' recommendations. Patient verbalized understanding and agreed. Patient says her daughter is a doctor and was against Prednisone as well. Patient says she will not take it. Nurse advised that patient reach out to PCP for an alternative medication to treat her symptoms.

## 2023-09-25 NOTE — TELEPHONE ENCOUNTER
Patient LVM that she wanted provider to be aware that she has a sore throat and sinus infection. General practitioner prescribed prednisone for her and said she should have no side effects. Patient wanted mental health provider to be aware of medication because it has caused gamal in the past. Patient would like advice.

## 2023-10-12 ENCOUNTER — HOSPITAL ENCOUNTER (OUTPATIENT)
Dept: RADIOLOGY | Facility: HOSPITAL | Age: 58
Discharge: HOME | End: 2023-10-12
Attending: SPECIALIST
Payer: MEDICAID

## 2023-10-12 DIAGNOSIS — Z12.31 VISIT FOR SCREENING MAMMOGRAM: Primary | ICD-10-CM

## 2023-10-12 DIAGNOSIS — Z12.31 VISIT FOR SCREENING MAMMOGRAM: ICD-10-CM

## 2023-10-12 PROCEDURE — 77067 MAMMO DIGITAL SCREENING BILAT WITH TOMO: ICD-10-PCS | Mod: 26,,, | Performed by: RADIOLOGY

## 2023-10-12 PROCEDURE — 77063 MAMMO DIGITAL SCREENING BILAT WITH TOMO: ICD-10-PCS | Mod: 26,,, | Performed by: RADIOLOGY

## 2023-10-12 PROCEDURE — 77063 BREAST TOMOSYNTHESIS BI: CPT | Mod: 26,,, | Performed by: RADIOLOGY

## 2023-10-12 PROCEDURE — 77067 SCR MAMMO BI INCL CAD: CPT | Mod: 26,,, | Performed by: RADIOLOGY

## 2023-10-12 PROCEDURE — 77067 SCR MAMMO BI INCL CAD: CPT | Mod: TC,PN

## 2023-10-19 ENCOUNTER — OFFICE VISIT (OUTPATIENT)
Dept: OBSTETRICS AND GYNECOLOGY | Facility: CLINIC | Age: 58
End: 2023-10-19
Payer: MEDICAID

## 2023-10-19 VITALS
SYSTOLIC BLOOD PRESSURE: 118 MMHG | WEIGHT: 241.88 LBS | BODY MASS INDEX: 39.03 KG/M2 | DIASTOLIC BLOOD PRESSURE: 78 MMHG

## 2023-10-19 DIAGNOSIS — Z12.72 VAGINAL PAP SMEAR: ICD-10-CM

## 2023-10-19 DIAGNOSIS — Z01.419 WELL WOMAN EXAM: Primary | ICD-10-CM

## 2023-10-19 PROCEDURE — 99999 PR PBB SHADOW E&M-EST. PATIENT-LVL III: CPT | Mod: PBBFAC,,, | Performed by: SPECIALIST

## 2023-10-19 PROCEDURE — 99396 PREV VISIT EST AGE 40-64: CPT | Mod: S$PBB,,, | Performed by: SPECIALIST

## 2023-10-19 PROCEDURE — 3074F SYST BP LT 130 MM HG: CPT | Mod: CPTII,,, | Performed by: SPECIALIST

## 2023-10-19 PROCEDURE — 88175 CYTOPATH C/V AUTO FLUID REDO: CPT | Performed by: PATHOLOGY

## 2023-10-19 PROCEDURE — 3008F BODY MASS INDEX DOCD: CPT | Mod: CPTII,,, | Performed by: SPECIALIST

## 2023-10-19 PROCEDURE — 99999 PR PBB SHADOW E&M-EST. PATIENT-LVL III: ICD-10-PCS | Mod: PBBFAC,,, | Performed by: SPECIALIST

## 2023-10-19 PROCEDURE — 88141 PR  CYTOPATH CERV/VAG INTERPRET: ICD-10-PCS | Mod: ,,, | Performed by: PATHOLOGY

## 2023-10-19 PROCEDURE — 99396 PR PREVENTIVE VISIT,EST,40-64: ICD-10-PCS | Mod: S$PBB,,, | Performed by: SPECIALIST

## 2023-10-19 PROCEDURE — 3074F PR MOST RECENT SYSTOLIC BLOOD PRESSURE < 130 MM HG: ICD-10-PCS | Mod: CPTII,,, | Performed by: SPECIALIST

## 2023-10-19 PROCEDURE — 3008F PR BODY MASS INDEX (BMI) DOCUMENTED: ICD-10-PCS | Mod: CPTII,,, | Performed by: SPECIALIST

## 2023-10-19 PROCEDURE — 1159F MED LIST DOCD IN RCRD: CPT | Mod: CPTII,,, | Performed by: SPECIALIST

## 2023-10-19 PROCEDURE — 99213 OFFICE O/P EST LOW 20 MIN: CPT | Mod: PBBFAC,PN | Performed by: SPECIALIST

## 2023-10-19 PROCEDURE — 3078F DIAST BP <80 MM HG: CPT | Mod: CPTII,,, | Performed by: SPECIALIST

## 2023-10-19 PROCEDURE — 3044F HG A1C LEVEL LT 7.0%: CPT | Mod: CPTII,,, | Performed by: SPECIALIST

## 2023-10-19 PROCEDURE — 88141 CYTOPATH C/V INTERPRET: CPT | Mod: ,,, | Performed by: PATHOLOGY

## 2023-10-19 PROCEDURE — 1159F PR MEDICATION LIST DOCUMENTED IN MEDICAL RECORD: ICD-10-PCS | Mod: CPTII,,, | Performed by: SPECIALIST

## 2023-10-19 PROCEDURE — 3044F PR MOST RECENT HEMOGLOBIN A1C LEVEL <7.0%: ICD-10-PCS | Mod: CPTII,,, | Performed by: SPECIALIST

## 2023-10-19 PROCEDURE — 3078F PR MOST RECENT DIASTOLIC BLOOD PRESSURE < 80 MM HG: ICD-10-PCS | Mod: CPTII,,, | Performed by: SPECIALIST

## 2023-10-19 NOTE — PROGRESS NOTES
57 yo WF presents for annual gyn eval Screening mammogram completed and WNL  Past Medical History:   Diagnosis Date    Bipolar 1 disorder     Cancer     skin cancer to right arm    GERD (gastroesophageal reflux disease)     History of psychiatric hospitalization     Mental disorder     Thyroid disease        Past Surgical History:   Procedure Laterality Date    breast reduction      BREAST SURGERY  Reduction    CARPAL TUNNEL RELEASE Left 2021    Procedure: RELEASE, CARPAL TUNNEL;  Surgeon: Ld Carbone MD;  Location: [facility] OR;  Service: Orthopedics;  Laterality: Left;  Procedure:  Left carpal tunnel release    Position:  Supine    Anesthesia:  Local    Equipment:  Carpal tunnel set    CARPAL TUNNEL RELEASE Right 2022    Procedure: Right carpal tunnel release;  Surgeon: Ld Carbone MD;  Location: [facility] OR;  Service: Orthopedics;  Laterality: Right;     SECTION      CHOLECYSTECTOMY      COLONOSCOPY N/A 2022    Procedure: COLONOSCOPY;  Surgeon: Rodrick Mccabe MD;  Location: [facility] ENDO (4TH FLR);  Service: Endoscopy;  Laterality: N/A;  Fully vaccinated, prep instr emailed -ml    COSMETIC SURGERY      gastric sleeve      HYSTERECTOMY      OOPHORECTOMY      TONSILLECTOMY      TOTAL REDUCTION MAMMOPLASTY Bilateral         TUBAL LIGATION  97    tummy tuck         Family History   Problem Relation Age of Onset    Cancer Father     Alcohol abuse Father     Colon polyps Sister     Asthma Mother     Cancer Mother     COPD Mother     Breast cancer Neg Hx     Ovarian cancer Neg Hx     Diabetes Neg Hx     Hypertension Neg Hx     Thyroid disease Neg Hx        Social History     Socioeconomic History    Marital status:    Tobacco Use    Smoking status: Never    Smokeless tobacco: Never   Substance and Sexual Activity    Alcohol use: Yes     Alcohol/week: 2.0 standard drinks of alcohol     Types: 2 Drinks containing 0.5 oz of alcohol per week    Drug use: No    Sexual  activity: Not Currently     Partners: Male     Birth control/protection: None     Social Determinants of Health     Financial Resource Strain: Low Risk  (2/8/2023)    Overall Financial Resource Strain (CARDIA)     Difficulty of Paying Living Expenses: Not hard at all   Food Insecurity: No Food Insecurity (2/8/2023)    Hunger Vital Sign     Worried About Running Out of Food in the Last Year: Never true     Ran Out of Food in the Last Year: Never true   Transportation Needs: No Transportation Needs (2/8/2023)    PRAPARE - Transportation     Lack of Transportation (Medical): No     Lack of Transportation (Non-Medical): No   Physical Activity: Insufficiently Active (2/8/2023)    Exercise Vital Sign     Days of Exercise per Week: 3 days     Minutes of Exercise per Session: 40 min   Stress: No Stress Concern Present (2/8/2023)    [employer] - Occupational Stress Questionnaire     Feeling of Stress : Only a little   Social Connections: Unknown (2/8/2023)    Social Connection and Isolation Panel [NHANES]     Frequency of Communication with Friends and Family: More than three times a week     Frequency of Social Gatherings with Friends and Family: Twice a week     Active Member of Clubs or Organizations: No     Attends Club or Organization Meetings: Never     Marital Status: Living with partner   Housing Stability: Low Risk  (2/8/2023)    Housing Stability Vital Sign     Unable to Pay for Housing in the Last Year: No     Number of Places Lived in the Last Year: 2     Unstable Housing in the Last Year: No       Current Outpatient Medications   Medication Sig Dispense Refill    ALPRAZolam (XANAX) 0.5 MG tablet Take 1 tablet (0.5 mg total) by mouth daily as needed for Anxiety. 30 tablet 0    ARIPiprazole (ABILIFY) 5 MG Tab TAKE 1 TABLET BY MOUTH EVERY DAY 30 tablet 2    atorvastatin (LIPITOR) 20 MG tablet Take 1 tablet (20 mg total) by mouth every evening. 90 tablet 3    hydrOXYzine pamoate  (VISTARIL) 50 MG Cap TAKE 1 CAPSULE(50 MG) BY MOUTH DAILY AS NEEDED FOR ANXIETY 30 capsule 3    levothyroxine (SYNTHROID) 50 MCG tablet Take 1 tablet (50 mcg total) by mouth before breakfast. 90 tablet 3    lithium (LITHOBID) 300 MG CR tablet TAKE 1 TABLET(300 MG) BY MOUTH THREE TIMES DAILY 90 tablet 5     No current facility-administered medications for this visit.       Review of patient's allergies indicates:   Allergen Reactions    Prednisone Other (See Comments)     MEDICATION CAUSES PATIENT TO HAVE MANIC EPISODES    Lamictal [lamotrigine] Rash       Review of System:   General: no chills, fever, night sweats, weight gain or weight loss  Psychological: no depression or suicidal ideation  Breasts: no new or changing breast lumps, nipple discharge or masses.  Respiratory: no cough, shortness of breath, or wheezing  Cardiovascular: no chest pain or dyspnea on exertion  Gastrointestinal: no abdominal pain, change in bowel habits, or black or bloody stools  Genito-Urinary: no incontinence, urinary frequency/urgency or vulvar/vaginal symptoms, pelvic pain or abnormal vaginal bleeding.  Musculoskeletal: no gait disturbance or muscular weakness                                               General Appearance    A and O x 4, Cooperative, no distress   Breasts    Abdomen   Symmetrical, no masses, no discharge, skin changes , erythema or retraction. No adenopathy  Soft, non-tender, bowel sounds active all four quadrants,  no masses, no organomegaly    Genitourinary:   External rectal exam shows no thrombosed external hemorrhoids.   Pelvic exam was performed with patient supine.  No labial fusion.  There is no rash, lesion or injury on the right labia.  There is no rash, lesion or injury on the left labia.  No bleeding and no signs of injury around the vaginal introitus, urethra is without lesions and well supported. The cervix is visualized with no discharge, lesions or friability.  No vaginal discharge found.  No  significant Cystocele, Enterocele or rectocele, and uterus well supported.  Bimanual exam:  The urethra is normal to palpation and there are no palpable vaginal wall masses.  Uterus is not deviated, not enlarged, not fixed, normal shape and not tender.  Cervix exhibits no motion tenderness.   Right adnexum displays no mass and no tenderness.  Left adnexum displays no mass and no tenderness.   Extremities: Extremities normal, atraumatic, no cyanosis or edema                     NOTE  NURSING PERSONAL PRESENT FOR ENTIRE PHYSICAL EXAM     Plan  BSE monthly  Repeat mammop screening yearly  RTO 2 years/prn    I spent a total of 30 minutes on the day of the visit. This includes face to face time and non-face to face time preparing to see the patient (eg, review of tests), obtaining and/or reviewing separately obtained history, documenting clinical information in the electronic or other health record, independently interpreting results and communicating results to the patient/family/caregiver, or care coordinator.

## 2023-10-20 ENCOUNTER — TELEPHONE (OUTPATIENT)
Dept: PSYCHIATRY | Facility: CLINIC | Age: 58
End: 2023-10-20
Payer: MEDICAID

## 2023-10-20 ENCOUNTER — OFFICE VISIT (OUTPATIENT)
Dept: PSYCHIATRY | Facility: CLINIC | Age: 58
End: 2023-10-20
Payer: MEDICAID

## 2023-10-20 DIAGNOSIS — F31.9 BIPOLAR 1 DISORDER: ICD-10-CM

## 2023-10-20 DIAGNOSIS — Z79.899 HIGH RISK MEDICATION USE: Primary | ICD-10-CM

## 2023-10-20 DIAGNOSIS — F41.9 ANXIETY DISORDER, UNSPECIFIED TYPE: ICD-10-CM

## 2023-10-20 PROCEDURE — 1160F RVW MEDS BY RX/DR IN RCRD: CPT | Mod: CPTII,95,, | Performed by: NURSE PRACTITIONER

## 2023-10-20 PROCEDURE — 1159F MED LIST DOCD IN RCRD: CPT | Mod: CPTII,95,, | Performed by: NURSE PRACTITIONER

## 2023-10-20 PROCEDURE — 3044F HG A1C LEVEL LT 7.0%: CPT | Mod: CPTII,95,, | Performed by: NURSE PRACTITIONER

## 2023-10-20 PROCEDURE — 1160F PR REVIEW ALL MEDS BY PRESCRIBER/CLIN PHARMACIST DOCUMENTED: ICD-10-PCS | Mod: CPTII,95,, | Performed by: NURSE PRACTITIONER

## 2023-10-20 PROCEDURE — 99214 PR OFFICE/OUTPT VISIT, EST, LEVL IV, 30-39 MIN: ICD-10-PCS | Mod: SA,HB,95, | Performed by: NURSE PRACTITIONER

## 2023-10-20 PROCEDURE — 1159F PR MEDICATION LIST DOCUMENTED IN MEDICAL RECORD: ICD-10-PCS | Mod: CPTII,95,, | Performed by: NURSE PRACTITIONER

## 2023-10-20 PROCEDURE — 3044F PR MOST RECENT HEMOGLOBIN A1C LEVEL <7.0%: ICD-10-PCS | Mod: CPTII,95,, | Performed by: NURSE PRACTITIONER

## 2023-10-20 PROCEDURE — 99214 OFFICE O/P EST MOD 30 MIN: CPT | Mod: SA,HB,95, | Performed by: NURSE PRACTITIONER

## 2023-10-20 NOTE — PROGRESS NOTES
Outpatient Psychiatry Follow-Up Visit    Clinical Status of Patient: Outpatient (Virtual)  10/20/2023     [id number] [place] [address]  [phone]   Visit type: Virtual visit with synchronous audio and video  Each patient to whom he or she provides medical services by telemedicine is:  (1) informed of the relationship between the physician and patient and the respective role of any other health care provider with respect to management of the patient; and (2) notified that he or she may decline to receive medical services by telemedicine and may withdraw from such care at any time.      Chief Complaint: Pt is a 56 year old female who presents today for a follow-up. Met with patient.       Interval History and Content of Current Session:  Interim Events/Subjective Report/Content of Current Session:  follow up appointment.    Pt is a 58 year old female with past psychiatric hx of Bipolar 1 disorder, anxiety NOS who presents for follow up treatment. She is currently taking Lithium 300mg TID, Xanax 0.5 mg QHS PRN for sleep (uses 2-3 x weekly), Abilify 5mg QD, hydroxyzine 50mg qd prn. Meds are tolerated well overall and provide good symptomatic relief.    Since her last visit, pt reports that she is stable and has been without any manic episodes since the time of her last visit. Her mood is good and she denies any bouts of depression. Her anxiety is well-managed and she denies excessive or ruminative worrying. Quality of life is good and she has been spending time with her grandkids recently. She is sleeping well with a normal appetite. No abnormal movements noted or reported. Pt is due for routine Li monitoring and TSH. Most recent metabolic panel and kidney function were reviewed and are within acceptable range. Pt stable, high functioning.     Past Psychiatric hx: Pt. is a 55 year old female  with a past psychiatric hx of Bipolar 1 disorder, depressed who established care with me 07/19 following the  "long term of her previous psych, Dr. Pelaez. She came to me taking Lithium 600mg BID and Xanax 0.5 mg QHS PRN for sleep (only uses this sparingly) , which had been previously prescribed and managed by Dr. Pelaez. She reports that her symptoms have stabilized over the last 7-8 months, and has been without a manic episode since November of 2018. Previous med trials of Lamictal (rash), Risperdal, Wellbutrin, some SSRIs (destablized mood, triggered manic episodes). Pt stable in clinic upon initial eval - all meds were continued at same dosage.      Notes a long standing history of "mood swings" dating to childhood, but first formal psych encounter in 1987 following the birth of her child. While admitted to the hospital following birth, and after being discharged, pt speaks to experiencing A/V hallucinations and euphoric gamal following a two-week period of going without sleep. "I was real paranoid after I had the baby. I felt that if I went to sleep, I wouldn't be able to hear the baby cry. I ended up going a couple weeks where I didn't sleep at all, started hearing things and seeing things that weren't there. I felt like people were trying to contact me and tell me things through the computer or the TV. I was paranoid and was thinking the vampires were after us. I started thinking I was someone I wasn't." She eventually sought psychiatric care in an outpatient seeing, but was not started on any psychiatric medication until 2002 (?). She was prescribed SSRIs and Wellbutrin at this time, which triggered a manic episode, and were d/c'd. She was eventually started on Lithium around 2005, and has achieved good results. States she has felt more stable than she's ever felt before.      Upon initial eval, pt reports manic episodes "once or twice a year" that are triggered by a lack of sleep. States that her manic episodes last for "weeks" and "I get hyper, my metabolism speeds up. I feel like I'm sped up. I always try to buy " "a car or a house. I had two houses at one time. It always ends with them putting me in the hospital." Does continue to experience delusional thinking and auditory hallucinations exclusively during manic episodes. Hx of several ED admissions in 03/19 r/t delusions, and "walking down the street talking to people who weren't there" Per - ED note. Reports inpatient hospitalization following manic episodes to [facility] x 1 week in November of 2018. Also reports another admission to [facility] in March of 2018 for 3 days following a manic episode. During these episodes, "I get very happy during those episodes. I start thinking I'm [place]t and can communicate through the TV".      Denies any depressive symptoms. Reports sleeping 7-9 hours of sleep each night, restful. No issues falling or staying asleep. Denies anhedonia. Denies guilt/worthlessness. Energy good, concentration good. Appetite good. Denies psychomotor slowing, denies SI.      On 10/16, pt daughter reported "She seems a little bit better during the day, but is still pretty manic and only sleeping 2-3 hours at night, despite trying to up-titrate the medication.  She had to have a meeting with her supervisors at work yesterday, but they have fortunately been very understanding about her bipolar" Episodes do not meet criteria for true gamal/or hypomania, but we have been up-titrating Seroquel (started 10/19) to address symptoms - pt has responded well but reports dry mouth.I advised pt to add dose of Seroquel 50mg Q AM. However, she states pt states this made her groggy so d/c this. Despite this, pt reports at f/u "I've been feeling so much better. I'm actually sleeping now and definitely haven't been feeling manic at all. I feel way more calm now." However,  Notes overall less mood lability, less agitation. Denies manic/hypomanic episodes since last visit. She went to [place] on vacation between visit - enjoyed this. Pt doing well with no decompensations " "since last visit.  Does note continued generalized worry. Feels restless, on edge.       During her 5/20 f/u visit pt reports "I've been getting into bed at night, and sometimes it feels like someone else is getting in bed with me. It got to the point where I couldn't sleep in my bed because I was kind of scared. I thought it was my cat but my cat wasn't in the room. Sometime it even touches me, like it's brushing on either my foot or back. Like someone is in here trying to wake me up, but no one is in there. I usually just go and sleep on the sofa." Denies A/V hallucinations.     Seroquel was increased following this visit, Today, pt reports "I don't have those feelings in my bed anymore. I'm not afraid to get in bed anymore. Thanks goodness. I've been sleeping better." Does reports "feeling extremely tired when I take it in the morning." and requests to move to PM dosing. Tolerates well otherwise. Denies any gamal or hypomania between sessions.      Past Medical hx:   Past Medical History:   Diagnosis Date    Bipolar 1 disorder     Cancer     skin cancer to right arm    GERD (gastroesophageal reflux disease)     History of psychiatric hospitalization     Mental disorder     Thyroid disease         Interim hx:  Medication changes last visit: Inc seroquel to 50mg PO QAM, 100mg QHS  Anxiety: inc'd  Depression: denies     Denies suicidal/homicidal ideations.  Denies hopelessness/worthlessness.    Denies auditory/visual hallucinations       Tobacco: never used  Alcohol: denies   Drug use: denies  Caffeine: 1-2 cups coffee daily      Review of Systems   PSYCHIATRIC: Pertinent items are noted in the narrative.        CONSTITUTIONAL: weight stable        M/S: no pain today         ENT: no allergies noted today        ABD: no n/v/d     Past Medical, Family and Social History: The patient's past medical, family and social history have been reviewed and updated as appropriate within the electronic medical record. See " encounter notes.     Medication: Lithium 300mg TID, and Xanax 0.5 mg QHS PRN for sleep, Sseroquel 200mg QHS     Compliance: yes      Side effects: dry mouth from Seroquel     Risk Parameters:  Patient reports no suicidal ideation  Patient reports no homicidal ideation  Patient reports no self-injurious behavior  Patient reports no violent behavior     Exam (detailed: at least 9 elements; comprehensive: all 15 elements)   Constitutional  Vitals:  Most recent vital signs, dated less than 90 days prior to this appointment, were reviewed. There were no vitals taken for this visit.     General:  unremarkable, age appropriate, casual attire, good eye contact, good rapport       Musculoskeletal  Muscle Strength/Tone:  no flaccidity, no tremor    Gait & Station:  normal      Psychiatric                       Speech:  normal tone, normal rate, rhythm, and volume   Mood & Affect:   Euthymic, congruent, appropriate         Thought Process:   Goal directed; Linear    Associations:   intact   Thought Content:   No SI/HI, + fixed delusions, or paranoia, no AV/VH   Insight & Judgement:   Good, adequate to circumstances   Orientation:   grossly intact; alert and oriented x 4    Memory:  intact for content of interview    Language:  grossly intact, can repeat    Attention Span  : Grossly intact for content of interview   Fund of Knowledge:   intact and appropriate to age and level of education        Assessment and Diagnosis   Status/Progress: Based on the examination today, the patient's problem(s) is/are under fair control.  New problems have not been presented today. Comorbidities are not currently complicating management of the primary condition.      Impression:       Pt is a 58 year old female with past psychiatric hx of Bipolar 1 disorder, anxiety NOS who presents for follow up treatment. She is currently taking Lithium 300mg TID, Xanax 0.5 mg QHS PRN for sleep (uses 2-3 x weekly), Abilify 5mg QD, hydroxyzine 50mg qd prn.  Meds are tolerated well overall and provide good symptomatic relief.    Since her last visit, pt reports that she is stable and has been without any manic episodes since the time of her last visit. Her mood is good and she denies any bouts of depression. Her anxiety is well-managed and she denies excessive or ruminative worrying. Quality of life is good and she has been spending time with her grandkids recently. She is sleeping well with a normal appetite. No abnormal movements noted or reported. Pt is due for routine Li monitoring and TSH. Most recent metabolic panel and kidney function were reviewed and are within acceptable range. Pt stable, high functioning.           Diagnosis: Bipolar 1 disorder, partial remission, anxiety unspecified    Intervention/Counseling/Treatment Plan   Medication Management:      1) Cont Lithium 300mg TID for mood. Pt instructed to monitor closely for mood destabilization as we have been gradually tapering due to inc'd TSH.    2) Cont Abilify 10mg QD. Typical ELFEGO's reviewed including weight gain, abnormal movements, EPS, TD, metabolic side effects.     4) Continue Xanax 0.5 mg QHS PRN for sleep (uses once weekly. Discussed risk of decreased RT, sedation, addictive potential, and not to mix with alcohol.      4. Call to report any worsening of symptoms or problems with the medication. Pt instructed to go to ER with thoughts of harming self, others     5. Patient given contact # for psychotherapists at [facility] and also instructed she may check with insurance for list of providers.      6. Labs: routine Li monitoring, TSH       Return to clinic: 3 months - self sched    -Spent 30min face to face with the pt; >50% time spent in counseling   -Supportive therapy and psychoeducation provided  -R/B/SE's of medications discussed with the pt who expresses understanding and chooses to take medications as prescribed.   -Pt instructed to call clinic, 911 or go to nearest emergency room  if sxs worsen or pt is in   crisis. The pt expresses understanding.    Montana Aguilar, NP

## 2023-10-20 NOTE — TELEPHONE ENCOUNTER
CALLED PATIENT AFTER HER VIRTUAL VISIT     I got patients fasting blood work scheduled for 11/7 at [facility]    Also scheduled patients 8 week follow up with anibal thomas on 12/14/23   This visit has to be in person and patient was made aware during the scheduling process

## 2023-10-26 LAB
FINAL PATHOLOGIC DIAGNOSIS: NORMAL
Lab: NORMAL

## 2023-11-07 ENCOUNTER — LAB VISIT (OUTPATIENT)
Dept: LAB | Facility: HOSPITAL | Age: 58
End: 2023-11-07
Payer: MEDICAID

## 2023-11-07 DIAGNOSIS — Z79.899 HIGH RISK MEDICATION USE: ICD-10-CM

## 2023-11-07 LAB
LITHIUM SERPL-SCNC: 0.4 MMOL/L (ref 0.6–1.2)
TSH SERPL DL<=0.005 MIU/L-ACNC: 2.12 UIU/ML (ref 0.4–4)

## 2023-11-07 PROCEDURE — 80178 ASSAY OF LITHIUM: CPT | Performed by: NURSE PRACTITIONER

## 2023-11-07 PROCEDURE — 84443 ASSAY THYROID STIM HORMONE: CPT | Performed by: NURSE PRACTITIONER

## 2023-11-07 PROCEDURE — 36415 COLL VENOUS BLD VENIPUNCTURE: CPT | Performed by: NURSE PRACTITIONER

## 2023-12-01 RX ORDER — HYDROXYZINE PAMOATE 50 MG/1
CAPSULE ORAL
Qty: 30 CAPSULE | Refills: 3 | Status: SHIPPED | OUTPATIENT
Start: 2023-12-01 | End: 2024-03-25

## 2023-12-01 NOTE — TELEPHONE ENCOUNTER
Last ordered: 4 months ago (8/1/2023) by Montana Aguilar NP     Last refill: 11/1/2023   Nov 1/16/24

## 2024-01-25 ENCOUNTER — OFFICE VISIT (OUTPATIENT)
Dept: PSYCHIATRY | Facility: CLINIC | Age: 59
End: 2024-01-25
Payer: MEDICAID

## 2024-01-25 VITALS
SYSTOLIC BLOOD PRESSURE: 104 MMHG | WEIGHT: 235.25 LBS | BODY MASS INDEX: 37.81 KG/M2 | HEART RATE: 62 BPM | DIASTOLIC BLOOD PRESSURE: 66 MMHG | HEIGHT: 66 IN

## 2024-01-25 DIAGNOSIS — F41.9 ANXIETY DISORDER, UNSPECIFIED TYPE: Primary | ICD-10-CM

## 2024-01-25 DIAGNOSIS — F31.9 BIPOLAR 1 DISORDER: ICD-10-CM

## 2024-01-25 PROCEDURE — 3078F DIAST BP <80 MM HG: CPT | Mod: SA,HB,CPTII, | Performed by: NURSE PRACTITIONER

## 2024-01-25 PROCEDURE — 3008F BODY MASS INDEX DOCD: CPT | Mod: SA,HB,CPTII, | Performed by: NURSE PRACTITIONER

## 2024-01-25 PROCEDURE — 99999 PR PBB SHADOW E&M-EST. PATIENT-LVL III: CPT | Mod: PBBFAC,SA,HB, | Performed by: NURSE PRACTITIONER

## 2024-01-25 PROCEDURE — 1160F RVW MEDS BY RX/DR IN RCRD: CPT | Mod: SA,HB,CPTII, | Performed by: NURSE PRACTITIONER

## 2024-01-25 PROCEDURE — 99213 OFFICE O/P EST LOW 20 MIN: CPT | Mod: PBBFAC,PO | Performed by: NURSE PRACTITIONER

## 2024-01-25 PROCEDURE — 3074F SYST BP LT 130 MM HG: CPT | Mod: SA,HB,CPTII, | Performed by: NURSE PRACTITIONER

## 2024-01-25 PROCEDURE — 99214 OFFICE O/P EST MOD 30 MIN: CPT | Mod: S$PBB,SA,HB, | Performed by: NURSE PRACTITIONER

## 2024-01-25 PROCEDURE — 1159F MED LIST DOCD IN RCRD: CPT | Mod: SA,HB,CPTII, | Performed by: NURSE PRACTITIONER

## 2024-01-25 RX ORDER — OMEPRAZOLE 20 MG/1
TABLET, ORALLY DISINTEGRATING, DELAYED RELEASE ORAL
COMMUNITY

## 2024-01-25 RX ORDER — ARIPIPRAZOLE 5 MG/1
5 TABLET ORAL DAILY
Qty: 30 TABLET | Refills: 2 | Status: SHIPPED | OUTPATIENT
Start: 2024-01-25 | End: 2024-05-07 | Stop reason: SDUPTHER

## 2024-01-25 NOTE — PROGRESS NOTES
"Outpatient Psychiatry Follow-Up Visit    Clinical Status of Patient: Outpatient (Ambulatory)  01/25/2024     Chief Complaint: Pt is a 58 year old female who presents today for a follow-up. Met with patient.       Interval History and Content of Current Session:  Interim Events/Subjective Report/Content of Current Session:  follow up appointment.    Pt is a 58 year old female with past psychiatric hx of Bipolar 1 disorder, anxiety NOS who presents for follow up treatment. She is currently taking Lithium 300mg TID, Xanax 0.5 mg QHS PRN for sleep (uses sparingly), Abilify 5mg QD, hydroxyzine 50mg qd prn. Meds are tolerated well overall and provide good symptomatic relief.    Between visits, pt notes she is doing very well overall. Her mood is stable and she has been without any manic or hypomanic episodes within the last 6 months. She displays good reality testing and is not overtly depressed. Denies excessive anxiety and rarely uses Klonopin. She is sleeping well with a normal appetite. Pt stable, high functioning.      Past Psychiatric hx: Pt. is a 55 year old female  with a past psychiatric hx of Bipolar 1 disorder, depressed who established care with me 07/19 following the care home of her previous psych, Dr. Pelaez. She came to me taking Lithium 600mg BID and Xanax 0.5 mg QHS PRN for sleep (only uses this sparingly) , which had been previously prescribed and managed by Dr. Pelaez. She reports that her symptoms have stabilized over the last 7-8 months, and has been without a manic episode since November of 2018. Previous med trials of Lamictal (rash), Risperdal, Wellbutrin, some SSRIs (destablized mood, triggered manic episodes). Pt stable in clinic upon initial eval - all meds were continued at same dosage.      Notes a long standing history of "mood swings" dating to childhood, but first formal psych encounter in 1987 following the birth of her child. While admitted to the hospital following birth, and after " "being discharged, pt speaks to experiencing A/V hallucinations and euphoric gamal following a two-week period of going without sleep. "I was real paranoid after I had the baby. I felt that if I went to sleep, I wouldn't be able to hear the baby cry. I ended up going a couple weeks where I didn't sleep at all, started hearing things and seeing things that weren't there. I felt like people were trying to contact me and tell me things through the computer or the TV. I was paranoid and was thinking the vampires were after us. I started thinking I was someone I wasn't." She eventually sought psychiatric care in an outpatient seeing, but was not started on any psychiatric medication until 2002 (?). She was prescribed SSRIs and Wellbutrin at this time, which triggered a manic episode, and were d/c'd. She was eventually started on Lithium around 2005, and has achieved good results. States she has felt more stable than she's ever felt before.      Upon initial eval, pt reports manic episodes "once or twice a year" that are triggered by a lack of sleep. States that her manic episodes last for "weeks" and "I get hyper, my metabolism speeds up. I feel like I'm sped up. I always try to buy a car or a house. I had two houses at one time. It always ends with them putting me in the hospital." Does continue to experience delusional thinking and auditory hallucinations exclusively during manic episodes. Hx of several ED admissions in 03/19 r/t delusions, and "walking down the street talking to people who weren't there" Per - ED note. Reports inpatient hospitalization following manic episodes to [facility] x 1 week in November of 2018. Also reports another admission to [facility] in March of 2018 for 3 days following a manic episode. During these episodes, "I get very happy during those episodes. I start thinking I'm [place] and can communicate through the TV".      Denies any depressive symptoms. Reports sleeping 7-9 hours of sleep " "each night, restful. No issues falling or staying asleep. Denies anhedonia. Denies guilt/worthlessness. Energy good, concentration good. Appetite good. Denies psychomotor slowing, denies SI.      On 10/16, pt daughter reported "She seems a little bit better during the day, but is still pretty manic and only sleeping 2-3 hours at night, despite trying to up-titrate the medication.  She had to have a meeting with her supervisors at work yesterday, but they have fortunately been very understanding about her bipolar" Episodes do not meet criteria for true gamal/or hypomania, but we have been up-titrating Seroquel (started 10/19) to address symptoms - pt has responded well but reports dry mouth.I advised pt to add dose of Seroquel 50mg Q AM. However, she states pt states this made her groggy so d/c this. Despite this, pt reports at f/u "I've been feeling so much better. I'm actually sleeping now and definitely haven't been feeling manic at all. I feel way more calm now." However,  Notes overall less mood lability, less agitation. Denies manic/hypomanic episodes since last visit. She went to [place] on vacation between visit - enjoyed this. Pt doing well with no decompensations since last visit.  Does note continued generalized worry. Feels restless, on edge.       During her 5/20 f/u visit pt reports "I've been getting into bed at night, and sometimes it feels like someone else is getting in bed with me. It got to the point where I couldn't sleep in my bed because I was kind of scared. I thought it was my cat but my cat wasn't in the room. Sometime it even touches me, like it's brushing on either my foot or back. Like someone is in here trying to wake me up, but no one is in there. I usually just go and sleep on the sofa." Denies A/V hallucinations.     Seroquel was increased following this visit, Today, pt reports "I don't have those feelings in my bed anymore. I'm not afraid to get in bed anymore. Thanks goodness. " "I've been sleeping better." Does reports "feeling extremely tired when I take it in the morning." and requests to move to PM dosing. Tolerates well otherwise. Denies any gamal or hypomania between sessions.      Past Medical hx:   Past Medical History:   Diagnosis Date    Bipolar 1 disorder     Cancer     skin cancer to right arm    GERD (gastroesophageal reflux disease)     History of psychiatric hospitalization     Mental disorder     Thyroid disease         Interim hx:  Medication changes last visit: Inc seroquel to 50mg PO QAM, 100mg QHS  Anxiety: inc'd  Depression: denies     Denies suicidal/homicidal ideations.  Denies hopelessness/worthlessness.    Denies auditory/visual hallucinations       Tobacco: never used  Alcohol: denies   Drug use: denies  Caffeine: 1-2 cups coffee daily      Review of Systems   PSYCHIATRIC: Pertinent items are noted in the narrative.        CONSTITUTIONAL: weight stable        M/S: no pain today         ENT: no allergies noted today        ABD: no n/v/d     Past Medical, Family and Social History: The patient's past medical, family and social history have been reviewed and updated as appropriate within the electronic medical record. See encounter notes.       Compliance: yes      Side effects: dry mouth from Seroquel     Risk Parameters:  Patient reports no suicidal ideation  Patient reports no homicidal ideation  Patient reports no self-injurious behavior  Patient reports no violent behavior     Exam (detailed: at least 9 elements; comprehensive: all 15 elements)   Constitutional  Vitals:  Most recent vital signs, dated less than 90 days prior to this appointment, were reviewed. There were no vitals taken for this visit.     General:  unremarkable, age appropriate, casual attire, good eye contact, good rapport       Musculoskeletal  Muscle Strength/Tone:  no flaccidity, no tremor    Gait & Station:  normal      Psychiatric                       Speech:  normal tone, normal rate, " rhythm, and volume   Mood & Affect:   Euthymic, congruent, appropriate         Thought Process:   Goal directed; Linear    Associations:   intact   Thought Content:   No SI/HI, + fixed delusions, or paranoia, no AV/VH   Insight & Judgement:   Good, adequate to circumstances   Orientation:   grossly intact; alert and oriented x 4    Memory:  intact for content of interview    Language:  grossly intact, can repeat    Attention Span  : Grossly intact for content of interview   Fund of Knowledge:   intact and appropriate to age and level of education        Assessment and Diagnosis   Status/Progress: Based on the examination today, the patient's problem(s) is/are under fair control.  New problems have not been presented today. Comorbidities are not currently complicating management of the primary condition.      Impression:           Pt is a 58 year old female with past psychiatric hx of Bipolar 1 disorder, anxiety NOS who presents for follow up treatment. She is currently taking Lithium 300mg TID, Xanax 0.5 mg QHS PRN for sleep (uses sparingly), Abilify 5mg QD, hydroxyzine 50mg qd prn. Meds are tolerated well overall and provide good symptomatic relief.    Between visits, pt notes she is doing very well overall. Her mood is stable and she has been without any manic or hypomanic episodes within the last 6 months. She displays good reality testing and is not overtly depressed. Denies excessive anxiety and rarely uses Klonopin. She is sleeping well with a normal appetite. Pt stable, high functioning.        Diagnosis: Bipolar 1 disorder, partial remission, anxiety unspecified    Intervention/Counseling/Treatment Plan   Medication Management:      1) Cont Lithium 300mg TID for mood. Pt instructed to monitor closely for mood destabilization as we have been gradually tapering due to inc'd TSH.    2) Cont Abilify 5mg QD. Typical ELFEGO's reviewed including weight gain, abnormal movements, EPS, TD, metabolic side effects.     4)  Continue Xanax 0.5 mg QHS PRN for sleep (uses once weekly. Discussed risk of decreased RT, sedation, addictive potential, and not to mix with alcohol.      4. Call to report any worsening of symptoms or problems with the medication. Pt instructed to go to ER with thoughts of harming self, others     5. Patient given contact # for psychotherapists at [facility] and also instructed she may check with insurance for list of providers.      6. Labs: none today       Return to clinic: 6 months - self sched    -Spent 30min face to face with the pt; >50% time spent in counseling   -Supportive therapy and psychoeducation provided  -R/B/SE's of medications discussed with the pt who expresses understanding and chooses to take medications as prescribed.   -Pt instructed to call clinic, 911 or go to nearest emergency room if sxs worsen or pt is in   crisis. The pt expresses understanding.    Montana Aguilar, NP

## 2024-02-25 NOTE — TELEPHONE ENCOUNTER
Care Due:                  Date            Visit Type   Department     Provider  --------------------------------------------------------------------------------                                EP -                              PRIMARY      [facility]  Last Visit: 02-      CARE ([facility])   MEDICINE       Milli Leonard  Next Visit: None Scheduled  None         None Found                                                            Last  Test          Frequency    Reason                     Performed    Due Date  --------------------------------------------------------------------------------    Office Visit  15 months..  atorvastatin,              02- 05-                             levothyroxine............    CMP.........  12 months..  atorvastatin.............  02- 02-    Health [facility] Embedded Care Due Messages. Reference number: [id number].   2/25/2024 9:22:28 AM CST

## 2024-02-25 NOTE — TELEPHONE ENCOUNTER
Refill Routing Note   Medication(s) are not appropriate for processing by [facility] for the following reason(s):        Required labs outdated    [facility] action(s):  Defer   Requires appointment : Yes     Requires labs : Yes             Appointments  past 12m or future 3m with PCP    Date Provider   Last Visit   2/9/2023 Milli Leonard MD   Next Visit   Visit date not found Milli Leonard MD   ED visits in past 90 days: 0        Note composed:12:14 PM 02/25/2024

## 2024-02-26 RX ORDER — ATORVASTATIN CALCIUM 20 MG/1
20 TABLET, FILM COATED ORAL NIGHTLY
Qty: 90 TABLET | Refills: 0 | Status: SHIPPED | OUTPATIENT
Start: 2024-02-26 | End: 2024-05-28

## 2024-02-26 RX ORDER — LITHIUM CARBONATE 300 MG/1
TABLET, FILM COATED, EXTENDED RELEASE ORAL
Qty: 90 TABLET | Refills: 5 | Status: SHIPPED | OUTPATIENT
Start: 2024-02-26

## 2024-03-20 RX ORDER — LEVOTHYROXINE SODIUM 50 UG/1
50 TABLET ORAL
Qty: 90 TABLET | Refills: 0 | Status: SHIPPED | OUTPATIENT
Start: 2024-03-20 | End: 2024-06-13

## 2024-03-20 NOTE — TELEPHONE ENCOUNTER
No care due was identified.  [facility] Embedded Care Due Messages. Reference number: [id number].   3/20/2024 11:12:51 AM CDT

## 2024-03-20 NOTE — TELEPHONE ENCOUNTER
Refill Decision Note   Cristina Tan  is requesting a refill authorization.    Brief Assessment and Rationale for Refill:  Approve       Medication Therapy Plan:         Comments:     Note composed:5:29 PM 03/20/2024

## 2024-03-25 RX ORDER — HYDROXYZINE PAMOATE 50 MG/1
CAPSULE ORAL
Qty: 30 CAPSULE | Refills: 3 | Status: SHIPPED | OUTPATIENT
Start: 2024-03-25

## 2024-04-09 ENCOUNTER — OFFICE VISIT (OUTPATIENT)
Dept: PSYCHIATRY | Facility: CLINIC | Age: 59
End: 2024-04-09
Payer: MEDICAID

## 2024-04-09 DIAGNOSIS — Z79.899 HIGH RISK MEDICATION USE: Primary | ICD-10-CM

## 2024-04-09 PROCEDURE — 1159F MED LIST DOCD IN RCRD: CPT | Mod: CPTII,95,, | Performed by: NURSE PRACTITIONER

## 2024-04-09 PROCEDURE — 1160F RVW MEDS BY RX/DR IN RCRD: CPT | Mod: CPTII,95,, | Performed by: NURSE PRACTITIONER

## 2024-04-09 PROCEDURE — 99214 OFFICE O/P EST MOD 30 MIN: CPT | Mod: SA,HB,95, | Performed by: NURSE PRACTITIONER

## 2024-04-09 PROCEDURE — 90833 PSYTX W PT W E/M 30 MIN: CPT | Mod: SA,HB,95, | Performed by: NURSE PRACTITIONER

## 2024-04-09 NOTE — PROGRESS NOTES
Outpatient Psychiatry Follow-Up Visit    Clinical Status of Patient: Outpatient (Virtual)  04/09/2024       [address] [place] [address]  [phone] (M)  Visit type: Virtual visit with synchronous audio and video  Each patient to whom he or she provides medical services by telemedicine is:  (1) informed of the relationship between the physician and patient and the respective role of any other health care provider with respect to management of the patient; and (2) notified that he or she may decline to receive medical services by telemedicine and may withdraw from such care at any time.      Chief Complaint: Pt is a 58 year old female who presents today for a follow-up. Met with patient.       Interval History and Content of Current Session:  Interim Events/Subjective Report/Content of Current Session:  follow up appointment.    Pt is a 58 year old female with past psychiatric hx of Bipolar 1 disorder, anxiety NOS who presents for follow up treatment. She is currently taking Lithium 300mg TID, Xanax 0.5 mg QHS PRN for sleep (uses sparingly), Abilify 5mg QD, hydroxyzine 50mg qd prn. Meds are tolerated well overall and provide good symptomatic relief. Her last Li level was 0.4, drawn 11/2023. This is subtherapeutic, but patient has demonstrated stability on current dosing.    Pt presents today for an urgent session. Pt appears stable and not overtly manic upon presentation today, but feels she is starting to show some warning signs. She has not been sleeping much which historically precedes a manic episode. She has been feeling increasingly impulsive and euphoric, though not floridly manic. She reports that she scheduled today's visit as a precaution. Most importantly, patient reports that she decreased her Lithium dose on her own to 300mg BID. This is a likely contributor to her worsening in symptoms and I have advised her to return to her usual dose ASAP. Her anxiety is manageable. She is stable in session  "today.     Past Psychiatric hx: Pt. is a 55 year old female  with a past psychiatric hx of Bipolar 1 disorder, depressed who established care with me 07/19 following the halfway of her previous psych, Dr. Pelaez. She came to me taking Lithium 600mg BID and Xanax 0.5 mg QHS PRN for sleep (only uses this sparingly) , which had been previously prescribed and managed by Dr. Pelaez. She reports that her symptoms have stabilized over the last 7-8 months, and has been without a manic episode since November of 2018. Previous med trials of Lamictal (rash), Risperdal, Wellbutrin, some SSRIs (destablized mood, triggered manic episodes). Pt stable in clinic upon initial eval - all meds were continued at same dosage.      Notes a long standing history of "mood swings" dating to childhood, but first formal psych encounter in 1987 following the birth of her child. While admitted to the hospital following birth, and after being discharged, pt speaks to experiencing A/V hallucinations and euphoric gamal following a two-week period of going without sleep. "I was real paranoid after I had the baby. I felt that if I went to sleep, I wouldn't be able to hear the baby cry. I ended up going a couple weeks where I didn't sleep at all, started hearing things and seeing things that weren't there. I felt like people were trying to contact me and tell me things through the computer or the TV. I was paranoid and was thinking the vampires were after us. I started thinking I was someone I wasn't." She eventually sought psychiatric care in an outpatient seeing, but was not started on any psychiatric medication until 2002 (?). She was prescribed SSRIs and Wellbutrin at this time, which triggered a manic episode, and were d/c'd. She was eventually started on Lithium around 2005, and has achieved good results. States she has felt more stable than she's ever felt before.      Upon initial eval, pt reports manic episodes "once or twice a year" that " "are triggered by a lack of sleep. States that her manic episodes last for "weeks" and "I get hyper, my metabolism speeds up. I feel like I'm sped up. I always try to buy a car or a house. I had two houses at one time. It always ends with them putting me in the hospital." Does continue to experience delusional thinking and auditory hallucinations exclusively during manic episodes. Hx of several ED admissions in 03/19 r/t delusions, and "walking down the street talking to people who weren't there" Per - ED note. Reports inpatient hospitalization following manic episodes to [place] x 1 week in November of 2018. Also reports another admission to [place] in March of 2018 for 3 days following a manic episode. During these episodes, "I get very happy during those episodes. I start thinking I'm [place]t and can communicate through the TV".      Denies any depressive symptoms. Reports sleeping 7-9 hours of sleep each night, restful. No issues falling or staying asleep. Denies anhedonia. Denies guilt/worthlessness. Energy good, concentration good. Appetite good. Denies psychomotor slowing, denies SI.      On 10/16, pt daughter reported "She seems a little bit better during the day, but is still pretty manic and only sleeping 2-3 hours at night, despite trying to up-titrate the medication.  She had to have a meeting with her supervisors at work yesterday, but they have fortunately been very understanding about her bipolar" Episodes do not meet criteria for true gamal/or hypomania, but we have been up-titrating Seroquel (started 10/19) to address symptoms - pt has responded well but reports dry mouth.I advised pt to add dose of Seroquel 50mg Q AM. However, she states pt states this made her groggy so d/c this. Despite this, pt reports at f/u "I've been feeling so much better. I'm actually sleeping now and definitely haven't been feeling manic at all. I feel way more calm now." However,  Notes overall less mood lability, less " "agitation. Denies manic/hypomanic episodes since last visit. She went to [place] on vacation between visit - enjoyed this. Pt doing well with no decompensations since last visit.  Does note continued generalized worry. Feels restless, on edge.       During her 5/20 f/u visit pt reports "I've been getting into bed at night, and sometimes it feels like someone else is getting in bed with me. It got to the point where I couldn't sleep in my bed because I was kind of scared. I thought it was my cat but my cat wasn't in the room. Sometime it even touches me, like it's brushing on either my foot or back. Like someone is in here trying to wake me up, but no one is in there. I usually just go and sleep on the sofa." Denies A/V hallucinations.     Seroquel was increased following this visit, Today, pt reports "I don't have those feelings in my bed anymore. I'm not afraid to get in bed anymore. Thanks goodness. I've been sleeping better." Does reports "feeling extremely tired when I take it in the morning." and requests to move to PM dosing. Tolerates well otherwise. Denies any gamal or hypomania between sessions.      Past Medical hx:   Past Medical History:   Diagnosis Date    Bipolar 1 disorder     Cancer     skin cancer to right arm    GERD (gastroesophageal reflux disease)     History of psychiatric hospitalization     Mental disorder     Thyroid disease         Interim hx:  Medication changes last visit:   Anxiety: inc'd  Depression: denies     Denies suicidal/homicidal ideations.  Denies hopelessness/worthlessness.    Denies auditory/visual hallucinations       Tobacco: never used  Alcohol: denies   Drug use: denies  Caffeine: 1-2 cups coffee daily      Review of Systems   PSYCHIATRIC: Pertinent items are noted in the narrative.        CONSTITUTIONAL: weight stable        M/S: no pain today         ENT: no allergies noted today        ABD: no n/v/d     Past Medical, Family and Social History: The patient's past " medical, family and social history have been reviewed and updated as appropriate within the electronic medical record. See encounter notes.       Compliance: yes      Side effects: dry mouth from Seroquel     Risk Parameters:  Patient reports no suicidal ideation  Patient reports no homicidal ideation  Patient reports no self-injurious behavior  Patient reports no violent behavior     Exam (detailed: at least 9 elements; comprehensive: all 15 elements)   Constitutional  Vitals:  Most recent vital signs, dated less than 90 days prior to this appointment, were reviewed. There were no vitals taken for this visit.     General:  unremarkable, age appropriate, casual attire, good eye contact, good rapport       Musculoskeletal  Muscle Strength/Tone:  no flaccidity, no tremor    Gait & Station:  normal      Psychiatric                       Speech:  normal tone, normal rate, rhythm, and volume   Mood & Affect:   Euthymic, congruent, appropriate         Thought Process:   Goal directed; Linear    Associations:   intact   Thought Content:   No SI/HI, + fixed delusions, or paranoia, no AV/VH   Insight & Judgement:   Good, adequate to circumstances   Orientation:   grossly intact; alert and oriented x 4    Memory:  intact for content of interview    Language:  grossly intact, can repeat    Attention Span  : Grossly intact for content of interview   Fund of Knowledge:   intact and appropriate to age and level of education        Assessment and Diagnosis   Status/Progress: Based on the examination today, the patient's problem(s) is/are under fair control.  New problems have not been presented today. Comorbidities are not currently complicating management of the primary condition.      Impression:             Diagnosis: Bipolar 1 disorder, partial remission, anxiety unspecified    Intervention/Counseling/Treatment Plan   Medication Management:      1) Increase Lithium to 300 mg TID for mood. Pt instructed to monitor closely for  mood destabilization as we have been gradually tapering due to inc'd TSH.    2) Cont Abilify 5mg QD. Typical ELFEGO's reviewed including weight gain, abnormal movements, EPS, TD, metabolic side effects.     4) Continue Xanax 0.5 mg QHS PRN for sleep (uses once weekly. Discussed risk of decreased RT, sedation, addictive potential, and not to mix with alcohol.      4. Call to report any worsening of symptoms or problems with the medication. Pt instructed to go to ER with thoughts of harming self, others     5. Patient given contact # for psychotherapists at [facility] and also instructed she may check with insurance for list of providers.      6. Labs: none today       Return to clinic: 3 weeks, self    -Spent 30min face to face with the pt; >50% time spent in counseling   -Supportive therapy and psychoeducation provided  -R/B/SE's of medications discussed with the pt who expresses understanding and chooses to take medications as prescribed.   -Pt instructed to call clinic, 911 or go to nearest emergency room if sxs worsen or pt is in   crisis. The pt expresses understanding.    [place]ana Aguilar, NP

## 2024-04-30 ENCOUNTER — LAB VISIT (OUTPATIENT)
Dept: LAB | Facility: HOSPITAL | Age: 59
End: 2024-04-30
Payer: MEDICAID

## 2024-04-30 ENCOUNTER — OFFICE VISIT (OUTPATIENT)
Dept: PSYCHIATRY | Facility: CLINIC | Age: 59
End: 2024-04-30
Payer: MEDICAID

## 2024-04-30 DIAGNOSIS — Z79.899 HIGH RISK MEDICATION USE: ICD-10-CM

## 2024-04-30 DIAGNOSIS — F41.9 ANXIETY DISORDER, UNSPECIFIED TYPE: Primary | ICD-10-CM

## 2024-04-30 DIAGNOSIS — F31.9 BIPOLAR 1 DISORDER: ICD-10-CM

## 2024-04-30 LAB — LITHIUM SERPL-SCNC: 0.7 MMOL/L (ref 0.6–1.2)

## 2024-04-30 PROCEDURE — 80178 ASSAY OF LITHIUM: CPT | Performed by: NURSE PRACTITIONER

## 2024-04-30 PROCEDURE — 36415 COLL VENOUS BLD VENIPUNCTURE: CPT | Performed by: NURSE PRACTITIONER

## 2024-04-30 PROCEDURE — 1159F MED LIST DOCD IN RCRD: CPT | Mod: CPTII,95,, | Performed by: NURSE PRACTITIONER

## 2024-04-30 PROCEDURE — 1160F RVW MEDS BY RX/DR IN RCRD: CPT | Mod: CPTII,95,, | Performed by: NURSE PRACTITIONER

## 2024-04-30 PROCEDURE — 99214 OFFICE O/P EST MOD 30 MIN: CPT | Mod: SA,HB,95, | Performed by: NURSE PRACTITIONER

## 2024-04-30 NOTE — PROGRESS NOTES
"Outpatient Psychiatry Follow-Up Visit    Clinical Status of Patient: Outpatient (Virtual)  04/30/2024       [address] [place] [address]  [phone] (M)  Visit type: Virtual visit with synchronous audio and video  Each patient to whom he or she provides medical services by telemedicine is:  (1) informed of the relationship between the physician and patient and the respective role of any other health care provider with respect to management of the patient; and (2) notified that he or she may decline to receive medical services by telemedicine and may withdraw from such care at any time.      Chief Complaint: Pt is a 59 year old female who presents today for a follow-up. Met with patient.       Interval History and Content of Current Session:  Interim Events/Subjective Report/Content of Current Session:  follow up appointment.    Pt is a 59 year old female with past psychiatric hx of Bipolar 1 disorder, anxiety NOS who presents for follow up treatment. She is currently taking Lithium 300mg TID, Xanax 0.5 mg QHS PRN for sleep (uses sparingly), Abilify 5mg QD, hydroxyzine 50mg qd prn. Meds are tolerated well overall and provide good symptomatic relief. Her last Li level was 0.4, drawn 11/2023. This is subtherapeutic, but patient has demonstrated stability on current dosing.    From last session three weeks ago "Pt presents today for an urgent session. Pt appears stable and not overtly manic upon presentation today, but feels she is starting to show some warning signs. She has not been sleeping much which historically precedes a manic episode. She has been feeling increasingly impulsive and euphoric, though not floridly manic. She reports that she scheduled today's visit as a precaution. Most importantly, patient reports that she decreased her Lithium dose on her own to 300mg BID. This is a likely contributor to her worsening in symptoms and I have advised her to return to her usual dose ASAP. Her anxiety is " "manageable. She is stable in session today."    Since last visit, pt notes that she has been feeling more stable, but more fatigued. She has been without any manic or hypomanic episodes since our last visit. She is stable, high functioning.    Between sessions, pt reports that their anxiety has improved and is well-managed. They deny any major exacerbations and report good stress tolerance. Pt denies excessive, unproductive worrying. Denies somatic symptoms of anxiety such as restlessness, tension, or chest tightness. They deny excessive irritability and report good ability to handle frustration. Pt is sleeping well and has a good appetite & energy levels. They are stable, high functioning.            Past Psychiatric hx: Pt. is a 55 year old female  with a past psychiatric hx of Bipolar 1 disorder, depressed who established care with me 07/19 following the long term of her previous psych, Dr. Pelaez. She came to me taking Lithium 600mg BID and Xanax 0.5 mg QHS PRN for sleep (only uses this sparingly) , which had been previously prescribed and managed by Dr. Pelaez. She reports that her symptoms have stabilized over the last 7-8 months, and has been without a manic episode since November of 2018. Previous med trials of Lamictal (rash), Risperdal, Wellbutrin, some SSRIs (destablized mood, triggered manic episodes). Pt stable in clinic upon initial eval - all meds were continued at same dosage.      Notes a long standing history of "mood swings" dating to childhood, but first formal psych encounter in 1987 following the birth of her child. While admitted to the hospital following birth, and after being discharged, pt speaks to experiencing A/V hallucinations and euphoric gamal following a two-week period of going without sleep. "I was real paranoid after I had the baby. I felt that if I went to sleep, I wouldn't be able to hear the baby cry. I ended up going a couple weeks where I didn't sleep at all, started hearing " "things and seeing things that weren't there. I felt like people were trying to contact me and tell me things through the computer or the TV. I was paranoid and was thinking the vampires were after us. I started thinking I was someone I wasn't." She eventually sought psychiatric care in an outpatient seeing, but was not started on any psychiatric medication until 2002 (?). She was prescribed SSRIs and Wellbutrin at this time, which triggered a manic episode, and were d/c'd. She was eventually started on Lithium around 2005, and has achieved good results. States she has felt more stable than she's ever felt before.      Upon initial eval, pt reports manic episodes "once or twice a year" that are triggered by a lack of sleep. States that her manic episodes last for "weeks" and "I get hyper, my metabolism speeds up. I feel like I'm sped up. I always try to buy a car or a house. I had two houses at one time. It always ends with them putting me in the hospital." Does continue to experience delusional thinking and auditory hallucinations exclusively during manic episodes. Hx of several ED admissions in 03/19 r/t delusions, and "walking down the street talking to people who weren't there" Per - ED note. Reports inpatient hospitalization following manic episodes to [place] x 1 week in November of 2018. Also reports another admission to [facility] in March of 2018 for 3 days following a manic episode. During these episodes, "I get very happy during those episodes. I start thinking I'm [place]t and can communicate through the TV".      Denies any depressive symptoms. Reports sleeping 7-9 hours of sleep each night, restful. No issues falling or staying asleep. Denies anhedonia. Denies guilt/worthlessness. Energy good, concentration good. Appetite good. Denies psychomotor slowing, denies SI.      On 10/16, pt daughter reported "She seems a little bit better during the day, but is still pretty manic and only sleeping 2-3 hours at " "night, despite trying to up-titrate the medication.  She had to have a meeting with her supervisors at work yesterday, but they have fortunately been very understanding about her bipolar" Episodes do not meet criteria for true gamal/or hypomania, but we have been up-titrating Seroquel (started 10/19) to address symptoms - pt has responded well but reports dry mouth.I advised pt to add dose of Seroquel 50mg Q AM. However, she states pt states this made her groggy so d/c this. Despite this, pt reports at f/u "I've been feeling so much better. I'm actually sleeping now and definitely haven't been feeling manic at all. I feel way more calm now." However,  Notes overall less mood lability, less agitation. Denies manic/hypomanic episodes since last visit. She went to [place] on vacation between visit - enjoyed this. Pt doing well with no decompensations since last visit.  Does note continued generalized worry. Feels restless, on edge.       During her 5/20 f/u visit pt reports "I've been getting into bed at night, and sometimes it feels like someone else is getting in bed with me. It got to the point where I couldn't sleep in my bed because I was kind of scared. I thought it was my cat but my cat wasn't in the room. Sometime it even touches me, like it's brushing on either my foot or back. Like someone is in here trying to wake me up, but no one is in there. I usually just go and sleep on the sofa." Denies A/V hallucinations.     Seroquel was increased following this visit, Today, pt reports "I don't have those feelings in my bed anymore. I'm not afraid to get in bed anymore. Thanks goodness. I've been sleeping better." Does reports "feeling extremely tired when I take it in the morning." and requests to move to PM dosing. Tolerates well otherwise. Denies any gamal or hypomania between sessions.      Past Medical hx:   Past Medical History:   Diagnosis Date    Bipolar 1 disorder     Cancer     skin cancer to right arm "    GERD (gastroesophageal reflux disease)     History of psychiatric hospitalization     Mental disorder     Thyroid disease         Interim hx:  Medication changes last visit:   Anxiety: inc'd  Depression: denies     Denies suicidal/homicidal ideations.  Denies hopelessness/worthlessness.    Denies auditory/visual hallucinations       Tobacco: never used  Alcohol: denies   Drug use: denies  Caffeine: 1-2 cups coffee daily      Review of Systems   PSYCHIATRIC: Pertinent items are noted in the narrative.        CONSTITUTIONAL: weight stable        M/S: no pain today         ENT: no allergies noted today        ABD: no n/v/d     Past Medical, Family and Social History: The patient's past medical, family and social history have been reviewed and updated as appropriate within the electronic medical record. See encounter notes.       Compliance: yes      Side effects: dry mouth from Seroquel     Risk Parameters:  Patient reports no suicidal ideation  Patient reports no homicidal ideation  Patient reports no self-injurious behavior  Patient reports no violent behavior     Exam (detailed: at least 9 elements; comprehensive: all 15 elements)   Constitutional  Vitals:  Most recent vital signs, dated less than 90 days prior to this appointment, were reviewed. There were no vitals taken for this visit.     General:  unremarkable, age appropriate, casual attire, good eye contact, good rapport       Musculoskeletal  Muscle Strength/Tone:  no flaccidity, no tremor    Gait & Station:  normal      Psychiatric                       Speech:  normal tone, normal rate, rhythm, and volume   Mood & Affect:   Euthymic, congruent, appropriate         Thought Process:   Goal directed; Linear    Associations:   intact   Thought Content:   No SI/HI, + fixed delusions, or paranoia, no AV/VH   Insight & Judgement:   Good, adequate to circumstances   Orientation:   grossly intact; alert and oriented x 4    Memory:  intact for content of  interview    Language:  grossly intact, can repeat    Attention Span  : Grossly intact for content of interview   Fund of Knowledge:   intact and appropriate to age and level of education        Assessment and Diagnosis   Status/Progress: Based on the examination today, the patient's problem(s) is/are under fair control.  New problems have not been presented today. Comorbidities are not currently complicating management of the primary condition.      Impression:             Diagnosis: Bipolar 1 disorder, partial remission, anxiety unspecified    Intervention/Counseling/Treatment Plan   Medication Management:      1) Cont Lithium to 300 mg TID for mood. Pt instructed to monitor closely for mood destabilization as we have been gradually tapering due to inc'd TSH.    2) Cont Abilify 5mg QD. Typical ELFEGO's reviewed including weight gain, abnormal movements, EPS, TD, metabolic side effects.     4) Continue Xanax 0.5 mg QHS PRN for sleep (uses once weekly. Discussed risk of decreased RT, sedation, addictive potential, and not to mix with alcohol.      4. Call to report any worsening of symptoms or problems with the medication. Pt instructed to go to ER with thoughts of harming self, others     5. Patient given contact # for psychotherapists at [facility] and also instructed she may check with insurance for list of providers.      6. Labs: none today       Return to clinic: 3 weeks, self    -Spent 30min face to face with the pt; >50% time spent in counseling   -Supportive therapy and psychoeducation provided  -R/B/SE's of medications discussed with the pt who expresses understanding and chooses to take medications as prescribed.   -Pt instructed to call clinic, 911 or go to nearest emergency room if sxs worsen or pt is in   crisis. The pt expresses understanding.    [place] Aguilar, NP

## 2024-05-07 RX ORDER — ARIPIPRAZOLE 5 MG/1
5 TABLET ORAL DAILY
Qty: 30 TABLET | Refills: 2 | Status: SHIPPED | OUTPATIENT
Start: 2024-05-07

## 2024-05-07 NOTE — TELEPHONE ENCOUNTER
Patient called and states that she needs a refill Abilify 10mg. Not seeing 10mg of abilify in patients chart that prescribed by you. 10 mg abilify was prescribed by another provider from hospital on 11/11/22    Needs to be sent to [facility] [address]

## 2024-05-07 NOTE — TELEPHONE ENCOUNTER
Called patient to relay providers message about abilify being 5mg not 10mg.     Last ordered 1/25/24 w/ 2 refils

## 2024-05-28 RX ORDER — ATORVASTATIN CALCIUM 20 MG/1
20 TABLET, FILM COATED ORAL NIGHTLY
Qty: 90 TABLET | Refills: 0 | Status: SHIPPED | OUTPATIENT
Start: 2024-05-28

## 2024-05-28 NOTE — TELEPHONE ENCOUNTER
Care Due:                  Date            Visit Type   Department     Provider  --------------------------------------------------------------------------------                                EP -                              PRIMARY      [facility] FAMILY  Last Visit: 02-      CARE ([facility])   MEDICINE       Milli Leonard  Next Visit: None Scheduled  None         None Found                                                            Last  Test          Frequency    Reason                     Performed    Due Date  --------------------------------------------------------------------------------    Office Visit  15 months..  atorvastatin,              02- 05-                             levothyroxine............    CMP.........  12 months..  atorvastatin.............  02- 02-    Lipid Panel.  12 months..  atorvastatin.............  10-   06-    [facility] Embedded Care Due Messages. Reference number: [id number].   5/28/2024 8:31:49 AM CDT

## 2024-05-30 ENCOUNTER — OFFICE VISIT (OUTPATIENT)
Dept: FAMILY MEDICINE | Facility: CLINIC | Age: 59
End: 2024-05-30
Payer: MEDICAID

## 2024-05-30 ENCOUNTER — LAB VISIT (OUTPATIENT)
Dept: LAB | Facility: HOSPITAL | Age: 59
End: 2024-05-30
Attending: INTERNAL MEDICINE
Payer: MEDICAID

## 2024-05-30 VITALS
DIASTOLIC BLOOD PRESSURE: 84 MMHG | BODY MASS INDEX: 39.12 KG/M2 | HEART RATE: 55 BPM | WEIGHT: 243.38 LBS | OXYGEN SATURATION: 99 % | SYSTOLIC BLOOD PRESSURE: 126 MMHG | HEIGHT: 66 IN

## 2024-05-30 DIAGNOSIS — E03.9 HYPOTHYROIDISM, UNSPECIFIED TYPE: ICD-10-CM

## 2024-05-30 DIAGNOSIS — F31.77 BIPOLAR 1 DISORDER, MIXED, PARTIAL REMISSION: Primary | ICD-10-CM

## 2024-05-30 DIAGNOSIS — E78.49 OTHER HYPERLIPIDEMIA: ICD-10-CM

## 2024-05-30 DIAGNOSIS — E78.5 HYPERLIPIDEMIA, UNSPECIFIED HYPERLIPIDEMIA TYPE: ICD-10-CM

## 2024-05-30 LAB
ALBUMIN SERPL BCP-MCNC: 3.7 G/DL (ref 3.5–5.2)
ALP SERPL-CCNC: 67 U/L (ref 55–135)
ALT SERPL W/O P-5'-P-CCNC: 16 U/L (ref 10–44)
ANION GAP SERPL CALC-SCNC: 10 MMOL/L (ref 8–16)
AST SERPL-CCNC: 28 U/L (ref 10–40)
BILIRUB SERPL-MCNC: 1.5 MG/DL (ref 0.1–1)
BUN SERPL-MCNC: 13 MG/DL (ref 6–20)
CALCIUM SERPL-MCNC: 9.9 MG/DL (ref 8.7–10.5)
CHLORIDE SERPL-SCNC: 111 MMOL/L (ref 95–110)
CHOLEST SERPL-MCNC: 197 MG/DL (ref 120–199)
CHOLEST/HDLC SERPL: 3.3 {RATIO} (ref 2–5)
CO2 SERPL-SCNC: 22 MMOL/L (ref 23–29)
CREAT SERPL-MCNC: 0.9 MG/DL (ref 0.5–1.4)
EST. GFR  (NO RACE VARIABLE): >60 ML/MIN/1.73 M^2
GLUCOSE SERPL-MCNC: 99 MG/DL (ref 70–110)
HDLC SERPL-MCNC: 60 MG/DL (ref 40–75)
HDLC SERPL: 30.5 % (ref 20–50)
LDLC SERPL CALC-MCNC: 93.6 MG/DL (ref 63–159)
NONHDLC SERPL-MCNC: 137 MG/DL
POTASSIUM SERPL-SCNC: 4.8 MMOL/L (ref 3.5–5.1)
PROT SERPL-MCNC: 7.5 G/DL (ref 6–8.4)
SODIUM SERPL-SCNC: 143 MMOL/L (ref 136–145)
TRIGL SERPL-MCNC: 217 MG/DL (ref 30–150)
TSH SERPL DL<=0.005 MIU/L-ACNC: 1.33 UIU/ML (ref 0.4–4)

## 2024-05-30 PROCEDURE — 84443 ASSAY THYROID STIM HORMONE: CPT | Performed by: INTERNAL MEDICINE

## 2024-05-30 PROCEDURE — 99213 OFFICE O/P EST LOW 20 MIN: CPT | Mod: PBBFAC,PO | Performed by: INTERNAL MEDICINE

## 2024-05-30 PROCEDURE — 1159F MED LIST DOCD IN RCRD: CPT | Mod: CPTII,,, | Performed by: INTERNAL MEDICINE

## 2024-05-30 PROCEDURE — 99214 OFFICE O/P EST MOD 30 MIN: CPT | Mod: S$PBB,,, | Performed by: INTERNAL MEDICINE

## 2024-05-30 PROCEDURE — 3079F DIAST BP 80-89 MM HG: CPT | Mod: CPTII,,, | Performed by: INTERNAL MEDICINE

## 2024-05-30 PROCEDURE — 80053 COMPREHEN METABOLIC PANEL: CPT | Performed by: INTERNAL MEDICINE

## 2024-05-30 PROCEDURE — 3008F BODY MASS INDEX DOCD: CPT | Mod: CPTII,,, | Performed by: INTERNAL MEDICINE

## 2024-05-30 PROCEDURE — 3074F SYST BP LT 130 MM HG: CPT | Mod: CPTII,,, | Performed by: INTERNAL MEDICINE

## 2024-05-30 PROCEDURE — 36415 COLL VENOUS BLD VENIPUNCTURE: CPT | Mod: PO | Performed by: INTERNAL MEDICINE

## 2024-05-30 PROCEDURE — 80061 LIPID PANEL: CPT | Performed by: INTERNAL MEDICINE

## 2024-05-30 PROCEDURE — 99999 PR PBB SHADOW E&M-EST. PATIENT-LVL III: CPT | Mod: PBBFAC,,, | Performed by: INTERNAL MEDICINE

## 2024-05-30 NOTE — PROGRESS NOTES
Subjective     Patient ID: Cristina Tan is a 59 y.o. female.    Chief Complaint: Annual Exam    Here for annual    Hypothyriodism- due labs  Bipolar- stable on meds, following with psych, on lithin and abilify  Hyperlipidemia- stable on lipitor       Review of Systems   Constitutional:  Negative for fever.   Respiratory:  Negative for shortness of breath.    Cardiovascular:  Negative for chest pain.   Neurological:  Negative for headaches.          Objective     Physical Exam  Constitutional:       Appearance: Normal appearance.   HENT:      Head: Normocephalic.   Cardiovascular:      Rate and Rhythm: Normal rate and regular rhythm.   Pulmonary:      Effort: Pulmonary effort is normal.      Breath sounds: Normal breath sounds.   Musculoskeletal:         General: Normal range of motion.      Cervical back: Normal range of motion.   Neurological:      General: No focal deficit present.      Mental Status: She is alert.   Psychiatric:         Mood and Affect: Mood normal.         Behavior: Behavior normal.            Assessment and Plan     1. Bipolar 1 disorder, mixed, partial remission    2. Hypothyroidism, unspecified type  -     TSH; Future; Expected date: 05/30/2024  -     Comprehensive Metabolic Panel; Future; Expected date: 05/30/2024    3. Hyperlipidemia, unspecified hyperlipidemia type        Recehck tsh,  hyperlipidemia and bipolar stable         No follow-ups on file.

## 2024-06-13 RX ORDER — LEVOTHYROXINE SODIUM 50 UG/1
50 TABLET ORAL
Qty: 90 TABLET | Refills: 3 | Status: SHIPPED | OUTPATIENT
Start: 2024-06-13

## 2024-06-13 NOTE — TELEPHONE ENCOUNTER
Refill Decision Note   Cristina Tan  is requesting a refill authorization.  Brief Assessment and Rationale for Refill:  Approve     Medication Therapy Plan:  MRM resolved      Comments:     Note composed:9:02 AM 06/13/2024

## 2024-06-13 NOTE — TELEPHONE ENCOUNTER
No care due was identified.  [facility] Embedded Care Due Messages. Reference number: [id number].   6/13/2024 8:07:57 AM CDT

## 2024-07-25 ENCOUNTER — TELEPHONE (OUTPATIENT)
Dept: FAMILY MEDICINE | Facility: CLINIC | Age: 59
End: 2024-07-25
Payer: MEDICAID

## 2024-07-26 NOTE — TELEPHONE ENCOUNTER
----- Message from Ganesh Dalton sent at 7/25/2024  4:49 PM CDT -----  Contact: Pt. Portal  .Type:  Sooner Apoointment Request    Caller is requesting a sooner appointment.  Caller declined first available appointment listed below.  Caller will not accept being placed on the waitlist and is requesting a message be sent to doctor.  Name of Caller:pt  When is the first available appointment?  Symptoms: annual  Would the patient rather a call back or a response via [facility]?  Call back  Best Call Back Number: [phone]   Additional Information:  8/1/2024 - 10/1/2024

## 2024-08-06 RX ORDER — ARIPIPRAZOLE 5 MG/1
5 TABLET ORAL
Qty: 30 TABLET | Refills: 2 | Status: SHIPPED | OUTPATIENT
Start: 2024-08-06

## 2024-08-15 RX ORDER — ARIPIPRAZOLE 5 MG/1
5 TABLET ORAL ONCE
Qty: 30 TABLET | Refills: 2 | Status: SHIPPED | OUTPATIENT
Start: 2024-08-15 | End: 2024-08-15

## 2024-08-20 ENCOUNTER — HOSPITAL ENCOUNTER (OUTPATIENT)
Dept: RADIOLOGY | Facility: HOSPITAL | Age: 59
Discharge: HOME | End: 2024-08-20
Attending: INTERNAL MEDICINE
Payer: MEDICARE

## 2024-08-20 ENCOUNTER — OFFICE VISIT (OUTPATIENT)
Dept: FAMILY MEDICINE | Facility: CLINIC | Age: 59
End: 2024-08-20
Payer: MEDICARE

## 2024-08-20 VITALS
WEIGHT: 247 LBS | DIASTOLIC BLOOD PRESSURE: 70 MMHG | BODY MASS INDEX: 39.7 KG/M2 | SYSTOLIC BLOOD PRESSURE: 118 MMHG | HEIGHT: 66 IN | OXYGEN SATURATION: 96 % | HEART RATE: 59 BPM

## 2024-08-20 DIAGNOSIS — S49.91XS INJURY OF RIGHT SHOULDER, SEQUELA: ICD-10-CM

## 2024-08-20 DIAGNOSIS — Z00.00 ANNUAL PHYSICAL EXAM: Primary | ICD-10-CM

## 2024-08-20 PROCEDURE — 99214 OFFICE O/P EST MOD 30 MIN: CPT | Mod: S$PBB,,, | Performed by: INTERNAL MEDICINE

## 2024-08-20 PROCEDURE — 99213 OFFICE O/P EST LOW 20 MIN: CPT | Mod: PBBFAC,PO | Performed by: INTERNAL MEDICINE

## 2024-08-20 PROCEDURE — G2211 COMPLEX E/M VISIT ADD ON: HCPCS | Mod: S$PBB,,, | Performed by: INTERNAL MEDICINE

## 2024-08-20 PROCEDURE — 99999 PR PBB SHADOW E&M-EST. PATIENT-LVL III: CPT | Mod: PBBFAC,,, | Performed by: INTERNAL MEDICINE

## 2024-08-20 PROCEDURE — 73030 X-RAY EXAM OF SHOULDER: CPT | Mod: TC,FY,PO,RT

## 2024-08-20 RX ORDER — ATORVASTATIN CALCIUM 20 MG/1
1 TABLET, FILM COATED ORAL NIGHTLY
COMMUNITY

## 2024-08-20 RX ORDER — ATORVASTATIN CALCIUM 20 MG/1
20 TABLET, FILM COATED ORAL NIGHTLY
Qty: 90 TABLET | Refills: 3 | Status: SHIPPED | OUTPATIENT
Start: 2024-08-20

## 2024-08-20 NOTE — PROGRESS NOTES
Subjective     Patient ID: Cristina Tan is a 59 y.o. female.    Chief Complaint: Annual Exam and Shoulder Pain    Here annual. Utd labs and mammo and cscope    Rioght shoudler pain for two years progressively worsening. Hurts to reach behind to put on bra    Shoulder Pain   Pertinent negatives include no fever or headaches.     Review of Systems   Constitutional:  Negative for fever.   Respiratory:  Negative for shortness of breath.    Cardiovascular:  Negative for chest pain.   Neurological:  Negative for headaches.          Objective     Physical Exam  Constitutional:       Appearance: Normal appearance.   HENT:      Head: Normocephalic.   Cardiovascular:      Rate and Rhythm: Normal rate and regular rhythm.   Pulmonary:      Effort: Pulmonary effort is normal.      Breath sounds: Normal breath sounds.   Musculoskeletal:         General: Normal range of motion.      Cervical back: Normal range of motion.   Neurological:      General: No focal deficit present.      Mental Status: She is alert.   Psychiatric:         Mood and Affect: Mood normal.         Behavior: Behavior normal.            Assessment and Plan     1. Annual physical exam    2. Injury of right shoulder, sequela  -     X-ray Shoulder 2 or More Views Right; Future; Expected date: 08/20/2024    Other orders  -     atorvastatin (LIPITOR) 20 MG tablet; Take 1 tablet (20 mg total) by mouth every evening.  Dispense: 90 tablet; Refill: 3        Annual- utd  Shoulder pain xray  Hyperlipidemia- stable  Bipolar- stable following with psych       No follow-ups on file.

## 2024-08-27 ENCOUNTER — PATIENT MESSAGE (OUTPATIENT)
Dept: SPORTS MEDICINE | Facility: CLINIC | Age: 59
End: 2024-08-27

## 2024-08-27 ENCOUNTER — OFFICE VISIT (OUTPATIENT)
Dept: SPORTS MEDICINE | Facility: CLINIC | Age: 59
End: 2024-08-27
Payer: MEDICARE

## 2024-08-27 VITALS — SYSTOLIC BLOOD PRESSURE: 126 MMHG | HEART RATE: 73 BPM | DIASTOLIC BLOOD PRESSURE: 73 MMHG

## 2024-08-27 DIAGNOSIS — M75.101 ROTATOR CUFF SYNDROME OF RIGHT SHOULDER: ICD-10-CM

## 2024-08-27 DIAGNOSIS — M19.011 ARTHRITIS OF RIGHT ACROMIOCLAVICULAR JOINT: ICD-10-CM

## 2024-08-27 DIAGNOSIS — M75.21 BICEPS TENDONITIS, RIGHT: Primary | ICD-10-CM

## 2024-08-27 DIAGNOSIS — S49.91XS INJURY OF RIGHT SHOULDER, SEQUELA: ICD-10-CM

## 2024-08-27 PROCEDURE — 99999 PR PBB SHADOW E&M-EST. PATIENT-LVL III: CPT | Mod: PBBFAC,,, | Performed by: PHYSICIAN ASSISTANT

## 2024-08-27 PROCEDURE — 99213 OFFICE O/P EST LOW 20 MIN: CPT | Mod: PBBFAC | Performed by: PHYSICIAN ASSISTANT

## 2024-08-27 RX ORDER — CELECOXIB 200 MG/1
200 CAPSULE ORAL 2 TIMES DAILY
Qty: 56 CAPSULE | Refills: 0 | Status: SHIPPED | OUTPATIENT
Start: 2024-08-27 | End: 2024-08-27

## 2024-08-27 RX ORDER — MELOXICAM 15 MG/1
15 TABLET ORAL DAILY
Qty: 35 TABLET | Refills: 0 | Status: SHIPPED | OUTPATIENT
Start: 2024-08-27 | End: 2024-10-01

## 2024-08-27 NOTE — PROGRESS NOTES
CC: Right shoulder pain     59 y.o. RHD Female presents as a new patient to me. She is retired but cares for her grandchildren during the day. Complaint is right shoulder pain x years. She used to play softball, pitching, at [employer]. Recent increased pain over the past 6 months with Atraumatic onset. Pain localizes anteriorly and superiorly. Worse with picking up a cup, putting on her bra, sleeping on that side. Pain is disruptive to sleep at night. Better with rest. Denies neck pain or radicular symptoms- baseline carpal tunnel. Treatment thus far has included activity modifications, rest, and oral medication.  Here today to discuss diagnosis and treatment options.     PMHx notable for bipolar.   Negative for tobacco.   Negative for diabetes.     Pain Score:   5    PAST MEDICAL HISTORY:   Past Medical History:   Diagnosis Date    Bipolar 1 disorder     Cancer     skin cancer to right arm    GERD (gastroesophageal reflux disease)     History of psychiatric hospitalization     Mental disorder     Thyroid disease        PAST SURGICAL HISTORY:  Past Surgical History:   Procedure Laterality Date    breast reduction      BREAST SURGERY  Reduction    CARPAL TUNNEL RELEASE Left 2021    Procedure: RELEASE, CARPAL TUNNEL;  Surgeon: Ld Carbone MD;  Location: [facility] OR;  Service: Orthopedics;  Laterality: Left;  Procedure:  Left carpal tunnel release    Position:  Supine    Anesthesia:  Local    Equipment:  Carpal tunnel set    CARPAL TUNNEL RELEASE Right 2022    Procedure: Right carpal tunnel release;  Surgeon: Ld Carbone MD;  Location: [facility] OR;  Service: Orthopedics;  Laterality: Right;     SECTION      CHOLECYSTECTOMY      COLONOSCOPY N/A 2022    Procedure: COLONOSCOPY;  Surgeon: Rodrick Mccabe MD;  Location: [facility] ENDO [address]);  Service: Endoscopy;  Laterality: N/A;  Fully vaccinated, prep instr emailed -ml    COSMETIC SURGERY      gastric sleeve      HYSTERECTOMY       OOPHORECTOMY      TONSILLECTOMY      TOTAL REDUCTION MAMMOPLASTY Bilateral     2003    TUBAL LIGATION  6/24/97    tummy tuck         FAMILY HISTORY:  Family History   Problem Relation Name Age of Onset    Cancer Father Alex Costello     Alcohol abuse Father Alex Costello     Colon polyps Sister      Asthma Mother Isabelle Costello     Cancer Mother Isabelle Costello     COPD Mother Isabelle Costello     Breast cancer Neg Hx      Ovarian cancer Neg Hx      Diabetes Neg Hx      Hypertension Neg Hx      Thyroid disease Neg Hx         MEDICATIONS:    Current Outpatient Medications:     ALPRAZolam (XANAX) 0.5 MG tablet, Take 1 tablet (0.5 mg total) by mouth daily as needed for Anxiety., Disp: 30 tablet, Rfl: 0    atorvastatin (LIPITOR) 20 MG tablet, Take 1 tablet by mouth every evening., Disp: , Rfl:     atorvastatin (LIPITOR) 20 MG tablet, Take 1 tablet (20 mg total) by mouth every evening., Disp: 90 tablet, Rfl: 3    levothyroxine (SYNTHROID) 50 MCG tablet, TAKE 1 TABLET(50 MCG) BY MOUTH BEFORE BREAKFAST, Disp: 90 tablet, Rfl: 3    lithium (LITHOBID) 300 MG CR tablet, TAKE 1 TABLET(300 MG) BY MOUTH THREE TIMES DAILY, Disp: 90 tablet, Rfl: 5    omeprazole 20 mg TbLD, daily for 3 days., Disp: , Rfl:     ARIPiprazole (ABILIFY) 5 MG Tab, Take 1 tablet (5 mg total) by mouth once. for 1 dose, Disp: 30 tablet, Rfl: 2    hydrOXYzine pamoate (VISTARIL) 50 MG Cap, TAKE 1 CAPSULE(50 MG) BY MOUTH DAILY AS NEEDED FOR ANXIETY (Patient not taking: Reported on 8/20/2024), Disp: 30 capsule, Rfl: 3    meloxicam (MOBIC) 15 MG tablet, Take 1 tablet (15 mg total) by mouth once daily., Disp: 35 tablet, Rfl: 0    ALLERGIES:  Review of patient's allergies indicates:   Allergen Reactions    Prednisone Other (See Comments)     MEDICATION CAUSES PATIENT TO HAVE MANIC EPISODES    Lamictal [lamotrigine] Rash        REVIEW OF SYSTEMS:  Constitution: Negative. Negative for chills, fever and night sweats.    Hematologic/Lymphatic: Negative for bleeding problem. Does not  bruise/bleed easily.   Skin: Negative for dry skin, itching and rash.   Musculoskeletal: Negative for falls. Positive for right shoulder pain and muscle weakness.     All other review of symptoms were reviewed and found to be noncontributory.     PHYSICAL EXAMINATION:  Vitals:  /73   Pulse 73    General: Well-developed well-nourished 59 y.o. femalein no acute distress   Cardiovascular: Regular rhythm by palpation of distal pulse, normal color and temperature, no concerning varicosities on symptomatic side   Lungs: No labored breathing or wheezing appreciated   Neuro: Alert and oriented ×3   Psychiatric: well oriented to person, place and time, demonstrates normal mood and affect   Skin: No rashes, lesions or ulcers, normal temperature, turgor, and texture on uninvolved extremity    Ortho/SPM Exam  Examination of the right shoulder demonstrates active forward elevation to 170, ER with arm at side to 60 IR to T12. Passive FE to 175, ER to 60. Prominent tenderness along the proximal biceps tendon. Positive AC tenderness. 5/5 resisted supraspinatus testing. 5/5 resisted infraspinatus testing. Positive belly press test. Positive Speed. Negative Fine. Positive Cross body. Stable shoulder. No midline neck tenderness. Negative Spurling's maneuver.     IMAGING:  Xrays including AP, Outlet and Axillary Lateral of RIGHT shoulder are ordered / images reviewed by me:   No fracture or acute change appreciated. No significant glenohumeral degenerative changes. Mild to moderate AC joint arthritis. Normal acromiohumeral distance.     ASSESSMENT:      ICD-10-CM ICD-9-CM   1. Biceps tendonitis, right  M75.21 726.12   2. Injury of right shoulder, sequela  S49.91XS 908.9   3. Rotator cuff syndrome of right shoulder  M75.101 726.10   4. Arthritis of right acromioclavicular joint  M19.011 716.91       PLAN:     -Findings and treatment options were discussed with the patient. Most painful on exam is her biceps. She does have a  positive belly press but overall good strength and ROM. AC joint arthritis as well. Discussed conservative treatment. She does not want to do formal PT. Additionally she has previously taken prednisone which sent her into a Manic episode. She is hesitant for CSI for that reason.  -We have discussed a variety of treatment options including medications, injections, physical therapy and other alternative treatments. I also explained the indications, risks and benefits of surgery. Given the patients hx and examination today, I believe she would benefit from physical therapy. Pt agrees and would like to proceed with HEP.    I made the decision to obtain old records of the patient including previous notes and imaging. I independently reviewed and interpreted lab results today as well as prior imaging.     1. Mobic 15 mg 1 time daily PRN for pain management. Patient understands to take with food and/or OTC prilosec to decrease GI side effects.  2. Ice compress to the affected area 2-3x a day for 15-20 minutes as needed for pain management.  3. HEP for periscapular/rotator cuff strengthening. [place] protocol for scapular dyskinesis.  4. Consider CSI if symptoms worsen. I have messaged her psychiatrist about this.  5. RTC to see me in 8 weeks for follow-up.    All of the patient's questions were answered and the patient will contact us if they have any questions or concerns in the interim.

## 2024-09-15 NOTE — PROGRESS NOTES
See treatment section for plan of care   
No-Patient/Caregiver offered and refused free interpretation services.

## 2024-10-01 ENCOUNTER — PATIENT MESSAGE (OUTPATIENT)
Dept: SPORTS MEDICINE | Facility: CLINIC | Age: 59
End: 2024-10-01
Payer: MEDICARE

## 2024-10-22 ENCOUNTER — OFFICE VISIT (OUTPATIENT)
Dept: SPORTS MEDICINE | Facility: CLINIC | Age: 59
End: 2024-10-22
Payer: MEDICARE

## 2024-10-22 VITALS
HEIGHT: 66 IN | BODY MASS INDEX: 39.7 KG/M2 | SYSTOLIC BLOOD PRESSURE: 125 MMHG | HEART RATE: 67 BPM | WEIGHT: 247 LBS | DIASTOLIC BLOOD PRESSURE: 50 MMHG

## 2024-10-22 DIAGNOSIS — M75.21 BICEPS TENDONITIS, RIGHT: Primary | ICD-10-CM

## 2024-10-22 PROCEDURE — 99213 OFFICE O/P EST LOW 20 MIN: CPT | Mod: PBBFAC | Performed by: PHYSICIAN ASSISTANT

## 2024-10-22 PROCEDURE — 99999 PR PBB SHADOW E&M-EST. PATIENT-LVL III: CPT | Mod: PBBFAC,,, | Performed by: PHYSICIAN ASSISTANT

## 2024-10-22 PROCEDURE — 99213 OFFICE O/P EST LOW 20 MIN: CPT | Mod: S$PBB,,, | Performed by: PHYSICIAN ASSISTANT

## 2024-10-22 RX ORDER — MELOXICAM 15 MG/1
15 TABLET ORAL DAILY
Qty: 28 TABLET | Refills: 2 | Status: SHIPPED | OUTPATIENT
Start: 2024-10-22 | End: 2024-11-19

## 2024-10-22 NOTE — PROGRESS NOTES
CC: Right shoulder pain    Cristina Tan presenting for follow up of her right shoulder. She has been taking the Meloxicam and says she has no pain when taking the medication. Has not been doing HEP and does not want to do formal PT. I spoke with her psychiatrist who advise avoiding CSI due to history of steroid inducing manic episodes.    Prior Hx 8/27/24:   59 y.o. RHD Female presents as a new patient to me. She is retired but cares for her grandchildren during the day. Complaint is right shoulder pain x years. She used to play softball, pitching, at [employer]. Recent increased pain over the past 6 months with Atraumatic onset. Pain localizes anteriorly and superiorly. Worse with picking up a cup, putting on her bra, sleeping on that side. Pain is disruptive to sleep at night. Better with rest. Denies neck pain or radicular symptoms- baseline carpal tunnel. Treatment thus far has included activity modifications, rest, and oral medication.  Here today to discuss diagnosis and treatment options.     PMHx notable for bipolar.   Negative for tobacco.   Negative for diabetes.     Pain Score:   2    PAST MEDICAL HISTORY:   Past Medical History:   Diagnosis Date    Bipolar 1 disorder     Cancer     skin cancer to right arm    GERD (gastroesophageal reflux disease)     History of psychiatric hospitalization     Mental disorder     Thyroid disease        PAST SURGICAL HISTORY:  Past Surgical History:   Procedure Laterality Date    breast reduction      BREAST SURGERY  Reduction    CARPAL TUNNEL RELEASE Left 04/27/2021    Procedure: RELEASE, CARPAL TUNNEL;  Surgeon: Ld Carbone MD;  Location: [facility] OR;  Service: Orthopedics;  Laterality: Left;  Procedure:  Left carpal tunnel release    Position:  Supine    Anesthesia:  Local    Equipment:  Carpal tunnel set    CARPAL TUNNEL RELEASE Right 12/8/2022    Procedure: Right carpal tunnel release;  Surgeon: Ld Carbone MD;  Location: [facility] OR;  Service: Orthopedics;   Laterality: Right;     SECTION      CHOLECYSTECTOMY      COLONOSCOPY N/A 2022    Procedure: COLONOSCOPY;  Surgeon: Rodrick Mccabe MD;  Location: [facility] ([address] [place] [address]);  Service: Endoscopy;  Laterality: N/A;  Fully vaccinated, prep instr emailed -ml    COSMETIC SURGERY      gastric sleeve      HYSTERECTOMY      OOPHORECTOMY      TONSILLECTOMY      TOTAL REDUCTION MAMMOPLASTY Bilateral         TUBAL LIGATION  97    tummy tuck         FAMILY HISTORY:  Family History   Problem Relation Name Age of Onset    Cancer Father Alex Costello     Alcohol abuse Father Alex Costello     Colon polyps Sister      Asthma Mother Isabelle Costello     Cancer Mother Isabelle Costello     COPD Mother Isabelle Costello     Breast cancer Neg Hx      Ovarian cancer Neg Hx      Diabetes Neg Hx      Hypertension Neg Hx      Thyroid disease Neg Hx         MEDICATIONS:    Current Outpatient Medications:     ALPRAZolam (XANAX) 0.5 MG tablet, Take 1 tablet (0.5 mg total) by mouth daily as needed for Anxiety., Disp: 30 tablet, Rfl: 0    atorvastatin (LIPITOR) 20 MG tablet, Take 1 tablet (20 mg total) by mouth every evening., Disp: 90 tablet, Rfl: 3    levothyroxine (SYNTHROID) 50 MCG tablet, TAKE 1 TABLET(50 MCG) BY MOUTH BEFORE BREAKFAST, Disp: 90 tablet, Rfl: 3    lithium (LITHOBID) 300 MG CR tablet, TAKE 1 TABLET(300 MG) BY MOUTH THREE TIMES DAILY, Disp: 90 tablet, Rfl: 5    omeprazole 20 mg TbLD, daily for 3 days., Disp: , Rfl:     ARIPiprazole (ABILIFY) 5 MG Tab, Take 1 tablet (5 mg total) by mouth once. for 1 dose, Disp: 30 tablet, Rfl: 2    atorvastatin (LIPITOR) 20 MG tablet, Take 1 tablet by mouth every evening., Disp: , Rfl:     hydrOXYzine pamoate (VISTARIL) 50 MG Cap, TAKE 1 CAPSULE(50 MG) BY MOUTH DAILY AS NEEDED FOR ANXIETY (Patient not taking: Reported on 2024), Disp: 30 capsule, Rfl: 3    meloxicam (MOBIC) 15 MG tablet, Take 1 tablet (15 mg total) by mouth once daily., Disp: 28 tablet, Rfl: 2    ALLERGIES:  Review of  "patient's allergies indicates:   Allergen Reactions    Prednisone Other (See Comments)     MEDICATION CAUSES PATIENT TO HAVE MANIC EPISODES    Lamictal [lamotrigine] Rash        REVIEW OF SYSTEMS:  Constitution: Negative. Negative for chills, fever and night sweats.    Hematologic/Lymphatic: Negative for bleeding problem. Does not bruise/bleed easily.   Skin: Negative for dry skin, itching and rash.   Musculoskeletal: Negative for falls. Negative for right shoulder pain and muscle weakness.     All other review of symptoms were reviewed and found to be noncontributory.     PHYSICAL EXAMINATION:  Vitals:  BP (!) 125/50   Pulse 67   Ht 5' 6" (1.676 m)   Wt 112.1 kg (247 lb 0.4 oz)   BMI 39.87 kg/m²    General: Well-developed well-nourished 59 y.o. femalein no acute distress   Cardiovascular: Regular rhythm by palpation of distal pulse, normal color and temperature, no concerning varicosities on symptomatic side   Lungs: No labored breathing or wheezing appreciated   Neuro: Alert and oriented ×3   Psychiatric: well oriented to person, place and time, demonstrates normal mood and affect   Skin: No rashes, lesions or ulcers, normal temperature, turgor, and texture on uninvolved extremity    Ortho/SPM Exam  Examination of the right shoulder demonstrates active forward elevation to 170, ER with arm at side to 60 IR to T12. Passive FE to 175, ER to 60. Minimal tenderness along the proximal biceps tendon. Negative AC tenderness. 5/5 resisted supraspinatus testing. 5/5 resisted infraspinatus testing. Negative belly press test. Positive Speed. Negative Fine. Negative Cross body. Stable shoulder. No midline neck tenderness. Negative Spurling's maneuver.     IMAGING:  Xrays including AP, Outlet and Axillary Lateral of RIGHT shoulder are ordered / images reviewed by me:   No fracture or acute change appreciated. No significant glenohumeral degenerative changes. Mild to moderate AC joint arthritis. Normal acromiohumeral " distance.     ASSESSMENT:      ICD-10-CM ICD-9-CM   1. Biceps tendonitis, right  M75.21 726.12         PLAN:     -Findings and treatment options were discussed with the patient. Biceps tendonitis that has had good relief with NSAIDS. Will avoid CSI in the setting of her history and she is not interested in formal PT. Stressed the importance of HEP. She does not have pain today.  -We have discussed a variety of treatment options including medications, injections, physical therapy and other alternative treatments. I also explained the indications, risks and benefits of surgery. Given the patients hx and examination today, I believe she would benefit from physical therapy. Pt agrees and would like to proceed with HEP.    I made the decision to obtain old records of the patient including previous notes and imaging. I independently reviewed and interpreted lab results today as well as prior imaging.     1. Continue Mobic 15 mg 1 time daily PRN for pain management. Patient understands to take with food and/or OTC prilosec to decrease GI side effects.  2. Ice compress to the affected area 2-3x a day for 15-20 minutes as needed for pain management.  3. HEP for periscapular/rotator cuff strengthening. [place] protocol for scapular dyskinesis.  4. RTC prn    All of the patient's questions were answered and the patient will contact us if they have any questions or concerns in the interim.

## 2024-10-23 DIAGNOSIS — Z12.31 VISIT FOR SCREENING MAMMOGRAM: Primary | ICD-10-CM

## 2024-10-24 ENCOUNTER — HOSPITAL ENCOUNTER (OUTPATIENT)
Dept: RADIOLOGY | Facility: HOSPITAL | Age: 59
Discharge: HOME | End: 2024-10-24
Attending: SPECIALIST
Payer: MEDICARE

## 2024-10-24 ENCOUNTER — OFFICE VISIT (OUTPATIENT)
Dept: OBSTETRICS AND GYNECOLOGY | Facility: CLINIC | Age: 59
End: 2024-10-24
Payer: MEDICARE

## 2024-10-24 VITALS
SYSTOLIC BLOOD PRESSURE: 122 MMHG | DIASTOLIC BLOOD PRESSURE: 60 MMHG | HEIGHT: 66 IN | WEIGHT: 246.69 LBS | BODY MASS INDEX: 39.65 KG/M2

## 2024-10-24 DIAGNOSIS — Z12.39 ENCOUNTER FOR SCREENING FOR MALIGNANT NEOPLASM OF BREAST, UNSPECIFIED SCREENING MODALITY: ICD-10-CM

## 2024-10-24 DIAGNOSIS — Z12.31 VISIT FOR SCREENING MAMMOGRAM: ICD-10-CM

## 2024-10-24 DIAGNOSIS — Z01.419 ENCOUNTER FOR WELL WOMAN EXAM WITH ROUTINE GYNECOLOGICAL EXAM: Primary | ICD-10-CM

## 2024-10-24 DIAGNOSIS — Z12.4 ENCOUNTER FOR PAP SMEAR OF CERVIX WITH HPV DNA COTESTING: ICD-10-CM

## 2024-10-24 PROCEDURE — 99213 OFFICE O/P EST LOW 20 MIN: CPT | Mod: PBBFAC,PN | Performed by: SPECIALIST

## 2024-10-24 PROCEDURE — 77067 SCR MAMMO BI INCL CAD: CPT | Mod: 26,,, | Performed by: RADIOLOGY

## 2024-10-24 PROCEDURE — 77067 SCR MAMMO BI INCL CAD: CPT | Mod: TC,PN

## 2024-10-24 PROCEDURE — 77063 BREAST TOMOSYNTHESIS BI: CPT | Mod: TC,PN

## 2024-10-24 PROCEDURE — 77063 BREAST TOMOSYNTHESIS BI: CPT | Mod: 26,,, | Performed by: RADIOLOGY

## 2024-10-24 PROCEDURE — 99999 PR PBB SHADOW E&M-EST. PATIENT-LVL III: CPT | Mod: PBBFAC,,, | Performed by: SPECIALIST

## 2024-10-24 NOTE — PROGRESS NOTES
60 yo WF presents for annual gyn eval and screening mammogram  No overt gyn complaints  Past Medical History:   Diagnosis Date    Bipolar 1 disorder     Cancer     skin cancer to right arm    GERD (gastroesophageal reflux disease)     History of psychiatric hospitalization     Mental disorder     Thyroid disease        Past Surgical History:   Procedure Laterality Date    breast reduction      BREAST SURGERY  Reduction    CARPAL TUNNEL RELEASE Left 2021    Procedure: RELEASE, CARPAL TUNNEL;  Surgeon: Ld Carbone MD;  Location: [facility] OR;  Service: Orthopedics;  Laterality: Left;  Procedure:  Left carpal tunnel release    Position:  Supine    Anesthesia:  Local    Equipment:  Carpal tunnel set    CARPAL TUNNEL RELEASE Right 2022    Procedure: Right carpal tunnel release;  Surgeon: Ld Carbone MD;  Location: [facility] OR;  Service: Orthopedics;  Laterality: Right;     SECTION      CHOLECYSTECTOMY      COLONOSCOPY N/A 2022    Procedure: COLONOSCOPY;  Surgeon: Rodrick Mccabe MD;  Location: [facility] ENDO (4TH FLR);  Service: Endoscopy;  Laterality: N/A;  Fully vaccinated, prep instr emailed -ml    COSMETIC SURGERY      gastric sleeve      HYSTERECTOMY      OOPHORECTOMY      TONSILLECTOMY      TOTAL REDUCTION MAMMOPLASTY Bilateral         TUBAL LIGATION  97    tummy tuck         Family History   Problem Relation Name Age of Onset    Cancer Father Alex Costello     Alcohol abuse Father Alex Costello     Colon polyps Sister      Asthma Mother Isabelle Costello     Cancer Mother Isabelle Costello     COPD Mother Isabelle Costello     Breast cancer Neg Hx      Ovarian cancer Neg Hx      Diabetes Neg Hx      Hypertension Neg Hx      Thyroid disease Neg Hx         Social History     Socioeconomic History    Marital status:    Tobacco Use    Smoking status: Never    Smokeless tobacco: Never   Substance and Sexual Activity    Alcohol use: Yes     Alcohol/week: 2.0 standard drinks of alcohol      Types: 2 Drinks containing 0.5 oz of alcohol per week    Drug use: No    Sexual activity: Not Currently     Partners: Male     Birth control/protection: None     Social Drivers of Health     Financial Resource Strain: Low Risk  (1/18/2024)    Overall Financial Resource Strain (CARDIA)     Difficulty of Paying Living Expenses: Not hard at all   Food Insecurity: No Food Insecurity (1/18/2024)    Hunger Vital Sign     Worried About Running Out of Food in the Last Year: Never true     Ran Out of Food in the Last Year: Never true   Transportation Needs: No Transportation Needs (1/18/2024)    PRAPARE - Transportation     Lack of Transportation (Medical): No     Lack of Transportation (Non-Medical): No   Physical Activity: Insufficiently Active (1/18/2024)    Exercise Vital Sign     Days of Exercise per Week: 3 days     Minutes of Exercise per Session: 40 min   Stress: No Stress Concern Present (1/18/2024)    [employer] - Occupational Stress Questionnaire     Feeling of Stress : Not at all   Housing Stability: Low Risk  (1/18/2024)    Housing Stability Vital Sign     Unable to Pay for Housing in the Last Year: No     Number of Places Lived in the Last Year: 1     Unstable Housing in the Last Year: No       Current Outpatient Medications   Medication Sig Dispense Refill    ALPRAZolam (XANAX) 0.5 MG tablet Take 1 tablet (0.5 mg total) by mouth daily as needed for Anxiety. 30 tablet 0    ARIPiprazole (ABILIFY) 5 MG Tab Take 1 tablet (5 mg total) by mouth once. for 1 dose 30 tablet 2    atorvastatin (LIPITOR) 20 MG tablet Take 1 tablet (20 mg total) by mouth every evening. 90 tablet 3    levothyroxine (SYNTHROID) 50 MCG tablet TAKE 1 TABLET(50 MCG) BY MOUTH BEFORE BREAKFAST 90 tablet 3    lithium (LITHOBID) 300 MG CR tablet TAKE 1 TABLET(300 MG) BY MOUTH THREE TIMES DAILY 90 tablet 5    meloxicam (MOBIC) 15 MG tablet Take 1 tablet (15 mg total) by mouth once daily. 28 tablet 2    omeprazole 20 mg  TbLD daily for 3 days.      atorvastatin (LIPITOR) 20 MG tablet Take 1 tablet by mouth every evening.      hydrOXYzine pamoate (VISTARIL) 50 MG Cap TAKE 1 CAPSULE(50 MG) BY MOUTH DAILY AS NEEDED FOR ANXIETY 30 capsule 3     No current facility-administered medications for this visit.       Review of patient's allergies indicates:   Allergen Reactions    Prednisone Other (See Comments)     MEDICATION CAUSES PATIENT TO HAVE MANIC EPISODES    Lamictal [lamotrigine] Rash       Review of System:   General: no chills, fever, night sweats, weight gain or weight loss  Psychological: no depression or suicidal ideation  Breasts: no new or changing breast lumps, nipple discharge or masses.  Respiratory: no cough, shortness of breath, or wheezing  Cardiovascular: no chest pain or dyspnea on exertion  Gastrointestinal: no abdominal pain, change in bowel habits, or black or bloody stools  Genito-Urinary: no incontinence, urinary frequency/urgency or vulvar/vaginal symptoms, pelvic pain or abnormal vaginal bleeding.  Musculoskeletal: no gait disturbance or muscular weakness   PE:   APPEARANCE: Well nourished, well developed, in no acute distress.  NECK: Neck symmetric without masses or thyromegaly.  NODES: No inguinal lymph node enlargement.  ABDOMEN: Soft. No tenderness or masses. No hepatosplenomegaly. No hernias.  BREASTS: Symmetrical, no skin changes or visible lesions. No palpable masses, nipple discharge or adenopathy bilaterally.  PELVIC: Normal external female genitalia without lesions. Normal hair distribution. Adequate perineal body, normal urethral meatus. Vagina moist and well rugated without lesions or discharge. No significant cystocele or rectocele. Uterus and cervix surgically absent. Bimanual exam revealed no masses, tenderness or abnormality.    NOTE  NURSING PERSONAL PRESENT FOR ENTIRE PHYSICAL EXAM    Discussed and rec BSE monthly  Screening mammogram and repeat yearly  Daily calcium intake  RTO 2  years/prn    I spent a total of 30 minutes on the day of the visit. This includes face to face time and non-face to face time preparing to see the patient (eg, review of tests), obtaining and/or reviewing separately obtained history, documenting clinical information in the electronic or other health record, independently interpreting results and communicating results to the patient/family/caregiver, or care coordinator.

## 2024-10-31 ENCOUNTER — OFFICE VISIT (OUTPATIENT)
Dept: PSYCHIATRY | Facility: CLINIC | Age: 59
End: 2024-10-31
Payer: MEDICARE

## 2024-10-31 DIAGNOSIS — Z79.899 HIGH RISK MEDICATION USE: Primary | ICD-10-CM

## 2024-11-02 ENCOUNTER — LAB VISIT (OUTPATIENT)
Dept: LAB | Facility: HOSPITAL | Age: 59
End: 2024-11-02
Payer: MEDICARE

## 2024-11-02 ENCOUNTER — PATIENT MESSAGE (OUTPATIENT)
Dept: PSYCHIATRY | Facility: CLINIC | Age: 59
End: 2024-11-02
Payer: MEDICARE

## 2024-11-02 DIAGNOSIS — E78.49 OTHER HYPERLIPIDEMIA: ICD-10-CM

## 2024-11-02 LAB
CHOLEST SERPL-MCNC: 163 MG/DL (ref 120–199)
CHOLEST/HDLC SERPL: 2.8 {RATIO} (ref 2–5)
HDLC SERPL-MCNC: 58 MG/DL (ref 40–75)
HDLC SERPL: 35.6 % (ref 20–50)
LDLC SERPL CALC-MCNC: 69.2 MG/DL (ref 63–159)
NONHDLC SERPL-MCNC: 105 MG/DL
TRIGL SERPL-MCNC: 179 MG/DL (ref 30–150)

## 2024-11-02 PROCEDURE — 36415 COLL VENOUS BLD VENIPUNCTURE: CPT | Performed by: INTERNAL MEDICINE

## 2024-11-02 PROCEDURE — 80061 LIPID PANEL: CPT | Performed by: INTERNAL MEDICINE

## 2024-11-16 ENCOUNTER — LAB VISIT (OUTPATIENT)
Dept: LAB | Facility: HOSPITAL | Age: 59
End: 2024-11-16
Payer: MEDICARE

## 2024-11-16 DIAGNOSIS — Z79.899 HIGH RISK MEDICATION USE: ICD-10-CM

## 2024-11-16 LAB — LITHIUM SERPL-SCNC: 0.4 MMOL/L (ref 0.6–1.2)

## 2024-11-16 PROCEDURE — 36415 COLL VENOUS BLD VENIPUNCTURE: CPT | Performed by: NURSE PRACTITIONER

## 2024-11-16 PROCEDURE — 80178 ASSAY OF LITHIUM: CPT | Performed by: NURSE PRACTITIONER

## 2025-01-08 RX ORDER — LITHIUM CARBONATE 300 MG/1
TABLET, FILM COATED, EXTENDED RELEASE ORAL
Qty: 90 TABLET | Refills: 5 | Status: SHIPPED | OUTPATIENT
Start: 2025-01-08

## 2025-01-09 RX ORDER — MELOXICAM 15 MG/1
TABLET ORAL
Qty: 28 TABLET | Refills: 2 | Status: SHIPPED | OUTPATIENT
Start: 2025-01-09

## 2025-02-11 RX ORDER — ARIPIPRAZOLE 5 MG/1
5 TABLET ORAL
Qty: 30 TABLET | Refills: 2 | Status: SHIPPED | OUTPATIENT
Start: 2025-02-11

## 2025-02-27 ENCOUNTER — E-VISIT (OUTPATIENT)
Dept: FAMILY MEDICINE | Facility: CLINIC | Age: 60
End: 2025-02-27
Payer: MEDICARE

## 2025-02-27 DIAGNOSIS — R05.9 COUGH, UNSPECIFIED TYPE: Primary | ICD-10-CM

## 2025-02-27 RX ORDER — AZITHROMYCIN 250 MG/1
TABLET, FILM COATED ORAL
Qty: 6 TABLET | Refills: 0 | Status: SHIPPED | OUTPATIENT
Start: 2025-02-27 | End: 2025-03-04

## 2025-02-27 RX ORDER — PROMETHAZINE HYDROCHLORIDE AND DEXTROMETHORPHAN HYDROBROMIDE 6.25; 15 MG/5ML; MG/5ML
5 SYRUP ORAL EVERY 4 HOURS PRN
Qty: 240 ML | Refills: 0 | Status: SHIPPED | OUTPATIENT
Start: 2025-02-27 | End: 2025-03-09

## 2025-02-27 NOTE — PROGRESS NOTES
Patient ID: Cristina Tan is a 59 y.o. female.    Chief Complaint: General Illness (Entered automatically based on patient selection in [employer].)    The patient initiated a request through [employer] on 2/27/2025 for evaluation and management with a chief complaint of General Illness (Entered automatically based on patient selection in [employer].)     I evaluated the questionnaire submission on 2/28/2025  .    [facility] Evisit [facility]-Cough And Cold    2/27/2025  7:13 AM CST - Filed by Patient   What do you need help with? Cough, Cold, Sore Throat   Do you agree to participate in an E-Visit? Yes   If you have any of the following symptoms, go to your local emergency room or call 911: I acknowledge   What is the main issue you would like addressed today? Cough for the last 3 days causing my ribs and stomach muscles to hurt   Do you think you might have COVID-19 or the Flu? No   Have you tested positive for COVID-19 or Flu? No   What symptoms do you currently have?  Cough   Describe your cough: Dry;  Does not stop;  Interferes with daily activities   Have you ever smoked? Never smoked   Have you had a fever? No   When did your symptoms first appear? 2/24/2025   In the last two weeks, have you been in close contact with someone who has COVID-19, the Flu, or strep throat? No   List what you have done or taken to help your symptoms. Drink tea with honey and lemon and cough drops   On a scale of 1-10, where 10 is the worst you can imagine, how severe are your symptoms? (range: 1 - 10) 7   Have your symptoms changed since they first started? Improved   Do you have transportation to an [facility] location to get tested for COVID-19? Yes   Provide any additional information you feel is important. Can't take steroids because of bipolar.   Please attach any relevant images or files    Are you able to take your vital signs? No         Encounter Diagnosis   Name Primary?    Cough, unspecified type Yes        No orders of the defined  types were placed in this encounter.     Medications Ordered This Encounter   Medications    azithromycin (Z-GRISELDA) 250 MG tablet     Sig: Take 2 tablets by mouth on day 1; Take 1 tablet by mouth on days 2-5     Dispense:  6 tablet     Refill:  0    promethazine-dextromethorphan (PROMETHAZINE-DM) 6.25-15 mg/5 mL Syrp     Sig: Take 5 mLs by mouth every 4 (four) hours as needed.     Dispense:  240 mL     Refill:  0        No follow-ups on file.      E-Visit Time Tracking:

## 2025-03-08 ENCOUNTER — PATIENT MESSAGE (OUTPATIENT)
Dept: FAMILY MEDICINE | Facility: CLINIC | Age: 60
End: 2025-03-08
Payer: MEDICARE

## 2025-03-13 ENCOUNTER — OFFICE VISIT (OUTPATIENT)
Dept: INTERNAL MEDICINE | Facility: CLINIC | Age: 60
End: 2025-03-13
Payer: MEDICARE

## 2025-03-13 DIAGNOSIS — H92.10 EAR DISCHARGE, UNSPECIFIED LATERALITY: ICD-10-CM

## 2025-03-13 DIAGNOSIS — F31.75 BIPOLAR DISORDER, IN PARTIAL REMISSION, MOST RECENT EPISODE DEPRESSED: ICD-10-CM

## 2025-03-13 DIAGNOSIS — Z86.0101 HISTORY OF ADENOMATOUS POLYP OF COLON: Primary | ICD-10-CM

## 2025-03-13 NOTE — PROGRESS NOTES
The patient location is: [place]  The chief complaint leading to consultation is: colon screening    Visit type: audiovisual    Face to Face time with patient: 8  10 minutes of total time spent on the encounter, which includes face to face time and non-face to face time preparing to see the patient (eg, review of tests), Obtaining and/or reviewing separately obtained history, Documenting clinical information in the electronic or other health record, Independently interpreting results (not separately reported) and communicating results to the patient/family/caregiver, or Care coordination (not separately reported).         Each patient to whom he or she provides medical services by telemedicine is:  (1) informed of the relationship between the physician and patient and the respective role of any other health care provider with respect to management of the patient; and (2) notified that he or she may decline to receive medical services by telemedicine and may withdraw from such care at any time.    Notes:  Subjective     Patient ID: Cristina Tan is a 59 y.o. female.    Chief Complaint: No chief complaint on file.    HPI  She moved to [place] and is no longer seeing Dr Leonard   She would like colonoscopy ordered.      6 polyps by last scope 4/22. - several were tubular adenomas.          Was told to f/u in 2025    C/o dampness in ears on occas.  No itching.         PMH, Meds reviewed.    Bipolar disease managed by psych.          Taking lithium, abilify and prn xanax.  Review of Systems   Constitutional:  Negative for activity change and unexpected weight change.   HENT:  Positive for ear discharge. Negative for hearing loss, rhinorrhea and trouble swallowing.    Eyes:  Negative for discharge and visual disturbance.   Respiratory:  Negative for chest tightness and wheezing.    Cardiovascular:  Negative for chest pain and palpitations.   Gastrointestinal:  Negative for blood in stool, constipation, diarrhea and  vomiting.   Endocrine: Negative for polydipsia and polyuria.   Genitourinary:  Negative for difficulty urinating, dysuria, hematuria and menstrual problem.   Musculoskeletal:  Negative for arthralgias, joint swelling and neck pain.   Neurological:  Negative for weakness and headaches.   Psychiatric/Behavioral:  Negative for confusion and dysphoric mood.           Objective     Physical Exam  Constitutional:       Appearance: Normal appearance.   Neurological:      General: No focal deficit present.      Mental Status: She is alert and oriented to person, place, and time.   Psychiatric:         Mood and Affect: Mood normal.         Behavior: Behavior normal.         Thought Content: Thought content normal.            Assessment and Plan     1. History of adenomatous polyp of colon  -     Ambulatory referral/consult to Endo Procedure ; Future; Expected date: 03/14/2025    2. Ear discharge, unspecified laterality    3. Bipolar disorder, in partial remission, most recent episode depressed                       - occasionally occurring                       - exam required to make appt.     Establish with new PCP    No follow-ups on file.

## 2025-03-24 ENCOUNTER — TELEPHONE (OUTPATIENT)
Dept: ENDOSCOPY | Facility: HOSPITAL | Age: 60
End: 2025-03-24

## 2025-03-24 ENCOUNTER — CLINICAL SUPPORT (OUTPATIENT)
Dept: ENDOSCOPY | Facility: HOSPITAL | Age: 60
End: 2025-03-24
Attending: INTERNAL MEDICINE
Payer: MEDICARE

## 2025-03-24 DIAGNOSIS — Z86.0101 HISTORY OF ADENOMATOUS POLYP OF COLON: ICD-10-CM

## 2025-03-24 DIAGNOSIS — Z12.11 COLON CANCER SCREENING: Primary | ICD-10-CM

## 2025-03-24 RX ORDER — SODIUM, POTASSIUM,MAG SULFATES 17.5-3.13G
1 SOLUTION, RECONSTITUTED, ORAL ORAL DAILY
Qty: 1 KIT | Refills: 0 | Status: SHIPPED | OUTPATIENT
Start: 2025-03-24 | End: 2025-03-26

## 2025-03-24 NOTE — PRE-PROCEDURE INSTRUCTIONS
Referral for procedure from PAT appointment      Spoke to Cristina to schedule procedure(s) Colonoscopy       Physician to perform procedure(s) Dr. KIP Mccabe  Date of Procedure (s) 04/15/25  Arrival Time 08:40 AM  Time of Procedure(s) 09:40 AM   Location of Procedure(s) [facility] [address] Floor  Type of Rx Prep sent to patient: Suprep  Instructions provided to patient via [facility]    Patient was informed on the following information and verbalized understanding. Screening questionnaire reviewed with patient and complete. If procedure requires anesthesia, a responsible adult needs to be present to accompany the patient home, patient cannot drive after receiving anesthesia. Appointment details are tentative, especially check-in time. Patient will receive a prep-op call 7 days prior to confirm check-in time for procedure. If applicable the patient should contact their pharmacy to verify Rx for procedure prep is ready for pick-up. Patient was advised to call the scheduling department at [phone] if pharmacy states no Rx is available. Patient was advised to call the endoscopy scheduling department if any questions or concerns arise.      [facility] Scheduling Department

## 2025-03-24 NOTE — TELEPHONE ENCOUNTER
Referral for procedure from PAT appointment        Spoke to Cristina to schedule procedure(s) Colonoscopy       Physician to perform procedure(s) Dr. KIP Mccabe  Date of Procedure (s) 04/15/25  Arrival Time 08:40 AM  Time of Procedure(s) 09:40 AM   Location of Procedure(s) [place] 4th Floor  Type of Rx Prep sent to patient: Suprep  Instructions provided to patient via [facility]     Patient was informed on the following information and verbalized understanding. Screening questionnaire reviewed with patient and complete. If procedure requires anesthesia, a responsible adult needs to be present to accompany the patient home, patient cannot drive after receiving anesthesia. Appointment details are tentative, especially check-in time. Patient will receive a prep-op call 7 days prior to confirm check-in time for procedure. If applicable the patient should contact their pharmacy to verify Rx for procedure prep is ready for pick-up. Patient was advised to call the scheduling department at [phone] if pharmacy states no Rx is available. Patient was advised to call the endoscopy scheduling department if any questions or concerns arise.        [facility] Scheduling Department

## 2025-04-03 ENCOUNTER — PATIENT MESSAGE (OUTPATIENT)
Dept: SURGERY | Facility: CLINIC | Age: 60
End: 2025-04-03
Payer: MEDICARE

## 2025-04-15 ENCOUNTER — ANESTHESIA EVENT (OUTPATIENT)
Dept: ENDOSCOPY | Facility: HOSPITAL | Age: 60
End: 2025-04-15
Payer: MEDICARE

## 2025-04-15 ENCOUNTER — ANESTHESIA (OUTPATIENT)
Dept: ENDOSCOPY | Facility: HOSPITAL | Age: 60
End: 2025-04-15
Payer: MEDICARE

## 2025-04-15 ENCOUNTER — HOSPITAL ENCOUNTER (OUTPATIENT)
Facility: HOSPITAL | Age: 60
Discharge: HOME | End: 2025-04-15
Attending: STUDENT IN AN ORGANIZED HEALTH CARE EDUCATION/TRAINING PROGRAM | Admitting: STUDENT IN AN ORGANIZED HEALTH CARE EDUCATION/TRAINING PROGRAM
Payer: MEDICARE

## 2025-04-15 VITALS
SYSTOLIC BLOOD PRESSURE: 111 MMHG | TEMPERATURE: 98 F | RESPIRATION RATE: 16 BRPM | OXYGEN SATURATION: 97 % | HEART RATE: 52 BPM | WEIGHT: 250.25 LBS | HEIGHT: 66 IN | DIASTOLIC BLOOD PRESSURE: 61 MMHG | BODY MASS INDEX: 40.22 KG/M2

## 2025-04-15 DIAGNOSIS — Z86.0101 HISTORY OF ADENOMATOUS POLYP OF COLON: ICD-10-CM

## 2025-04-15 DIAGNOSIS — Z12.11 ENCOUNTER FOR SCREENING COLONOSCOPY: ICD-10-CM

## 2025-04-15 DIAGNOSIS — Z12.11 SCREENING FOR MALIGNANT NEOPLASM OF COLON: Primary | ICD-10-CM

## 2025-04-15 PROCEDURE — 37000009 HC ANESTHESIA EA ADD 15 MINS: Performed by: STUDENT IN AN ORGANIZED HEALTH CARE EDUCATION/TRAINING PROGRAM

## 2025-04-15 PROCEDURE — 88305 TISSUE EXAM BY PATHOLOGIST: CPT | Mod: TC,91 | Performed by: STUDENT IN AN ORGANIZED HEALTH CARE EDUCATION/TRAINING PROGRAM

## 2025-04-15 PROCEDURE — 25000003 PHARM REV CODE 250: Performed by: NURSE ANESTHETIST, CERTIFIED REGISTERED

## 2025-04-15 PROCEDURE — 45385 COLONOSCOPY W/LESION REMOVAL: CPT | Mod: PT,,, | Performed by: STUDENT IN AN ORGANIZED HEALTH CARE EDUCATION/TRAINING PROGRAM

## 2025-04-15 PROCEDURE — 27202867: Performed by: STUDENT IN AN ORGANIZED HEALTH CARE EDUCATION/TRAINING PROGRAM

## 2025-04-15 PROCEDURE — 45385 COLONOSCOPY W/LESION REMOVAL: CPT | Mod: PT | Performed by: STUDENT IN AN ORGANIZED HEALTH CARE EDUCATION/TRAINING PROGRAM

## 2025-04-15 PROCEDURE — 37000008 HC ANESTHESIA 1ST 15 MINUTES: Performed by: STUDENT IN AN ORGANIZED HEALTH CARE EDUCATION/TRAINING PROGRAM

## 2025-04-15 PROCEDURE — 63600175 PHARM REV CODE 636 W HCPCS: Performed by: NURSE ANESTHETIST, CERTIFIED REGISTERED

## 2025-04-15 RX ORDER — PROPOFOL 10 MG/ML
VIAL (ML) INTRAVENOUS
Status: DISCONTINUED | OUTPATIENT
Start: 2025-04-15 | End: 2025-04-15

## 2025-04-15 RX ORDER — SODIUM CHLORIDE 9 MG/ML
INJECTION, SOLUTION INTRAVENOUS CONTINUOUS
Status: DISCONTINUED | OUTPATIENT
Start: 2025-04-15 | End: 2025-04-15 | Stop reason: HOSPADM

## 2025-04-15 RX ORDER — LIDOCAINE HYDROCHLORIDE 20 MG/ML
INJECTION INTRAVENOUS
Status: DISCONTINUED | OUTPATIENT
Start: 2025-04-15 | End: 2025-04-15

## 2025-04-15 RX ADMIN — LIDOCAINE HYDROCHLORIDE 100 MG: 20 INJECTION INTRAVENOUS at 10:04

## 2025-04-15 RX ADMIN — SODIUM CHLORIDE: 9 INJECTION, SOLUTION INTRAVENOUS at 10:04

## 2025-04-15 RX ADMIN — PROPOFOL 100 MG: 10 INJECTION, EMULSION INTRAVENOUS at 10:04

## 2025-04-15 RX ADMIN — GLYCOPYRROLATE 0.1 MG: 0.2 INJECTION, SOLUTION INTRAMUSCULAR; INTRAVENOUS at 10:04

## 2025-04-15 RX ADMIN — PROPOFOL 150 MCG/KG/MIN: 10 INJECTION, EMULSION INTRAVENOUS at 10:04

## 2025-04-15 NOTE — H&P
[facility]  Colon and Rectal Surgery    [address]  [place], [place]  Tel: [phone]  Fax: [phone]  [email or web address]   MD Genaro Juarez MD Brian Kann, MD W. Forrest Johnston, MD Matthew Zelhart, MD Jennifer Paruch, MD William Kethman, MD Danielle Kay, MD Steven Schuetz, MD     Patient name: Cristina Tan   YOB: 1965   MRN: [id number]  Date of procedure: 04/15/2025    Procedure: Colonoscopy  Indications: Previous adenomatous polyp, Screening for colon cancer, and No family history of colorectal cancer    Last colonoscopy:  (Complete)    The patient was informed of the availability of a certified  without charge. A certified  was not necessary for this visit.    Sedation plan: MAC  ASA: ASA 2 - Patient with mild systemic disease with no functional limitations    Review of Systems  See above    Past Medical History:   Diagnosis Date    Bipolar 1 disorder     Cancer     skin cancer to right arm    GERD (gastroesophageal reflux disease)     History of psychiatric hospitalization     Mental disorder     Thyroid disease      Past Surgical History:   Procedure Laterality Date    breast reduction      BREAST SURGERY  Reduction    CARPAL TUNNEL RELEASE Left 2021    Procedure: RELEASE, CARPAL TUNNEL;  Surgeon: Ld Carbone MD;  Location: [facility] OR;  Service: Orthopedics;  Laterality: Left;  Procedure:  Left carpal tunnel release    Position:  Supine    Anesthesia:  Local    Equipment:  Carpal tunnel set    CARPAL TUNNEL RELEASE Right 2022    Procedure: Right carpal tunnel release;  Surgeon: Ld Carbone MD;  Location: [facility] OR;  Service: Orthopedics;  Laterality: Right;     SECTION      CHOLECYSTECTOMY      COLONOSCOPY N/A 2022    Procedure: COLONOSCOPY;  Surgeon: Rodrick Mccabe MD;  Location: [facility] ([address] [place] [address]);  Service: Endoscopy;  Laterality: N/A;  Fully  "vaccinated, prep instr emailed -ml    COSMETIC SURGERY  2002    gastric sleeve      HYSTERECTOMY      OOPHORECTOMY      TONSILLECTOMY      TOTAL REDUCTION MAMMOPLASTY Bilateral     2003    TUBAL LIGATION  6/24/97    tummy jonnabetty       Family History   Problem Relation Name Age of Onset    Cancer Father Alex Costello     Alcohol abuse Father Alex Costello     Colon polyps Sister      Asthma Mother Isabelle Costello     Cancer Mother Isabelle Costello     COPD Mother Isabelle Costello     Breast cancer Neg Hx      Ovarian cancer Neg Hx      Diabetes Neg Hx      Hypertension Neg Hx      Thyroid disease Neg Hx       Social History[1]  Review of patient's allergies indicates:   Allergen Reactions    Prednisone Other (See Comments)     MEDICATION CAUSES PATIENT TO HAVE MANIC EPISODES    Lamictal [lamotrigine] Rash       Prior to Admission medications    Medication Sig Start Date End Date Taking? Authorizing Provider   ARIPiprazole (ABILIFY) 5 MG Tab TAKE 1 TABLET(5 MG) BY MOUTH DAILY 2/11/25  Yes Montana Aguilar NP   levothyroxine (SYNTHROID) 50 MCG tablet TAKE 1 TABLET(50 MCG) BY MOUTH BEFORE BREAKFAST 6/13/24  Yes Milli Leonard MD   lithium (LITHOBID) 300 MG CR tablet TAKE 1 TABLET(300 MG) BY MOUTH THREE TIMES DAILY 1/8/25  Yes Montana Aguilar NP   ALPRAZolam (XANAX) 0.5 MG tablet Take 1 tablet (0.5 mg total) by mouth daily as needed for Anxiety. 2/8/23   Montana Aguilar NP   atorvastatin (LIPITOR) 20 MG tablet Take 1 tablet by mouth every evening.    Provider, Historical   atorvastatin (LIPITOR) 20 MG tablet Take 1 tablet (20 mg total) by mouth every evening. 8/20/24   Milli Leonard MD   meloxicam (MOBIC) 15 MG tablet TAKE 1 TABLET(15 MG) BY MOUTH DAILY 1/9/25   Petty Cadet PA-C   omeprazole 20 mg TbLD daily for 3 days.    Provider, Historical       Physical Examination  /71   Pulse (!) 47   Temp 97.7 °F (36.5 °C)   Resp 16   Ht 5' 6" (1.676 m)   Wt 113.5 kg (250 lb 3.6 oz)   SpO2 96%   " Breastfeeding No   BMI 40.39 kg/m²      Constitutional: well developed, no cough, no dyspnea, alert, and no acute distress    Head: Normocephalic, no lesions, without obvious abnormality  Eye: Normal external eye, conjunctiva, and lids, PERRL  Cardiovascular: regular rate and regular rhythm  Respiratory: normal air entry  Gastrointestinal: soft, non-tender, without masses or organomegaly  Neurologic: alert, oriented, normal speech, no focal findings or movement disorder noted  Psychiatric: appropriate, normal mood    Plan of Care    It was a pleasure meeting Ms. Tan today - we will plan to perform a colonoscopy with monitored anesthesia care. The details of the procedure, the possible need for biopsy or polypectomy and the potential risks including bleeding, perforation, missed polyps were discussed in detail and they consented to undergo the procedure.      Rodrick Mccabe MD, FACS, FASCRS  Department of Colon & Rectal Surgery  [facility]         [1]   Social History  Tobacco Use    Smoking status: Never    Smokeless tobacco: Never   Substance Use Topics    Alcohol use: Not Currently     Alcohol/week: 2.0 standard drinks of alcohol     Types: 2 Drinks containing 0.5 oz of alcohol per week    Drug use: No

## 2025-04-15 NOTE — [employer] PATIENT INSTRUCTIONS
Discharge Summary/Instructions after an Endoscopic Procedure  Patient Name: Cristina Tan  Patient MRN: [id number]  Patient YOB: 1965  Tuesday, April 15, 2025  Rodrick Mccabe MD  Dear patient,  As a result of recent federal legislation (The Federal Cures Act), you may   receive lab or pathology results from your procedure in your [facility]   account before your physician is able to contact you. Your physician or   their representative will relay the results to you with their   recommendations at their soonest availability.  Thank you,  RESTRICTIONS:  During your procedure today, you received medications for sedation.  These   medications may affect your judgment, balance and coordination.  Therefore,   for 24 hours, you have the following restrictions:   - DO NOT drive a car, operate machinery, make legal/financial decisions,   sign important papers or drink alcohol.    ACTIVITY:  Today: no heavy lifting, straining or running due to procedural   sedation/anesthesia.  The following day: return to full activity including work.  DIET:  Eat and drink normally unless instructed otherwise.     TREATMENT FOR COMMON SIDE EFFECTS:  - Mild abdominal pain, nausea, belching, bloating or excessive gas:  rest,   eat lightly and use a heating pad.  - Sore Throat: treat with throat lozenges and/or gargle with warm salt   water.  - Because air was used during the procedure, expelling large amounts of air   from your rectum or belching is normal.  - If a bowel prep was taken, you may not have a bowel movement for 1-3 days.    This is normal.  SYMPTOMS TO WATCH FOR AND REPORT TO YOUR PHYSICIAN:  1. Abdominal pain or bloating, other than gas cramps.  2. Chest pain.  3. Back pain.  4. Signs of infection such as: chills or fever occurring within 24 hours   after the procedure.  5. Rectal bleeding, which would show as bright red, maroon, or black stools.   (A tablespoon of blood from the rectum is not serious, especially  if   hemorrhoids are present.)  6. Vomiting.  7. Weakness or dizziness.  GO DIRECTLY TO THE NEAREST EMERGENCY ROOM IF YOU HAVE ANY OF THE FOLLOWING:      Difficulty breathing              Chills and/or fever over 101 F   Persistent vomiting and/or vomiting blood   Severe abdominal pain   Severe chest pain   Black, tarry stools   Bleeding- more than one tablespoon   Any other symptom or condition that you feel may need urgent attention  Your doctor recommends these additional instructions:  If any biopsies were taken, your doctors clinic will contact you in 1 to 2   weeks with any results.  - Discharge patient to home.   - Resume previous diet.   - Continue present medications.   - Await pathology results.   - Repeat colonoscopy for surveillance based on pathology results.   - Return to referring physician as previously scheduled.  For questions, problems or results please call your physician - Rodrick Mccabe MD at Work:  [phone].  [facility] EMERGENCY ROOM PHONE NUMBER: [phone]  IF A COMPLICATION OR EMERGENCY SITUATION ARISES AND YOU ARE UNABLE TO REACH   YOUR PHYSICIAN - GO DIRECTLY TO THE EMERGENCY ROOM.  MD Rodrick Cannon MD  4/15/2025 10:43:51 AM  This report has been verified and signed electronically.  Dear patient,  As a result of recent federal legislation (The Federal Cures Act), you may   receive lab or pathology results from your procedure in your [facility]   account before your physician is able to contact you. Your physician or   their representative will relay the results to you with their   recommendations at their soonest availability.  Thank you,  [employer]

## 2025-04-15 NOTE — ANESTHESIA POSTPROCEDURE EVALUATION
Anesthesia Post Evaluation    Patient: Cristina Tan    Procedure(s) Performed: Procedure(s) (LRB):  COLONOSCOPY, SCREENING, HIGH RISK PATIENT (N/A)    Final Anesthesia Type: general      Patient location during evaluation: PACU  Patient participation: Yes- Able to Participate  Level of consciousness: awake and alert and oriented  Post-procedure vital signs: reviewed and stable  Pain management: adequate  Airway patency: patent    PONV status at discharge: No PONV  Anesthetic complications: no      Cardiovascular status: hemodynamically stable  Respiratory status: nasal cannula  Hydration status: euvolemic  Follow-up not needed.          Vitals Value Taken Time   /61 04/15/25 11:17   Temp 36.7 °C (98.1 °F) 04/15/25 10:47   Pulse 52 04/15/25 11:17   Resp 16 04/15/25 11:17   SpO2 97 % 04/15/25 11:17         Event Time   Out of Recovery 11:22:32         Pain/Jimbo Score: Jimbo Score: 10 (4/15/2025 11:17 AM)

## 2025-04-15 NOTE — ANESTHESIA PREPROCEDURE EVALUATION
04/15/2025  Cristina Tan is a 60 y.o., female.    Procedure: COLONOSCOPY, SCREENING, HIGH RISK PATIENT - /suprep/portal/Bao   precall complete. kw   Anesthesia type: Choice   Diagnosis: History of adenomatous polyp of colon [Z86.0101]     Problem List[1]  Past Surgical History:   Procedure Laterality Date    breast reduction      BREAST SURGERY  Reduction    CARPAL TUNNEL RELEASE Left 2021    Procedure: RELEASE, CARPAL TUNNEL;  Surgeon: Ld Carbone MD;  Location: [facility] OR;  Service: Orthopedics;  Laterality: Left;  Procedure:  Left carpal tunnel release    Position:  Supine    Anesthesia:  Local    Equipment:  Carpal tunnel set    CARPAL TUNNEL RELEASE Right 2022    Procedure: Right carpal tunnel release;  Surgeon: Ld Carbone MD;  Location: [facility] OR;  Service: Orthopedics;  Laterality: Right;     SECTION      CHOLECYSTECTOMY      COLONOSCOPY N/A 2022    Procedure: COLONOSCOPY;  Surgeon: Rodrick Mccabe MD;  Location: [facility] ([address] [place] [address]);  Service: Endoscopy;  Laterality: N/A;  Fully vaccinated, prep instr emailed -ml    COSMETIC SURGERY      gastric sleeve      HYSTERECTOMY      OOPHORECTOMY      TONSILLECTOMY      TOTAL REDUCTION MAMMOPLASTY Bilateral         TUBAL LIGATION  97    tummy tuck       Current Discharge Medication List        CONTINUE these medications which have NOT CHANGED    Details   ARIPiprazole (ABILIFY) 5 MG Tab TAKE 1 TABLET(5 MG) BY MOUTH DAILY  Qty: 30 tablet, Refills: 2      levothyroxine (SYNTHROID) 50 MCG tablet TAKE 1 TABLET(50 MCG) BY MOUTH BEFORE BREAKFAST  Qty: 90 tablet, Refills: 3      lithium (LITHOBID) 300 MG CR tablet TAKE 1 TABLET(300 MG) BY MOUTH THREE TIMES DAILY  Qty: 90 tablet, Refills: 5      ALPRAZolam (XANAX) 0.5 MG tablet Take 1 tablet (0.5 mg total) by mouth daily as  needed for Anxiety.  Qty: 30 tablet, Refills: 0      !! atorvastatin (LIPITOR) 20 MG tablet Take 1 tablet by mouth every evening.      !! atorvastatin (LIPITOR) 20 MG tablet Take 1 tablet (20 mg total) by mouth every evening.  Qty: 90 tablet, Refills: 3      meloxicam (MOBIC) 15 MG tablet TAKE 1 TABLET(15 MG) BY MOUTH DAILY  Qty: 28 tablet, Refills: 2      omeprazole 20 mg TbLD daily for 3 days.       !! - Potential duplicate medications found. Please discuss with provider.            Pre-op Assessment    I have reviewed the NPO Status.   I have reviewed the Medications.     Review of Systems  Anesthesia Hx:  No problems with previous Anesthesia                Cardiovascular:  Exercise tolerance: good                                             Hepatic/GI:     GERD                Neurological:    Neuromuscular Disease,  Headaches                                 Endocrine:        Morbid Obesity / BMI > 40  Psych:  Psychiatric History anxiety depression              Physical Exam  General: Well nourished    Airway:  Mallampati: II / II  Mouth Opening: Normal  TM Distance: Normal  Tongue: Normal  Neck ROM: Normal ROM    Dental:  Intact      Anesthesia Plan  Type of Anesthesia, risks & benefits discussed:    Anesthesia Type: Gen Supraglottic Airway  Intra-op Monitoring Plan: Standard ASA Monitors  Post Op Pain Control Plan: multimodal analgesia and IV/PO Opioids PRN  Induction:  IV  Airway Plan: Direct  Informed Consent: Informed consent signed with the Patient and all parties understand the risks and agree with anesthesia plan.  All questions answered. Patient consented to blood products? No  ASA Score: 3    Ready For Surgery From Anesthesia Perspective.     .             [1]  Patient Active Problem List  Diagnosis    Contusion of right wrist    Carpal tunnel syndrome of right wrist    Bipolar 1 disorder    Anxiety disorder, unspecified    GERD (gastroesophageal reflux disease)    Hyperlipidemia    Peripheral  neuritis of left foot    Left foot pain    Localized edema    Carpal tunnel syndrome on left    Screening for malignant neoplasm of colon    Acute left ankle pain    Thyroid disease    Headache    Carpal tunnel syndrome on right    Right hand pain    Right foot pain

## 2025-04-15 NOTE — TRANSFER OF CARE
"Anesthesia Transfer of Care Note    Patient: Cristina Tan    Procedure(s) Performed: Procedure(s) (LRB):  COLONOSCOPY, SCREENING, HIGH RISK PATIENT (N/A)    Patient location: GI    Anesthesia Type: general    Transport from OR: Transported from OR on 6-10 L/min O2 by face mask with adequate spontaneous ventilation    Post pain: adequate analgesia    Post assessment: no apparent anesthetic complications and tolerated procedure well    Post vital signs: stable    Level of consciousness: awake and alert    Nausea/Vomiting: no nausea/vomiting    Complications: none    Transfer of care protocol was followed      Last vitals: Visit Vitals  /71   Pulse (!) 47   Temp 36.5 °C (97.7 °F)   Resp 16   Ht 5' 6" (1.676 m)   Wt 113.5 kg (250 lb 3.6 oz)   SpO2 96%   Breastfeeding No   BMI 40.39 kg/m²     "

## 2025-04-17 LAB
ESTROGEN SERPL-MCNC: NORMAL PG/ML
INSULIN SERPL-ACNC: NORMAL U[IU]/ML
LAB AP CLINICAL INFORMATION: NORMAL
LAB AP GROSS DESCRIPTION: NORMAL
LAB AP PERFORMING LOCATION(S): NORMAL
LAB AP REPORT FOOTNOTES: NORMAL

## 2025-04-22 ENCOUNTER — RESULTS FOLLOW-UP (OUTPATIENT)
Facility: CLINIC | Age: 60
End: 2025-04-22

## 2025-04-30 NOTE — TELEPHONE ENCOUNTER
LDO - 2/11/2025  LOV - 10/31/2024 ( Virtual )  FOV - None Scheduled    ** Called PT 4/30 @ 1:28 PM to schedule her follow up appointment. NEEDS to be In Person. Last seen in Clinic was 1/2024

## 2025-05-01 RX ORDER — ARIPIPRAZOLE 5 MG/1
5 TABLET ORAL
Qty: 30 TABLET | Refills: 2 | Status: SHIPPED | OUTPATIENT
Start: 2025-05-01

## 2025-05-22 ENCOUNTER — OFFICE VISIT (OUTPATIENT)
Dept: PSYCHIATRY | Facility: CLINIC | Age: 60
End: 2025-05-22

## 2025-05-22 ENCOUNTER — PATIENT MESSAGE (OUTPATIENT)
Dept: PSYCHIATRY | Facility: CLINIC | Age: 60
End: 2025-05-22
Payer: MEDICARE

## 2025-05-22 DIAGNOSIS — Z79.899 HIGH RISK MEDICATION USE: Primary | ICD-10-CM

## 2025-05-22 RX ORDER — ARIPIPRAZOLE 5 MG/1
5 TABLET ORAL DAILY
Qty: 30 TABLET | Refills: 3 | Status: SHIPPED | OUTPATIENT
Start: 2025-05-22

## 2025-05-22 NOTE — PROGRESS NOTES
"Outpatient Psychiatry Follow-Up Visit    Clinical Status of Patient: Outpatient (Virtual)  05/22/2025       [address] [place] [id number]  [phone] (M)  Visit type: Virtual visit with synchronous audio and video  Each patient to whom he or she provides medical services by telemedicine is:  (1) informed of the relationship between the physician and patient and the respective role of any other health care provider with respect to management of the patient; and (2) notified that he or she may decline to receive medical services by telemedicine and may withdraw from such care at any time.      Chief Complaint: Pt is a 59 year old female who presents today for a follow-up. Met with patient.       Interval History and Content of Current Session:  Interim Events/Subjective Report/Content of Current Session:  follow up appointment.    Pt is a 59 year old female with past psychiatric hx of Bipolar 1 disorder, anxiety NOS who presents for follow up treatment. She is currently taking Lithium 300mg TID, Xanax 0.5 mg QHS PRN for sleep (uses sparingly), Abilify 5mg QD, hydroxyzine 50mg qd prn. Meds are tolerated well overall and provide good symptomatic relief. Her last Li level was 0.4, drawn 11/2023. This is subtherapeutic, but patient has demonstrated stability on current dosing.    Pt continues to modify her lithium dose without my guidance. She reported "sometimes I only take two instead of three." Compliance talk given.     Between sessions, pt reports a stable mood. There is no evidence of manic or hypomanic episodes since our last visit. They deny grandiosity, euphoria, or pressured speech/racing thoughts. No issues with anger management or excessive irritability. They are high functioning and doing well.     Between sessions, the patient reports improvement in anxiety, which is now well-managed. They deny experiencing any major exacerbations and report good stress tolerance. The patient does not report " "excessive or unproductive worrying and denies somatic symptoms of anxiety, such as restlessness, muscle tension, or chest tightness. They also deny excessive irritability and indicate a good ability to manage frustration. The patient reports sleeping well, with good appetite and energy levels. Overall, they are stable and functioning at a high level.    Pt reports that depressive symptoms have remained well-managed since last visit, with no major decompensations noted. Mood is euthymic and they deny anhedonia, hopelessness, or worthlesness. Motivation level is good. Sleep and appetite are stable.           Past Psychiatric hx: Pt. is a 55 year old female  with a past psychiatric hx of Bipolar 1 disorder, depressed who established care with me 07/19 following the residential of her previous psych, Dr. Pelaez. She came to me taking Lithium 600mg BID and Xanax 0.5 mg QHS PRN for sleep (only uses this sparingly) , which had been previously prescribed and managed by Dr. Pelaez. She reports that her symptoms have stabilized over the last 7-8 months, and has been without a manic episode since November of 2018. Previous med trials of Lamictal (rash), Risperdal, Wellbutrin, some SSRIs (destablized mood, triggered manic episodes). Pt stable in clinic upon initial eval - all meds were continued at same dosage.      Notes a long standing history of "mood swings" dating to childhood, but first formal psych encounter in 1987 following the birth of her child. While admitted to the hospital following birth, and after being discharged, pt speaks to experiencing A/V hallucinations and euphoric gamal following a two-week period of going without sleep. "I was real paranoid after I had the baby. I felt that if I went to sleep, I wouldn't be able to hear the baby cry. I ended up going a couple weeks where I didn't sleep at all, started hearing things and seeing things that weren't there. I felt like people were trying to contact me and " "tell me things through the computer or the TV. I was paranoid and was thinking the vampires were after us. I started thinking I was someone I wasn't." She eventually sought psychiatric care in an outpatient seeing, but was not started on any psychiatric medication until 2002 (?). She was prescribed SSRIs and Wellbutrin at this time, which triggered a manic episode, and were d/c'd. She was eventually started on Lithium around 2005, and has achieved good results. States she has felt more stable than she's ever felt before.      Upon initial eval, pt reports manic episodes "once or twice a year" that are triggered by a lack of sleep. States that her manic episodes last for "weeks" and "I get hyper, my metabolism speeds up. I feel like I'm sped up. I always try to buy a car or a house. I had two houses at one time. It always ends with them putting me in the hospital." Does continue to experience delusional thinking and auditory hallucinations exclusively during manic episodes. Hx of several ED admissions in 03/19 r/t delusions, and "walking down the street talking to people who weren't there" Per - ED note. Reports inpatient hospitalization following manic episodes to [facility] x 1 week in November of 2018. Also reports another admission to [facility] in March of 2018 for 3 days following a manic episode. During these episodes, "I get very happy during those episodes. I start thinking I'm [place] and can communicate through the TV".      Denies any depressive symptoms. Reports sleeping 7-9 hours of sleep each night, restful. No issues falling or staying asleep. Denies anhedonia. Denies guilt/worthlessness. Energy good, concentration good. Appetite good. Denies psychomotor slowing, denies SI.      On 10/16, pt daughter reported "She seems a little bit better during the day, but is still pretty manic and only sleeping 2-3 hours at night, despite trying to up-titrate the medication.  She had to have a meeting with her " "supervisors at work yesterday, but they have fortunately been very understanding about her bipolar" Episodes do not meet criteria for true gamal/or hypomania, but we have been up-titrating Seroquel (started 10/19) to address symptoms - pt has responded well but reports dry mouth.I advised pt to add dose of Seroquel 50mg Q AM. However, she states pt states this made her groggy so d/c this. Despite this, pt reports at f/u "I've been feeling so much better. I'm actually sleeping now and definitely haven't been feeling manic at all. I feel way more calm now." However,  Notes overall less mood lability, less agitation. Denies manic/hypomanic episodes since last visit. She went to [place] on vacation between visit - enjoyed this. Pt doing well with no decompensations since last visit.  Does note continued generalized worry. Feels restless, on edge.       During her 5/20 f/u visit pt reports "I've been getting into bed at night, and sometimes it feels like someone else is getting in bed with me. It got to the point where I couldn't sleep in my bed because I was kind of scared. I thought it was my cat but my cat wasn't in the room. Sometime it even touches me, like it's brushing on either my foot or back. Like someone is in here trying to wake me up, but no one is in there. I usually just go and sleep on the sofa." Denies A/V hallucinations.     Seroquel was increased following this visit, Today, pt reports "I don't have those feelings in my bed anymore. I'm not afraid to get in bed anymore. Thanks goodness. I've been sleeping better." Does reports "feeling extremely tired when I take it in the morning." and requests to move to PM dosing. Tolerates well otherwise. Denies any gamal or hypomania between sessions.      Past Medical hx:   Past Medical History:   Diagnosis Date    Bipolar 1 disorder     Cancer     skin cancer to right arm    GERD (gastroesophageal reflux disease)     History of psychiatric hospitalization  "    Mental disorder     Thyroid disease         Interim hx:  Medication changes last visit:   Anxiety: inc'd  Depression: denies     Denies suicidal/homicidal ideations.  Denies hopelessness/worthlessness.    Denies auditory/visual hallucinations       Tobacco: never used  Alcohol: denies   Drug use: denies  Caffeine: 1-2 cups coffee daily      Review of Systems   PSYCHIATRIC: Pertinent items are noted in the narrative.        CONSTITUTIONAL: weight stable        M/S: no pain today         ENT: no allergies noted today        ABD: no n/v/d     Past Medical, Family and Social History: The patient's past medical, family and social history have been reviewed and updated as appropriate within the electronic medical record. See encounter notes.       Compliance: yes      Side effects: dry mouth from Seroquel     Risk Parameters:  Patient reports no suicidal ideation  Patient reports no homicidal ideation  Patient reports no self-injurious behavior  Patient reports no violent behavior     Exam (detailed: at least 9 elements; comprehensive: all 15 elements)   Constitutional  Vitals:  Most recent vital signs, dated less than 90 days prior to this appointment, were reviewed. There were no vitals taken for this visit.     General:  unremarkable, age appropriate, casual attire, good eye contact, good rapport       Musculoskeletal  Muscle Strength/Tone:  no flaccidity, no tremor    Gait & Station:  normal      Psychiatric                       Speech:  normal tone, normal rate, rhythm, and volume   Mood & Affect:   Euthymic, congruent, appropriate         Thought Process:   Goal directed; Linear    Associations:   intact   Thought Content:   No SI/HI, + fixed delusions, or paranoia, no AV/VH   Insight & Judgement:   Good, adequate to circumstances   Orientation:   grossly intact; alert and oriented x 4    Memory:  intact for content of interview    Language:  grossly intact, can repeat    Attention Span  : Grossly intact for  "content of interview   Fund of Knowledge:   intact and appropriate to age and level of education        Assessment and Diagnosis   Status/Progress: Based on the examination today, the patient's problem(s) is/are under fair control.  New problems have not been presented today. Comorbidities are not currently complicating management of the primary condition.      Impression:       Pt is a 59 year old female with past psychiatric hx of Bipolar 1 disorder, anxiety NOS who presents for follow up treatment. She is currently taking Lithium 300mg TID, Xanax 0.5 mg QHS PRN for sleep (uses sparingly), Abilify 5mg QD, hydroxyzine 50mg qd prn. Meds are tolerated well overall and provide good symptomatic relief. Her last Li level was 0.4, drawn 11/2023. This is subtherapeutic, but patient has demonstrated stability on current dosing.    Pt continues to modify her lithium dose without my guidance. She reported "sometimes I only take two instead of three." Compliance talk given.     Between sessions, pt reports a stable mood. There is no evidence of manic or hypomanic episodes since our last visit. They deny grandiosity, euphoria, or pressured speech/racing thoughts. No issues with anger management or excessive irritability. They are high functioning and doing well.     Between sessions, the patient reports improvement in anxiety, which is now well-managed. They deny experiencing any major exacerbations and report good stress tolerance. The patient does not report excessive or unproductive worrying and denies somatic symptoms of anxiety, such as restlessness, muscle tension, or chest tightness. They also deny excessive irritability and indicate a good ability to manage frustration. The patient reports sleeping well, with good appetite and energy levels. Overall, they are stable and functioning at a high level.    Pt reports that depressive symptoms have remained well-managed since last visit, with no major decompensations noted. " Mood is euthymic and they deny anhedonia, hopelessness, or worthlesness. Motivation level is good. Sleep and appetite are stable.                 Diagnosis: Bipolar 1 disorder, partial remission, anxiety unspecified    Intervention/Counseling/Treatment Plan   Medication Management:      1) Cont Lithium 300 mg TID for mood. Pt instructed to monitor closely for mood destabilization as we have been gradually tapering due to inc'd TSH.    2) Cont Abilify 5mg QD. Typical ELFEGO's reviewed including weight gain, abnormal movements, EPS, TD, metabolic side effects.     4) Continue Xanax 0.5 mg QHS PRN for sleep (uses once weekly. Discussed risk of decreased RT, sedation, addictive potential, and not to mix with alcohol.      4. Call to report any worsening of symptoms or problems with the medication. Pt instructed to go to ER with thoughts of harming self, others     5. Patient given contact # for psychotherapists at [facility] and also instructed she may check with insurance for list of providers.      6. Labs: none today       Return to clinic: 3 weeks, self    -Spent 30min face to face with the pt; >50% time spent in counseling   -Supportive therapy and psychoeducation provided  -R/B/SE's of medications discussed with the pt who expresses understanding and chooses to take medications as prescribed.   -Pt instructed to call clinic, 911 or go to nearest emergency room if sxs worsen or pt is in   crisis. The pt expresses understanding.    Montana Aguilar, NP

## 2025-05-24 ENCOUNTER — LAB VISIT (OUTPATIENT)
Dept: LAB | Facility: HOSPITAL | Age: 60
End: 2025-05-24
Payer: MEDICARE

## 2025-05-24 DIAGNOSIS — Z79.899 HIGH RISK MEDICATION USE: ICD-10-CM

## 2025-05-24 DIAGNOSIS — E78.49 OTHER HYPERLIPIDEMIA: ICD-10-CM

## 2025-05-24 DIAGNOSIS — E78.5 HYPERLIPIDEMIA, UNSPECIFIED HYPERLIPIDEMIA TYPE: ICD-10-CM

## 2025-05-24 LAB
CHOLEST SERPL-MCNC: 150 MG/DL (ref 120–199)
CHOLEST/HDLC SERPL: 2.6 {RATIO} (ref 2–5)
HDLC SERPL-MCNC: 57 MG/DL (ref 40–75)
HDLC SERPL: 38 % (ref 20–50)
LDLC SERPL CALC-MCNC: 55.4 MG/DL (ref 63–159)
LITHIUM SERPL-SCNC: 0.6 MMOL/L (ref 0.6–1.2)
NONHDLC SERPL-MCNC: 93 MG/DL
TRIGL SERPL-MCNC: 188 MG/DL (ref 30–150)

## 2025-05-24 PROCEDURE — 36415 COLL VENOUS BLD VENIPUNCTURE: CPT

## 2025-05-24 PROCEDURE — 80178 ASSAY OF LITHIUM: CPT

## 2025-05-24 PROCEDURE — 80061 LIPID PANEL: CPT

## 2025-05-26 ENCOUNTER — RESULTS FOLLOW-UP (OUTPATIENT)
Dept: PSYCHIATRY | Facility: CLINIC | Age: 60
End: 2025-05-26

## 2025-05-26 DIAGNOSIS — Z00.00 ENCOUNTER FOR MEDICARE ANNUAL WELLNESS EXAM: ICD-10-CM

## 2025-05-29 NOTE — TELEPHONE ENCOUNTER
Care Due:                  Date            Visit Type   Department     Provider  --------------------------------------------------------------------------------                                ESTABLISHED                              PATIENT -    [facility] INTERNAL  Last Visit: 03-      [facility]       Latrice Christianson  Next Visit: None Scheduled  None         None Found                                                            Last  Test          Frequency    Reason                     Performed    Due Date  --------------------------------------------------------------------------------    CMP.........  12 months..  atorvastatin.............  05- 05-    TSH.........  12 months..  levothyroxine............  05- 05-    [facility] Embedded Care Due Messages. Reference number: [id number].   5/29/2025 10:19:20 AM CDT

## 2025-05-30 RX ORDER — LEVOTHYROXINE SODIUM 50 UG/1
50 TABLET ORAL
Qty: 90 TABLET | Refills: 0 | OUTPATIENT
Start: 2025-05-30

## 2025-05-30 NOTE — TELEPHONE ENCOUNTER
Refill Routing Note   Medication(s) are not appropriate for processing by [facility] for the following reason(s):        Patient not seen by provider within 15 months  ED/Hospital Visit since last OV with provider  Required labs outdated  No active prescription written by provider    [facility] action(s):  Defer   Requires labs : Yes           Extended chart review required: Yes     Appointments  past 12m or future 3m with PCP    Date Provider   Last Visit   Visit date not found Liam Holcomb MD   Next Visit   Visit date not found Liam Holcomb MD   ED visits in past 90 days: 0        Note composed:8:51 PM 05/29/2025

## 2025-06-18 ENCOUNTER — TELEPHONE (OUTPATIENT)
Dept: INTERNAL MEDICINE | Facility: CLINIC | Age: 60
End: 2025-06-18
Payer: MEDICARE

## 2025-06-19 ENCOUNTER — OFFICE VISIT (OUTPATIENT)
Dept: INTERNAL MEDICINE | Facility: CLINIC | Age: 60
End: 2025-06-19
Payer: MEDICARE

## 2025-06-19 ENCOUNTER — LAB VISIT (OUTPATIENT)
Dept: LAB | Facility: HOSPITAL | Age: 60
End: 2025-06-19
Attending: INTERNAL MEDICINE
Payer: MEDICARE

## 2025-06-19 ENCOUNTER — RESULTS FOLLOW-UP (OUTPATIENT)
Dept: INTERNAL MEDICINE | Facility: CLINIC | Age: 60
End: 2025-06-19

## 2025-06-19 VITALS
DIASTOLIC BLOOD PRESSURE: 78 MMHG | OXYGEN SATURATION: 97 % | HEIGHT: 66 IN | WEIGHT: 250 LBS | SYSTOLIC BLOOD PRESSURE: 118 MMHG | BODY MASS INDEX: 40.18 KG/M2 | HEART RATE: 51 BPM

## 2025-06-19 DIAGNOSIS — K21.9 GASTROESOPHAGEAL REFLUX DISEASE, UNSPECIFIED WHETHER ESOPHAGITIS PRESENT: ICD-10-CM

## 2025-06-19 DIAGNOSIS — E03.9 HYPOTHYROIDISM, UNSPECIFIED TYPE: ICD-10-CM

## 2025-06-19 DIAGNOSIS — K63.5 POLYP OF COLON, UNSPECIFIED PART OF COLON, UNSPECIFIED TYPE: ICD-10-CM

## 2025-06-19 DIAGNOSIS — E78.5 HYPERLIPIDEMIA, UNSPECIFIED HYPERLIPIDEMIA TYPE: ICD-10-CM

## 2025-06-19 DIAGNOSIS — Z00.00 ANNUAL PHYSICAL EXAM: Primary | ICD-10-CM

## 2025-06-19 DIAGNOSIS — F31.9 BIPOLAR 1 DISORDER: ICD-10-CM

## 2025-06-19 DIAGNOSIS — Z00.00 ANNUAL PHYSICAL EXAM: ICD-10-CM

## 2025-06-19 LAB
ABSOLUTE EOSINOPHIL (OHS): 0.28 K/UL
ABSOLUTE MONOCYTE (OHS): 0.57 K/UL (ref 0.3–1)
ABSOLUTE NEUTROPHIL COUNT (OHS): 4.3 K/UL (ref 1.8–7.7)
ALBUMIN SERPL BCP-MCNC: 4.1 G/DL (ref 3.5–5.2)
ALP SERPL-CCNC: 63 UNIT/L (ref 40–150)
ALT SERPL W/O P-5'-P-CCNC: 18 UNIT/L (ref 10–44)
ANION GAP (OHS): 9 MMOL/L (ref 8–16)
AST SERPL-CCNC: 17 UNIT/L (ref 11–45)
BASOPHILS # BLD AUTO: 0.05 K/UL
BASOPHILS NFR BLD AUTO: 0.6 %
BILIRUB SERPL-MCNC: 1.6 MG/DL (ref 0.1–1)
BILIRUB UR QL STRIP.AUTO: NEGATIVE
BUN SERPL-MCNC: 14 MG/DL (ref 8–23)
CALCIUM SERPL-MCNC: 9.6 MG/DL (ref 8.7–10.5)
CHLORIDE SERPL-SCNC: 109 MMOL/L (ref 95–110)
CLARITY UR: CLEAR
CO2 SERPL-SCNC: 26 MMOL/L (ref 23–29)
COLOR UR AUTO: YELLOW
CREAT SERPL-MCNC: 0.7 MG/DL (ref 0.5–1.4)
ERYTHROCYTE [DISTWIDTH] IN BLOOD BY AUTOMATED COUNT: 12.7 % (ref 11.5–14.5)
GFR SERPLBLD CREATININE-BSD FMLA CKD-EPI: >60 ML/MIN/1.73/M2
GLUCOSE SERPL-MCNC: 84 MG/DL (ref 70–110)
GLUCOSE UR QL STRIP: NEGATIVE
HCT VFR BLD AUTO: 42.6 % (ref 37–48.5)
HGB BLD-MCNC: 13.5 GM/DL (ref 12–16)
HGB UR QL STRIP: NEGATIVE
HOLD SPECIMEN: NORMAL
IMM GRANULOCYTES # BLD AUTO: 0.02 K/UL (ref 0–0.04)
IMM GRANULOCYTES NFR BLD AUTO: 0.2 % (ref 0–0.5)
KETONES UR QL STRIP: NEGATIVE
LEUKOCYTE ESTERASE UR QL STRIP: NEGATIVE
LYMPHOCYTES # BLD AUTO: 2.96 K/UL (ref 1–4.8)
MCH RBC QN AUTO: 29.9 PG (ref 27–31)
MCHC RBC AUTO-ENTMCNC: 31.7 G/DL (ref 32–36)
MCV RBC AUTO: 95 FL (ref 82–98)
NITRITE UR QL STRIP: NEGATIVE
NUCLEATED RBC (/100WBC) (OHS): 0 /100 WBC
PH UR STRIP: 7 [PH]
PLATELET # BLD AUTO: 321 K/UL (ref 150–450)
PMV BLD AUTO: 11 FL (ref 9.2–12.9)
POTASSIUM SERPL-SCNC: 4.5 MMOL/L (ref 3.5–5.1)
PROT SERPL-MCNC: 7.2 GM/DL (ref 6–8.4)
PROT UR QL STRIP: NEGATIVE
RBC # BLD AUTO: 4.51 M/UL (ref 4–5.4)
RELATIVE EOSINOPHIL (OHS): 3.4 %
RELATIVE LYMPHOCYTE (OHS): 36.2 % (ref 18–48)
RELATIVE MONOCYTE (OHS): 7 % (ref 4–15)
RELATIVE NEUTROPHIL (OHS): 52.6 % (ref 38–73)
SODIUM SERPL-SCNC: 144 MMOL/L (ref 136–145)
SP GR UR STRIP: 1.01
UROBILINOGEN UR STRIP-ACNC: NEGATIVE EU/DL
WBC # BLD AUTO: 8.18 K/UL (ref 3.9–12.7)

## 2025-06-19 PROCEDURE — 1159F MED LIST DOCD IN RCRD: CPT | Mod: CPTII,S$GLB,, | Performed by: INTERNAL MEDICINE

## 2025-06-19 PROCEDURE — 3008F BODY MASS INDEX DOCD: CPT | Mod: CPTII,S$GLB,, | Performed by: INTERNAL MEDICINE

## 2025-06-19 PROCEDURE — 99999 PR PBB SHADOW E&M-EST. PATIENT-LVL III: CPT | Mod: PBBFAC,,, | Performed by: INTERNAL MEDICINE

## 2025-06-19 PROCEDURE — 81003 URINALYSIS AUTO W/O SCOPE: CPT | Performed by: INTERNAL MEDICINE

## 2025-06-19 PROCEDURE — 85025 COMPLETE CBC W/AUTO DIFF WBC: CPT

## 2025-06-19 PROCEDURE — 36415 COLL VENOUS BLD VENIPUNCTURE: CPT

## 2025-06-19 PROCEDURE — 3074F SYST BP LT 130 MM HG: CPT | Mod: CPTII,S$GLB,, | Performed by: INTERNAL MEDICINE

## 2025-06-19 PROCEDURE — 3078F DIAST BP <80 MM HG: CPT | Mod: CPTII,S$GLB,, | Performed by: INTERNAL MEDICINE

## 2025-06-19 PROCEDURE — 80053 COMPREHEN METABOLIC PANEL: CPT

## 2025-06-19 PROCEDURE — 99396 PREV VISIT EST AGE 40-64: CPT | Mod: S$GLB,,, | Performed by: INTERNAL MEDICINE

## 2025-06-19 RX ORDER — PHENOL/SODIUM PHENOLATE
20 AEROSOL, SPRAY (ML) MUCOUS MEMBRANE DAILY PRN
Qty: 30 EACH | Refills: 1 | Status: SHIPPED | OUTPATIENT
Start: 2025-06-19 | End: 2026-06-19

## 2025-06-19 RX ORDER — ATORVASTATIN CALCIUM 20 MG/1
20 TABLET, FILM COATED ORAL NIGHTLY
Qty: 90 TABLET | Refills: 3 | Status: SHIPPED | OUTPATIENT
Start: 2025-06-19

## 2025-06-19 RX ORDER — LEVOTHYROXINE SODIUM 50 UG/1
50 TABLET ORAL
Qty: 90 TABLET | Refills: 3 | Status: SHIPPED | OUTPATIENT
Start: 2025-06-19

## 2025-06-19 NOTE — PROGRESS NOTES
CC:  Annual exam     HPI:  The patient is a 60-year-old female with hyperlipidemia, reflux, hypothyroidism, bipolar disorder, colon polyps and history of gastric sleeve who presents today for annual exam.     ROS:  Weight is stable.  Eye exam was done 1 month ago.  No auditory changes.  No dysphagia.  No chest pain.  No shortness a breath.  She walks 2 miles 3 to 4 times a week.  No nausea vomiting.  No abdominal pain.  He has a bowel movement every other day.  Last colonoscopy was in 2025.  It did show polyps.  No bladder changes.  Last gyn visit was in October of 2024.  No skin changes.  No breast changes.  The patient does have a history of breast reduction surgery.  She had a mammogram in October of 2024.  No numbness or tingling in arms or legs.    Physical exam:   General appearance: No acute distress  HEENT: Conjunctiva is clear.  Pupils equal.  TMs are clear.  Nasal septum is midline without discharge.  Oropharynx is without erythema.  Trachea is midline without JVD.  Pulmonary: Good inspiratory, expiratory breath sounds are heard.  Lungs are clear auscultation.  Cardiovascular: S1-S2, rhythm is regular.  Extremities without edema.  GI: Abdomen is nontender, nondistended without hepatosplenomegaly    Assessment:    Annual exam  2.  Reflux  3.  Bipolar disorder  4.  Hyperlipidemia  5.  Hypothyroidism  6.  Colon polyps  7.  History of gastric sleeve  Plan:    Will schedule a CBC, CMP, UA   2.  Refills of medications were provided for the patient   3.  The patient already had thyroid and lipid panel done.

## 2025-06-22 ENCOUNTER — PATIENT MESSAGE (OUTPATIENT)
Dept: INTERNAL MEDICINE | Facility: CLINIC | Age: 60
End: 2025-06-22
Payer: MEDICARE

## 2025-06-22 DIAGNOSIS — L98.9 SKIN LESION: ICD-10-CM

## 2025-06-22 DIAGNOSIS — Z85.828 HISTORY OF SKIN CANCER: Primary | ICD-10-CM

## 2025-06-23 ENCOUNTER — TELEPHONE (OUTPATIENT)
Dept: INTERNAL MEDICINE | Facility: CLINIC | Age: 60
End: 2025-06-23
Payer: MEDICARE

## 2025-06-23 DIAGNOSIS — R23.9 SKIN CHANGE: Primary | ICD-10-CM

## 2025-06-23 DIAGNOSIS — Z85.828 HISTORY OF SKIN CANCER: ICD-10-CM

## 2025-06-24 ENCOUNTER — TELEPHONE (OUTPATIENT)
Dept: DERMATOLOGY | Facility: CLINIC | Age: 60
End: 2025-06-24
Payer: MEDICARE

## 2025-07-15 ENCOUNTER — OFFICE VISIT (OUTPATIENT)
Dept: DERMATOLOGY | Facility: CLINIC | Age: 60
End: 2025-07-15
Payer: MEDICARE

## 2025-07-15 DIAGNOSIS — L82.1 SEBORRHEIC KERATOSES: Primary | ICD-10-CM

## 2025-07-15 DIAGNOSIS — Z85.828 HISTORY OF SKIN CANCER: ICD-10-CM

## 2025-07-15 DIAGNOSIS — R23.9 SKIN CHANGE: ICD-10-CM

## 2025-07-15 DIAGNOSIS — D22.9 BENIGN NEVUS: ICD-10-CM

## 2025-07-15 PROCEDURE — 1159F MED LIST DOCD IN RCRD: CPT | Mod: CPTII,S$GLB,, | Performed by: DERMATOLOGY

## 2025-07-15 PROCEDURE — 99999 PR PBB SHADOW E&M-EST. PATIENT-LVL III: CPT | Mod: PBBFAC,,, | Performed by: DERMATOLOGY

## 2025-07-15 PROCEDURE — 99202 OFFICE O/P NEW SF 15 MIN: CPT | Mod: S$GLB,,, | Performed by: DERMATOLOGY

## 2025-07-15 NOTE — PROGRESS NOTES
Subjective:      Patient ID:  Cristina Tan is a 60 y.o. female who presents for   Chief Complaint   Patient presents with    Lesion     Chest, L lower leg, R groin - 1yr     Lesion    Ms. Tan is a 60 year old female hx of BCC s/p excision with [facility] dermatology who presents for spot check of concerning skin lesions on the right pelvis, central chest, and left medial shin. Central chest lesions x2 present for approx 2 years, are rough feeling papules, otherwise asymptomatic. Right pelvis is a pink papule that she noticed 2 months ago without change since then, appeared suddenly, asymptomatic. Left medial shin present for approx 4 years, has not grown or changed since then, it intermittently pruritic but nontender.    Review of Systems   Constitutional:  Negative for fever, chills and weight loss.   HENT:  Negative for mouth sores.    Eyes:  Negative for itching and eye irritation.   Genitourinary:  Negative for genital sores and genital itching.   Skin:  Negative for itching and rash.       Objective:   Physical Exam   Constitutional: She appears well-developed and well-nourished.   Neurological: She is alert and oriented to person, place, and time.   Psychiatric: She has a normal mood and affect.   Skin:               Diagram Legend     Erythematous scaling macule/papule c/w actinic keratosis       Vascular papule c/w angioma      Pigmented verrucoid papule/plaque c/w seborrheic keratosis      Yellow umbilicated papule c/w sebaceous hyperplasia      Irregularly shaped tan macule c/w lentigo     1-2 mm smooth white papules consistent with Milia      Movable subcutaneous cyst with punctum c/w epidermal inclusion cyst      Subcutaneous movable cyst c/w pilar cyst      Firm pink to brown papule c/w dermatofibroma      Pedunculated fleshy papule(s) c/w skin tag(s)      Evenly pigmented macule c/w junctional nevus     Mildly variegated pigmented, slightly irregular-bordered macule c/w mildly atypical nevus       Flesh colored to evenly pigmented papule c/w intradermal nevus       Pink pearly papule/plaque c/w basal cell carcinoma      Erythematous hyperkeratotic cursted plaque c/w SCC      Surgical scar with no sign of skin cancer recurrence      Open and closed comedones      Inflammatory papules and pustules      Verrucoid papule consistent consistent with wart     Erythematous eczematous patches and plaques     Dystrophic onycholytic nail with subungual debris c/w onychomycosis     Umbilicated papule    Erythematous-base heme-crusted tan verrucoid plaque consistent with inflamed seborrheic keratosis     Erythematous Silvery Scaling Plaque c/w Psoriasis     See annotation      Assessment / Plan:        Seborrheic Keratosis  Benign Nevi  -Reassurance provided    History of BCC  Right proximal arm, s/p excision with [facility] dermatology. Most recent TBSE 2+ years ago.  -Follow up with Dr. Patel 11/18/25 for TBSE

## 2025-08-01 RX ORDER — LITHIUM CARBONATE 300 MG/1
TABLET, FILM COATED, EXTENDED RELEASE ORAL
Qty: 90 TABLET | Refills: 5 | Status: SHIPPED | OUTPATIENT
Start: 2025-08-01

## 2025-08-01 NOTE — TELEPHONE ENCOUNTER
LDO - 1/8/2025  LOV - 5/22/2025 @ 10:00 am ( Virtual )  FOV - None Scheduled ( Clinic Note 3 weeks - self )    Called Pt 8/1 @ 10:06 am. Scheduled 1 YR in Clinic Appointment 9/17 @ 10:30 am

## (undated) DEVICE — DRAPE STERI-DRAPE 1000 17X11IN

## (undated) DEVICE — GLOVE PROTEXIS LTX MICRO 8

## (undated) DEVICE — SEE MEDLINE ITEM 146313

## (undated) DEVICE — APPLICATOR CHLORAPREP ORN 26ML

## (undated) DEVICE — ALCOHOL 70% ISOP RUBBING 4OZ

## (undated) DEVICE — PAD CAST SPECIALIST STRL 3

## (undated) DEVICE — DRAPE THREE-QTR REINF 53X77IN

## (undated) DEVICE — TUBING SUC UNIV W/CONN 12FT

## (undated) DEVICE — SEE MEDLINE ITEM 152622

## (undated) DEVICE — FORCEP STRAIGHT DISP

## (undated) DEVICE — SEE MEDLINE ITEM 152514

## (undated) DEVICE — DRESSING XEROFORM FOIL PK 1X8

## (undated) DEVICE — GLOVE PROTEXIS LTX MICRO  7.5

## (undated) DEVICE — NDL 27G X 1 1/4

## (undated) DEVICE — TOURNIQUET SB QC DP 18X4IN

## (undated) DEVICE — SUT PROLENE 4-0 MONO 18IN

## (undated) DEVICE — SEE MEDLINE ITEM 157173

## (undated) DEVICE — BANDAGE ESMARK LATEX FREE 4INX

## (undated) DEVICE — CORD BIPOLAR 12 FOOT

## (undated) DEVICE — SEE L#120831

## (undated) DEVICE — GAUZE SPONGE 4X4 12PLY

## (undated) DEVICE — DRAPE PLASTIC U 60X72

## (undated) DEVICE — Device

## (undated) DEVICE — SUT 3-0 VICRYL / SH (J416)

## (undated) DEVICE — SEE MEDLINE ITEM 157128

## (undated) DEVICE — TOWEL OR DISP STRL BLUE 4/PK

## (undated) DEVICE — DRAPE HAND STERILE

## (undated) DEVICE — SYR 10CC LUER LOCK

## (undated) DEVICE — BOWL STERILE LARGE 32OZ

## (undated) DEVICE — SUT ETHILON 4-0 PS2 18 BLK

## (undated) DEVICE — SEE MEDLINE ITEM 157131

## (undated) DEVICE — APPLICATOR CHLORAPREP CLR 10.5

## (undated) DEVICE — BANDAGE ELAS SOFTWRAP ST 3X5YD

## (undated) DEVICE — DRAPE U SPLIT SHEET 54X76IN

## (undated) DEVICE — DRAPE HALF SURGICAL 40X58IN

## (undated) DEVICE — SEE MEDLINE ITEM 152487